# Patient Record
Sex: FEMALE | Race: WHITE | NOT HISPANIC OR LATINO | Employment: UNEMPLOYED | URBAN - METROPOLITAN AREA
[De-identification: names, ages, dates, MRNs, and addresses within clinical notes are randomized per-mention and may not be internally consistent; named-entity substitution may affect disease eponyms.]

---

## 2017-01-12 DIAGNOSIS — A60.00 HERPES SIMPLEX INFECTION OF GENITOURINARY SYSTEM: ICD-10-CM

## 2017-01-12 DIAGNOSIS — M19.90 OSTEOARTHRITIS, UNSPECIFIED OSTEOARTHRITIS TYPE, UNSPECIFIED SITE: ICD-10-CM

## 2017-01-12 DIAGNOSIS — G89.4 CHRONIC PAIN DISORDER: Primary | ICD-10-CM

## 2017-01-12 DIAGNOSIS — F33.1 MAJOR DEPRESSIVE DISORDER, RECURRENT EPISODE, MODERATE (H): ICD-10-CM

## 2017-01-12 DIAGNOSIS — F41.1 GENERALIZED ANXIETY DISORDER: ICD-10-CM

## 2017-01-12 DIAGNOSIS — J30.89 OTHER ALLERGIC RHINITIS: ICD-10-CM

## 2017-01-12 DIAGNOSIS — Z98.1 STATUS POST LAMINECTOMY WITH SPINAL FUSION: ICD-10-CM

## 2017-01-12 RX ORDER — VALACYCLOVIR HYDROCHLORIDE 500 MG/1
500 TABLET, FILM COATED ORAL DAILY
Qty: 30 TABLET | Refills: 0 | Status: SHIPPED | OUTPATIENT
Start: 2017-01-12 | End: 2017-05-03

## 2017-01-12 RX ORDER — MELOXICAM 7.5 MG/1
7.5-15 TABLET ORAL DAILY
Qty: 60 TABLET | Refills: 1 | Status: SHIPPED
Start: 2017-01-12 | End: 2017-06-09 | Stop reason: ALTCHOICE

## 2017-01-12 NOTE — TELEPHONE ENCOUNTER
Valacyclovir     Last Written Prescription Date: 10/04/16  Last Fill Quantity: 90, # refills: 0  Last Office Visit with [facility] or [facility] prescribing provider: 12/09/16   Next 5 appointments (look out 90 days)     Feb 13, 2017  9:20 AM   Office Visit with Rosaura Flores PA-C   [facility]    [address] [place] [id number] [phone]                   CREATININE   Date Value Ref Range Status   07/05/2016 0.65 0.52 - 1.04 mg/dL Final

## 2017-01-12 NOTE — TELEPHONE ENCOUNTER
Fuad      Last Written Prescription Date: 12/09/16  Last Quantity: 60, # refills: 1  Last Office Visit with [facility], [facility] or [facility] prescribing provider: 12/09/16   Next 5 appointments (look out 90 days)     Feb 13, 2017  9:20 AM   Office Visit with Rosaura Flores PA-C   [facility]    [address] [place] [id number]   [phone]                   CREATININE   Date Value Ref Range Status   07/05/2016 0.65 0.52 - 1.04 mg/dL Final     AST        7   7/5/2016  ALT       21   7/5/2016  BP Readings from Last 3 Encounters:   12/09/16 144/100   10/14/16 138/75   09/15/16 146/92

## 2017-01-20 DIAGNOSIS — A60.00 HERPES SIMPLEX INFECTION OF GENITOURINARY SYSTEM: ICD-10-CM

## 2017-01-20 DIAGNOSIS — G89.4 CHRONIC PAIN DISORDER: ICD-10-CM

## 2017-01-20 DIAGNOSIS — F41.1 GENERALIZED ANXIETY DISORDER: ICD-10-CM

## 2017-01-20 DIAGNOSIS — F33.1 MAJOR DEPRESSIVE DISORDER, RECURRENT EPISODE, MODERATE (H): ICD-10-CM

## 2017-01-20 DIAGNOSIS — J30.89 OTHER ALLERGIC RHINITIS: Primary | ICD-10-CM

## 2017-01-20 DIAGNOSIS — Z98.1 STATUS POST LAMINECTOMY WITH SPINAL FUSION: ICD-10-CM

## 2017-01-20 RX ORDER — CETIRIZINE HYDROCHLORIDE 10 MG/1
10 TABLET ORAL 2 TIMES DAILY
Qty: 60 TABLET | Refills: 5 | Status: SHIPPED | OUTPATIENT
Start: 2017-01-20 | End: 2018-01-04

## 2017-01-20 NOTE — TELEPHONE ENCOUNTER
cetirizine (ZYRTEC) 10 MG tablet      Last Written Prescription Date: 1/18/2016  Last Fill Quantity: 60,  # refills: 11   Last Office Visit with [facility], [facility] or [facility] prescribing provider: 12/9/2016                                         Next 5 appointments (look out 90 days)     Feb 13, 2017  9:20 AM   Office Visit with Rosaura Flores PA-C   [facility]    [address] [place] [id number] [phone]

## 2017-01-24 ENCOUNTER — TELEPHONE (OUTPATIENT)
Dept: FAMILY MEDICINE | Facility: CLINIC | Age: 47
End: 2017-01-24

## 2017-01-24 DIAGNOSIS — Z98.1 STATUS POST LAMINECTOMY WITH SPINAL FUSION: Primary | ICD-10-CM

## 2017-01-24 DIAGNOSIS — G89.4 CHRONIC PAIN DISORDER: ICD-10-CM

## 2017-01-24 NOTE — TELEPHONE ENCOUNTER
left message for patient to return call.  I just want her to know that Rosaura Flores PA-C is out of the office today  Mariaa Sierra RN

## 2017-01-24 NOTE — TELEPHONE ENCOUNTER
Reason for Call:  Medication or medication refill:    Do you use a [facility]?  Name of the pharmacy and phone number for the current request: Wants written Rx    Name of the medication requested: Norco    Other request: Cynthia says she won't see the spine specialist until Feb 10th and will see Rosaura on Feb 17. She has a new job so can only make apts on Fridays. Her [place] will be due to be refilled on Feb 9th. She wonders if Rosaura will write her Rx to be filled on 2/9 and she will .     Can we leave a detailed message on this number? YES    Phone number patient can be reached at: Home number on file [phone] (home)    Best Time: anytime    Call taken on 1/24/2017 at 8:34 AM by Lian Hernandes [phone]      Last Written Prescription Date:  1/9/17  Last Fill Quantity: 180,   # refills: 0  Last Office Visit with [facility] or [facility] prescribing provider: 12/9/16  Future Office visit:    Next 5 appointments (look out 90 days)     Feb 17, 2017  7:40 AM   Office Visit with Rosaura Flores PA-C   [facility]    [address] [place] [id number] [phone]                   Routing refill request to provider for review/approval because:  Drug not on the [facility], [facility] or [facility] refill protocol or controlled substance

## 2017-01-25 RX ORDER — HYDROCODONE BITARTRATE AND ACETAMINOPHEN 5; 325 MG/1; MG/1
TABLET ORAL
Qty: 180 TABLET | Refills: 0 | Status: SHIPPED | OUTPATIENT
Start: 2017-02-09 | End: 2017-02-17

## 2017-01-25 NOTE — TELEPHONE ENCOUNTER
Pt informed. Script walked over to pharmacy in [facility].     Sherita Wilhelm MA  8:33 AM 1/25/2017

## 2017-02-17 ENCOUNTER — OFFICE VISIT (OUTPATIENT)
Dept: FAMILY MEDICINE | Facility: CLINIC | Age: 47
End: 2017-02-17
Payer: COMMERCIAL

## 2017-02-17 VITALS
DIASTOLIC BLOOD PRESSURE: 94 MMHG | HEART RATE: 87 BPM | SYSTOLIC BLOOD PRESSURE: 142 MMHG | WEIGHT: 211 LBS | TEMPERATURE: 97.2 F | BODY MASS INDEX: 33.91 KG/M2 | HEIGHT: 66 IN

## 2017-02-17 DIAGNOSIS — G89.4 CHRONIC PAIN DISORDER: ICD-10-CM

## 2017-02-17 DIAGNOSIS — Z98.1 STATUS POST LAMINECTOMY WITH SPINAL FUSION: Primary | ICD-10-CM

## 2017-02-17 DIAGNOSIS — E66.9 NON MORBID OBESITY, UNSPECIFIED OBESITY TYPE: ICD-10-CM

## 2017-02-17 DIAGNOSIS — I10 ESSENTIAL HYPERTENSION WITH GOAL BLOOD PRESSURE LESS THAN 140/90: ICD-10-CM

## 2017-02-17 PROCEDURE — 99214 OFFICE O/P EST MOD 30 MIN: CPT | Performed by: PHYSICIAN ASSISTANT

## 2017-02-17 RX ORDER — HYDROCODONE BITARTRATE AND ACETAMINOPHEN 5; 325 MG/1; MG/1
TABLET ORAL
Qty: 187 TABLET | Refills: 0 | Status: SHIPPED | OUTPATIENT
Start: 2017-02-17 | End: 2017-02-17

## 2017-02-17 RX ORDER — HYDROCODONE BITARTRATE AND ACETAMINOPHEN 5; 325 MG/1; MG/1
TABLET ORAL
Qty: 180 TABLET | Refills: 0 | Status: SHIPPED | OUTPATIENT
Start: 2017-02-17 | End: 2017-05-03

## 2017-02-17 ASSESSMENT — ANXIETY QUESTIONNAIRES
7. FEELING AFRAID AS IF SOMETHING AWFUL MIGHT HAPPEN: NOT AT ALL
6. BECOMING EASILY ANNOYED OR IRRITABLE: SEVERAL DAYS
GAD7 TOTAL SCORE: 4
2. NOT BEING ABLE TO STOP OR CONTROL WORRYING: SEVERAL DAYS
1. FEELING NERVOUS, ANXIOUS, OR ON EDGE: SEVERAL DAYS
3. WORRYING TOO MUCH ABOUT DIFFERENT THINGS: NOT AT ALL
5. BEING SO RESTLESS THAT IT IS HARD TO SIT STILL: NOT AT ALL

## 2017-02-17 ASSESSMENT — PATIENT HEALTH QUESTIONNAIRE - PHQ9: 5. POOR APPETITE OR OVEREATING: SEVERAL DAYS

## 2017-02-17 NOTE — PROGRESS NOTES
SUBJECTIVE:                                                    Cynthia Lyons is a 47 year old female who presents to clinic today for the following health issues:    Chronic Pain Follow-Up      Type / Location of Pain: Back Pain   Analgesia/pain control:      Recent changes: Worse-     Overall control: Tolerable with discomfort  Activity level/function:     Daily activities: Can do most things most days, with some rest    Work: not applicable  Adverse effects: No  Adherance    Taking medication as directed? Yes    Participating in other treatments: Recently saw spine specialist  Risk Factors:    Sleep: Good    Mood/anxiety: controlled    Recent family or social stressors: none noted    Other aggravating factors: prolonged sitting and prolonged standing  PHQ-9 SCORE 3/2/2016 6/7/2016 9/7/2016   Total Score - - -   Total Score 0 0 0     NANCI-7 SCORE 3/2/2016 6/7/2016 9/7/2016   Total Score - - -   Total Score 0 0 0     Encounter-Level CSA - 08/11/2016:                 Controlled Substance Agreement - Scan on 9/8/2016  8:37 AM : CONTROLLED SUBSTANCE AGREEMENT 08/11/2016 (below)          Encounter-Level CSA - 08/11/2016:                 Controlled Substance Agreement - Scan on 9/16/2014  3:59 PM : [facility] Controlled Medication Agreement 9/10/14 (below)               Saw Dr Otto - liked him.  He suggested pool therapy and wt loss, no other recommendations.  Patient had been on disability in past, tried to go back to work but will be going back on full disability.  * Needs to talk about medications, is going to be going to [place] 2/28 to 3/13.  Wonders about temporary increase in #tabs as always hurts worse when not in her own bed.    Wt - states she gained a lot over holidays and has lost some already, plans to lose more.  Has lap band, thinking about getting adjusted.    BP - high today and in Dec.  History of HTN, off meds > 1 yr.  States in past BP very sensitive to her wt.  Thinks BPs maybe higher in mornings  "since that is when her pain is worst.      Amount of exercise or physical activity: None    Problems taking medications regularly: No    Medication side effects: none  Diet: regular (no restrictions)    Problem list and histories reviewed & adjusted, as indicated.  Additional history: as documented    Problem list, Medication list, Allergies, and Medical/Social/Surgical histories reviewed in [facility] and updated as appropriate.    ROS:  Constitutional, cardiovascular, pulmonary, musculoskeletal, and psych systems are negative, except as otherwise noted.    OBJECTIVE:                                                    BP (!) 142/94  Pulse 87  Temp 97.2  F (36.2  C) (Tympanic)  Ht 5' 6\" (1.676 m)  Wt 211 lb (95.7 kg)  BMI 34.06 kg/m2  Body mass index is 34.06 kg/(m^2).  GENERAL: healthy, alert and no distress  Heart: Regular rate and rhythm, no murmurs or rubs        ASSESSMENT/PLAN:                                                        ICD-10-CM    1. Status post laminectomy with spinal fusion Z98.1 HYDROcodone-acetaminophen (NORCO) 5-325 MG per tablet     DISCONTINUED: HYDROcodone-acetaminophen (NORCO) 5-325 MG per tablet   2. Chronic pain disorder G89.4 HYDROcodone-acetaminophen (NORCO) 5-325 MG per tablet     DISCONTINUED: HYDROcodone-acetaminophen (NORCO) 5-325 MG per tablet   3. Essential hypertension with goal blood pressure less than 140/90 I10    4. Non morbid obesity, unspecified obesity type E66.9      Patient Instructions     Start the pool therapy per Dr Otto    Refills today should last about until 5/9    See me then, or sooner if needed    Start checking BPs - need to be under 140/90.  Or we need to get you on a medicine - call if we need to resume lisinopril.    Keep working on weight loss - for your back and your BP      Rosaura Flores PA-C  [facility]    "

## 2017-02-17 NOTE — NURSING NOTE
"Chief Complaint   Patient presents with     Musculoskeletal Problem     BP (!) 153/95 (BP Location: Right arm, Patient Position: Chair, Cuff Size: Adult Large)  Pulse 87  Temp 97.2  F (36.2  C) (Tympanic)  Ht 5' 6\" (1.676 m)  Wt 211 lb (95.7 kg)  BMI 34.06 kg/m2 Estimated body mass index is 34.06 kg/(m^2) as calculated from the following:    Height as of this encounter: 5' 6\" (1.676 m).    Weight as of this encounter: 211 lb (95.7 kg).  bp completed using cuff size: large      Health Maintenance that is potentially due pending provider review:  PHQ9    Possibly completing today per provider review.  Lorena CHI MA      "

## 2017-02-17 NOTE — MR AVS SNAPSHOT
After Visit Summary   2/17/2017    Cynthia Lyons    MRN: [id number]           Patient Information     Date Of Birth          1970        Visit Information        Provider Department      2/17/2017 7:40 AM Rosarua Flores PA-C [facility]        Today's Diagnoses     Status post laminectomy with spinal fusion        Chronic pain disorder          Care Instructions      Start the pool therapy per Dr Otto    Refills today should last about until 5/9    See me then, or sooner if needed    Start checking BPs - need to be under 140/90.  Or we need to get you on a medicine - call if we need to resume lisinopril.    Keep working on weight loss - for your back and your BP        Follow-ups after your visit        Who to contact     If you have questions or need follow up information about today's clinic visit or your schedule please contact [facility] directly at [phone].  Normal or non-critical lab and imaging results will be communicated to you by [employer], letter or phone within 4 business days after the clinic has received the results. If you do not hear from us within 7 days, please contact the clinic through [employer] or phone. If you have a critical or abnormal lab result, we will notify you by phone as soon as possible.  Submit refill requests through [employer]) or call your pharmacy and they will forward the refill request to us. Please allow 3 business days for your refill to be completed.          Additional Information About Your Visit        MyChart Information     [employer] gives you secure access to your electronic health record. If you see a primary care provider, you can also send messages to your care team and make appointments. If you have questions, please call your primary care clinic.  If you do not have a primary care provider, please call [phone] and they will assist you.        Care EveryWhere ID     This is your Care  "EveryWhere ID. This could be used by other organizations to access your [place] medical records  [id number]        Your Vitals Were     Pulse Temperature Height BMI (Body Mass Index)          87 97.2  F (36.2  C) (Tympanic) 5' 6\" (1.676 m) 34.06 kg/m2         Blood Pressure from Last 3 Encounters:   02/17/17 (!) 153/95   12/09/16 (!) 144/100   10/14/16 138/75    Weight from Last 3 Encounters:   02/17/17 211 lb (95.7 kg)   08/11/16 211 lb 6.4 oz (95.9 kg)   05/03/16 204 lb (92.5 kg)              Today, you had the following     No orders found for display         Today's Medication Changes          These changes are accurate as of: 2/17/17  8:21 AM.  If you have any questions, ask your nurse or doctor.               Start taking these medicines.        Dose/Directions    HYDROcodone-acetaminophen 5-325 MG per tablet   Commonly known as:  NORCO   Used for:  Status post laminectomy with spinal fusion, Chronic pain disorder   Started by:  Rosaura Flores PA-C        2 in am and 2 at dinnertime/pm, and additional 1-2 at night.   Quantity:  180 tablet   Refills:  0         Stop taking these medicines if you haven't already. Please contact your care team if you have questions.     cefdinir 300 MG capsule   Commonly known as:  OMNICEF   Stopped by:  Rosaura Flores PA-C                Where to get your medicines      Some of these will need a paper prescription and others can be bought over the counter.  Ask your nurse if you have questions.     Bring a paper prescription for each of these medications     HYDROcodone-acetaminophen 5-325 MG per tablet                Primary Care Provider Office Phone # Fax #    Rosaura Flores PA-C [phone]       [address] [address]        Thank you!     Thank you for choosing [facility]  for your care. Our goal is always to provide you with excellent care. Hearing back from our patients is one " way we can continue to improve our services. Please take a few minutes to complete the written survey that you may receive in the mail after your visit with us. Thank you!             Your Updated Medication List - Protect others around you: Learn how to safely use, store and throw away your medicines at www.disposemymeds.org.          This list is accurate as of: 2/17/17  8:21 AM.  Always use your most recent med list.                   Brand Name Dispense Instructions for use    acetaminophen 500 MG tablet    TYLENOL    60    1 TABLET EVERY 6 HOURS. max tylenol/acetaminophen daily 4,000mg/24 hours       albuterol 108 (90 BASE) MCG/ACT Inhaler    albuterol    1 Inhaler    Inhale 2 puffs into the lungs every 4 hours as needed for shortness of breath / dyspnea       buPROPion 100 MG 12 hr tablet    WELLBUTRIN SR    30 tablet    Take 2 tablets (200 mg) by mouth daily       cetirizine 10 MG tablet    zyrTEC    60 tablet    Take 1 tablet (10 mg) by mouth 2 times daily       FLUoxetine 20 MG capsule    PROzac    360 capsule    Take 4 capsules (80 mg) by mouth daily       fluticasone 50 MCG/ACT spray    FLONASE    1 Package    Spray 1 spray in nostril 2 times daily.       HYDROcodone-acetaminophen 5-325 MG per tablet    NORCO    180 tablet    2 in am and 2 at dinnertime/pm, and additional 1-2 at night.       ipratropium - albuterol 0.5 mg/2.5 mg/3 mL 0.5-2.5 (3) MG/3ML neb solution    DUONEB    360 mL    Take 1 vial (3 mLs) by nebulization 4 times daily       meloxicam 7.5 MG tablet    MOBIC    60 tablet    Take 1-2 tablets (7.5-15 mg) by mouth daily If needed for pain (optional).  Take with food and do not overlap with any ibuprofen or naproxen.  Max 2 pills per 24 hrs.       methocarbamol 750 MG tablet    ROBAXIN    120 tablet    1-2 tabs as needed up to 3 times per day (4th time per day, on occasion, is ok).       pseudoePHEDrine 120 MG 12 hr tablet    SUDAFED    28 tablet    Take 1 tablet (120 mg) by mouth every 12  hours       valACYclovir 500 MG tablet    VALTREX    30 tablet    Take 1 tablet (500 mg) by mouth daily

## 2017-02-17 NOTE — PATIENT INSTRUCTIONS
Start the pool therapy per Dr Otto    Refills today should last about until 5/9    See me then, or sooner if needed    Start checking BPs - need to be under 140/90.  Or we need to get you on a medicine - call if we need to resume lisinopril.    Keep working on weight loss - for your back and your BP

## 2017-02-18 ASSESSMENT — ANXIETY QUESTIONNAIRES: GAD7 TOTAL SCORE: 4

## 2017-02-18 ASSESSMENT — PATIENT HEALTH QUESTIONNAIRE - PHQ9: SUM OF ALL RESPONSES TO PHQ QUESTIONS 1-9: 0

## 2017-04-05 DIAGNOSIS — Z98.1 STATUS POST LAMINECTOMY WITH SPINAL FUSION: ICD-10-CM

## 2017-04-05 RX ORDER — METHOCARBAMOL 750 MG/1
TABLET, FILM COATED ORAL
Qty: 120 TABLET | Refills: 0 | Status: SHIPPED | OUTPATIENT
Start: 2017-04-05 | End: 2017-06-09

## 2017-04-05 NOTE — TELEPHONE ENCOUNTER
methocarbamol (ROBAXIN) 750 MG tablet      Last Written Prescription Date:  12/9/16  Last Fill Quantity: 120,   # refills: 1  Last Office Visit with [facility] or [facility] provider: 2/17/17  Future Office visit:    Next 5 appointments (look out 90 days)     May 08, 2017  8:00 AM CDT   Office Visit with Rosaura Flores PA-C   [facility]    [address] [place] [id number]   [phone]                   Routing refill request to provider for review/approval because:  Drug not on the [facility] or [facility] refill protocol or controlled substance

## 2017-05-03 ENCOUNTER — OFFICE VISIT (OUTPATIENT)
Dept: FAMILY MEDICINE | Facility: CLINIC | Age: 47
End: 2017-05-03
Payer: COMMERCIAL

## 2017-05-03 VITALS
WEIGHT: 216.2 LBS | SYSTOLIC BLOOD PRESSURE: 144 MMHG | HEART RATE: 76 BPM | TEMPERATURE: 97.6 F | DIASTOLIC BLOOD PRESSURE: 100 MMHG | BODY MASS INDEX: 34.9 KG/M2

## 2017-05-03 DIAGNOSIS — Z98.1 STATUS POST LAMINECTOMY WITH SPINAL FUSION: ICD-10-CM

## 2017-05-03 DIAGNOSIS — G89.4 CHRONIC PAIN DISORDER: Primary | ICD-10-CM

## 2017-05-03 DIAGNOSIS — F41.0 PANIC ANXIETY SYNDROME: ICD-10-CM

## 2017-05-03 DIAGNOSIS — F33.1 MAJOR DEPRESSIVE DISORDER, RECURRENT EPISODE, MODERATE (H): ICD-10-CM

## 2017-05-03 DIAGNOSIS — J30.89 OTHER ALLERGIC RHINITIS: ICD-10-CM

## 2017-05-03 DIAGNOSIS — G89.4 CHRONIC PAIN DISORDER: ICD-10-CM

## 2017-05-03 DIAGNOSIS — F41.1 GENERALIZED ANXIETY DISORDER: ICD-10-CM

## 2017-05-03 DIAGNOSIS — A60.00 HERPES SIMPLEX INFECTION OF GENITOURINARY SYSTEM: ICD-10-CM

## 2017-05-03 DIAGNOSIS — I10 ESSENTIAL HYPERTENSION WITH GOAL BLOOD PRESSURE LESS THAN 140/90: ICD-10-CM

## 2017-05-03 PROCEDURE — 99214 OFFICE O/P EST MOD 30 MIN: CPT | Performed by: PHYSICIAN ASSISTANT

## 2017-05-03 RX ORDER — LISINOPRIL 20 MG/1
20 TABLET ORAL DAILY
Qty: 30 TABLET | Refills: 0 | Status: SHIPPED | OUTPATIENT
Start: 2017-05-03 | End: 2017-06-09

## 2017-05-03 RX ORDER — BUPROPION HYDROCHLORIDE 100 MG/1
TABLET, EXTENDED RELEASE ORAL
Qty: 60 TABLET | Refills: 0 | Status: SHIPPED | OUTPATIENT
Start: 2017-05-03 | End: 2017-06-09

## 2017-05-03 RX ORDER — VALACYCLOVIR HYDROCHLORIDE 500 MG/1
TABLET, FILM COATED ORAL
Qty: 30 TABLET | Refills: 0 | Status: SHIPPED | OUTPATIENT
Start: 2017-05-03 | End: 2017-06-09

## 2017-05-03 RX ORDER — VENLAFAXINE HYDROCHLORIDE 37.5 MG/1
37.5 CAPSULE, EXTENDED RELEASE ORAL DAILY
Qty: 30 CAPSULE | Refills: 0 | Status: SHIPPED | OUTPATIENT
Start: 2017-05-03 | End: 2017-06-09

## 2017-05-03 RX ORDER — HYDROCODONE BITARTRATE AND ACETAMINOPHEN 5; 325 MG/1; MG/1
TABLET ORAL
Qty: 180 TABLET | Refills: 0 | Status: SHIPPED | OUTPATIENT
Start: 2017-05-03 | End: 2017-06-09

## 2017-05-03 RX ORDER — LISINOPRIL 5 MG/1
5 TABLET ORAL
COMMUNITY
Start: 2013-04-08 | End: 2017-05-03

## 2017-05-03 ASSESSMENT — ANXIETY QUESTIONNAIRES
2. NOT BEING ABLE TO STOP OR CONTROL WORRYING: NEARLY EVERY DAY
3. WORRYING TOO MUCH ABOUT DIFFERENT THINGS: NEARLY EVERY DAY
5. BEING SO RESTLESS THAT IT IS HARD TO SIT STILL: MORE THAN HALF THE DAYS
1. FEELING NERVOUS, ANXIOUS, OR ON EDGE: NEARLY EVERY DAY
6. BECOMING EASILY ANNOYED OR IRRITABLE: NEARLY EVERY DAY
7. FEELING AFRAID AS IF SOMETHING AWFUL MIGHT HAPPEN: NEARLY EVERY DAY
GAD7 TOTAL SCORE: 19

## 2017-05-03 ASSESSMENT — PAIN SCALES - GENERAL: PAINLEVEL: SEVERE PAIN (6)

## 2017-05-03 ASSESSMENT — PATIENT HEALTH QUESTIONNAIRE - PHQ9: 5. POOR APPETITE OR OVEREATING: MORE THAN HALF THE DAYS

## 2017-05-03 NOTE — NURSING NOTE
"Chief Complaint   Patient presents with     Recheck Medication       Initial BP (!) 144/100 (BP Location: Right arm, Patient Position: Chair, Cuff Size: Adult Regular)  Pulse 76  Temp 97.6  F (36.4  C) (Tympanic)  Wt 216 lb 3.2 oz (98.1 kg)  BMI 34.9 kg/m2 Estimated body mass index is 34.9 kg/(m^2) as calculated from the following:    Height as of 2/17/17: 5' 6\" (1.676 m).    Weight as of this encounter: 216 lb 3.2 oz (98.1 kg).  Medication Reconciliation: complete    Health Maintenance that is potentially due pending provider review:  Pap Smear    Sherita Wilhelm MA  7:54 AM 5/3/2017  .    "

## 2017-05-03 NOTE — MR AVS SNAPSHOT
After Visit Summary   5/3/2017    Cynthia Lyons    MRN: [id number]           Patient Information     Date Of Birth          1970        Visit Information        Provider Department      5/3/2017 7:40 AM Rosaura Flores PA-C [facility]        Today's Diagnoses     Essential hypertension with goal blood pressure less than 140/90    -  1    Status post laminectomy with spinal fusion        Chronic pain disorder        Panic anxiety syndrome          Care Instructions    Depression and anxiety -  Anxiety chronically bad, depression maybe situationally bad  Consider counseling  Decided on med adjust today rather than seeing psych specialist   Decreasing on prozac, planning to try effexor  Continue wellbutrin  Recheck 4-6 weeks    Pain -  Unchanged   See if meloxicam helping or not by trying off it some days  Plan pool therapy and weight loss   Next narcotic refill 6/9 due to early fill today     BP -  Increase lisinopril to 20 mg   Recheck at next appt.  If BP running at/above 140/90 before then, call me for dose adjustment.  Will try getting back off med when lost weight - your goal for self is 200 pounds    Due for pap sometime          Follow-ups after your visit        Who to contact     If you have questions or need follow up information about today's clinic visit or your schedule please contact [facility] directly at [phone].  Normal or non-critical lab and imaging results will be communicated to you by MyChart, letter or phone within 4 business days after the clinic has received the results. If you do not hear from us within 7 days, please contact the clinic through MyChart or phone. If you have a critical or abnormal lab result, we will notify you by phone as soon as possible.  Submit refill requests through [employer] or call your pharmacy and they will forward the refill request to us. Please allow 3 business days for your refill to be  completed.          Additional Information About Your Visit        [employer] Information     [employer] gives you secure access to your electronic health record. If you see a primary care provider, you can also send messages to your care team and make appointments. If you have questions, please call your primary care clinic.  If you do not have a primary care provider, please call [phone] and they will assist you.        Care EveryWhere ID     This is your Care EveryWhere ID. This could be used by other organizations to access your [place] medical records  [id number]        Your Vitals Were     Pulse Temperature BMI (Body Mass Index)             76 97.6  F (36.4  C) (Tympanic) 34.9 kg/m2          Blood Pressure from Last 3 Encounters:   05/03/17 (!) 144/100   02/17/17 (!) 142/94   12/09/16 (!) 144/100    Weight from Last 3 Encounters:   05/03/17 216 lb 3.2 oz (98.1 kg)   02/17/17 211 lb (95.7 kg)   08/11/16 211 lb 6.4 oz (95.9 kg)              Today, you had the following     No orders found for display         Today's Medication Changes          These changes are accurate as of: 5/3/17  8:25 AM.  If you have any questions, ask your nurse or doctor.               Start taking these medicines.        Dose/Directions    venlafaxine 37.5 MG 24 hr capsule   Commonly known as:  EFFEXOR XR   Used for:  Panic anxiety syndrome   Started by:  Rosaura Flores PA-C        Dose:  37.5 mg   Take 1 capsule (37.5 mg) by mouth daily Start when on prozac (fluoxetine) 40 mg.   Quantity:  30 capsule   Refills:  0         These medicines have changed or have updated prescriptions.        Dose/Directions    FLUoxetine 20 MG capsule   Commonly known as:  PROzac   This may have changed:    - how much to take  - additional instructions   Used for:  Panic anxiety syndrome   Changed by:  Rosaura Flores PA-C        Dose:  60 mg   Take 3 capsules (60 mg) by mouth daily Then in 2 wks go to 2 caps (40 mg) daily and start  cymbalta 37.5.   Quantity:  75 capsule   Refills:  1       lisinopril 20 MG tablet   Commonly known as:  PRINIVIL/ZESTRIL   This may have changed:    - medication strength  - how much to take  - when to take this   Used for:  Essential hypertension with goal blood pressure less than 140/90   Changed by:  Rosaura Flores PA-C        Dose:  20 mg   Take 1 tablet (20 mg) by mouth daily   Quantity:  30 tablet   Refills:  0            Where to get your medicines      These medications were sent to [id number]     Phone:  [phone]     FLUoxetine 20 MG capsule    lisinopril 20 MG tablet    venlafaxine 37.5 MG 24 hr capsule         Some of these will need a paper prescription and others can be bought over the counter.  Ask your nurse if you have questions.     Bring a paper prescription for each of these medications     HYDROcodone-acetaminophen 5-325 MG per tablet                Primary Care Provider Office Phone # Fax #    Rosaura Flores PA-C [phone]       [address] [address]        Thank you!     Thank you for choosing [facility]  for your care. Our goal is always to provide you with excellent care. Hearing back from our patients is one way we can continue to improve our services. Please take a few minutes to complete the written survey that you may receive in the mail after your visit with us. Thank you!             Your Updated Medication List - Protect others around you: Learn how to safely use, store and throw away your medicines at www.disposemymeds.org.          This list is accurate as of: 5/3/17  8:25 AM.  Always use your most recent med list.                   Brand Name Dispense Instructions for use    acetaminophen 500 MG tablet    TYLENOL    60    1 TABLET EVERY 6 HOURS. max tylenol/acetaminophen daily 4,000mg/24  hours       albuterol 108 (90 BASE) MCG/ACT Inhaler    albuterol    1 Inhaler    Inhale 2 puffs into the lungs every 4 hours as needed for shortness of breath / dyspnea       buPROPion 100 MG 12 hr tablet    WELLBUTRIN SR    30 tablet    Take 2 tablets (200 mg) by mouth daily       cetirizine 10 MG tablet    zyrTEC    60 tablet    Take 1 tablet (10 mg) by mouth 2 times daily       FLUoxetine 20 MG capsule    PROzac    75 capsule    Take 3 capsules (60 mg) by mouth daily Then in 2 wks go to 2 caps (40 mg) daily and start cymbalta 37.5.       fluticasone 50 MCG/ACT spray    FLONASE    1 Package    Spray 1 spray in nostril 2 times daily.       HYDROcodone-acetaminophen 5-325 MG per tablet    NORCO    180 tablet    2 in am and 2 at dinnertime/pm, and additional 1-2 at night.       ipratropium - albuterol 0.5 mg/2.5 mg/3 mL 0.5-2.5 (3) MG/3ML neb solution    DUONEB    360 mL    Take 1 vial (3 mLs) by nebulization 4 times daily       lisinopril 20 MG tablet    PRINIVIL/ZESTRIL    30 tablet    Take 1 tablet (20 mg) by mouth daily       meloxicam 7.5 MG tablet    MOBIC    60 tablet    Take 1-2 tablets (7.5-15 mg) by mouth daily If needed for pain (optional).  Take with food and do not overlap with any ibuprofen or naproxen.  Max 2 pills per 24 hrs.       methocarbamol 750 MG tablet    ROBAXIN    120 tablet    TAKE 1 TO 2 TABLETS BY MOUTH UP TO THREE TIMES DAILY(4TH TIME PER DAY ON OCCASION IS OK)       pseudoePHEDrine 120 MG 12 hr tablet    SUDAFED    28 tablet    Take 1 tablet (120 mg) by mouth every 12 hours       valACYclovir 500 MG tablet    VALTREX    30 tablet    Take 1 tablet (500 mg) by mouth daily       venlafaxine 37.5 MG 24 hr capsule    EFFEXOR XR    30 capsule    Take 1 capsule (37.5 mg) by mouth daily Start when on prozac (fluoxetine) 40 mg.

## 2017-05-03 NOTE — PROGRESS NOTES
SUBJECTIVE:                                                    Cynthia Lyons is a 47 year old female who presents to clinic today for the following health issues:        Depression and Anxiety Follow-Up    Status since last visit: Worsened Anxiety    Other associated symptoms:None    Complicating factors:     Significant life event: Yes-  To discuss with provider     Current substance abuse: None    PHQ-9 SCORE 6/7/2016 9/7/2016 2/17/2017   Total Score - - -   Total Score 0 0 0     NANCI-7 SCORE 6/7/2016 9/7/2016 2/17/2017   Total Score - - -   Total Score 0 0 4        PHQ-9  English      PHQ-9   Any Language     GAD7       Chronic Pain Follow-Up       Type / Location of Pain: Lower Back  Analgesia/pain control:       Recent changes:  same      Overall control: Comfortably manageable  Activity level/function:      Daily activities:  Can do most things most days, with some rest    Work:  Unable to work  Adverse effects:  No  Adherance    Taking medication as directed?  Yes    Participating in other treatments: no - not at this time  Risk Factors:    Sleep:  Poor, feels due more to anxiety    Mood/anxiety:  Worsened, due to anxiety    Recent family or social stressors:  To discuss with provider    Other aggravating factors: prolonged sitting, prolonged standing and walking  PHQ-9 SCORE 6/7/2016 9/7/2016 2/17/2017   Total Score - - -   Total Score 0 0 0     NANCI-7 SCORE 6/7/2016 9/7/2016 2/17/2017   Total Score - - -   Total Score 0 0 4     Encounter-Level CSA - 08/11/2016:                 Controlled Substance Agreement - Scan on 9/8/2016  8:37 AM : CONTROLLED SUBSTANCE AGREEMENT 08/11/2016 (below)          Encounter-Level CSA - 08/11/2016:                 Controlled Substance Agreement - Scan on 9/16/2014  3:59 PM : [facility] Controlled Medication Agreement 9/10/14 (below)               Chronic pain.  Narcotic contract transferred from Dr Guzman.    Since last visit -   Mood worse.  Had to put down daughter's dog this  week.  Patient crying recently about dog.  Worrying about daughter's response to loss of dog, daughter's other dog, her other pregnant daughter, and family member with thyroid cancer.  Feels her depression is from dog, but anxiety chronic.    Past meds - cymbalta, and remotely, zoloft, celexa, lexapro.    Has been stress eating.  Gaining wt, despite her plan last visit to lose wt.  Hasn't started pool therapy nor followed thru on her idea last visit to get lap band adjusted.    HTN - restarted her lisinopril 5 from in the past, but home measures and clinic reading today still high.  She notes BP is a problem whenever she is over 190 pounds.  Current wt 216.       Problem list and histories reviewed & adjusted, as indicated.  Additional history: as documented    BP Readings from Last 3 Encounters:   05/03/17 (!) 144/100   02/17/17 (!) 142/94   12/09/16 (!) 144/100    Wt Readings from Last 3 Encounters:   05/03/17 216 lb 3.2 oz (98.1 kg)   02/17/17 211 lb (95.7 kg)   08/11/16 211 lb 6.4 oz (95.9 kg)         Reviewed and updated as needed this visit by clinical staff       Reviewed and updated as needed this visit by Provider         ROS:  Constitutional, musculoskeletal, and psych systems are negative, except as otherwise noted.    OBJECTIVE:                                                    BP (!) 144/100 (BP Location: Right arm, Patient Position: Chair, Cuff Size: Adult Regular)  Pulse 76  Temp 97.6  F (36.4  C) (Tympanic)  Wt 216 lb 3.2 oz (98.1 kg)  BMI 34.9 kg/m2  Body mass index is 34.9 kg/(m^2).  GENERAL: healthy, alert and no distress  PSYCH: mentation appears normal, affect somewhat stressed    PHQ-9 SCORE 9/7/2016 2/17/2017 5/3/2017   Total Score - - -   Total Score 0 0 19     NANCI-7 SCORE 9/7/2016 2/17/2017 5/3/2017   Total Score - - -   Total Score 0 4 19        reviewed today, appropriate     ASSESSMENT/PLAN:                                                        ICD-10-CM    1. Chronic pain  disorder G89.4 HYDROcodone-acetaminophen (NORCO) 5-325 MG per tablet   2. Status post laminectomy with spinal fusion Z98.1 HYDROcodone-acetaminophen (NORCO) 5-325 MG per tablet   3. Panic anxiety syndrome F41.0 FLUoxetine (PROZAC) 20 MG capsule     venlafaxine (EFFEXOR XR) 37.5 MG 24 hr capsule   4. Essential hypertension with goal blood pressure less than 140/90 I10 lisinopril (PRINIVIL/ZESTRIL) 20 MG tablet     Patient Instructions   Depression and anxiety -  Anxiety chronically bad, depression maybe situationally bad  Consider counseling  Decided on med adjust today rather than seeing psych specialist   Decreasing on prozac, planning to try effexor  Continue wellbutrin  Recheck 4-6 weeks    Pain -  Unchanged   See if meloxicam helping or not by trying off it some days  Plan pool therapy and weight loss   Next narcotic refill 6/9 due to early fill today     BP -  Increase lisinopril to 20 mg   Recheck at next appt.  If BP running at/above 140/90 before then, call me for dose adjustment.  Will try getting back off med when lost weight - your goal for self is 200 pounds    Due for pap sometime        Rosaura Flores PA-C  [facility]

## 2017-05-03 NOTE — PATIENT INSTRUCTIONS
Depression and anxiety -  Anxiety chronically bad, depression maybe situationally bad  Consider counseling  Decided on med adjust today rather than seeing psych specialist   Decreasing on prozac, planning to try effexor  Continue wellbutrin  Recheck 4-6 weeks    Pain -  Unchanged   See if meloxicam helping or not by trying off it some days  Plan pool therapy and weight loss   Next narcotic refill 6/9 due to early fill today     BP -  Increase lisinopril to 20 mg   Recheck at next appt.  If BP running at/above 140/90 before then, call me for dose adjustment.  Will try getting back off med when lost weight - your goal for self is 200 pounds    Due for pap sometime

## 2017-05-03 NOTE — TELEPHONE ENCOUNTER
Valacyclovir     Last Written Prescription Date: 1/12/17  Last Fill Quantity: 30, # refills: 0  Last Office Visit with G, P or [facility] prescribing provider: 05/03/17   Next 5 appointments (look out 90 days)     Jun 09, 2017  9:40 AM CDT   Office Visit with Rosaura Flores PA-C   [facility]    [address] [place] [id number] [phone]                   Creatinine   Date Value Ref Range Status   07/05/2016 0.65 0.52 - 1.04 mg/dL Final

## 2017-05-03 NOTE — TELEPHONE ENCOUNTER
buPROPion (WELLBUTRIN SR) 100 MG 12 hr tablet       Last Written Prescription Date: 12/12/16  Last Fill Quantity: 30; # refills: 1  Last Office Visit with [facility], [facility] or [facility] prescribing provider:  5/3/17   Next 5 appointments (look out 90 days)     Jun 09, 2017  9:40 AM CDT   Office Visit with Rosaura Flores PA-C   [facility]    [address] [place] [id number] [phone]                   Last PHQ-9 score on record=   PHQ-9 SCORE 5/3/2017   Total Score 19       Lab Results   Component Value Date    AST 7 07/05/2016     Lab Results   Component Value Date    ALT 21 07/05/2016

## 2017-05-04 ASSESSMENT — ANXIETY QUESTIONNAIRES: GAD7 TOTAL SCORE: 19

## 2017-05-04 ASSESSMENT — PATIENT HEALTH QUESTIONNAIRE - PHQ9: SUM OF ALL RESPONSES TO PHQ QUESTIONS 1-9: 19

## 2017-05-24 ENCOUNTER — CARE COORDINATION (OUTPATIENT)
Dept: SURGERY | Facility: CLINIC | Age: 47
End: 2017-05-24

## 2017-05-24 NOTE — PROGRESS NOTES
left a message in regards to needing to go over NRB questions and then will need to be reviewed with Lisa MCDERMOTT before being scheduled.

## 2017-06-03 ENCOUNTER — HEALTH MAINTENANCE LETTER (OUTPATIENT)
Age: 47
End: 2017-06-03

## 2017-06-09 ENCOUNTER — OFFICE VISIT (OUTPATIENT)
Dept: FAMILY MEDICINE | Facility: CLINIC | Age: 47
End: 2017-06-09
Payer: COMMERCIAL

## 2017-06-09 VITALS
SYSTOLIC BLOOD PRESSURE: 138 MMHG | WEIGHT: 216 LBS | DIASTOLIC BLOOD PRESSURE: 85 MMHG | HEART RATE: 77 BPM | BODY MASS INDEX: 34.72 KG/M2 | HEIGHT: 66 IN | TEMPERATURE: 97.8 F

## 2017-06-09 DIAGNOSIS — J30.89 OTHER ALLERGIC RHINITIS: ICD-10-CM

## 2017-06-09 DIAGNOSIS — I10 ESSENTIAL HYPERTENSION WITH GOAL BLOOD PRESSURE LESS THAN 140/90: ICD-10-CM

## 2017-06-09 DIAGNOSIS — G89.4 CHRONIC PAIN DISORDER: ICD-10-CM

## 2017-06-09 DIAGNOSIS — F41.0 PANIC ANXIETY SYNDROME: ICD-10-CM

## 2017-06-09 DIAGNOSIS — E78.5 DYSLIPIDEMIA: ICD-10-CM

## 2017-06-09 DIAGNOSIS — A60.00 HERPES SIMPLEX INFECTION OF GENITOURINARY SYSTEM: ICD-10-CM

## 2017-06-09 DIAGNOSIS — F33.1 MAJOR DEPRESSIVE DISORDER, RECURRENT EPISODE, MODERATE (H): ICD-10-CM

## 2017-06-09 DIAGNOSIS — Z98.1 STATUS POST LAMINECTOMY WITH SPINAL FUSION: Primary | ICD-10-CM

## 2017-06-09 DIAGNOSIS — F41.1 GENERALIZED ANXIETY DISORDER: ICD-10-CM

## 2017-06-09 DIAGNOSIS — Z98.1 STATUS POST LAMINECTOMY WITH SPINAL FUSION: ICD-10-CM

## 2017-06-09 DIAGNOSIS — E66.9 NON MORBID OBESITY, UNSPECIFIED OBESITY TYPE: ICD-10-CM

## 2017-06-09 LAB
ANION GAP SERPL CALCULATED.3IONS-SCNC: 8 MMOL/L (ref 3–14)
BUN SERPL-MCNC: 17 MG/DL (ref 7–30)
CALCIUM SERPL-MCNC: 9.4 MG/DL (ref 8.5–10.1)
CHLORIDE SERPL-SCNC: 105 MMOL/L (ref 94–109)
CHOLEST SERPL-MCNC: 273 MG/DL
CO2 SERPL-SCNC: 25 MMOL/L (ref 20–32)
CREAT SERPL-MCNC: 0.78 MG/DL (ref 0.52–1.04)
GFR SERPL CREATININE-BSD FRML MDRD: 79 ML/MIN/1.7M2
GLUCOSE SERPL-MCNC: 104 MG/DL (ref 70–99)
HDLC SERPL-MCNC: 51 MG/DL
LDLC SERPL CALC-MCNC: 187 MG/DL
NONHDLC SERPL-MCNC: 222 MG/DL
POTASSIUM SERPL-SCNC: 4 MMOL/L (ref 3.4–5.3)
SODIUM SERPL-SCNC: 138 MMOL/L (ref 133–144)
TRIGL SERPL-MCNC: 173 MG/DL
TSH SERPL DL<=0.005 MIU/L-ACNC: 1.72 MU/L (ref 0.4–4)

## 2017-06-09 PROCEDURE — 99214 OFFICE O/P EST MOD 30 MIN: CPT | Performed by: PHYSICIAN ASSISTANT

## 2017-06-09 PROCEDURE — 80048 BASIC METABOLIC PNL TOTAL CA: CPT | Performed by: PHYSICIAN ASSISTANT

## 2017-06-09 PROCEDURE — 80061 LIPID PANEL: CPT | Performed by: PHYSICIAN ASSISTANT

## 2017-06-09 PROCEDURE — 84443 ASSAY THYROID STIM HORMONE: CPT | Performed by: PHYSICIAN ASSISTANT

## 2017-06-09 PROCEDURE — 36415 COLL VENOUS BLD VENIPUNCTURE: CPT | Performed by: PHYSICIAN ASSISTANT

## 2017-06-09 RX ORDER — LISINOPRIL 20 MG/1
20 TABLET ORAL DAILY
Qty: 30 TABLET | Refills: 11 | Status: SHIPPED | OUTPATIENT
Start: 2017-06-09 | End: 2018-01-15

## 2017-06-09 RX ORDER — VENLAFAXINE HYDROCHLORIDE 75 MG/1
75 CAPSULE, EXTENDED RELEASE ORAL DAILY
Qty: 30 CAPSULE | Refills: 1 | Status: SHIPPED | OUTPATIENT
Start: 2017-06-09 | End: 2017-08-13

## 2017-06-09 RX ORDER — HYDROCODONE BITARTRATE AND ACETAMINOPHEN 5; 325 MG/1; MG/1
TABLET ORAL
Qty: 180 TABLET | Refills: 0 | Status: SHIPPED | OUTPATIENT
Start: 2017-06-09 | End: 2017-07-06

## 2017-06-09 RX ORDER — BUPROPION HYDROCHLORIDE 100 MG/1
TABLET, EXTENDED RELEASE ORAL
Qty: 60 TABLET | Refills: 1 | Status: SHIPPED | OUTPATIENT
Start: 2017-06-09 | End: 2017-07-06

## 2017-06-09 ASSESSMENT — ANXIETY QUESTIONNAIRES
1. FEELING NERVOUS, ANXIOUS, OR ON EDGE: SEVERAL DAYS
6. BECOMING EASILY ANNOYED OR IRRITABLE: SEVERAL DAYS
2. NOT BEING ABLE TO STOP OR CONTROL WORRYING: NOT AT ALL
GAD7 TOTAL SCORE: 2
7. FEELING AFRAID AS IF SOMETHING AWFUL MIGHT HAPPEN: NOT AT ALL
3. WORRYING TOO MUCH ABOUT DIFFERENT THINGS: NOT AT ALL
5. BEING SO RESTLESS THAT IT IS HARD TO SIT STILL: NOT AT ALL

## 2017-06-09 ASSESSMENT — PATIENT HEALTH QUESTIONNAIRE - PHQ9: 5. POOR APPETITE OR OVEREATING: NOT AT ALL

## 2017-06-09 NOTE — PATIENT INSTRUCTIONS
Pain -   Stop meloxicam (mobic), try daypro (oxaprozin) instead  Consider calling again for formal pool therapy if your swimming not helping  Working on wt loss -    Labs today   self goals:     -to be gym weekdays for 10 minutes per day, and   increasing this every 2 weeks    -Planning to use [employer] vega    -smaller portions    -more chicken, more salad    -less junk food    -less red meat   Motivations for wt loss: back pain, getting rid of BP med, daughter's upcoming wedding, grandchild, and considering plastics    Mood-  Increase dose of effexor - new prescription sent for 75 mg  No prozac  Continue wellbutrin  lab    Due for pap

## 2017-06-09 NOTE — NURSING NOTE
"Chief Complaint   Patient presents with     Musculoskeletal Problem       Initial /85 (BP Location: Right arm, Patient Position: Chair, Cuff Size: Adult Large)  Pulse 77  Temp 97.8  F (36.6  C) (Tympanic)  Ht 5' 6\" (1.676 m) Estimated body mass index is 34.9 kg/(m^2) as calculated from the following:    Height as of 2/17/17: 5' 6\" (1.676 m).    Weight as of 5/3/17: 216 lb 3.2 oz (98.1 kg).  Medication Reconciliation: complete    Health Maintenance that is potentially due pending provider review:  NONE    Lorena CHI MA        "

## 2017-06-09 NOTE — LETTER
"[facility]  [address] [place] [id number]  Phone: [phone]  Fax: [phone]    June 13, 2017    Cynthia Lyons  [address]   [place]  [place] [address]          Dear Ms. Lyons,    The results of your recent lab tests: Your thyroid is normal.  Blood salts were fine.  Kidney function decreased slightly, but this could be due to dehydration.  Blood sugar was within normal for having had breakfast, but high if had not had breakfast.  I am not sure how to interpret this since you had stated having \"just a few bites of hashbrown\".  Cholesterol increased a lot and is actually very close to needing a cholesterol med.  Please strongly work on weight loss and we'll recheck lab in 3 months. Enclosed is a copy of these results.  If you have any further questions or problems, please contact our office.  Results for orders placed or performed in visit on 06/09/17   Basic metabolic panel  (Ca, Cl, CO2, Creat, Gluc, K, Na, BUN)   Result Value Ref Range    Sodium 138 133 - 144 mmol/L    Potassium 4.0 3.4 - 5.3 mmol/L    Chloride 105 94 - 109 mmol/L    Carbon Dioxide 25 20 - 32 mmol/L    Anion Gap 8 3 - 14 mmol/L    Glucose 104 (H) 70 - 99 mg/dL    Urea Nitrogen 17 7 - 30 mg/dL    Creatinine 0.78 0.52 - 1.04 mg/dL    GFR Estimate 79 >60 mL/min/1.7m2    GFR Estimate If Black >90   GFR Calc   >60 mL/min/1.7m2    Calcium 9.4 8.5 - 10.1 mg/dL   Lipid Profile with reflex to direct LDL   Result Value Ref Range    Cholesterol 273 (H) <200 mg/dL    Triglycerides 173 (H) <150 mg/dL    HDL Cholesterol 51 >49 mg/dL    LDL Cholesterol Calculated 187 (H) <100 mg/dL    Non HDL Cholesterol 222 (H) <130 mg/dL   TSH with free T4 reflex   Result Value Ref Range    TSH 1.72 0.40 - 4.00 mU/L       Sincerely,      Rosaura Flores PA-C/ [facility]          "

## 2017-06-09 NOTE — PROGRESS NOTES
SUBJECTIVE:                                                    Cynthia Lyons is a 47 year old female who presents to clinic today for the following health issues:    Chronic Pain Follow-Up       Type / Location of Pain: Lower Back  Analgesia/pain control:       Recent changes:  same      Overall control: Comfortably manageable  Activity level/function:      Daily activities:  Can do most things most days, with some rest    Work:  Unable to work  Adverse effects:  No  Adherance    Taking medication as directed?  Yes    Participating in other treatments: no - not at this time  Risk Factors::    Sleep:  Good    Mood/anxiety:  It's OK, is improved, but now that she is not on Prozac she feels irritated.  Is now taking 2 prozac with 1 effexor and 2 wellbutrin    Recent family or social stressors:  none noted    Other aggravating factors: none  PHQ-9 SCORE 9/7/2016 2/17/2017 5/3/2017   Total Score - - -   Total Score 0 0 19     NANCI-7 SCORE 9/7/2016 2/17/2017 5/3/2017   Total Score - - -   Total Score 0 4 19     Encounter-Level CSA - 08/11/2016:                 Controlled Substance Agreement - Scan on 9/8/2016  8:37 AM : CONTROLLED SUBSTANCE AGREEMENT 08/11/2016 (below)          Encounter-Level CSA - 08/11/2016:                 Controlled Substance Agreement - Scan on 9/16/2014  3:59 PM : [facility] Controlled Medication Agreement 9/10/14 (below)                 Amount of exercise or physical activity: None, but will be starting today    Problems taking medications regularly: No    Medication side effects: none    Diet: regular (no restrictions)      Chronic back pain.  Narcotic contract transferred from Dr Guzman.  At last visit was unsure if meloxicam was helping.  Was to work on pool therapy and wt loss.  Lisinopril was increased, with patient self goal of getting wt down to 200 pounds.  Anxiety, had been cross tapering to effexor and continuing wellbutrin, and considering counseling.    Today - patient notes felt  "awesome during the cross taper, but then got angry without any prozac.  Just started taking more prozac again today.  Anxiety is now good, but vega.  Has talked with  about counseling but they haven't started yet.  Pain is unchanged overall.  Decided meloxicam doesn't make a difference.  Started swimming in her pool today - decided to hold off on formal pool therapy.  Hasn't heard back from Dr Otto about back brace, but somewhat wants to lose the wt first.  Thinks will motivate self with considering plastic surgeon for tummy tuck and breast reduction after wt loss.  Past meds - ibuprofen, naprosyn.  Notes BPs have been doing very well, that today's is actually high.      Problem list and histories reviewed & adjusted, as indicated.  Additional history: as documented    BP Readings from Last 3 Encounters:   06/09/17 138/85   05/03/17 (!) 144/100   02/17/17 (!) 142/94    Wt Readings from Last 3 Encounters:   06/09/17 216 lb (98 kg)   05/03/17 216 lb 3.2 oz (98.1 kg)   02/17/17 211 lb (95.7 kg)         Labs reviewed in EPIC    Reviewed and updated as needed this visit by clinical staff       Reviewed and updated as needed this visit by Provider         ROS:  Constitutional, cardiovascular, pulmonary, musculoskeletal, neuro, endocrine and psych systems are negative, except as otherwise noted.    OBJECTIVE:                                                    /85 (BP Location: Right arm, Patient Position: Chair, Cuff Size: Adult Large)  Pulse 77  Temp 97.8  F (36.6  C) (Tympanic)  Ht 5' 6\" (1.676 m)  Body mass index is 34.86 kg/(m^2).  GENERAL: healthy, alert and no distress  RESP: lungs clear to auscultation - no rales, rhonchi or wheezes  CV: regular rate and rhythm, normal S1 S2, no S3 or S4, no murmur, click or rub, no peripheral edema   PSYCH: mentation appears normal, affect normal/bright    PHQ-9 SCORE 2/17/2017 5/3/2017 6/9/2017   Total Score - - -   Total Score 0 19 1     NANCI-7 SCORE " 2/17/2017 5/3/2017 6/9/2017   Total Score - - -   Total Score 4 19 2          ASSESSMENT/PLAN:                                                        ICD-10-CM    1. Status post laminectomy with spinal fusion Z98.1 oxaprozin (DAYPRO) 600 MG tablet     HYDROcodone-acetaminophen (NORCO) 5-325 MG per tablet   2. Chronic pain disorder G89.4 venlafaxine (EFFEXOR-XR) 75 MG 24 hr capsule     oxaprozin (DAYPRO) 600 MG tablet     HYDROcodone-acetaminophen (NORCO) 5-325 MG per tablet   3. Major depressive disorder, recurrent episode, moderate (H) F33.1 venlafaxine (EFFEXOR-XR) 75 MG 24 hr capsule     buPROPion (WELLBUTRIN SR) 100 MG 12 hr tablet   4. Generalized anxiety disorder F41.1 venlafaxine (EFFEXOR-XR) 75 MG 24 hr capsule     buPROPion (WELLBUTRIN SR) 100 MG 12 hr tablet   5. Essential hypertension with goal blood pressure less than 140/90 I10 Basic metabolic panel  (Ca, Cl, CO2, Creat, Gluc, K, Na, BUN)     TSH with free T4 reflex     lisinopril (PRINIVIL/ZESTRIL) 20 MG tablet   6. Dyslipidemia E78.5 Lipid Profile with reflex to direct LDL     TSH with free T4 reflex   7. Non morbid obesity, unspecified obesity type E66.9        Patient Instructions   Pain -   Stop meloxicam (mobic), try daypro (oxaprozin) instead  Consider calling again for formal pool therapy if your swimming not helping  Working on wt loss -    Labs today   self goals:     -to be gym weekdays for 10 minutes per day, and   increasing this every 2 weeks    -Planning to use [employer] vega    -smaller portions    -more chicken, more salad    -less junk food    -less red meat   Motivations for wt loss: back pain, getting rid of BP med, daughter's upcoming wedding, grandchild, and considering plastics    Mood-  Increase dose of effexor - new prescription sent for 75 mg  No prozac  Continue wellbutrin  lab    Due for kristen Flores PA-C  [facility]

## 2017-06-09 NOTE — MR AVS SNAPSHOT
After Visit Summary   6/9/2017    Cynthia Lyons    MRN: [id number]           Patient Information     Date Of Birth          1970        Visit Information        Provider Department      6/9/2017 9:40 AM Rosaura Flores PA-C [facility]        Today's Diagnoses     Status post laminectomy with spinal fusion    -  1    Chronic pain disorder        Major depressive disorder, recurrent episode, moderate (H)        Generalized anxiety disorder        Essential hypertension with goal blood pressure less than 140/90        Dyslipidemia          Care Instructions    Pain -   Stop meloxicam (mobic), try daypro (oxaprozin) instead  Consider calling again for formal pool therapy if your swimming not helping  Working on wt loss -    Labs today   self goals:     -to be gym weekdays for 10 minutes per day, and   increasing this every 2 weeks    -Planning to use [employer] vega    -smaller portions    -more chicken, more salad    -less junk food    -less red meat   Motivations for wt loss: back pain, getting rid of BP med, daughter's upcoming wedding, grandchild, and considering plastics    Mood-  Increase dose of effexor - new prescription sent for 75 mg  No prozac  Continue wellbutrin  lab    Due for pap          Follow-ups after your visit        Who to contact     If you have questions or need follow up information about today's clinic visit or your schedule please contact [facility] directly at [phone].  Normal or non-critical lab and imaging results will be communicated to you by MyChart, letter or phone within 4 business days after the clinic has received the results. If you do not hear from us within 7 days, please contact the clinic through MyChart or phone. If you have a critical or abnormal lab result, we will notify you by phone as soon as possible.  Submit refill requests through [employer] or call your pharmacy and they will forward the refill  "request to us. Please allow 3 business days for your refill to be completed.          Additional Information About Your Visit        S[employer] Information     View[employer] gives you secure access to your electronic health record. If you see a primary care provider, you can also send messages to your care team and make appointments. If you have questions, please call your primary care clinic.  If you do not have a primary care provider, please call [phone] and they will assist you.        Care EveryWhere ID     This is your Care EveryWhere ID. This could be used by other organizations to access your [facility] medical records  [id number]        Your Vitals Were     Pulse Temperature Height             77 97.8  F (36.6  C) (Tympanic) 5' 6\" (1.676 m)          Blood Pressure from Last 3 Encounters:   06/09/17 138/85   05/03/17 (!) 144/100   02/17/17 (!) 142/94    Weight from Last 3 Encounters:   05/03/17 216 lb 3.2 oz (98.1 kg)   02/17/17 211 lb (95.7 kg)   08/11/16 211 lb 6.4 oz (95.9 kg)              We Performed the Following     Basic metabolic panel  (Ca, Cl, CO2, Creat, Gluc, K, Na, BUN)     Lipid Profile with reflex to direct LDL     TSH with free T4 reflex          Today's Medication Changes          These changes are accurate as of: 6/9/17 10:21 AM.  If you have any questions, ask your nurse or doctor.               Start taking these medicines.        Dose/Directions    oxaprozin 600 MG tablet   Commonly known as:  DAYPRO   Used for:  Status post laminectomy with spinal fusion, Chronic pain disorder   Started by:  Rosaura Flores PA-C        Dose:  1200 mg   Take 2 tablets (1,200 mg) by mouth daily Or 1 tab twice daily.   Quantity:  60 tablet   Refills:  11         These medicines have changed or have updated prescriptions.        Dose/Directions    buPROPion 100 MG 12 hr tablet   Commonly known as:  WELLBUTRIN SR   This may have changed:  See the new instructions.   Used for:  Major depressive disorder, " recurrent episode, moderate (H), Generalized anxiety disorder   Changed by:  Rosaura Flores PA-C        TAKE 2 TABLETS BY MOUTH DAILY   Quantity:  60 tablet   Refills:  1       venlafaxine 75 MG 24 hr capsule   Commonly known as:  EFFEXOR XR   This may have changed:    - medication strength  - how much to take  - additional instructions   Used for:  Chronic pain disorder, Major depressive disorder, recurrent episode, moderate (H), Generalized anxiety disorder   Changed by:  Rosaura Flores PA-C        Dose:  75 mg   Take 1 capsule (75 mg) by mouth daily   Quantity:  30 capsule   Refills:  1         Stop taking these medicines if you haven't already. Please contact your care team if you have questions.     meloxicam 7.5 MG tablet   Commonly known as:  MOBIC   Stopped by:  Rosaura Flores PA-C                Where to get your medicines      These medications were sent to [facility] [id number]     Phone:  [phone]     buPROPion 100 MG 12 hr tablet    lisinopril 20 MG tablet    oxaprozin 600 MG tablet    venlafaxine 75 MG 24 hr capsule         Some of these will need a paper prescription and others can be bought over the counter.  Ask your nurse if you have questions.     Bring a paper prescription for each of these medications     HYDROcodone-acetaminophen 5-325 MG per tablet                Primary Care Provider Office Phone # Fax #    Rosaura Flores PA-C [phone]       [address] [address]        Thank you!     Thank you for choosing [facility]  for your care. Our goal is always to provide you with excellent care. Hearing back from our patients is one way we can continue to improve our services. Please take a few minutes to complete the written survey that you may receive in the mail after your visit with us. Thank you!              Your Updated Medication List - Protect others around you: Learn how to safely use, store and throw away your medicines at www.disposemymeds.org.          This list is accurate as of: 6/9/17 10:21 AM.  Always use your most recent med list.                   Brand Name Dispense Instructions for use    acetaminophen 500 MG tablet    TYLENOL    60    1 TABLET EVERY 6 HOURS. max tylenol/acetaminophen daily 4,000mg/24 hours       albuterol 108 (90 BASE) MCG/ACT Inhaler    albuterol    1 Inhaler    Inhale 2 puffs into the lungs every 4 hours as needed for shortness of breath / dyspnea       buPROPion 100 MG 12 hr tablet    WELLBUTRIN SR    60 tablet    TAKE 2 TABLETS BY MOUTH DAILY       cetirizine 10 MG tablet    zyrTEC    60 tablet    Take 1 tablet (10 mg) by mouth 2 times daily       fluticasone 50 MCG/ACT spray    FLONASE    1 Package    Spray 1 spray in nostril 2 times daily.       HYDROcodone-acetaminophen 5-325 MG per tablet    NORCO    180 tablet    2 in am and 2 at dinnertime/pm, and additional 1-2 at night.       ipratropium - albuterol 0.5 mg/2.5 mg/3 mL 0.5-2.5 (3) MG/3ML neb solution    DUONEB    360 mL    Take 1 vial (3 mLs) by nebulization 4 times daily       lisinopril 20 MG tablet    PRINIVIL/ZESTRIL    30 tablet    Take 1 tablet (20 mg) by mouth daily       methocarbamol 750 MG tablet    ROBAXIN    120 tablet    TAKE 1 TO 2 TABLETS BY MOUTH UP TO THREE TIMES DAILY(4TH TIME PER DAY ON OCCASION IS OK)       oxaprozin 600 MG tablet    DAYPRO    60 tablet    Take 2 tablets (1,200 mg) by mouth daily Or 1 tab twice daily.       pseudoePHEDrine 120 MG 12 hr tablet    SUDAFED    28 tablet    Take 1 tablet (120 mg) by mouth every 12 hours       valACYclovir 500 MG tablet    VALTREX    30 tablet    TAKE 1 TABLET(500 MG) BY MOUTH DAILY       venlafaxine 75 MG 24 hr capsule    EFFEXOR XR    30 capsule    Take 1 capsule (75 mg) by mouth daily

## 2017-06-10 RX ORDER — BUPROPION HYDROCHLORIDE 100 MG/1
TABLET, EXTENDED RELEASE ORAL
Qty: 60 TABLET | Refills: 0 | OUTPATIENT
Start: 2017-06-10

## 2017-06-10 RX ORDER — LISINOPRIL 20 MG/1
TABLET ORAL
Qty: 30 TABLET | Refills: 0 | OUTPATIENT
Start: 2017-06-10

## 2017-06-10 ASSESSMENT — ANXIETY QUESTIONNAIRES: GAD7 TOTAL SCORE: 2

## 2017-06-10 ASSESSMENT — PATIENT HEALTH QUESTIONNAIRE - PHQ9: SUM OF ALL RESPONSES TO PHQ QUESTIONS 1-9: 1

## 2017-06-10 NOTE — TELEPHONE ENCOUNTER
methocarbamol (ROBAXIN) 750 MG tablet      Last Written Prescription Date: 4/5/17  Last Quantity: 120, # refills: 0  Last Office Visit with [facility], [facility] or  Health prescribing provider: 6/9/17  Next 5 appointments (look out 90 days)     Jul 06, 2017 10:00 AM CDT   Office Visit with Rosaura Flores PA-C   [facility] ([facility]    [address] [place] [address]   [phone]                   Creatinine   Date Value Ref Range Status   06/09/2017 0.78 0.52 - 1.04 mg/dL Final     Lab Results   Component Value Date    AST 7 07/05/2016     Lab Results   Component Value Date    ALT 21 07/05/2016     BP Readings from Last 3 Encounters:   06/09/17 138/85   05/03/17 (!) 144/100   02/17/17 (!) 142/94         valACYclovir (VALTREX) 500 MG tablet     Last Written Prescription Date: 5/3/17  Last Fill Quantity: 30, # refills: 0  Last Office Visit with [facility], [facility] or [facility] prescribing provider: 6/9/17   Next 5 appointments (look out 90 days)     Jul 06, 2017 10:00 AM CDT   Office Visit with Rosaura Flores PA-C   [facility] ([facility]    [address] [place] [address]   [phone]                   Creatinine   Date Value Ref Range Status   06/09/2017 0.78 0.52 - 1.04 mg/dL Final

## 2017-06-12 RX ORDER — VALACYCLOVIR HYDROCHLORIDE 500 MG/1
TABLET, FILM COATED ORAL
Qty: 30 TABLET | Refills: 3 | Status: SHIPPED | OUTPATIENT
Start: 2017-06-12 | End: 2017-10-26

## 2017-06-12 RX ORDER — METHOCARBAMOL 750 MG/1
TABLET, FILM COATED ORAL
Qty: 120 TABLET | Refills: 3 | Status: SHIPPED | OUTPATIENT
Start: 2017-06-12 | End: 2018-02-28

## 2017-06-12 NOTE — PROGRESS NOTES
"CMA - future orders lipid panel, diagnosis dyslipidemia, a1c diagnosis elev blood sugar, and Cr, diagnosis dec GFR.    Stephanie Marie,    Your thyroid is normal.  Blood salts were fine.  Kidney function decreased slightly, but this could be due to dehydration.  Blood sugar was within normal for having had breakfast, but high if had not had breakfast.  I am not sure how to interpret this since you had stated having \"just a few bites of hashbrown\".  Cholesterol increased a lot and is actually very close to needing a cholesterol med.  Please strongly work on weight loss and we'll recheck lab in 3 months.    Please call clinic at [phone] if you have questions.    Rosaura Flores PA-C  "

## 2017-06-13 DIAGNOSIS — E78.5 DYSLIPIDEMIA: Primary | ICD-10-CM

## 2017-06-13 DIAGNOSIS — R94.4 DECREASED GFR: ICD-10-CM

## 2017-06-13 DIAGNOSIS — R73.9 ELEVATED BLOOD SUGAR: ICD-10-CM

## 2017-06-19 DIAGNOSIS — F33.1 MAJOR DEPRESSIVE DISORDER, RECURRENT EPISODE, MODERATE (H): ICD-10-CM

## 2017-06-19 DIAGNOSIS — F41.0 PANIC ANXIETY SYNDROME: ICD-10-CM

## 2017-06-19 DIAGNOSIS — F41.1 GENERALIZED ANXIETY DISORDER: ICD-10-CM

## 2017-06-20 RX ORDER — BUPROPION HYDROCHLORIDE 100 MG/1
TABLET, EXTENDED RELEASE ORAL
Qty: 60 TABLET | Refills: 1 | Status: SHIPPED | OUTPATIENT
Start: 2017-06-20 | End: 2017-10-05

## 2017-07-06 ENCOUNTER — OFFICE VISIT (OUTPATIENT)
Dept: FAMILY MEDICINE | Facility: CLINIC | Age: 47
End: 2017-07-06
Payer: COMMERCIAL

## 2017-07-06 VITALS
BODY MASS INDEX: 34.87 KG/M2 | SYSTOLIC BLOOD PRESSURE: 122 MMHG | WEIGHT: 217 LBS | HEIGHT: 66 IN | DIASTOLIC BLOOD PRESSURE: 84 MMHG | HEART RATE: 89 BPM | TEMPERATURE: 97.7 F

## 2017-07-06 DIAGNOSIS — Z98.1 STATUS POST LAMINECTOMY WITH SPINAL FUSION: Primary | ICD-10-CM

## 2017-07-06 DIAGNOSIS — G89.4 CHRONIC PAIN DISORDER: ICD-10-CM

## 2017-07-06 DIAGNOSIS — F32.1 MODERATE MAJOR DEPRESSION (H): ICD-10-CM

## 2017-07-06 DIAGNOSIS — Z02.89 ENCOUNTER FOR COMPLETION OF FORM WITH PATIENT: ICD-10-CM

## 2017-07-06 DIAGNOSIS — I10 ESSENTIAL HYPERTENSION WITH GOAL BLOOD PRESSURE LESS THAN 140/90: ICD-10-CM

## 2017-07-06 PROCEDURE — 99215 OFFICE O/P EST HI 40 MIN: CPT | Performed by: PHYSICIAN ASSISTANT

## 2017-07-06 RX ORDER — SULINDAC 200 MG/1
200 TABLET ORAL 2 TIMES DAILY WITH MEALS
Qty: 60 TABLET | Refills: 1 | Status: SHIPPED | OUTPATIENT
Start: 2017-07-06 | End: 2017-07-31 | Stop reason: ALTCHOICE

## 2017-07-06 RX ORDER — HYDROCODONE BITARTRATE AND ACETAMINOPHEN 5; 325 MG/1; MG/1
TABLET ORAL
Qty: 180 TABLET | Refills: 0 | Status: SHIPPED | OUTPATIENT
Start: 2017-07-06 | End: 2017-08-09

## 2017-07-06 NOTE — PATIENT INSTRUCTIONS
Switch from daypro to sulindac to see if cheaper   Keep working on physical activity and weight loss    Form will be completed later today     Recheck in 1 month to see how pain is doing, lifestyle changes and your weight loss

## 2017-07-06 NOTE — MR AVS SNAPSHOT
After Visit Summary   7/6/2017    Cynthia Lyons    MRN: [id number]           Patient Information     Date Of Birth          1970        Visit Information        Provider Department      7/6/2017 9:40 AM Rosaura Flores PA-C [facility]        Today's Diagnoses     Essential hypertension with goal blood pressure less than 140/90    -  1    Status post laminectomy with spinal fusion        Chronic pain disorder        Moderate major depression (H)          Care Instructions    Switch from daypro to sulindac to see if cheaper   Keep working on physical activity and weight loss    Form will be completed later today     Recheck in 1 month to see how pain is doing, lifestyle changes and your weight loss          Follow-ups after your visit        Who to contact     If you have questions or need follow up information about today's clinic visit or your schedule please contact [facility] directly at [phone].  Normal or non-critical lab and imaging results will be communicated to you by [employer], letter or phone within 4 business days after the clinic has received the results. If you do not hear from us within 7 days, please contact the clinic through [employer] or phone. If you have a critical or abnormal lab result, we will notify you by phone as soon as possible.  Submit refill requests through [employer] or call your pharmacy and they will forward the refill request to us. Please allow 3 business days for your refill to be completed.          Additional Information About Your Visit        MyChart Information     [employer] gives you secure access to your electronic health record. If you see a primary care provider, you can also send messages to your care team and make appointments. If you have questions, please call your primary care clinic.  If you do not have a primary care provider, please call [phone] and they will assist you.        Care EveryWhere  "ID     This is your Care EveryWhere ID. This could be used by other organizations to access your [place] medical records  [id number]        Your Vitals Were     Pulse Temperature Height BMI (Body Mass Index)          89 97.7  F (36.5  C) (Tympanic) 5' 6\" (1.676 m) 35.02 kg/m2         Blood Pressure from Last 3 Encounters:   07/06/17 (!) 136/94   06/09/17 138/85   05/03/17 (!) 144/100    Weight from Last 3 Encounters:   07/06/17 217 lb (98.4 kg)   06/09/17 216 lb (98 kg)   05/03/17 216 lb 3.2 oz (98.1 kg)              Today, you had the following     No orders found for display         Today's Medication Changes          These changes are accurate as of: 7/6/17 10:35 AM.  If you have any questions, ask your nurse or doctor.               Start taking these medicines.        Dose/Directions    sulindac 200 MG tablet   Commonly known as:  CLINORIL   Used for:  Status post laminectomy with spinal fusion, Chronic pain disorder   Started by:  Rosaura Flores PA-C        Dose:  200 mg   Take 1 tablet (200 mg) by mouth 2 times daily (with meals)   Quantity:  60 tablet   Refills:  1         These medicines have changed or have updated prescriptions.        Dose/Directions    buPROPion 100 MG 12 hr tablet   Commonly known as:  WELLBUTRIN SR   This may have changed:  Another medication with the same name was removed. Continue taking this medication, and follow the directions you see here.   Used for:  Panic anxiety syndrome, Major depressive disorder, recurrent episode, moderate (H), Generalized anxiety disorder   Changed by:  Rosaura Flores PA-C        TAKE 2 TABLETS BY MOUTH DAILY   Quantity:  60 tablet   Refills:  1            Where to get your medicines      These medications were sent to [facility] [address] [address]     Phone:  [phone]     sulindac 200 MG tablet         Some of these will need a paper prescription and " others can be bought over the counter.  Ask your nurse if you have questions.     Bring a paper prescription for each of these medications     HYDROcodone-acetaminophen 5-325 MG per tablet                Primary Care Provider Office Phone # Fax #    Rosaura Flores PA-C [phone]       [facility] [address] [facility] [address]        Equal Access to Services     AXEL ALEXANDER : Hadii aad ku hadasho Soomaali, waaxda luqadaha, qaybta kaalmada adeegyada, waxay idiin hayaan adeeg kharash la'aan ah. So [facility] [phone].    ATENCIÓN: Si habla español, tiene a sumner disposición servicios gratuitos de asistencia lingüística. Rafitaame al [phone].    We comply with applicable federal civil rights laws and [place] laws. We do not discriminate on the basis of race, color, national origin, age, disability sex, sexual orientation or gender identity.            Thank you!     Thank you for choosing [facility]  for your care. Our goal is always to provide you with excellent care. Hearing back from our patients is one way we can continue to improve our services. Please take a few minutes to complete the written survey that you may receive in the mail after your visit with us. Thank you!             Your Updated Medication List - Protect others around you: Learn how to safely use, store and throw away your medicines at www.disposemymeds.org.          This list is accurate as of: 7/6/17 10:35 AM.  Always use your most recent med list.                   Brand Name Dispense Instructions for use Diagnosis    acetaminophen 500 MG tablet    TYLENOL    60    1 TABLET EVERY 6 HOURS. max tylenol/acetaminophen daily 4,000mg/24 hours    Obesity, unspecified       albuterol 108 (90 BASE) MCG/ACT Inhaler    albuterol    1 Inhaler    Inhale 2 puffs into the lungs every 4 hours as needed for shortness of breath / dyspnea    Other allergic rhinitis       buPROPion 100 MG 12 hr tablet     WELLBUTRIN SR    60 tablet    TAKE 2 TABLETS BY MOUTH DAILY    Panic anxiety syndrome, Major depressive disorder, recurrent episode, moderate (H), Generalized anxiety disorder       cetirizine 10 MG tablet    zyrTEC    60 tablet    Take 1 tablet (10 mg) by mouth 2 times daily    Other allergic rhinitis, Status post laminectomy with spinal fusion, Chronic pain disorder, Major depressive disorder, recurrent episode, moderate (H), Generalized anxiety disorder, Herpes simplex infection of genitourinary system       fluticasone 50 MCG/ACT spray    FLONASE    1 Package    Spray 1 spray in nostril 2 times daily.    Allergic rhinitis       HYDROcodone-acetaminophen 5-325 MG per tablet    NORCO    180 tablet    2 in am and 2 at dinnertime/pm, and additional 1-2 at night.    Status post laminectomy with spinal fusion, Chronic pain disorder       ipratropium - albuterol 0.5 mg/2.5 mg/3 mL 0.5-2.5 (3) MG/3ML neb solution    DUONEB    360 mL    Take 1 vial (3 mLs) by nebulization 4 times daily    Bronchitis, acute, with bronchospasm       lisinopril 20 MG tablet    PRINIVIL/ZESTRIL    30 tablet    Take 1 tablet (20 mg) by mouth daily    Essential hypertension with goal blood pressure less than 140/90       methocarbamol 750 MG tablet    ROBAXIN    120 tablet    TAKE 1 TO 2 TABLETS BY MOUTH UP TO THREE TIMES DAILY(4TH TIME PER DAY ON OCCASION IS OK)    Status post laminectomy with spinal fusion       oxaprozin 600 MG tablet    DAYPRO    60 tablet    Take 2 tablets (1,200 mg) by mouth daily Or 1 tab twice daily.    Status post laminectomy with spinal fusion, Chronic pain disorder       pseudoePHEDrine 120 MG 12 hr tablet    SUDAFED    28 tablet    Take 1 tablet (120 mg) by mouth every 12 hours    Chronic pansinusitis       sulindac 200 MG tablet    CLINORIL    60 tablet    Take 1 tablet (200 mg) by mouth 2 times daily (with meals)    Status post laminectomy with spinal fusion, Chronic pain disorder       valACYclovir 500 MG tablet     VALTREX    30 tablet    TAKE 1 TABLET(500 MG) BY MOUTH DAILY    Herpes simplex infection of genitourinary system, Status post laminectomy with spinal fusion, Chronic pain disorder, Major depressive disorder, recurrent episode, moderate (H), Generalized anxiety disorder, Other allergic rhinitis       venlafaxine 75 MG 24 hr capsule    EFFEXOR XR    30 capsule    Take 1 capsule (75 mg) by mouth daily    Chronic pain disorder, Major depressive disorder, recurrent episode, moderate (H), Generalized anxiety disorder

## 2017-07-06 NOTE — PROGRESS NOTES
SUBJECTIVE:                                                    Cynthia Lyons is a 47 year old female who presents to clinic today for the following health issues:      Chronic Pain Follow-Up       Type / Location of Pain: Lower Back  Analgesia/pain control:       Recent changes:  same      Overall control: Comfortably manageable  Activity level/function:      Daily activities:  Can do most things most days, with some rest    Work:  Unable to work  Adverse effects:  No  Adherance    Taking medication as directed?  Yes    Participating in other treatments: no - not at this time  Risk Factors:    Sleep:  Poor currently due to not being at home    Mood/anxiety:  controlled    Recent family or social stressors:  none noted    Other aggravating factors: none  PHQ-9 SCORE 2/17/2017 5/3/2017 6/9/2017   Total Score - - -   Total Score 0 19 1     NANCI-7 SCORE 2/17/2017 5/3/2017 6/9/2017   Total Score - - -   Total Score 4 19 2     Encounter-Level CSA - 08/11/2016:                 Controlled Substance Agreement - Scan on 9/8/2016  8:37 AM : CONTROLLED SUBSTANCE AGREEMENT 08/11/2016 (below)          Encounter-Level CSA - 08/11/2016:                 Controlled Substance Agreement - Scan on 9/16/2014  3:59 PM : [facility] Controlled Medication Agreement 9/10/14 (below)              * Has forms for Disability     Chronic back pain.  Narcotic contract transferred from Dr Guzman.    Pain -   Didn't get to switch NSAID as daypro ended up being too expensive.  Started doing her pool exercise, but then went on vacation  Eating was doing well until vacation as well.  No wt loss this month.    Generally success with her self goals, except the gym is a struggle.  Her self goals:     -to be gym weekdays for 10 minutes per day, and                                    increasing this every 2 weeks                                              -Planning to use [employer]ga                                              -smaller  "portions                                              -more chicken, more salad                                              -less junk food                                              -less red meat  Continues to recall her motivations for wt loss: back pain, getting rid of BP med, daughter's upcoming wedding, grandchild, and considering plastics     Mood-  Now doing well with the increase in effexor    No lost/stolen/shared meds.  Not locking up currently due to being home, daughter won't take her own meds, not out of house or having people over often.  Locks up if daughter having sleep over.    Problem list and histories reviewed & adjusted, as indicated.  Additional history: as documented    BP Readings from Last 3 Encounters:   07/06/17 (!) 136/94   06/09/17 138/85   05/03/17 (!) 144/100    Wt Readings from Last 3 Encounters:   07/06/17 217 lb (98.4 kg)   06/09/17 216 lb (98 kg)   05/03/17 216 lb 3.2 oz (98.1 kg)         Labs reviewed in EPIC    Reviewed and updated as needed this visit by clinical staff       Reviewed and updated as needed this visit by Provider         ROS:  Constitutional, musculoskeletal, neuro, and psych systems are negative, except as otherwise noted.    OBJECTIVE:     /84 (BP Location: Right arm, Patient Position: Chair, Cuff Size: Adult Large)  Pulse 89  Temp 97.7  F (36.5  C) (Tympanic)  Ht 5' 6\" (1.676 m)  Wt 217 lb (98.4 kg)  BMI 35.02 kg/m2  Body mass index is 35.02 kg/(m^2).  GENERAL: healthy, alert and no distress  PSYCH: mentation appears normal, affect normal/bright  MS: no tenderness over spine, flexion to 15-20 degrees, side bending 10 bilaterally, extension 10.  Normal strength to opposed hip flexion, knee flexion and extension, ankle flexion and extension, able to heel and toe raise.  Normal patellar and achilles reflexes.    PHQ-9 SCORE 2/17/2017 5/3/2017 6/9/2017   Total Score - - -   Total Score 0 19 1     NANCI-7 SCORE 2/17/2017 5/3/2017 6/9/2017   Total Score " - - -   Total Score 4 19 2     ASSESSMENT/PLAN:       ICD-10-CM    1. Status post laminectomy with spinal fusion Z98.1 sulindac (CLINORIL) 200 MG tablet     HYDROcodone-acetaminophen (NORCO) 5-325 MG per tablet   2. Chronic pain disorder G89.4 sulindac (CLINORIL) 200 MG tablet     HYDROcodone-acetaminophen (NORCO) 5-325 MG per tablet   3. Moderate major depression (H) F32.1    4. Essential hypertension with goal blood pressure less than 140/90 I10    5. Encounter for completion of form with patient Z02.89    40 min spent with patient, over half in form completion.  Patient Instructions   Switch from daypro to sulindac to see if cheaper   Keep working on physical activity and weight loss    Form will be completed later today     Recheck in 1 month to see how pain is doing, lifestyle changes and your weight loss    Rosaura Flores PA-C  [facility]

## 2017-07-06 NOTE — NURSING NOTE
"Chief Complaint   Patient presents with     Pain       Initial BP (!) 136/94 (BP Location: Right arm, Patient Position: Chair, Cuff Size: Adult Large)  Pulse 89  Temp 97.7  F (36.5  C) (Tympanic)  Ht 5' 6\" (1.676 m)  Wt 217 lb (98.4 kg)  BMI 35.02 kg/m2 Estimated body mass index is 35.02 kg/(m^2) as calculated from the following:    Height as of this encounter: 5' 6\" (1.676 m).    Weight as of this encounter: 217 lb (98.4 kg).  Medication Reconciliation: complete    Health Maintenance that is potentially due pending provider review:  BP was high, used pink card, recheck manually    Possibly completing today per provider review.  Lorena CHI MA        "

## 2017-07-15 ENCOUNTER — APPOINTMENT (OUTPATIENT)
Dept: CT IMAGING | Facility: CLINIC | Age: 47
End: 2017-07-15
Attending: EMERGENCY MEDICINE
Payer: COMMERCIAL

## 2017-07-15 ENCOUNTER — HOSPITAL ENCOUNTER (EMERGENCY)
Facility: CLINIC | Age: 47
Discharge: HOME | End: 2017-07-15
Attending: EMERGENCY MEDICINE | Admitting: EMERGENCY MEDICINE
Payer: COMMERCIAL

## 2017-07-15 VITALS
BODY MASS INDEX: 32.49 KG/M2 | RESPIRATION RATE: 16 BRPM | DIASTOLIC BLOOD PRESSURE: 81 MMHG | SYSTOLIC BLOOD PRESSURE: 136 MMHG | WEIGHT: 195 LBS | TEMPERATURE: 97.8 F | HEIGHT: 65 IN | OXYGEN SATURATION: 100 %

## 2017-07-15 DIAGNOSIS — F41.9 ANXIETY: ICD-10-CM

## 2017-07-15 DIAGNOSIS — R42 DIZZINESS: ICD-10-CM

## 2017-07-15 LAB
ALBUMIN SERPL-MCNC: 3.4 G/DL (ref 3.4–5)
ALBUMIN UR-MCNC: NEGATIVE MG/DL
ALP SERPL-CCNC: 63 U/L (ref 40–150)
ALT SERPL W P-5'-P-CCNC: 32 U/L (ref 0–50)
AMPHETAMINES UR QL SCN: ABNORMAL
ANION GAP SERPL CALCULATED.3IONS-SCNC: 5 MMOL/L (ref 3–14)
APPEARANCE UR: CLEAR
AST SERPL W P-5'-P-CCNC: 24 U/L (ref 0–45)
BARBITURATES UR QL: ABNORMAL
BASOPHILS # BLD AUTO: 0 10E9/L (ref 0–0.2)
BASOPHILS NFR BLD AUTO: 0.4 %
BENZODIAZ UR QL: ABNORMAL
BILIRUB SERPL-MCNC: 0.1 MG/DL (ref 0.2–1.3)
BILIRUB UR QL STRIP: NEGATIVE
BUN SERPL-MCNC: 17 MG/DL (ref 7–30)
CALCIUM SERPL-MCNC: 9.1 MG/DL (ref 8.5–10.1)
CANNABINOIDS UR QL SCN: ABNORMAL
CHLORIDE SERPL-SCNC: 111 MMOL/L (ref 94–109)
CO2 SERPL-SCNC: 26 MMOL/L (ref 20–32)
COCAINE UR QL: ABNORMAL
COLOR UR AUTO: YELLOW
CREAT SERPL-MCNC: 0.68 MG/DL (ref 0.52–1.04)
CRP SERPL-MCNC: 16.8 MG/L (ref 0–8)
D DIMER PPP FEU-MCNC: NORMAL UG/ML FEU (ref 0–0.5)
DIFFERENTIAL METHOD BLD: ABNORMAL
EOSINOPHIL # BLD AUTO: 0.3 10E9/L (ref 0–0.7)
EOSINOPHIL NFR BLD AUTO: 3.2 %
ERYTHROCYTE [DISTWIDTH] IN BLOOD BY AUTOMATED COUNT: 15.4 % (ref 10–15)
GFR SERPL CREATININE-BSD FRML MDRD: ABNORMAL ML/MIN/1.7M2
GLUCOSE SERPL-MCNC: 130 MG/DL (ref 70–99)
GLUCOSE UR STRIP-MCNC: NEGATIVE MG/DL
HCT VFR BLD AUTO: 39.1 % (ref 35–47)
HGB BLD-MCNC: 12.4 G/DL (ref 11.7–15.7)
HGB UR QL STRIP: ABNORMAL
IMM GRANULOCYTES # BLD: 0 10E9/L (ref 0–0.4)
IMM GRANULOCYTES NFR BLD: 0.2 %
KETONES UR STRIP-MCNC: NEGATIVE MG/DL
LACTATE BLD-SCNC: 2.7 MMOL/L (ref 0.7–2.1)
LEUKOCYTE ESTERASE UR QL STRIP: NEGATIVE
LYMPHOCYTES # BLD AUTO: 1.7 10E9/L (ref 0.8–5.3)
LYMPHOCYTES NFR BLD AUTO: 20.7 %
MCH RBC QN AUTO: 30.8 PG (ref 26.5–33)
MCHC RBC AUTO-ENTMCNC: 31.7 G/DL (ref 31.5–36.5)
MCV RBC AUTO: 97 FL (ref 78–100)
MONOCYTES # BLD AUTO: 0.6 10E9/L (ref 0–1.3)
MONOCYTES NFR BLD AUTO: 7.8 %
MUCOUS THREADS #/AREA URNS LPF: PRESENT /LPF
NEUTROPHILS # BLD AUTO: 5.5 10E9/L (ref 1.6–8.3)
NEUTROPHILS NFR BLD AUTO: 67.7 %
NITRATE UR QL: NEGATIVE
OPIATES UR QL SCN: ABNORMAL
PCP UR QL SCN: ABNORMAL
PH UR STRIP: 5.5 PH (ref 5–7)
PLATELET # BLD AUTO: 401 10E9/L (ref 150–450)
POTASSIUM SERPL-SCNC: 4.3 MMOL/L (ref 3.4–5.3)
PROT SERPL-MCNC: 7.1 G/DL (ref 6.8–8.8)
RBC # BLD AUTO: 4.02 10E12/L (ref 3.8–5.2)
RBC #/AREA URNS AUTO: 7 /HPF (ref 0–2)
SODIUM SERPL-SCNC: 142 MMOL/L (ref 133–144)
SP GR UR STRIP: 1.02 (ref 1–1.03)
SQUAMOUS #/AREA URNS AUTO: <1 /HPF (ref 0–1)
TROPONIN I SERPL-MCNC: NORMAL UG/L (ref 0–0.04)
TSH SERPL DL<=0.005 MIU/L-ACNC: 1.24 MU/L (ref 0.4–4)
URN SPEC COLLECT METH UR: ABNORMAL
UROBILINOGEN UR STRIP-MCNC: NORMAL MG/DL (ref 0–2)
WBC # BLD AUTO: 8.1 10E9/L (ref 4–11)
WBC #/AREA URNS AUTO: 1 /HPF (ref 0–2)

## 2017-07-15 PROCEDURE — 25000131 ZZH RX MED GY IP 250 OP 636 PS 637: Performed by: EMERGENCY MEDICINE

## 2017-07-15 PROCEDURE — 93005 ELECTROCARDIOGRAM TRACING: CPT

## 2017-07-15 PROCEDURE — 84443 ASSAY THYROID STIM HORMONE: CPT | Performed by: EMERGENCY MEDICINE

## 2017-07-15 PROCEDURE — 25000128 H RX IP 250 OP 636: Performed by: EMERGENCY MEDICINE

## 2017-07-15 PROCEDURE — 80307 DRUG TEST PRSMV CHEM ANLYZR: CPT | Performed by: EMERGENCY MEDICINE

## 2017-07-15 PROCEDURE — 83605 ASSAY OF LACTIC ACID: CPT | Performed by: EMERGENCY MEDICINE

## 2017-07-15 PROCEDURE — 99285 EMERGENCY DEPT VISIT HI MDM: CPT | Performed by: EMERGENCY MEDICINE

## 2017-07-15 PROCEDURE — 84484 ASSAY OF TROPONIN QUANT: CPT | Performed by: EMERGENCY MEDICINE

## 2017-07-15 PROCEDURE — 96361 HYDRATE IV INFUSION ADD-ON: CPT

## 2017-07-15 PROCEDURE — 80053 COMPREHEN METABOLIC PANEL: CPT | Performed by: EMERGENCY MEDICINE

## 2017-07-15 PROCEDURE — 25000125 ZZHC RX 250: Performed by: EMERGENCY MEDICINE

## 2017-07-15 PROCEDURE — 70498 CT ANGIOGRAPHY NECK: CPT

## 2017-07-15 PROCEDURE — 99285 EMERGENCY DEPT VISIT HI MDM: CPT | Mod: 25

## 2017-07-15 PROCEDURE — 85379 FIBRIN DEGRADATION QUANT: CPT | Performed by: EMERGENCY MEDICINE

## 2017-07-15 PROCEDURE — 96374 THER/PROPH/DIAG INJ IV PUSH: CPT

## 2017-07-15 PROCEDURE — 81001 URINALYSIS AUTO W/SCOPE: CPT | Performed by: EMERGENCY MEDICINE

## 2017-07-15 PROCEDURE — 86140 C-REACTIVE PROTEIN: CPT | Performed by: EMERGENCY MEDICINE

## 2017-07-15 PROCEDURE — 70450 CT HEAD/BRAIN W/O DYE: CPT | Mod: XS

## 2017-07-15 PROCEDURE — 85025 COMPLETE CBC W/AUTO DIFF WBC: CPT | Performed by: EMERGENCY MEDICINE

## 2017-07-15 RX ORDER — MECLIZINE HYDROCHLORIDE 25 MG/1
25 TABLET ORAL ONCE
Status: COMPLETED | OUTPATIENT
Start: 2017-07-15 | End: 2017-07-15

## 2017-07-15 RX ORDER — LORAZEPAM 2 MG/ML
1 INJECTION INTRAMUSCULAR ONCE
Status: COMPLETED | OUTPATIENT
Start: 2017-07-15 | End: 2017-07-15

## 2017-07-15 RX ORDER — SODIUM CHLORIDE 9 MG/ML
1000 INJECTION, SOLUTION INTRAVENOUS CONTINUOUS
Status: DISCONTINUED | OUTPATIENT
Start: 2017-07-15 | End: 2017-07-15 | Stop reason: HOSPADM

## 2017-07-15 RX ORDER — IOPAMIDOL 755 MG/ML
70 INJECTION, SOLUTION INTRAVASCULAR ONCE
Status: COMPLETED | OUTPATIENT
Start: 2017-07-15 | End: 2017-07-15

## 2017-07-15 RX ADMIN — MECLIZINE HYDROCHLORIDE 25 MG: 25 TABLET ORAL at 07:54

## 2017-07-15 RX ADMIN — LORAZEPAM 1 MG: 2 INJECTION INTRAMUSCULAR; INTRAVENOUS at 10:31

## 2017-07-15 RX ADMIN — SODIUM CHLORIDE 1000 ML: 9 INJECTION, SOLUTION INTRAVENOUS at 10:34

## 2017-07-15 RX ADMIN — IOPAMIDOL 70 ML: 755 INJECTION, SOLUTION INTRAVENOUS at 10:43

## 2017-07-15 RX ADMIN — SODIUM CHLORIDE 100 ML: 9 INJECTION, SOLUTION INTRAVENOUS at 10:43

## 2017-07-15 RX ADMIN — SODIUM CHLORIDE 1000 ML: 9 INJECTION, SOLUTION INTRAVENOUS at 08:46

## 2017-07-15 NOTE — ED AVS SNAPSHOT
[facility]    [address] [facility] [address]    Phone:  [phone]    Fax:  [phone]                                       Cynthia Lyons   MRN: [id number]    Department:  [facility]   Date of Visit:  7/15/2017           After Visit Summary Signature Page     I have received my discharge instructions, and my questions have been answered. I have discussed any challenges I see with this plan with the nurse or doctor.    ..........................................................................................................................................  Patient/Patient Representative Signature      ..........................................................................................................................................  Patient Representative Print Name and Relationship to Patient    ..................................................               ................................................  Date                                            Time    ..........................................................................................................................................  Reviewed by Signature/Title    ...................................................              ..............................................  Date                                                            Time

## 2017-07-15 NOTE — ED NOTES
States that she has been dizzy with numb and tingly fingertips off and on x 3 days - her only pain is chronic back pain - alert and oriented

## 2017-07-15 NOTE — ED PROVIDER NOTES
History     Chief Complaint   Patient presents with     Dizziness     dizziness , sharp pains in head, fingers numb,      HPI  Cynthia Lyons is a 47 year old female who presents with 3 days of dizziness.  Patient describes episode of dizziness but not specifically vertiginous symptoms.  She states she'll have brief episodes when she looks to the sides where she has a dizzy sensation and a fullness in her ears.  No tinnitus or change in her hearing.  Patient has chronic sinus issues and seasonal allergies.  She currently denies any colored or bloody discharge.  No fever or chills.  No difficulty with speech or swallowing.  No neck pain.  Symptoms began when they were moving and she did do a lot of lifting.  She has chronic neck and back issues but denies any significant neck pain currently.  She has at times develop paresthesias in her fingertips.  Denies any focal motor weakness.  No difficulty with gait.  She's had no change in her speech or facial droop.  No history of TIA or CVA.  Denies history of vertigo.  She denies headache or visual changes currently.  She has no chest pain or shortness of breath.  She's had no upper respiratory illness.  Does not have associated nausea or vomiting.  No abdominal pain.  No diarrhea or dysuria.  She tried some Dramamine without improvement.  Patient does have history of anxiety and this is contributing to a lot of her symptoms.  Her provider switched her from Prozac to Effexor a few months ago.  One month ago the doubled the dose.  In 2013 patient had evaluation for headache and by her description she had fullness in her ears at that time.  A CT of the head was negative.  Review the records also so she's had evaluations with negative rheumatologic blood work and Lyme screen.        I have reviewed the Medications, Allergies, Past Medical and Surgical History, and Social History in the Epic system.         Review of Systems all other systems reviewed and are  negative.  Past Medical History:   Diagnosis Date     Allergic rhinitis, cause unspecified      Lumbago     disc      Lumbago      Other kyphosis (acquired)      Other unspecified back disorder     T10 and down yoko     Unspecified sinusitis (chronic)      Patient Active Problem List   Diagnosis     Essential hypertension with goal blood pressure less than 140/90     Obesity     Backache     binge eating disorder     Allergic rhinitis     Routine general medical examination at a health care facility     Moderate major depression (H)     Sleep apnea     Sinusitis, chronic     Joint pain     Personal History of Tobacco Use, Presenting Hazards to Health-quit1/08     Lyme arthritis (H)     Status post laminectomy with spinal fusion     Panic anxiety syndrome     Chronic pain disorder     Hyperlipidemia LDL goal <130     Tobacco abuse     Generalized anxiety disorder     Acne     Seborrheic keratosis     Dermatofibroma     Genital HSV     S/P hysterectomy     Non morbid obesity, unspecified obesity type     Current Facility-Administered Medications   Medication     0.9% sodium chloride infusion     Current Outpatient Prescriptions   Medication     Meclizine HCl (DRAMAMINE LESS DROWSY PO)     sulindac (CLINORIL) 200 MG tablet     HYDROcodone-acetaminophen (NORCO) 5-325 MG per tablet     buPROPion (WELLBUTRIN SR) 100 MG 12 hr tablet     methocarbamol (ROBAXIN) 750 MG tablet     valACYclovir (VALTREX) 500 MG tablet     venlafaxine (EFFEXOR-XR) 75 MG 24 hr capsule     lisinopril (PRINIVIL/ZESTRIL) 20 MG tablet     cetirizine (ZYRTEC) 10 MG tablet     fluticasone (FLONASE) 50 MCG/ACT nasal spray        Allergies   Allergen Reactions     Oxycontin [Oxycodone Hcl] Itching and Rash     Social History     Social History     Marital status:      Spouse name: N/A     Number of children: N/A     Years of education: N/A     Occupational History     Not on file.     Social History Main Topics     Smoking status: Light Tobacco  "Smoker     Packs/day: 0.50     Years: 23.00     Types: Cigarettes     Smokeless tobacco: Never Used      Comment: started smoking age 22     Alcohol use No     Drug use: No     Sexual activity: Yes     Partners: Male     Birth control/ protection: Surgical      Comment: Vas/ hyst      Other Topics Concern     Parent/Sibling W/ Cabg, Mi Or Angioplasty Before 65f 55m? No     Social History Narrative     Family History   Problem Relation Age of Onset     Hypertension Mother      Alcohol/Drug Mother      Arthritis Mother      rheumatoid arthr     GASTROINTESTINAL DISEASE Mother      divertitulitis     Unknown/Adopted Father      DIABETES Father      GASTROINTESTINAL DISEASE Daughter      kidney and UTI problems     HEART DISEASE Maternal Grandmother      chf     Breast Cancer Maternal Aunt      CANCER Maternal Aunt      kidney cancer     Unknown/Adopted Paternal Grandmother      Unknown/Adopted Paternal Grandfather            Physical Exam   BP: 136/81  Heart Rate: 83  Temp: 97.8  F (36.6  C)  Resp: 16  Height: 165.1 cm (5' 5\")  Weight: 88.5 kg (195 lb)  SpO2: 100 %  Physical Exam general alert anxious female in mild to moderate distress.  Vital signs are stable.  She is afebrile.  HEENT shows pupils to be restricted but equal and reactive.  Extraocular motions are intact.  With left lateral gaze persistent nystagmus and reproduction of her symptoms.  No temporal tenderness.  No facial asymmetry.  Speech is clear and concise.  Ears are clear bilaterally.  Nasal passages are boggy but patent.  Orally some postnasal drip otherwise no lesions.  Neck is supple without bruits or stridor.  No reproduction of symptoms with manipulation of the neck.  Lungs are clear without adventitious sounds.  Cardiac regular rate without murmur.  Abdomen exam reveals obesity with active bowel sounds.  On palpation localizing tenderness, masses, or organomegaly.  Neurologically cranial 2 through 12 are intact except smell which was not " checked.  No focal motor or sensory findings.  Negative Romberg.    ED Course     ED Course     Procedures             EKG Interpretation:      Interpreted by Gonzalo Kenney  Time reviewed: 8:45  Symptoms at time of EKG: Dizziness   Rhythm: normal sinus   Rate: Normal  Axis: Normal  Ectopy: none  Conduction: normal  ST Segments/ T Waves: No ST-T wave changes  Q Waves: none  Comparison to prior: Unchanged from 2/09    Clinical Impression: normal EKG    Results for orders placed or performed during the hospital encounter of 07/15/17   CT Head w/o Contrast    Narrative    CT SCAN OF THE HEAD WITHOUT CONTRAST   7/15/2017 10:56 AM     HISTORY: Dizziness with left-sided headache.    TECHNIQUE: Axial images of the head and coronal reformations without  IV contrast material. Radiation dose for this scan was reduced using  automated exposure control, adjustment of the mA and/or kV according  to patient size, or iterative reconstruction technique.    COMPARISON: 7/11/2013.    FINDINGS: The ventricles are normal in size, shape and configuration.  The brain parenchyma and subarachnoid spaces are normal. There is no  evidence of intracranial hemorrhage, mass, acute infarct or anomaly.     The visualized portions of the sinuses and mastoids appear normal.  There is no evidence of trauma.      Impression    IMPRESSION: Normal CT scan of the head.     CT Head Neck Angio w/o & w Contrast    Narrative    CT ANGIOGRAM OF THE HEAD AND NECK WITHOUT AND WITH CONTRAST  7/15/2017  11:04 AM     HISTORY: Left-sided headache and dizziness.    TECHNIQUE: Precontrast localizing scans were followed by CT  angiography with an injection of 70 mL Isovue 370 IV with scans  through the head and neck. Images were transferred to a separate 3-D  workstation where multiplanar reformations and 3-D images were  created. Estimates of carotid stenoses are made relative to the distal  internal carotid artery diameters except as noted. Radiation dose for  this  scan was reduced using automated exposure control, adjustment of  the mA and/or kV according to patient size, or iterative  reconstruction technique.    COMPARISON: None.    CT HEAD FINDINGS: No contrast enhancing lesions. Cerebral blood flow  is grossly normal.    CT ANGIOGRAM HEAD FINDINGS: Arteries are widely patent with no  aneurysm, significant stenosis, occlusion or intraarterial thrombus.  Venous circulation is unremarkable.     CT ANGIOGRAM NECK FINDINGS:   Right carotid artery: Widely patent without stenosis.      Left carotid artery: Widely patent without stenosis.      Vertebral arteries: Widely patent without stenosis. No evidence of  dissection.      Other findings: Mild mosaic attenuation in the visualized lung apices  which may be due to air-trapping or expiratory phase of imaging.  Thoracic spinal fusion hardware partially visualized.      Impression    IMPRESSION: Patent arteries in the head and neck without evidence of  vascular cutoff, stenosis, or aneurysm.                   Critical Care time:  none               Labs Ordered and Resulted from Time of ED Arrival Up to the Time of Departure from the ED   CBC WITH PLATELETS DIFFERENTIAL - Abnormal; Notable for the following:        Result Value    RDW 15.4 (*)     All other components within normal limits   COMPREHENSIVE METABOLIC PANEL - Abnormal; Notable for the following:     Chloride 111 (*)     Glucose 130 (*)     Bilirubin Total 0.1 (*)     All other components within normal limits   LACTIC ACID WHOLE BLOOD - Abnormal; Notable for the following:     Lactic Acid 2.7 (*)     All other components within normal limits   CRP INFLAMMATION - Abnormal; Notable for the following:     CRP Inflammation 16.8 (*)     All other components within normal limits   URINE MACROSCOPIC WITH REFLEX TO MICRO - Abnormal; Notable for the following:     Blood Urine Small (*)     RBC Urine 7 (*)     Mucous Urine Present (*)     All other components within normal  limits   DRUG ABUSE SCREEN 77 URINE (FL, RH, SH) - Abnormal; Notable for the following:     Opiates Qualitative Urine   (*)     Value: Positive   Cutoff for a positive opiate is greater than 300 ng/mL. This is an unconfirmed   screening result to be used for medical purposes only.      All other components within normal limits   TROPONIN I   TSH WITH FREE T4 REFLEX   D DIMER QUANTITATIVE   ORTHOSTATIC BLOOD PRESSURE AND PULSE     IV was established and blood work was obtained.  EKG was obtained and reviewed as above.  Orthostatic pulse and blood pressures were obtained and showed no significant change.  IV was established blood work was obtained.  Patient is given meclizine orally.  Discussed results for blood test being unremarkable except her CRP be mildly elevated at 16.8.  Records review that she had also similar CRP elevation 6 years ago.  Patient did not think the meclizine benefited a great deal.  She then states that she feels like her hands are swollen.  They do not look markedly swollen and there is no joint effusions.  She states that she has arthritis but as mentioned above she's had negative rheumatologic blood work in the past.  Ativan is ordered and a head and neck CT/CTA is ordered.  Discussed results the patient's CT/CTA of the head and neck showing no acute abnormality.  Assessments & Plan (with Medical Decision Making)   Patient is a 47-year-old female presents with three-day history of dizziness and has a sensation where she is not feel quite right.  Denies specific headache.  No visual change but when she gazes to the left or right has a sensation of fullness in her ears a flushing sensation throughout her body.  Denies tinnitus or change in her hearing.  No difficulty with speech or swallowing.  Patient has chronic neck and back pain but denies significant neck discomfort over the last 3 days.  She denies any focal motor weakness or sensory changes.  No gait disturbance.  No saddle paresthesias  or loss of bowel or bladder.  She has not been ill.  No fever or chills.  Does not have associated chest pain, palpitations, shortness of breath or cough.  Denies abdominal pain, nausea or vomiting.  She does have significant anxiety issues and was recently switched to Effexor which has been benefiting.  She does state at times she has paresthesias in her fingertips.  Patient states she has a history of rheumatologic issues but her most recent testing was negative.  6 years ago she did have a mildly elevated CRP and this is consistent with today's value.  Patient's blood work otherwise was noncontributory.  She had a negative d-dimer so doubt significant clotting disorder, DVT or PE.  She does admit that when the symptoms began they were in the process of moving and wonders if this is contributing to her anxiety.  She has not missed dosing of her medicines so doubt this is the cause.  No recent change in her med dosing.  She denies fall or head injury.  On exam she was afebrile and vital signs are stable.  She is not hypoxic.  Her neurologic exam was unremarkable.  No evidence of CNS infection.  No skin rash which would suggest shingles.  The patient was given meclizine without significant change.  She was then given Ativan that seemed to benefited somewhat but did not completely resolve her symptoms.  We did do a CT CT of her head and neck which showed no acute cause for her problems.  At this point the exact cause of her symptoms are unclear.  We do recommend she take an aspirin for cardio and stroke prevention.  She is provided a number for neurology to make an appointment for follow-up.  She is given a handout on dizziness of unclear etiology.  Reasons to return to the emergency room for reassessment were discussed.  I have reviewed the nursing notes.    I have reviewed the findings, diagnosis, plan and need for follow up with the patient.       New Prescriptions    No medications on file       Final diagnoses:    Dizziness   Anxiety       7/15/2017   [facility], [place] EMERGENCY DEPARTMENT     Gonzalo Kenney MD  07/15/17 [id number]

## 2017-07-15 NOTE — ED AVS SNAPSHOT
[facility] Emergency Department    [address] [facility] [address]    Phone:  [phone]    Fax:  [phone]                                       Cynthia Lyons   MRN: [id number]    Department:  [facility] Emergency Department   Date of Visit:  7/15/2017           Patient Information     Date Of Birth          1970        Your diagnoses for this visit were:     Dizziness     Anxiety        You were seen by Gonzalo Kenney MD.      Follow-up Information     Follow up with Rosaura Flores PA-C.    Specialty:  Physician Assistant    Why:  As needed    Contact information:    [facility]  [address] [facility] [address]  [phone]          Discharge Instructions       To set up a neurology appointment with Dr. Boone called [phone]    Discharge References/Attachments     DIZZINESS, UNCERTAIN CAUSE (ENGLISH)      Future Appointments        Provider Department Dept Phone Center    8/9/2017 8:00 AM Rosaura Flores PA-C [facility] [phone] [facility]      24 Hour Appointment Hotline       To make an appointment at any [facility] call [phone]). If you don't have a family doctor or clinic, we will help you find one. [place] clinics are conveniently located to serve the needs of you and your family.             Review of your medicines      Our records show that you are taking the medicines listed below. If these are incorrect, please call your family doctor or clinic.        Dose / Directions Last dose taken    buPROPion 100 MG 12 hr tablet   Commonly known as:  WELLBUTRIN SR   Quantity:  60 tablet        TAKE 2 TABLETS BY MOUTH DAILY   Refills:  1        cetirizine 10 MG tablet   Commonly known as:  zyrTEC   Dose:  10 mg   Quantity:  60 tablet        Take 1 tablet (10 mg) by mouth 2 times daily   Refills:  5        DRAMAMINE LESS DROWSY PO   Dose:  50 mg        Take 50 mg by mouth 3 times daily as needed for  dizziness   Refills:  0        fluticasone 50 MCG/ACT spray   Commonly known as:  FLONASE   Dose:  1 spray   Quantity:  1 Package        Spray 1 spray in nostril 2 times daily.   Refills:  11        HYDROcodone-acetaminophen 5-325 MG per tablet   Commonly known as:  NORCO   Quantity:  180 tablet        2 in am and 2 at dinnertime/pm, and additional 1-2 at night.   Refills:  0        lisinopril 20 MG tablet   Commonly known as:  PRINIVIL/ZESTRIL   Dose:  20 mg   Quantity:  30 tablet        Take 1 tablet (20 mg) by mouth daily   Refills:  11        methocarbamol 750 MG tablet   Commonly known as:  ROBAXIN   Quantity:  120 tablet        TAKE 1 TO 2 TABLETS BY MOUTH UP TO THREE TIMES DAILY(4TH TIME PER DAY ON OCCASION IS OK)   Refills:  3        sulindac 200 MG tablet   Commonly known as:  CLINORIL   Dose:  200 mg   Quantity:  60 tablet        Take 1 tablet (200 mg) by mouth 2 times daily (with meals)   Refills:  1        valACYclovir 500 MG tablet   Commonly known as:  VALTREX   Quantity:  30 tablet        TAKE 1 TABLET(500 MG) BY MOUTH DAILY   Refills:  3        venlafaxine 75 MG 24 hr capsule   Commonly known as:  EFFEXOR XR   Dose:  75 mg   Quantity:  30 capsule        Take 1 capsule (75 mg) by mouth daily   Refills:  1                Procedures and tests performed during your visit     Procedure/Test Number of Times Performed    CBC with platelets differential 1    CRP inflammation 1    CT Head Neck Angio w/o & w Contrast 1    CT Head w/o Contrast 1    Comprehensive metabolic panel 1    D dimer quantitative 1    Drug abuse screen 77 urine ([place],RH,SH) 1    EKG 12-lead, tracing only 2    Lactic acid whole blood 1    Orthostatic blood pressure and pulse 1    TSH with free T4 reflex 1    Troponin I 1    UA reflex to Microscopic 1      Orders Needing Specimen Collection     None      Pending Results     Date and Time Order Name Status Description    7/15/2017 1016 CT Head Neck Angio w/o & w Contrast Preliminary              Pending Culture Results     No orders found from 7/13/2017 to 7/16/2017.            Pending Results Instructions     If you had any lab results that were not finalized at the time of your Discharge, you can call the ED Lab Result RN at [phone]. You will be contacted by this team for any positive Lab results or changes in treatment. The nurses are available 7 days a week from 10A to 6:30P.  You can leave a message 24 hours per day and they will return your call.        Test Results From Your Hospital Stay        7/15/2017  8:52 AM      Component Results     Component Value Ref Range & Units Status    WBC 8.1 4.0 - 11.0 10e9/L Final    RBC Count 4.02 3.8 - 5.2 10e12/L Final    Hemoglobin 12.4 11.7 - 15.7 g/dL Final    Hematocrit 39.1 35.0 - 47.0 % Final    MCV 97 78 - 100 fl Final    MCH 30.8 26.5 - 33.0 pg Final    MCHC 31.7 31.5 - 36.5 g/dL Final    RDW 15.4 (H) 10.0 - 15.0 % Final    Platelet Count 401 150 - 450 10e9/L Final    Diff Method Automated Method  Final    % Neutrophils 67.7 % Final    % Lymphocytes 20.7 % Final    % Monocytes 7.8 % Final    % Eosinophils 3.2 % Final    % Basophils 0.4 % Final    % Immature Granulocytes 0.2 % Final    Absolute Neutrophil 5.5 1.6 - 8.3 10e9/L Final    Absolute Lymphocytes 1.7 0.8 - 5.3 10e9/L Final    Absolute Monocytes 0.6 0.0 - 1.3 10e9/L Final    Absolute Eosinophils 0.3 0.0 - 0.7 10e9/L Final    Absolute Basophils 0.0 0.0 - 0.2 10e9/L Final    Abs Immature Granulocytes 0.0 0 - 0.4 10e9/L Final         7/15/2017  8:59 AM      Component Results     Component Value Ref Range & Units Status    Sodium 142 133 - 144 mmol/L Final    Potassium 4.3 3.4 - 5.3 mmol/L Final    Chloride 111 (H) 94 - 109 mmol/L Final    Carbon Dioxide 26 20 - 32 mmol/L Final    Anion Gap 5 3 - 14 mmol/L Final    Glucose 130 (H) 70 - 99 mg/dL Final    Urea Nitrogen 17 7 - 30 mg/dL Final    Creatinine 0.68 0.52 - 1.04 mg/dL Final    GFR Estimate >90  Non  GFR Calc   >60  mL/min/1.7m2 Final    GFR Estimate If Black >90   GFR Calc   >60 mL/min/1.7m2 Final    Calcium 9.1 8.5 - 10.1 mg/dL Final    Bilirubin Total 0.1 (L) 0.2 - 1.3 mg/dL Final    Albumin 3.4 3.4 - 5.0 g/dL Final    Protein Total 7.1 6.8 - 8.8 g/dL Final    Alkaline Phosphatase 63 40 - 150 U/L Final    ALT 32 0 - 50 U/L Final    AST 24 0 - 45 U/L Final         7/15/2017  8:43 AM      Component Results     Component Value Ref Range & Units Status    Lactic Acid 2.7 (H) 0.7 - 2.1 mmol/L Final    Significant value called to and read back by  TARA RAO RN ED 7/15/17 0843 MA           7/15/2017  8:59 AM      Component Results     Component Value Ref Range & Units Status    Troponin I ES  0.000 - 0.045 ug/L Final    <0.015  The 99th percentile for upper reference range is 0.045 ug/L.  Troponin values in   the range of 0.045 - 0.120 ug/L may be associated with risks of adverse   clinical events.           7/15/2017  9:05 AM      Component Results     Component Value Ref Range & Units Status    TSH 1.24 0.40 - 4.00 mU/L Final         7/15/2017  8:59 AM      Component Results     Component Value Ref Range & Units Status    CRP Inflammation 16.8 (H) 0.0 - 8.0 mg/L Final         7/15/2017  8:48 AM      Component Results     Component Value Ref Range & Units Status    D Dimer  0.0 - 0.50 ug/ml FEU Final    <0.3  This D-dimer assay is intended for use in conjunction with a clinical pretest   probability assessment model to exclude pulmonary embolism (PE) and deep venous   thrombosis (DVT) in outpatients suspected of PE or DVT. The cut-off value is   0.5 ug/mL FEU.           7/15/2017 10:18 AM      Component Results     Component Value Ref Range & Units Status    Color Urine Yellow  Final    Appearance Urine Clear  Final    Glucose Urine Negative NEG mg/dL Final    Bilirubin Urine Negative NEG Final    Ketones Urine Negative NEG mg/dL Final    Specific Gravity Urine 1.024 1.003 - 1.035 Final    Blood Urine Small (A) NEG  Final    pH Urine 5.5 5.0 - 7.0 pH Final    Protein Albumin Urine Negative NEG mg/dL Final    Urobilinogen mg/dL Normal 0.0 - 2.0 mg/dL Final    Nitrite Urine Negative NEG Final    Leukocyte Esterase Urine Negative NEG Final    Source Midstream Urine  Final    RBC Urine 7 (H) 0 - 2 /HPF Final    WBC Urine 1 0 - 2 /HPF Final    Squamous Epithelial /HPF Urine <1 0 - 1 /HPF Final    Mucous Urine Present (A) NEG /LPF Final         7/15/2017 10:18 AM      Component Results     Component Value Ref Range & Units Status    Amphetamine Qual Urine  NEG Final    Negative   Cutoff for a negative amphetamine is 500 ng/mL or less.      Barbiturates Qual Urine  NEG Final    Negative   Cutoff for a negative barbiturate is 200 ng/mL or less.      Benzodiazepine Qual Urine  NEG Final    Negative   Cutoff for a negative benzodiazepine is 200 ng/mL or less.      Cannabinoids Qual Urine  NEG Final    Negative   Cutoff for a negative cannabinoid is 50 ng/mL or less.      Cocaine Qual Urine  NEG Final    Negative   Cutoff for a negative cocaine is 300 ng/mL or less.      Opiates Qualitative Urine  NEG Final    Positive   Cutoff for a positive opiate is greater than 300 ng/mL. This is an unconfirmed   screening result to be used for medical purposes only.   (A)    PCP Qual Urine  NEG Final    Negative   Cutoff for a negative PCP is 25 ng/mL or less.           7/15/2017 11:41 AM      Narrative     CT SCAN OF THE HEAD WITHOUT CONTRAST   7/15/2017 10:56 AM     HISTORY: Dizziness with left-sided headache.    TECHNIQUE: Axial images of the head and coronal reformations without  IV contrast material. Radiation dose for this scan was reduced using  automated exposure control, adjustment of the mA and/or kV according  to patient size, or iterative reconstruction technique.    COMPARISON: 7/11/2013.    FINDINGS: The ventricles are normal in size, shape and configuration.  The brain parenchyma and subarachnoid spaces are normal. There is  no  evidence of intracranial hemorrhage, mass, acute infarct or anomaly.     The visualized portions of the sinuses and mastoids appear normal.  There is no evidence of trauma.        Impression     IMPRESSION: Normal CT scan of the head.      LAUREL HENDERSON MD         7/15/2017 11:14 AM      Narrative     CT ANGIOGRAM OF THE HEAD AND NECK WITHOUT AND WITH CONTRAST  7/15/2017  11:04 AM     HISTORY: Left-sided headache and dizziness.    TECHNIQUE: Precontrast localizing scans were followed by CT  angiography with an injection of 70 mL Isovue 370 IV with scans  through the head and neck. Images were transferred to a separate 3-D  workstation where multiplanar reformations and 3-D images were  created. Estimates of carotid stenoses are made relative to the distal  internal carotid artery diameters except as noted. Radiation dose for  this scan was reduced using automated exposure control, adjustment of  the mA and/or kV according to patient size, or iterative  reconstruction technique.    COMPARISON: None.    CT HEAD FINDINGS: No contrast enhancing lesions. Cerebral blood flow  is grossly normal.    CT ANGIOGRAM HEAD FINDINGS: Arteries are widely patent with no  aneurysm, significant stenosis, occlusion or intraarterial thrombus.  Venous circulation is unremarkable.     CT ANGIOGRAM NECK FINDINGS:   Right carotid artery: Widely patent without stenosis.      Left carotid artery: Widely patent without stenosis.      Vertebral arteries: Widely patent without stenosis. No evidence of  dissection.      Other findings: Mild mosaic attenuation in the visualized lung apices  which may be due to air-trapping or expiratory phase of imaging.  Thoracic spinal fusion hardware partially visualized.        Impression     IMPRESSION: Patent arteries in the head and neck without evidence of  vascular cutoff, stenosis, or aneurysm.                  Thank you for choosing [facility]       Thank you for choosing [facility] for your care. Our  goal is always to provide you with excellent care. Hearing back from our patients is one way we can continue to improve our services. Please take a few minutes to complete the written survey that you may receive in the mail after you visit with us. Thank you!        [employer] Information     [employer] gives you secure access to your electronic health record. If you see a primary care provider, you can also send messages to your care team and make appointments. If you have questions, please call your primary care clinic.  If you do not have a primary care provider, please call [phone] and they will assist you.        Care EveryWhere ID     This is your Care EveryWhere ID. This could be used by other organizations to access your [facility] medical records  [id number]        Equal Access to Services     AXEL ALEXANDER : Libia Kapoor, cassandra duffy, ralph inman, jean maloney. So [facility] [phone].    ATENCIÓN: Si habla español, tiene a sumner disposición servicios gratuitos de asistencia lingüística. Llame al [phone].    We comply with applicable federal civil rights laws and [place] laws. We do not discriminate on the basis of race, color, national origin, age, disability sex, sexual orientation or gender identity.            After Visit Summary       This is your record. Keep this with you and show to your community pharmacist(s) and doctor(s) at your next visit.

## 2017-07-17 ENCOUNTER — TELEPHONE (OUTPATIENT)
Dept: FAMILY MEDICINE | Facility: CLINIC | Age: 47
End: 2017-07-17

## 2017-07-17 NOTE — TELEPHONE ENCOUNTER
Cynthia was in ED on the weekend and had some labs done. She has questions about one of the labs. She says the CRP was elevated. She wants to know about that test.   Please call.

## 2017-07-31 ENCOUNTER — OFFICE VISIT (OUTPATIENT)
Dept: RHEUMATOLOGY | Facility: CLINIC | Age: 47
End: 2017-07-31
Payer: COMMERCIAL

## 2017-07-31 VITALS
SYSTOLIC BLOOD PRESSURE: 125 MMHG | HEART RATE: 89 BPM | TEMPERATURE: 98.8 F | DIASTOLIC BLOOD PRESSURE: 80 MMHG | BODY MASS INDEX: 37.77 KG/M2 | WEIGHT: 227 LBS | RESPIRATION RATE: 16 BRPM

## 2017-07-31 DIAGNOSIS — M25.50 MULTIPLE JOINT PAIN: Primary | ICD-10-CM

## 2017-07-31 LAB
CRP SERPL-MCNC: 8.4 MG/L (ref 0–8)
ERYTHROCYTE [SEDIMENTATION RATE] IN BLOOD BY WESTERGREN METHOD: 17 MM/H (ref 0–20)

## 2017-07-31 PROCEDURE — 86618 LYME DISEASE ANTIBODY: CPT | Performed by: INTERNAL MEDICINE

## 2017-07-31 PROCEDURE — 86140 C-REACTIVE PROTEIN: CPT | Performed by: INTERNAL MEDICINE

## 2017-07-31 PROCEDURE — 86200 CCP ANTIBODY: CPT | Performed by: INTERNAL MEDICINE

## 2017-07-31 PROCEDURE — 85652 RBC SED RATE AUTOMATED: CPT | Performed by: INTERNAL MEDICINE

## 2017-07-31 PROCEDURE — 86038 ANTINUCLEAR ANTIBODIES: CPT | Performed by: INTERNAL MEDICINE

## 2017-07-31 PROCEDURE — 86431 RHEUMATOID FACTOR QUANT: CPT | Performed by: INTERNAL MEDICINE

## 2017-07-31 PROCEDURE — 36415 COLL VENOUS BLD VENIPUNCTURE: CPT | Performed by: INTERNAL MEDICINE

## 2017-07-31 PROCEDURE — 99204 OFFICE O/P NEW MOD 45 MIN: CPT | Performed by: INTERNAL MEDICINE

## 2017-07-31 NOTE — NURSING NOTE
"Chief Complaint   Patient presents with     Consult     joint pain and elevate CRP       Initial /80 (BP Location: Left arm, Patient Position: Chair)  Pulse 89  Temp 98.8  F (37.1  C) (Oral)  Resp 16  Wt 227 lb (103 kg)  BMI 37.77 kg/m2 Estimated body mass index is 37.77 kg/(m^2) as calculated from the following:    Height as of 7/15/17: 5' 5\" (1.651 m).    Weight as of this encounter: 227 lb (103 kg).  Medication Reconciliation: complete   Sunitha Jiang LPN    "

## 2017-07-31 NOTE — MR AVS SNAPSHOT
After Visit Summary   7/31/2017    Cynthia Lyons    MRN: [id number]           Patient Information     Date Of Birth          1970        Visit Information        Provider Department      7/31/2017 7:45 AM Stevenson Ngo MD [facility]        Today's Diagnoses     Multiple joint pain    -  1       Follow-ups after your visit        Your next 10 appointments already scheduled     Aug 09, 2017  8:00 AM CDT   PHYSICAL with Rosaura Flores PA-C   [facility] ([facility])    [address] [place] [address] [phone]            Sep 05, 2017  8:45 AM CDT   New Visit with Abeba Boone MD   [facility] ([facility]    [address] [facility] [address] [phone]              Who to contact     If you have questions or need follow up information about today's clinic visit or your schedule please contact [facility] directly at [phone].  Normal or non-critical lab and imaging results will be communicated to you by [employer], letter or phone within 4 business days after the clinic has received the results. If you do not hear from us within 7 days, please contact the clinic through [employer] or phone. If you have a critical or abnormal lab result, we will notify you by phone as soon as possible.  Submit refill requests through [employer] or call your pharmacy and they will forward the refill request to us. Please allow 3 business days for your refill to be completed.          Additional Information About Your Visit        [employer] Information     [employer] gives you secure access to your electronic health record. If you see a primary care provider, you can also send messages to your care team and make appointments. If you have questions, please call your primary care clinic.  If you do not have a primary care provider, please call [phone] and they will assist  you.        Care EveryWhere ID     This is your Care EveryWhere ID. This could be used by other organizations to access your [place] medical records  [id number]        Your Vitals Were     Pulse Temperature Respirations BMI (Body Mass Index)          89 98.8  F (37.1  C) (Oral) 16 37.77 kg/m2         Blood Pressure from Last 3 Encounters:   07/31/17 125/80   07/15/17 136/81   07/06/17 122/84    Weight from Last 3 Encounters:   07/31/17 227 lb (103 kg)   07/15/17 195 lb (88.5 kg)   07/06/17 217 lb (98.4 kg)              We Performed the Following     Antinuclear antibody screen by EIA     CRP inflammation     Cyclic Citrullinated Peptide Antibody IgG     Erythrocyte sedimentation rate auto     Lyme Disease Diamond with reflex to WB Serum     Rheumatoid factor          Today's Medication Changes          These changes are accurate as of: 7/31/17 11:59 PM.  If you have any questions, ask your nurse or doctor.               Stop taking these medicines if you haven't already. Please contact your care team if you have questions.     sulindac 200 MG tablet   Commonly known as:  CLINORIL   Stopped by:  Stevenson Ngo MD                    Primary Care Provider Office Phone # Fax #    Rosaura lFores PA-C [phone]       [address] [address]        Equal Access to Services     AXEL ALEXANDER AH: Hadii aad ku hadasho Sopretty, waaxda luqadaha, qaybta kaalmada adeegyada, jean maloney. So [facility] [phone].    ATENCIÓN: Si habla español, tiene a sumner disposición servicios gratuitos de asistencia lingüística. Llame al [phone].    We comply with applicable federal civil rights laws and [place] laws. We do not discriminate on the basis of race, color, national origin, age, disability sex, sexual orientation or gender identity.            Thank you!     Thank you for choosing [facility]  for your care. Our goal is  always to provide you with excellent care. Hearing back from our patients is one way we can continue to improve our services. Please take a few minutes to complete the written survey that you may receive in the mail after your visit with us. Thank you!             Your Updated Medication List - Protect others around you: Learn how to safely use, store and throw away your medicines at www.disposemymeds.org.          This list is accurate as of: 7/31/17 11:59 PM.  Always use your most recent med list.                   Brand Name Dispense Instructions for use Diagnosis    buPROPion 100 MG 12 hr tablet    WELLBUTRIN SR    60 tablet    TAKE 2 TABLETS BY MOUTH DAILY    Panic anxiety syndrome, Major depressive disorder, recurrent episode, moderate (H), Generalized anxiety disorder       cetirizine 10 MG tablet    zyrTEC    60 tablet    Take 1 tablet (10 mg) by mouth 2 times daily    Other allergic rhinitis, Status post laminectomy with spinal fusion, Chronic pain disorder, Major depressive disorder, recurrent episode, moderate (H), Generalized anxiety disorder, Herpes simplex infection of genitourinary system       DRAMAMINE LESS DROWSY PO      Take 50 mg by mouth 3 times daily as needed for dizziness        fluticasone 50 MCG/ACT spray    FLONASE    1 Package    Spray 1 spray in nostril 2 times daily.    Allergic rhinitis       HYDROcodone-acetaminophen 5-325 MG per tablet    NORCO    180 tablet    2 in am and 2 at dinnertime/pm, and additional 1-2 at night.    Status post laminectomy with spinal fusion, Chronic pain disorder       lisinopril 20 MG tablet    PRINIVIL/ZESTRIL    30 tablet    Take 1 tablet (20 mg) by mouth daily    Essential hypertension with goal blood pressure less than 140/90       methocarbamol 750 MG tablet    ROBAXIN    120 tablet    TAKE 1 TO 2 TABLETS BY MOUTH UP TO THREE TIMES DAILY(4TH TIME PER DAY ON OCCASION IS OK)    Status post laminectomy with spinal fusion       valACYclovir 500 MG tablet     VALTREX    30 tablet    TAKE 1 TABLET(500 MG) BY MOUTH DAILY    Herpes simplex infection of genitourinary system, Status post laminectomy with spinal fusion, Chronic pain disorder, Major depressive disorder, recurrent episode, moderate (H), Generalized anxiety disorder, Other allergic rhinitis       venlafaxine 75 MG 24 hr capsule    EFFEXOR XR    30 capsule    Take 1 capsule (75 mg) by mouth daily    Chronic pain disorder, Major depressive disorder, recurrent episode, moderate (H), Generalized anxiety disorder
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
## 2017-08-01 LAB
ANA SER QL IA: NORMAL
CCP AB SER IA-ACNC: 1 U/ML
RHEUMATOID FACT SER NEPH-ACNC: <20 IU/ML (ref 0–20)

## 2017-08-02 LAB — B BURGDOR IGG+IGM SER QL: 0.4 (ref 0–0.89)

## 2017-08-09 ENCOUNTER — OFFICE VISIT (OUTPATIENT)
Dept: FAMILY MEDICINE | Facility: CLINIC | Age: 47
End: 2017-08-09
Payer: COMMERCIAL

## 2017-08-09 VITALS
DIASTOLIC BLOOD PRESSURE: 64 MMHG | WEIGHT: 213.4 LBS | BODY MASS INDEX: 35.51 KG/M2 | TEMPERATURE: 97.5 F | HEART RATE: 80 BPM | SYSTOLIC BLOOD PRESSURE: 118 MMHG

## 2017-08-09 DIAGNOSIS — G89.4 CHRONIC PAIN DISORDER: ICD-10-CM

## 2017-08-09 DIAGNOSIS — R73.9 ELEVATED BLOOD SUGAR: ICD-10-CM

## 2017-08-09 DIAGNOSIS — E66.01 SEVERE OBESITY (BMI 35.0-39.9) WITH COMORBIDITY (H): ICD-10-CM

## 2017-08-09 DIAGNOSIS — T39.391A NSAID OVERDOSE, ACCIDENTAL OR UNINTENTIONAL, INITIAL ENCOUNTER: ICD-10-CM

## 2017-08-09 DIAGNOSIS — Z01.411 ENCOUNTER FOR GYNECOLOGICAL EXAMINATION WITH ABNORMAL FINDING: Primary | ICD-10-CM

## 2017-08-09 DIAGNOSIS — E78.5 DYSLIPIDEMIA: ICD-10-CM

## 2017-08-09 DIAGNOSIS — Z98.1 STATUS POST LAMINECTOMY WITH SPINAL FUSION: ICD-10-CM

## 2017-08-09 DIAGNOSIS — N64.59 BREAST THICKENING: ICD-10-CM

## 2017-08-09 LAB
BASOPHILS # BLD AUTO: 0 10E9/L (ref 0–0.2)
BASOPHILS NFR BLD AUTO: 0.4 %
CHOLEST SERPL-MCNC: 277 MG/DL
DIFFERENTIAL METHOD BLD: ABNORMAL
EOSINOPHIL # BLD AUTO: 0.2 10E9/L (ref 0–0.7)
EOSINOPHIL NFR BLD AUTO: 1.5 %
ERYTHROCYTE [DISTWIDTH] IN BLOOD BY AUTOMATED COUNT: 14.9 % (ref 10–15)
HBA1C MFR BLD: 5.5 % (ref 4.3–6)
HCT VFR BLD AUTO: 43.1 % (ref 35–47)
HDLC SERPL-MCNC: 41 MG/DL
HGB BLD-MCNC: 13.9 G/DL (ref 11.7–15.7)
LDLC SERPL CALC-MCNC: 199 MG/DL
LYMPHOCYTES # BLD AUTO: 2.2 10E9/L (ref 0.8–5.3)
LYMPHOCYTES NFR BLD AUTO: 20.2 %
MCH RBC QN AUTO: 30.9 PG (ref 26.5–33)
MCHC RBC AUTO-ENTMCNC: 32.3 G/DL (ref 31.5–36.5)
MCV RBC AUTO: 96 FL (ref 78–100)
MONOCYTES # BLD AUTO: 0.8 10E9/L (ref 0–1.3)
MONOCYTES NFR BLD AUTO: 7.6 %
NEUTROPHILS # BLD AUTO: 7.7 10E9/L (ref 1.6–8.3)
NEUTROPHILS NFR BLD AUTO: 70.3 %
NONHDLC SERPL-MCNC: 236 MG/DL
PLATELET # BLD AUTO: 504 10E9/L (ref 150–450)
RBC # BLD AUTO: 4.5 10E12/L (ref 3.8–5.2)
TRIGL SERPL-MCNC: 185 MG/DL
WBC # BLD AUTO: 11 10E9/L (ref 4–11)

## 2017-08-09 PROCEDURE — 87624 HPV HI-RISK TYP POOLED RSLT: CPT | Performed by: PHYSICIAN ASSISTANT

## 2017-08-09 PROCEDURE — 87491 CHLMYD TRACH DNA AMP PROBE: CPT | Performed by: PHYSICIAN ASSISTANT

## 2017-08-09 PROCEDURE — G0145 SCR C/V CYTO,THINLAYER,RESCR: HCPCS | Performed by: PHYSICIAN ASSISTANT

## 2017-08-09 PROCEDURE — 36415 COLL VENOUS BLD VENIPUNCTURE: CPT | Performed by: FAMILY MEDICINE

## 2017-08-09 PROCEDURE — 80061 LIPID PANEL: CPT | Performed by: FAMILY MEDICINE

## 2017-08-09 PROCEDURE — 99213 OFFICE O/P EST LOW 20 MIN: CPT | Mod: 25 | Performed by: PHYSICIAN ASSISTANT

## 2017-08-09 PROCEDURE — 85025 COMPLETE CBC W/AUTO DIFF WBC: CPT | Performed by: FAMILY MEDICINE

## 2017-08-09 PROCEDURE — 83036 HEMOGLOBIN GLYCOSYLATED A1C: CPT | Performed by: FAMILY MEDICINE

## 2017-08-09 PROCEDURE — 99396 PREV VISIT EST AGE 40-64: CPT | Performed by: PHYSICIAN ASSISTANT

## 2017-08-09 RX ORDER — HYDROCODONE BITARTRATE AND ACETAMINOPHEN 5; 325 MG/1; MG/1
TABLET ORAL
Qty: 180 TABLET | Refills: 0 | Status: SHIPPED | OUTPATIENT
Start: 2017-09-08 | End: 2017-10-05

## 2017-08-09 RX ORDER — HYDROCODONE BITARTRATE AND ACETAMINOPHEN 5; 325 MG/1; MG/1
TABLET ORAL
Qty: 180 TABLET | Refills: 0 | Status: SHIPPED | OUTPATIENT
Start: 2017-08-09 | End: 2017-08-09

## 2017-08-09 NOTE — NURSING NOTE
"Chief Complaint   Patient presents with     Physical     Recheck Medication       Initial /64 (BP Location: Right arm, Cuff Size: Adult Large)  Pulse 80  Temp 97.5  F (36.4  C) (Tympanic)  Wt 213 lb 6.4 oz (96.8 kg)  BMI 35.51 kg/m2 Estimated body mass index is 35.51 kg/(m^2) as calculated from the following:    Height as of 7/15/17: 5' 5\" (1.651 m).    Weight as of this encounter: 213 lb 6.4 oz (96.8 kg).  Medication Reconciliation: complete    Health Maintenance that is potentially due pending provider review:  NONE    Possibly completing today per provider review.    Is there anyone who you would like to be able to receive your results? Not Applicable  If yes have patient fill out CICI  Tanika López M.A.        "

## 2017-08-09 NOTE — PATIENT INSTRUCTIONS
Naproxen - not more than 1-2 tabs naproxen every 12 hours.  This would be high dose.  Do not go higher.  Take with food.      Keep working on wt loss.      Recheck in 2 months.    Set up breast imaging      Preventive Health Recommendations  Female Ages 40 to 49    Yearly exam:     See your health care provider every year in order to  1. Review health changes.   2. Discuss preventive care.    3. Review your medicines if your doctor prescribed any.      Get a Pap test every three years (unless you have an abnormal result and your provider advises testing more often).      If you get Pap tests with HPV test, you only need to test every 5 years, unless you have an abnormal result. You do not need a Pap test if your uterus was removed (hysterectomy) and you have not had cancer.      You should be tested each year for STDs (sexually transmitted diseases), if you're at risk.       Ask your doctor if you should have a mammogram.      Have a colonoscopy (test for colon cancer) if someone in your family has had colon cancer or polyps before age 50.       Have a cholesterol test every 5 years.       Have a diabetes test (fasting glucose) after age 45. If you are at risk for diabetes, you should have this test every 3 years.    Shots: Get a flu shot each year. Get a tetanus shot every 10 years.     Nutrition:     Eat at least 5 servings of fruits and vegetables each day.    Eat whole-grain bread, whole-wheat pasta and brown rice instead of white grains and rice.    Talk to your provider about Calcium and Vitamin D.     Lifestyle    Exercise at least 150 minutes a week (an average of 30 minutes a day, 5 days a week). This will help you control your weight and prevent disease.    Limit alcohol to one drink per day.    No smoking.     Wear sunscreen to prevent skin cancer.    See your dentist every six months for an exam and cleaning.

## 2017-08-09 NOTE — MR AVS SNAPSHOT
After Visit Summary   8/9/2017    Cynthia Lyons    MRN: [id number]           Patient Information     Date Of Birth          1970        Visit Information        Provider Department      8/9/2017 8:00 AM Rosaura Flores PA-C [facility]        Today's Diagnoses     Encounter for gynecological examination with abnormal finding    -  1    NSAID overdose, accidental or unintentional, initial encounter        Dyslipidemia        Elevated blood sugar        Status post laminectomy with spinal fusion        Chronic pain disorder        Breast thickening          Care Instructions    Naproxen - not more than 1-2 tabs naproxen every 12 hours.  This would be high dose.  Do not go higher.  Take with food.      Keep working on wt loss.      Recheck in 2 months.    Set up breast imaging      Preventive Health Recommendations  Female Ages 40 to 49    Yearly exam:     See your health care provider every year in order to  1. Review health changes.   2. Discuss preventive care.    3. Review your medicines if your doctor prescribed any.      Get a Pap test every three years (unless you have an abnormal result and your provider advises testing more often).      If you get Pap tests with HPV test, you only need to test every 5 years, unless you have an abnormal result. You do not need a Pap test if your uterus was removed (hysterectomy) and you have not had cancer.      You should be tested each year for STDs (sexually transmitted diseases), if you're at risk.       Ask your doctor if you should have a mammogram.      Have a colonoscopy (test for colon cancer) if someone in your family has had colon cancer or polyps before age 50.       Have a cholesterol test every 5 years.       Have a diabetes test (fasting glucose) after age 45. If you are at risk for diabetes, you should have this test every 3 years.    Shots: Get a flu shot each year. Get a tetanus shot every 10 years.      Nutrition:     Eat at least 5 servings of fruits and vegetables each day.    Eat whole-grain bread, whole-wheat pasta and brown rice instead of white grains and rice.    Talk to your provider about Calcium and Vitamin D.     Lifestyle    Exercise at least 150 minutes a week (an average of 30 minutes a day, 5 days a week). This will help you control your weight and prevent disease.    Limit alcohol to one drink per day.    No smoking.     Wear sunscreen to prevent skin cancer.    See your dentist every six months for an exam and cleaning.          Follow-ups after your visit        Your next 10 appointments already scheduled     Sep 05, 2017  8:45 AM CDT   New Visit with Abeba Boone MD   [facility] ([facility]    [address] [id number] [phone]              Future tests that were ordered for you today     Open Future Orders        Priority Expected Expires Ordered    MA Diagnostic Digital Bilateral Routine  8/9/2018 8/9/2017    US Breast Left Limited 1-3 Quadrants Routine  8/9/2018 8/9/2017            Who to contact     If you have questions or need follow up information about today's clinic visit or your schedule please contact [facility] directly at [phone].  Normal or non-critical lab and imaging results will be communicated to you by Anacomphart, letter or phone within 4 business days after the clinic has received the results. If you do not hear from us within 7 days, please contact the clinic through Anacomphart or phone. If you have a critical or abnormal lab result, we will notify you by phone as soon as possible.  Submit refill requests through Netechy or call your pharmacy and they will forward the refill request to us. Please allow 3 business days for your refill to be completed.          Additional Information About Your Visit        Netechy Information     Netechy gives you secure access to your electronic health record.  If you see a primary care provider, you can also send messages to your care team and make appointments. If you have questions, please call your primary care clinic.  If you do not have a primary care provider, please call [phone] and they will assist you.        Care EveryWhere ID     This is your Care EveryWhere ID. This could be used by other organizations to access your [place] medical records  [id number]        Your Vitals Were     Pulse Temperature BMI (Body Mass Index)             80 97.5  F (36.4  C) (Tympanic) 35.51 kg/m2          Blood Pressure from Last 3 Encounters:   08/09/17 118/64   07/31/17 125/80   07/15/17 136/81    Weight from Last 3 Encounters:   08/09/17 213 lb 6.4 oz (96.8 kg)   07/31/17 227 lb (103 kg)   07/15/17 195 lb (88.5 kg)              We Performed the Following     CBC with platelets and differential     Hemoglobin A1c     Lipid Profile with reflex to direct LDL          Today's Medication Changes          These changes are accurate as of: 8/9/17  8:48 AM.  If you have any questions, ask your nurse or doctor.               Start taking these medicines.        Dose/Directions    HYDROcodone-acetaminophen 5-325 MG per tablet   Commonly known as:  NORCO   Used for:  Status post laminectomy with spinal fusion, Chronic pain disorder   Started by:  Rosaura Flores PA-C        Start taking on:  9/8/2017   2 in am and 2 at dinnertime/pm, and additional 1-2 at night.   Quantity:  180 tablet   Refills:  0            Where to get your medicines      Some of these will need a paper prescription and others can be bought over the counter.  Ask your nurse if you have questions.     Bring a paper prescription for each of these medications     HYDROcodone-acetaminophen 5-325 MG per tablet                Primary Care Provider Office Phone # Fax #    Rosaura Flores PA-C [phone] [address] [place] [address]        Equal Access to Services     AXEL ALEXANDER  AH: Hadii azul flowerssheyshawna Sobenjaali, waaxda luqadaha, qaybta kaalwilmar inman, jean olegario wesdiego velardeodilonmalik maloney. So [facility] [phone].    ATENCIÓN: Si habla dee, tiene a sumner disposición servicios gratuitos de asistencia lingüística. Llame al [phone].    We comply with applicable federal civil rights laws and [place] laws. We do not discriminate on the basis of race, color, national origin, age, disability sex, sexual orientation or gender identity.            Thank you!     Thank you for choosing [facility]  for your care. Our goal is always to provide you with excellent care. Hearing back from our patients is one way we can continue to improve our services. Please take a few minutes to complete the written survey that you may receive in the mail after your visit with us. Thank you!             Your Updated Medication List - Protect others around you: Learn how to safely use, store and throw away your medicines at www.disposemymeds.org.          This list is accurate as of: 8/9/17  8:48 AM.  Always use your most recent med list.                   Brand Name Dispense Instructions for use Diagnosis    buPROPion 100 MG 12 hr tablet    WELLBUTRIN SR    60 tablet    TAKE 2 TABLETS BY MOUTH DAILY    Panic anxiety syndrome, Major depressive disorder, recurrent episode, moderate (H), Generalized anxiety disorder       cetirizine 10 MG tablet    zyrTEC    60 tablet    Take 1 tablet (10 mg) by mouth 2 times daily    Other allergic rhinitis, Status post laminectomy with spinal fusion, Chronic pain disorder, Major depressive disorder, recurrent episode, moderate (H), Generalized anxiety disorder, Herpes simplex infection of genitourinary system       fluticasone 50 MCG/ACT spray    FLONASE    1 Package    Spray 1 spray in nostril 2 times daily.    Allergic rhinitis       HYDROcodone-acetaminophen 5-325 MG per tablet   Start taking on:  9/8/2017    NORCO    180 tablet    2 in am and 2 at  dinnertime/pm, and additional 1-2 at night.    Status post laminectomy with spinal fusion, Chronic pain disorder       lisinopril 20 MG tablet    PRINIVIL/ZESTRIL    30 tablet    Take 1 tablet (20 mg) by mouth daily    Essential hypertension with goal blood pressure less than 140/90       methocarbamol 750 MG tablet    ROBAXIN    120 tablet    TAKE 1 TO 2 TABLETS BY MOUTH UP TO THREE TIMES DAILY(4TH TIME PER DAY ON OCCASION IS OK)    Status post laminectomy with spinal fusion       valACYclovir 500 MG tablet    VALTREX    30 tablet    TAKE 1 TABLET(500 MG) BY MOUTH DAILY    Herpes simplex infection of genitourinary system, Status post laminectomy with spinal fusion, Chronic pain disorder, Major depressive disorder, recurrent episode, moderate (H), Generalized anxiety disorder, Other allergic rhinitis       venlafaxine 75 MG 24 hr capsule    EFFEXOR XR    30 capsule    Take 1 capsule (75 mg) by mouth daily    Chronic pain disorder, Major depressive disorder, recurrent episode, moderate (H), Generalized anxiety disorder

## 2017-08-09 NOTE — LETTER
[facility]  [address] [place] [id number]  [phone]        December 14, 2017    Cynthia Lyons  [address] [address]              Dear Cynthia Lyons    This is to remind you that your provider wanted you to return to the clinic for fasting lab test(s),    please fast for 10-12 hours. Morning medications can be taken with water.    You may call our office at [facility] at [phone] to schedule an appointment.    Please disregard this notice if you have already had your labs drawn or made an appointment.          Sincerely,        Rosaura Flores PA-C

## 2017-08-09 NOTE — PROGRESS NOTES
SUBJECTIVE:   CC: Cynthia Lyons is an 47 year old woman who presents for preventive health visit.     Healthy Habits:    Do you get at least three servings of calcium containing foods daily (dairy, green leafy vegetables, etc.)? yes    Amount of exercise or daily activities, outside of work: when able     Problems taking medications regularly No    Medication side effects: No    Have you had an eye exam in the past two years? yes    Do you see a dentist twice per year? yes  Do you have sleep apnea, excessive snoring or daytime drowsiness?yes    Rapid wt changes of late.  See flowsheet.  Feels she did have a lot of water wt.  Down 4 pounds overall from last appt with me.      Had been taking naproxen 3 tabs OTC q 6 hrs.  Feels naproxen was working better than sulindac.  GI upset if takes too many days in a row.  Had a lot of swellings and joint pains.  Saw rheumatology for CRP.  [place] to be OA.      Chronic back pain is stable.     last month.  Recheck today.  Elev glucose.    History of abn mammograms of R breast.  No symptoms.  Mat aunt with breast cancer.        Today's PHQ-2 Score:   PHQ-2 ( 1999 Pfizer) 3/25/2015 3/5/2014   Q1: Little interest or pleasure in doing things 0 1   Q2: Feeling down, depressed or hopeless 0 0   PHQ-2 Score 0 1       Abuse: Current or Past(Physical, Sexual or Emotional)- No  Do you feel safe in your environment - Yes  Social History   Substance Use Topics     Smoking status: Light Tobacco Smoker     Packs/day: 0.50     Years: 23.00     Types: Cigarettes     Smokeless tobacco: Never Used      Comment: started smoking age 22     Alcohol use No     The patient does not drink >3 drinks per day nor >7 drinks per week.    Reviewed orders with patient.  Reviewed health maintenance and updated orders accordingly - Yes  Labs reviewed in EPIC  BP Readings from Last 3 Encounters:   08/09/17 118/64   07/31/17 125/80   07/15/17 136/81    Wt Readings from Last 3 Encounters:   08/09/17  213 lb 6.4 oz (96.8 kg)   07/31/17 227 lb (103 kg)   07/15/17 195 lb (88.5 kg)         Patient under age 50, mutual decision reflected in health maintenance.    Pertinent mammograms are reviewed under the imaging tab.  History of abnormal Pap smear: NO - age 30-65 PAP every 5 years with negative HPV co-testing recommended    Reviewed and updated as needed this visit by clinical staff  Tobacco  Allergies  Meds  Problems  Med Hx  Surg Hx  Fam Hx  Soc Hx          Reviewed and updated as needed this visit by Provider  Allergies  Meds  Problems  Med Hx  Surg Hx  Fam Hx          ROS:C: NEGATIVE for fever, chills, change in weight  I: NEGATIVE for worrisome rashes, moles or lesions  E: NEGATIVE for vision changes or irritation  ENT: NEGATIVE for ear, mouth and throat problems  R: NEGATIVE for significant cough or SOB  B: NEGATIVE for masses, tenderness or discharge  CV: NEGATIVE for chest pain, palpitations or peripheral edema  GI: NEGATIVE for nausea, abdominal pain, heartburn, or change in bowel habits  : NEGATIVE for unusual urinary or vaginal symptoms. No vaginal bleeding.  M: NEGATIVE for significant arthralgias or myalgia  N: NEGATIVE for weakness, dizziness or paresthesias  P: NEGATIVE for changes in mood or affect     ROS is negative except as listed above in ROS or in HPI.      OBJECTIVE:   /64 (BP Location: Right arm, Cuff Size: Adult Large)  Pulse 80  Temp 97.5  F (36.4  C) (Tympanic)  Wt 213 lb 6.4 oz (96.8 kg)  BMI 35.51 kg/m2  EXAM:  GENERAL: healthy, alert and no distress  EYES: Eyes grossly normal to inspection, PERRL and conjunctivae and sclerae normal  HENT: ear canals and TM's normal, nose and mouth without ulcers or lesions  NECK: no adenopathy, no asymmetry, masses, or scars and thyroid normal to palpation  RESP: lungs clear to auscultation - no rales, rhonchi or wheezes  BREAST: L breast with diffuse thickening without discrete lump to 4 and 5 o clock, otherwise normal without  masses, tenderness or nipple discharge and no palpable axillary masses or adenopathy  CV: regular rate and rhythm, normal S1 S2, no S3 or S4, no murmur, click or rub, no peripheral edema and peripheral pulses strong  ABDOMEN: soft, nontender, no hepatosplenomegaly, no masses and bowel sounds normal   (female): normal female external genitalia, normal urethral meatus, vaginal mucosa pink, moist, well rugated, and normal cervix/adnexa/uterus without masses or discharge  MS: no gross musculoskeletal defects noted, no edema  SKIN: no suspicious lesions or rashes  NEURO: Normal strength and tone, mentation intact and speech normal  PSYCH: mentation appears normal, affect normal/bright    ASSESSMENT/PLAN:       ICD-10-CM    1. Encounter for gynecological examination with abnormal finding Z01.411 Pap imaged thin layer screen with HPV - recommended age 30 - 65     HPV High Risk Types DNA Cervical     Chlamydia trachomatis PCR   2. NSAID overdose, accidental or unintentional, initial encounter T39.391A    3. Dyslipidemia E78.5 Lipid Profile with reflex to direct LDL   4. Elevated blood sugar R73.9 Hemoglobin A1c   5. Status post laminectomy with spinal fusion Z98.1 HYDROcodone-acetaminophen (NORCO) 5-325 MG per tablet     DISCONTINUED: HYDROcodone-acetaminophen (NORCO) 5-325 MG per tablet   6. Chronic pain disorder G89.4 HYDROcodone-acetaminophen (NORCO) 5-325 MG per tablet     DISCONTINUED: HYDROcodone-acetaminophen (NORCO) 5-325 MG per tablet   7. Breast thickening N64.59 MA Diagnostic Digital Bilateral     US Breast Left Limited 1-3 Quadrants   8. Severe obesity (BMI 35.0-39.9) with comorbidity (H) - HTN E66.01 Working on wt loss       COUNSELING:   Reviewed preventive health counseling, as reflected in patient instructions       Regular exercise       Healthy diet/nutrition     reports that she has been smoking Cigarettes.  She has a 11.50 pack-year smoking history. She has never used smokeless tobacco.  Tobacco  "Cessation Action Plan: deferring until after wt loss at this time  Estimated body mass index is 35.51 kg/(m^2) as calculated from the following:    Height as of 7/15/17: 5' 5\" (1.651 m).    Weight as of this encounter: 213 lb 6.4 oz (96.8 kg).   Weight management plan: Discussed healthy diet and exercise guidelines and patient will follow up in 2 mo f/u in clinic to re-evaluate.  Has lost 4 pounds since last visit with me.    Counseling Resources:  ATP IV Guidelines  Pooled Cohorts Equation Calculator  Breast Cancer Risk Calculator  FRAX Risk Assessment  ICSI Preventive Guidelines  Dietary Guidelines for Americans, 2010  [employer]'s MyPlate  ASA Prophylaxis  Lung CA Screening    Rosaura Flores PA-C  [facility]    Patient Instructions   Naproxen - not more than 1-2 tabs naproxen every 12 hours.  This would be high dose.  Do not go higher.  Take with food.      Keep working on wt loss.      Recheck in 2 months.    Set up breast imaging      Preventive Health Recommendations  Female Ages 40 to 49    Yearly exam:     See your health care provider every year in order to  1. Review health changes.   2. Discuss preventive care.    3. Review your medicines if your doctor prescribed any.      Get a Pap test every three years (unless you have an abnormal result and your provider advises testing more often).      If you get Pap tests with HPV test, you only need to test every 5 years, unless you have an abnormal result. You do not need a Pap test if your uterus was removed (hysterectomy) and you have not had cancer.      You should be tested each year for STDs (sexually transmitted diseases), if you're at risk.       Ask your doctor if you should have a mammogram.      Have a colonoscopy (test for colon cancer) if someone in your family has had colon cancer or polyps before age 50.       Have a cholesterol test every 5 years.       Have a diabetes test (fasting glucose) after age 45. If you are at risk " for diabetes, you should have this test every 3 years.    Shots: Get a flu shot each year. Get a tetanus shot every 10 years.     Nutrition:     Eat at least 5 servings of fruits and vegetables each day.    Eat whole-grain bread, whole-wheat pasta and brown rice instead of white grains and rice.    Talk to your provider about Calcium and Vitamin D.     Lifestyle    Exercise at least 150 minutes a week (an average of 30 minutes a day, 5 days a week). This will help you control your weight and prevent disease.    Limit alcohol to one drink per day.    No smoking.     Wear sunscreen to prevent skin cancer.    See your dentist every six months for an exam and cleaning.

## 2017-08-10 LAB
C TRACH DNA SPEC QL NAA+PROBE: NORMAL
SPECIMEN SOURCE: NORMAL

## 2017-08-11 PROBLEM — E78.5 DYSLIPIDEMIA: Status: ACTIVE | Noted: 2017-08-11

## 2017-08-11 RX ORDER — ATORVASTATIN CALCIUM 20 MG/1
20 TABLET, FILM COATED ORAL DAILY
Qty: 90 TABLET | Refills: 0 | Status: SHIPPED | OUTPATIENT
Start: 2017-08-11 | End: 2017-11-03

## 2017-08-11 NOTE — PROGRESS NOTES
Stephanie Marie,    Unfortunately your cholesterol went higher yet.  You do need to be on cholesterol medication.  I've sent this to your pharmacy.  We'll recheck cholesterol in 1-3 months before further refills.  If you lose weight or change your diet, we can try off med again in future.    Please call clinic at [phone] if you have questions, or we can chat about this more at next appt.    Rosaura Flores PA-C

## 2017-08-14 LAB
COPATH REPORT: NORMAL
PAP: NORMAL

## 2017-08-15 ENCOUNTER — HOSPITAL ENCOUNTER (OUTPATIENT)
Dept: MAMMOGRAPHY | Facility: CLINIC | Age: 47
Discharge: HOME | End: 2017-08-15
Attending: PHYSICIAN ASSISTANT | Admitting: PHYSICIAN ASSISTANT
Payer: COMMERCIAL

## 2017-08-15 ENCOUNTER — HOSPITAL ENCOUNTER (OUTPATIENT)
Dept: ULTRASOUND IMAGING | Facility: CLINIC | Age: 47
End: 2017-08-15
Attending: PHYSICIAN ASSISTANT
Payer: COMMERCIAL

## 2017-08-15 DIAGNOSIS — N64.59 BREAST THICKENING: ICD-10-CM

## 2017-08-15 PROCEDURE — 76642 ULTRASOUND BREAST LIMITED: CPT | Mod: LT

## 2017-08-15 PROCEDURE — G0204 DX MAMMO INCL CAD BI: HCPCS

## 2017-08-15 NOTE — LETTER
[facility]  [address]  [place] [address]                                                                                                            [place] Lyons  [address]  [place]  [place] [address]      August 15, 2017  Date of Exam: 8/15/17    Dear Cynthia:    Thank you for your recent visit.  Breast Imaging Result: We are pleased to inform you that the results of your recent breast imaging show no evidence of malignancy (cancer).    If you are experiencing any breast problems such as a lump or localized pain we request that you discuss this with your health care provider if you haven t already done so, as additional testing may be necessary.    As you know, early detection of cancer is very important. Although mammography is the most accurate method for early detection, not all cancers are found through mammography. A thorough examination includes a combination of mammography, physical examination and breast self-examination. Currently the American College of Radiology and the Society of Breast Imaging recommend an annual mammogram for all women beginning at the age of 40.    A report of your breast imaging results was sent to: Rosaura Flores    Your breast imaging will become part of your medical file here at [facility] for at least 10 years. You are responsible for informing any new health care provider or breast imaging facility of the date and location of this examination.    We appreciate the opportunity to participate in your health care.    Sincerely,    Parvez Mazariegos MD  Interpreting Radiologist  [facility]

## 2017-08-16 LAB
FINAL DIAGNOSIS: NORMAL
HPV HR 12 DNA CVX QL NAA+PROBE: NEGATIVE
HPV16 DNA SPEC QL NAA+PROBE: NEGATIVE
HPV18 DNA SPEC QL NAA+PROBE: NEGATIVE
SPECIMEN DESCRIPTION: NORMAL

## 2017-08-16 NOTE — PROGRESS NOTES
Stephanie Marie,    Your breast imaging results were normal.  No further work up is needed.    Please call clinic at [phone] if you have questions.    Rosaura Flores PA-C

## 2017-08-21 DIAGNOSIS — F41.0 PANIC ANXIETY SYNDROME: ICD-10-CM

## 2017-08-21 DIAGNOSIS — F41.1 GENERALIZED ANXIETY DISORDER: ICD-10-CM

## 2017-08-21 DIAGNOSIS — F33.1 MAJOR DEPRESSIVE DISORDER, RECURRENT EPISODE, MODERATE (H): ICD-10-CM

## 2017-08-22 RX ORDER — BUPROPION HYDROCHLORIDE 100 MG/1
TABLET, EXTENDED RELEASE ORAL
Qty: 60 TABLET | Refills: 0 | Status: SHIPPED | OUTPATIENT
Start: 2017-08-22 | End: 2017-10-05

## 2017-08-22 NOTE — TELEPHONE ENCOUNTER
buPROPion (WELLBUTRIN SR) 100 MG 12 hr tablet       Last Written Prescription Date: 6/20/2017  Last Fill Quantity: 60; # refills: 1  Last Office Visit with [facility], [facility] or [facility] prescribing provider:  8/9/2017        Last PHQ-9 score on record=   PHQ-9 SCORE 6/9/2017   Total Score -   Total Score 1       Lab Results   Component Value Date    AST 24 07/15/2017     Lab Results   Component Value Date    ALT 32 07/15/2017

## 2017-09-18 ENCOUNTER — MYC MEDICAL ADVICE (OUTPATIENT)
Dept: FAMILY MEDICINE | Facility: CLINIC | Age: 47
End: 2017-09-18

## 2017-09-18 DIAGNOSIS — F41.1 GENERALIZED ANXIETY DISORDER: ICD-10-CM

## 2017-09-18 DIAGNOSIS — F41.0 PANIC ANXIETY SYNDROME: ICD-10-CM

## 2017-09-18 DIAGNOSIS — F33.1 MAJOR DEPRESSIVE DISORDER, RECURRENT EPISODE, MODERATE (H): ICD-10-CM

## 2017-09-18 NOTE — TELEPHONE ENCOUNTER
buPROPion (WELLBUTRIN SR) 100 MG 12 hr tablet       Last Written Prescription Date: 08/22/2017  Last Fill Quantity: 60 tablet; # refills: 0  Last Office Visit with [facility], [facility] or [facility] prescribing provider:  08/09/2017   Next 5 appointments (look out 90 days)     Oct 05, 2017  8:00 AM CDT   Office Visit with Rosaura Flores PA-C   [facility]    [address] [place] [id number] [phone]                   Last PHQ-9 score on record=   PHQ-9 SCORE 6/9/2017   Total Score -   Total Score 1       Lab Results   Component Value Date    AST 24 07/15/2017     Lab Results   Component Value Date    ALT 32 07/15/2017

## 2017-09-19 RX ORDER — BUPROPION HYDROCHLORIDE 100 MG/1
TABLET, EXTENDED RELEASE ORAL
Qty: 60 TABLET | Refills: 0 | Status: SHIPPED | OUTPATIENT
Start: 2017-09-19 | End: 2017-11-03

## 2017-10-05 ENCOUNTER — OFFICE VISIT (OUTPATIENT)
Dept: FAMILY MEDICINE | Facility: CLINIC | Age: 47
End: 2017-10-05
Payer: COMMERCIAL

## 2017-10-05 VITALS
HEIGHT: 65 IN | BODY MASS INDEX: 36.99 KG/M2 | TEMPERATURE: 98 F | WEIGHT: 222 LBS | SYSTOLIC BLOOD PRESSURE: 132 MMHG | DIASTOLIC BLOOD PRESSURE: 86 MMHG | HEART RATE: 72 BPM

## 2017-10-05 DIAGNOSIS — Z98.1 STATUS POST LAMINECTOMY WITH SPINAL FUSION: ICD-10-CM

## 2017-10-05 DIAGNOSIS — I10 ESSENTIAL HYPERTENSION WITH GOAL BLOOD PRESSURE LESS THAN 140/90: ICD-10-CM

## 2017-10-05 DIAGNOSIS — Z23 NEED FOR PROPHYLACTIC VACCINATION AND INOCULATION AGAINST INFLUENZA: ICD-10-CM

## 2017-10-05 DIAGNOSIS — G89.4 CHRONIC PAIN DISORDER: Primary | ICD-10-CM

## 2017-10-05 DIAGNOSIS — E78.5 DYSLIPIDEMIA: ICD-10-CM

## 2017-10-05 PROBLEM — E66.9 NON MORBID OBESITY, UNSPECIFIED OBESITY TYPE: Status: RESOLVED | Noted: 2017-02-17 | Resolved: 2017-10-05

## 2017-10-05 PROCEDURE — 99214 OFFICE O/P EST MOD 30 MIN: CPT | Performed by: PHYSICIAN ASSISTANT

## 2017-10-05 PROCEDURE — 90471 IMMUNIZATION ADMIN: CPT | Performed by: PHYSICIAN ASSISTANT

## 2017-10-05 PROCEDURE — 90686 IIV4 VACC NO PRSV 0.5 ML IM: CPT | Performed by: PHYSICIAN ASSISTANT

## 2017-10-05 RX ORDER — HYDROCODONE BITARTRATE AND ACETAMINOPHEN 5; 325 MG/1; MG/1
TABLET ORAL
Qty: 180 TABLET | Refills: 0 | Status: SHIPPED | OUTPATIENT
Start: 2017-10-05 | End: 2017-11-03

## 2017-10-05 RX ORDER — PHENTERMINE HYDROCHLORIDE 37.5 MG/1
TABLET ORAL
Qty: 15 TABLET | Refills: 0 | Status: SHIPPED | OUTPATIENT
Start: 2017-10-05 | End: 2017-11-03

## 2017-10-05 NOTE — PATIENT INSTRUCTIONS
Refilled pain med    Keep working on weight loss  Discussed weight loss may help back pain, BP, and cholesterol  Decided to add phentermine to help with wt loss  Recheck in 1 mo    Decided cholesterol lab again next month    Call if questions

## 2017-10-05 NOTE — NURSING NOTE
"Chief Complaint   Patient presents with     Pain     Recheck Medication       Initial /86 (BP Location: Right arm, Patient Position: Chair, Cuff Size: Adult Large)  Pulse 72  Temp 98  F (36.7  C) (Tympanic)  Ht 5' 5\" (1.651 m)  Wt 222 lb (100.7 kg)  BMI 36.94 kg/m2 Estimated body mass index is 36.94 kg/(m^2) as calculated from the following:    Height as of this encounter: 5' 5\" (1.651 m).    Weight as of this encounter: 222 lb (100.7 kg).  Medication Reconciliation: complete    Health Maintenance that is potentially due pending provider review:  NONE        Is there anyone who you would like to be able to receive your results? No  If yes have patient fill out CICI      "

## 2017-10-05 NOTE — MR AVS SNAPSHOT
After Visit Summary   10/5/2017    yCnthia Lyons    MRN: [id number]           Patient Information     Date Of Birth          1970        Visit Information        Provider Department      10/5/2017 8:00 AM Rosaura Flores PA-C [facility]        Today's Diagnoses     Status post laminectomy with spinal fusion    -  1    Chronic pain disorder        Class 2 obesity with serious comorbidity in adult, unspecified BMI, unspecified obesity type        Essential hypertension with goal blood pressure less than 140/90        Dyslipidemia          Care Instructions    Refilled pain med    Keep working on weight loss  Discussed weight loss may help back pain, BP, and cholesterol  Decided to add phentermine to help with wt loss  Recheck in 1 mo    Decided cholesterol lab again next month    Call if questions              Follow-ups after your visit        Who to contact     If you have questions or need follow up information about today's clinic visit or your schedule please contact [facility] directly at [phone].  Normal or non-critical lab and imaging results will be communicated to you by [employer], letter or phone within 4 business days after the clinic has received the results. If you do not hear from us within 7 days, please contact the clinic through [employer] or phone. If you have a critical or abnormal lab result, we will notify you by phone as soon as possible.  Submit refill requests through [employer] or call your pharmacy and they will forward the refill request to us. Please allow 3 business days for your refill to be completed.          Additional Information About Your Visit        [employer] Information     [employer] gives you secure access to your electronic health record. If you see a primary care provider, you can also send messages to your care team and make appointments. If you have questions, please call your primary care clinic.  If you do not  "have a primary care provider, please call [phone] and they will assist you.        Care EveryWhere ID     This is your Care EveryWhere ID. This could be used by other organizations to access your [facility] medical records  [id number]        Your Vitals Were     Pulse Temperature Height BMI (Body Mass Index)          72 98  F (36.7  C) (Tympanic) 5' 5\" (1.651 m) 36.94 kg/m2         Blood Pressure from Last 3 Encounters:   10/05/17 132/86   08/09/17 118/64   07/31/17 125/80    Weight from Last 3 Encounters:   10/05/17 222 lb (100.7 kg)   08/09/17 213 lb 6.4 oz (96.8 kg)   07/31/17 227 lb (103 kg)              Today, you had the following     No orders found for display         Today's Medication Changes          These changes are accurate as of: 10/5/17  8:28 AM.  If you have any questions, ask your nurse or doctor.               Start taking these medicines.        Dose/Directions    phentermine 37.5 MG tablet   Commonly known as:  ADIPEX-P   Used for:  Class 2 obesity with serious comorbidity in adult, unspecified BMI, unspecified obesity type, Essential hypertension with goal blood pressure less than 140/90, Dyslipidemia   Started by:  Rosaura Flores PA-C        0.5 tab daily in morning.   Quantity:  15 tablet   Refills:  0         These medicines have changed or have updated prescriptions.        Dose/Directions    buPROPion 100 MG 12 hr tablet   Commonly known as:  WELLBUTRIN SR   This may have changed:  Another medication with the same name was removed. Continue taking this medication, and follow the directions you see here.   Used for:  Panic anxiety syndrome, Major depressive disorder, recurrent episode, moderate (H), Generalized anxiety disorder   Changed by:  Rosaura Flores PA-C        TAKE 2 TABLETS BY MOUTH DAILY   Quantity:  60 tablet   Refills:  0            Where to get your medicines      Some of these will need a paper prescription and others can be bought over the counter.  Ask " your nurse if you have questions.     Bring a paper prescription for each of these medications     HYDROcodone-acetaminophen 5-325 MG per tablet    phentermine 37.5 MG tablet                Primary Care Provider Office Phone # Fax #    Rosaura Flores PA-C [phone] [address] [place] [address]        Equal Access to Services     [facility] CATHERINE : Hadii aad ku hadasho Soomaali, waaxda luqadaha, qaybta kaalmada adeegyada, waxay idiin hayaan adeeg khodilonsh lamilla . So [facility] [phone].    ATENCIÓN: Si habla español, tiene a sumner disposición servicios gratuitos de asistencia lingüística. Bety al [phone].    We comply with applicable federal civil rights laws and [place] laws. We do not discriminate on the basis of race, color, national origin, age, disability, sex, sexual orientation, or gender identity.            Thank you!     Thank you for choosing [facility]  for your care. Our goal is always to provide you with excellent care. Hearing back from our patients is one way we can continue to improve our services. Please take a few minutes to complete the written survey that you may receive in the mail after your visit with us. Thank you!             Your Updated Medication List - Protect others around you: Learn how to safely use, store and throw away your medicines at www.disposemymeds.org.          This list is accurate as of: 10/5/17  8:28 AM.  Always use your most recent med list.                   Brand Name Dispense Instructions for use Diagnosis    atorvastatin 20 MG tablet    LIPITOR    90 tablet    Take 1 tablet (20 mg) by mouth daily    Dyslipidemia       buPROPion 100 MG 12 hr tablet    WELLBUTRIN SR    60 tablet    TAKE 2 TABLETS BY MOUTH DAILY    Panic anxiety syndrome, Major depressive disorder, recurrent episode, moderate (H), Generalized anxiety disorder       cetirizine 10 MG tablet    zyrTEC    60 tablet    Take 1 tablet (10 mg) by mouth 2  times daily    Other allergic rhinitis, Status post laminectomy with spinal fusion, Chronic pain disorder, Major depressive disorder, recurrent episode, moderate (H), Generalized anxiety disorder, Herpes simplex infection of genitourinary system       fluticasone 50 MCG/ACT spray    FLONASE    1 Package    Spray 1 spray in nostril 2 times daily.    Allergic rhinitis       HYDROcodone-acetaminophen 5-325 MG per tablet    NORCO    180 tablet    2 in am and 2 at dinnertime/pm, and additional 1-2 at night.    Status post laminectomy with spinal fusion, Chronic pain disorder       lisinopril 20 MG tablet    PRINIVIL/ZESTRIL    30 tablet    Take 1 tablet (20 mg) by mouth daily    Essential hypertension with goal blood pressure less than 140/90       methocarbamol 750 MG tablet    ROBAXIN    120 tablet    TAKE 1 TO 2 TABLETS BY MOUTH UP TO THREE TIMES DAILY(4TH TIME PER DAY ON OCCASION IS OK)    Status post laminectomy with spinal fusion       phentermine 37.5 MG tablet    ADIPEX-P    15 tablet    0.5 tab daily in morning.    Class 2 obesity with serious comorbidity in adult, unspecified BMI, unspecified obesity type, Essential hypertension with goal blood pressure less than 140/90, Dyslipidemia       valACYclovir 500 MG tablet    VALTREX    30 tablet    TAKE 1 TABLET(500 MG) BY MOUTH DAILY    Herpes simplex infection of genitourinary system, Status post laminectomy with spinal fusion, Chronic pain disorder, Major depressive disorder, recurrent episode, moderate (H), Generalized anxiety disorder, Other allergic rhinitis       venlafaxine 75 MG 24 hr capsule    EFFEXOR-XR    90 capsule    TAKE 1 CAPSULE BY MOUTH DAILY.    Chronic pain disorder, Major depressive disorder, recurrent episode, moderate (H), Generalized anxiety disorder

## 2017-10-26 DIAGNOSIS — Z98.1 STATUS POST LAMINECTOMY WITH SPINAL FUSION: ICD-10-CM

## 2017-10-26 DIAGNOSIS — A60.00 HERPES SIMPLEX INFECTION OF GENITOURINARY SYSTEM: ICD-10-CM

## 2017-10-26 DIAGNOSIS — F41.1 GENERALIZED ANXIETY DISORDER: ICD-10-CM

## 2017-10-26 DIAGNOSIS — F33.1 MAJOR DEPRESSIVE DISORDER, RECURRENT EPISODE, MODERATE (H): ICD-10-CM

## 2017-10-26 DIAGNOSIS — J30.89 OTHER ALLERGIC RHINITIS: ICD-10-CM

## 2017-10-26 DIAGNOSIS — G89.4 CHRONIC PAIN DISORDER: ICD-10-CM

## 2017-10-26 RX ORDER — VALACYCLOVIR HYDROCHLORIDE 500 MG/1
TABLET, FILM COATED ORAL
Qty: 90 TABLET | Refills: 1 | Status: SHIPPED | OUTPATIENT
Start: 2017-10-26 | End: 2018-04-12

## 2017-10-30 DIAGNOSIS — F41.1 GENERALIZED ANXIETY DISORDER: ICD-10-CM

## 2017-10-30 DIAGNOSIS — F41.0 PANIC ANXIETY SYNDROME: ICD-10-CM

## 2017-10-30 DIAGNOSIS — F33.1 MAJOR DEPRESSIVE DISORDER, RECURRENT EPISODE, MODERATE (H): ICD-10-CM

## 2017-10-30 RX ORDER — BUPROPION HYDROCHLORIDE 100 MG/1
TABLET, EXTENDED RELEASE ORAL
Qty: 180 TABLET | Refills: 0 | Status: SHIPPED | OUTPATIENT
Start: 2017-10-30 | End: 2018-01-15

## 2017-11-03 ENCOUNTER — OFFICE VISIT (OUTPATIENT)
Dept: FAMILY MEDICINE | Facility: CLINIC | Age: 47
End: 2017-11-03
Payer: COMMERCIAL

## 2017-11-03 VITALS
WEIGHT: 219 LBS | HEIGHT: 65 IN | SYSTOLIC BLOOD PRESSURE: 130 MMHG | DIASTOLIC BLOOD PRESSURE: 90 MMHG | BODY MASS INDEX: 36.49 KG/M2 | TEMPERATURE: 97.7 F | HEART RATE: 64 BPM

## 2017-11-03 DIAGNOSIS — E78.5 DYSLIPIDEMIA: ICD-10-CM

## 2017-11-03 DIAGNOSIS — G89.4 CHRONIC PAIN DISORDER: ICD-10-CM

## 2017-11-03 DIAGNOSIS — Z98.1 STATUS POST LAMINECTOMY WITH SPINAL FUSION: ICD-10-CM

## 2017-11-03 DIAGNOSIS — I10 ESSENTIAL HYPERTENSION WITH GOAL BLOOD PRESSURE LESS THAN 140/90: ICD-10-CM

## 2017-11-03 PROCEDURE — 99214 OFFICE O/P EST MOD 30 MIN: CPT | Performed by: PHYSICIAN ASSISTANT

## 2017-11-03 RX ORDER — HYDROCODONE BITARTRATE AND ACETAMINOPHEN 5; 325 MG/1; MG/1
TABLET ORAL
Qty: 180 TABLET | Refills: 0 | Status: SHIPPED | OUTPATIENT
Start: 2017-11-03 | End: 2017-11-30

## 2017-11-03 RX ORDER — ATORVASTATIN CALCIUM 20 MG/1
20 TABLET, FILM COATED ORAL DAILY
Qty: 90 TABLET | Refills: 0 | Status: SHIPPED | OUTPATIENT
Start: 2017-11-03 | End: 2017-12-21

## 2017-11-03 RX ORDER — PHENTERMINE HYDROCHLORIDE 37.5 MG/1
TABLET ORAL
Qty: 30 TABLET | Refills: 0 | Status: SHIPPED | OUTPATIENT
Start: 2017-11-03 | End: 2017-11-30

## 2017-11-03 NOTE — PROGRESS NOTES
SUBJECTIVE:   Cynthia Lyons is a 47 year old female who presents to clinic today for the following health issues:      Chronic Pain Follow-Up       Type / Location of Pain: Lower Back  Analgesia/pain control:       Recent changes:  same      Overall control: Comfortably manageable  Activity level/function:      Daily activities:  Can do most things most days, with some rest    Work:  Unable to work  Adverse effects:  No  Adherance    Taking medication as directed?  Yes    Participating in other treatments: no - not at this time  Risk Factors:    Sleep:  Good    Mood/anxiety:  controlled    Recent family or social stressors:  Daughter had a baby 2 weeks ago    Other aggravating factors: none  PHQ-9 SCORE 2/17/2017 5/3/2017 6/9/2017   Total Score - - -   Total Score 0 19 1     NACNI-7 SCORE 2/17/2017 5/3/2017 6/9/2017   Total Score - - -   Total Score 4 19 2     Encounter-Level CSA - 08/11/2016:          Controlled Substance Agreement - Scan on 9/8/2016  8:37 AM : CONTROLLED SUBSTANCE AGREEMENT 08/11/2016 (below)            Encounter-Level CSA - 08/11/2016:          Controlled Substance Agreement - Scan on 9/16/2014  3:59 PM : [facility] Controlled Medication Agreement 9/10/14 (below)                Medication Followup of Phentermine    Taking Medication as prescribed: yes    Side Effects:  None    Medication Helping Symptoms:         Amount of exercise or physical activity: 2 days for about 13 minutes    Problems taking medications regularly: No    Medication side effects: none    Diet: regular (no restrictions)       Chronic back pain.  Narcotic contract transferred from Dr Guzman.    Wt - started phentermine last month.  Thought was down 7 pounds on home scale, but 3 pounds here.  Feels is more active, and more conscious of her eating and decreased appetite.  Smaller portions.  Not food or calorie counting.  Twice a week going on treadmill.  Not napping anymore, and sleep is better.    BP is up today.  Hasn't been  "monitoring.  Took med late today when just running out door.      Pain unchanged.    Lipids - tolerating med.  Due for lab but in rush today.    Problem list and histories reviewed & adjusted, as indicated.  Additional history: as documented    BP Readings from Last 3 Encounters:   11/03/17 130/90   10/05/17 132/86   08/09/17 118/64    Wt Readings from Last 3 Encounters:   11/03/17 219 lb (99.3 kg)   10/05/17 222 lb (100.7 kg)   08/09/17 213 lb 6.4 oz (96.8 kg)           Labs reviewed in EPIC      Reviewed and updated as needed this visit by clinical staffTobacco  Allergies  Meds  Problems  Med Hx  Surg Hx  Fam Hx  Soc Hx        Reviewed and updated as needed this visit by Provider  Tobacco  Allergies  Meds  Problems  Med Hx  Surg Hx  Fam Hx  Soc Hx          ROS:  Constitutional, cardiovascular, pulmonary, GI, musculoskeletal, neuro, and psych systems are negative, except as otherwise noted.      OBJECTIVE:   /90  Pulse 64  Temp 97.7  F (36.5  C) (Tympanic)  Ht 5' 5\" (1.651 m)  Wt 219 lb (99.3 kg)  BMI 36.44 kg/m2  Body mass index is 36.44 kg/(m^2).  GENERAL: healthy, alert and no distress  PSYCH: mentation appears normal, affect normal/bright    ASSESSMENT/PLAN:       ICD-10-CM    1. Class 2 obesity with serious comorbidity in adult, unspecified BMI, unspecified obesity type E66.9 phentermine (ADIPEX-P) 37.5 MG tablet   2. Essential hypertension with goal blood pressure less than 140/90 I10 phentermine (ADIPEX-P) 37.5 MG tablet   3. Dyslipidemia E78.5 phentermine (ADIPEX-P) 37.5 MG tablet     atorvastatin (LIPITOR) 20 MG tablet   4. Status post laminectomy with spinal fusion Z98.1 HYDROcodone-acetaminophen (NORCO) 5-325 MG per tablet   5. Chronic pain disorder G89.4 HYDROcodone-acetaminophen (NORCO) 5-325 MG per tablet   stable/no change to chronic pain  Due for lipid lab  HTN not at goal today, needs monitoring or med adjust  Increased phentermine with plan of patient report on " BP    Patient Instructions   Can increase phentermine  Try to track calories - 1200 tessie goal  Exercise 5 days per week, 30 minutes    My Chart me a few BPs in next week so that I can be sure BP today was a fluke, and that BP is doing ok with phentermine increase    Lab next visit for sure - for cholesterol, and needed anyway if BP med changes      Rosaura Flores, PAMoiraC  [facility]

## 2017-11-03 NOTE — PATIENT INSTRUCTIONS
Can increase phentermine  Try to track calories - 1200 tessie goal  Exercise 5 days per week, 30 minutes    My Chart me a few BPs in next week so that I can be sure BP today was a fluke, and that BP is doing ok with phentermine increase    Lab next visit for sure - for cholesterol, and needed anyway if BP med changes

## 2017-11-03 NOTE — NURSING NOTE
"Chief Complaint   Patient presents with     Pain       Initial BP (!) 134/92 (BP Location: Right arm, Patient Position: Chair, Cuff Size: Adult Large)  Pulse 64  Temp 97.7  F (36.5  C) (Tympanic)  Ht 5' 5\" (1.651 m)  Wt 219 lb (99.3 kg)  BMI 36.44 kg/m2 Estimated body mass index is 36.44 kg/(m^2) as calculated from the following:    Height as of this encounter: 5' 5\" (1.651 m).    Weight as of this encounter: 219 lb (99.3 kg).  Medication Reconciliation: complete    Health Maintenance that is potentially due pending provider review:  NONE        Is there anyone who you would like to be able to receive your results? No  If yes have patient fill out CICI      "

## 2017-11-03 NOTE — MR AVS SNAPSHOT
After Visit Summary   11/3/2017    Cynthia Lyons    MRN: [id number]           Patient Information     Date Of Birth          1970        Visit Information        Provider Department      11/3/2017 8:00 AM Rosaura Flores PA-C [facility]        Today's Diagnoses     Class 2 obesity with serious comorbidity in adult, unspecified BMI, unspecified obesity type        Essential hypertension with goal blood pressure less than 140/90        Dyslipidemia        Status post laminectomy with spinal fusion        Chronic pain disorder          Care Instructions    Can increase phentermine  Try to track calories - 1200 tessie goal  Exercise 5 days per week, 30 minutes    My Chart me a few BPs in next week so that I can be sure BP today was a fluke, and that BP is doing ok with phentermine increase    Lab next visit for sure - for cholesterol, and needed anyway if BP med changes          Follow-ups after your visit        Who to contact     If you have questions or need follow up information about today's clinic visit or your schedule please contact [facility] directly at [phone].  Normal or non-critical lab and imaging results will be communicated to you by [employer], letter or phone within 4 business days after the clinic has received the results. If you do not hear from us within 7 days, please contact the clinic through [employer] or phone. If you have a critical or abnormal lab result, we will notify you by phone as soon as possible.  Submit refill requests through [employer] or call your pharmacy and they will forward the refill request to us. Please allow 3 business days for your refill to be completed.          Additional Information About Your Visit        Evgenhart Information     [employer] gives you secure access to your electronic health record. If you see a primary care provider, you can also send messages to your care team and make appointments. If you have  "questions, please call your primary care clinic.  If you do not have a primary care provider, please call [phone] and they will assist you.        Care EveryWhere ID     This is your Care EveryWhere ID. This could be used by other organizations to access your [facility] medical records  [id number]        Your Vitals Were     Pulse Temperature Height BMI (Body Mass Index)          64 97.7  F (36.5  C) (Tympanic) 5' 5\" (1.651 m) 36.44 kg/m2         Blood Pressure from Last 3 Encounters:   11/03/17 130/90   10/05/17 132/86   08/09/17 118/64    Weight from Last 3 Encounters:   11/03/17 219 lb (99.3 kg)   10/05/17 222 lb (100.7 kg)   08/09/17 213 lb 6.4 oz (96.8 kg)              Today, you had the following     No orders found for display         Today's Medication Changes          These changes are accurate as of: 11/3/17  8:52 AM.  If you have any questions, ask your nurse or doctor.               These medicines have changed or have updated prescriptions.        Dose/Directions    atorvastatin 20 MG tablet   Commonly known as:  LIPITOR   This may have changed:  additional instructions   Used for:  Dyslipidemia   Changed by:  Rosaura Flores PA-C        Dose:  20 mg   Take 1 tablet (20 mg) by mouth daily Lab before refill.   Quantity:  90 tablet   Refills:  0       buPROPion 100 MG 12 hr tablet   Commonly known as:  WELLBUTRIN SR   This may have changed:  Another medication with the same name was removed. Continue taking this medication, and follow the directions you see here.   Used for:  Panic anxiety syndrome, Major depressive disorder, recurrent episode, moderate (H), Generalized anxiety disorder   Changed by:  Rosaura Flores PA-C        TAKE 2 TABLETS BY MOUTH DAILY   Quantity:  180 tablet   Refills:  0       phentermine 37.5 MG tablet   Commonly known as:  ADIPEX-P   This may have changed:  additional instructions   Used for:  Class 2 obesity with serious comorbidity in adult, unspecified BMI, " unspecified obesity type, Essential hypertension with goal blood pressure less than 140/90, Dyslipidemia   Changed by:  Rosaura Flores PA-C        1 tab daily in morning.   Quantity:  30 tablet   Refills:  0            Where to get your medicines      These medications were sent to [facility] [address]  [address] [place], [place] [address]     Phone:  [phone]     atorvastatin 20 MG tablet         Some of these will need a paper prescription and others can be bought over the counter.  Ask your nurse if you have questions.     Bring a paper prescription for each of these medications     HYDROcodone-acetaminophen 5-325 MG per tablet    phentermine 37.5 MG tablet                Primary Care Provider Office Phone # Fax #    Rosaura Flores PA-C [phone] [address] [place], [place] [address]        Equal Access to Services     AXEL ALEXANDER : Hadii azul crouch hadasho Sopretty, waaxda luqadaha, qaybta kaalmada adeegyada, jean graves . So [facility] [phone].    ATENCIÓN: Si habla español, tiene a sumner disposición servicios gratuitos de asistencia lingüística. Llame al [phone].    We comply with applicable federal civil rights laws and [place] laws. We do not discriminate on the basis of race, color, national origin, age, disability, sex, sexual orientation, or gender identity.            Thank you!     Thank you for choosing [facility]  for your care. Our goal is always to provide you with excellent care. Hearing back from our patients is one way we can continue to improve our services. Please take a few minutes to complete the written survey that you may receive in the mail after your visit with us. Thank you!             Your Updated Medication List - Protect others around you: Learn how to safely use, store and throw away your medicines at www.disposemymeds.org.          This list  is accurate as of: 11/3/17  8:52 AM.  Always use your most recent med list.                   Brand Name Dispense Instructions for use Diagnosis    atorvastatin 20 MG tablet    LIPITOR    90 tablet    Take 1 tablet (20 mg) by mouth daily Lab before refill.    Dyslipidemia       buPROPion 100 MG 12 hr tablet    WELLBUTRIN SR    180 tablet    TAKE 2 TABLETS BY MOUTH DAILY    Panic anxiety syndrome, Major depressive disorder, recurrent episode, moderate (H), Generalized anxiety disorder       cetirizine 10 MG tablet    zyrTEC    60 tablet    Take 1 tablet (10 mg) by mouth 2 times daily    Other allergic rhinitis, Status post laminectomy with spinal fusion, Chronic pain disorder, Major depressive disorder, recurrent episode, moderate (H), Generalized anxiety disorder, Herpes simplex infection of genitourinary system       fluticasone 50 MCG/ACT spray    FLONASE    1 Package    Spray 1 spray in nostril 2 times daily.    Allergic rhinitis       HYDROcodone-acetaminophen 5-325 MG per tablet    NORCO    180 tablet    2 in am and 2 at dinnertime/pm, and additional 1-2 at night.    Status post laminectomy with spinal fusion, Chronic pain disorder       lisinopril 20 MG tablet    PRINIVIL/ZESTRIL    30 tablet    Take 1 tablet (20 mg) by mouth daily    Essential hypertension with goal blood pressure less than 140/90       methocarbamol 750 MG tablet    ROBAXIN    120 tablet    TAKE 1 TO 2 TABLETS BY MOUTH UP TO THREE TIMES DAILY(4TH TIME PER DAY ON OCCASION IS OK)    Status post laminectomy with spinal fusion       phentermine 37.5 MG tablet    ADIPEX-P    30 tablet    1 tab daily in morning.    Class 2 obesity with serious comorbidity in adult, unspecified BMI, unspecified obesity type, Essential hypertension with goal blood pressure less than 140/90, Dyslipidemia       valACYclovir 500 MG tablet    VALTREX    90 tablet    TAKE 1 TABLET(500 MG) BY MOUTH DAILY    Herpes simplex infection of genitourinary system, Status post  laminectomy with spinal fusion, Chronic pain disorder, Major depressive disorder, recurrent episode, moderate (H), Generalized anxiety disorder, Other allergic rhinitis       venlafaxine 75 MG 24 hr capsule    EFFEXOR-XR    90 capsule    TAKE 1 CAPSULE BY MOUTH DAILY.    Chronic pain disorder, Major depressive disorder, recurrent episode, moderate (H), Generalized anxiety disorder

## 2017-11-29 ENCOUNTER — MYC MEDICAL ADVICE (OUTPATIENT)
Dept: FAMILY MEDICINE | Facility: CLINIC | Age: 47
End: 2017-11-29

## 2017-11-29 DIAGNOSIS — G89.4 CHRONIC PAIN DISORDER: ICD-10-CM

## 2017-11-29 DIAGNOSIS — E78.5 DYSLIPIDEMIA: ICD-10-CM

## 2017-11-29 DIAGNOSIS — I10 BENIGN ESSENTIAL HYPERTENSION: ICD-10-CM

## 2017-11-29 DIAGNOSIS — Z98.1 STATUS POST LAMINECTOMY WITH SPINAL FUSION: ICD-10-CM

## 2017-11-30 RX ORDER — PHENTERMINE HYDROCHLORIDE 37.5 MG/1
TABLET ORAL
Qty: 30 TABLET | Refills: 0 | Status: SHIPPED | OUTPATIENT
Start: 2017-11-30 | End: 2017-12-20

## 2017-11-30 RX ORDER — LISINOPRIL AND HYDROCHLOROTHIAZIDE 12.5; 2 MG/1; MG/1
1 TABLET ORAL DAILY
Qty: 30 TABLET | Refills: 0 | Status: SHIPPED | OUTPATIENT
Start: 2017-11-30 | End: 2017-12-21

## 2017-11-30 RX ORDER — HYDROCODONE BITARTRATE AND ACETAMINOPHEN 5; 325 MG/1; MG/1
TABLET ORAL
Qty: 180 TABLET | Refills: 0 | Status: SHIPPED | OUTPATIENT
Start: 2017-11-30 | End: 2017-12-20

## 2017-12-12 ENCOUNTER — MYC MEDICAL ADVICE (OUTPATIENT)
Dept: FAMILY MEDICINE | Facility: CLINIC | Age: 47
End: 2017-12-12

## 2017-12-12 ENCOUNTER — RADIANT APPOINTMENT (OUTPATIENT)
Dept: GENERAL RADIOLOGY | Facility: CLINIC | Age: 47
End: 2017-12-12
Attending: FAMILY MEDICINE
Payer: COMMERCIAL

## 2017-12-12 ENCOUNTER — OFFICE VISIT (OUTPATIENT)
Dept: FAMILY MEDICINE | Facility: CLINIC | Age: 47
End: 2017-12-12
Payer: COMMERCIAL

## 2017-12-12 VITALS
HEIGHT: 65 IN | WEIGHT: 222.2 LBS | SYSTOLIC BLOOD PRESSURE: 118 MMHG | BODY MASS INDEX: 37.02 KG/M2 | HEART RATE: 90 BPM | TEMPERATURE: 97 F | DIASTOLIC BLOOD PRESSURE: 78 MMHG

## 2017-12-12 DIAGNOSIS — M54.2 NECK PAIN: Primary | ICD-10-CM

## 2017-12-12 DIAGNOSIS — J01.00 ACUTE NON-RECURRENT MAXILLARY SINUSITIS: ICD-10-CM

## 2017-12-12 DIAGNOSIS — S16.1XXA STRAIN OF NECK MUSCLE, INITIAL ENCOUNTER: ICD-10-CM

## 2017-12-12 PROCEDURE — 72040 X-RAY EXAM NECK SPINE 2-3 VW: CPT

## 2017-12-12 PROCEDURE — 99214 OFFICE O/P EST MOD 30 MIN: CPT | Performed by: FAMILY MEDICINE

## 2017-12-12 PROCEDURE — 72070 X-RAY EXAM THORAC SPINE 2VWS: CPT

## 2017-12-12 NOTE — LETTER
My Depression Action Plan  Name: Cynthia Lyons   Date of Birth 1970  Date: 12/12/2017    My doctor: Rosaura Flores   My clinic: [address] [id number] [phone]          GREEN    ZONE   Good Control    What it looks like:     Things are going generally well. You have normal up s and down s. You may even feel depressed from time to time, but bad moods usually last less than a day.   What you need to do:  1. Continue to care for yourself (see self care plan)  2. Check your depression survival kit and update it as needed  3. Follow your physician s recommendations including any medication.  4. Do not stop taking medication unless you consult with your physician first.           YELLOW         ZONE Getting Worse    What it looks like:     Depression is starting to interfere with your life.     It may be hard to get out of bed; you may be starting to isolate yourself from others.    Symptoms of depression are starting to last most all day and this has happened for several days.     You may have suicidal thoughts but they are not constant.   What you need to do:     1. Call your care team, your response to treatment will improve if you keep your care team informed of your progress. Yellow periods are signs an adjustment may need to be made.     2. Continue your self-care, even if you have to fake it!    3. Talk to someone in your support network    4. Open up your depression survival kit           RED    ZONE Medical Alert - Get Help    What it looks like:     Depression is seriously interfering with your life.     You may experience these or other symptoms: You can t get out of bed most days, can t work or engage in other necessary activities, you have trouble taking care of basic hygiene, or basic responsibilities, thoughts of suicide or death that will not go away, self-injurious behavior.     What you need to do:  1. Call your care  team and request a same-day appointment. If they are not available (weekends or after hours) call your local crisis line, emergency room or 911.      Electronically signed by: Julia Quintanilla, December 12, 2017    Depression Self Care Plan / Survival Kit    Self-Care for Depression  Here s the deal. Your body and mind are really not as separate as most people think.  What you do and think affects how you feel and how you feel influences what you do and think. This means if you do things that people who feel good do, it will help you feel better.  Sometimes this is all it takes.  There is also a place for medication and therapy depending on how severe your depression is, so be sure to consult with your medical provider and/ or Behavioral Health Consultant if your symptoms are worsening or not improving.     In order to better manage my stress, I will:    Exercise  Get some form of exercise, every day. This will help reduce pain and release endorphins, the  feel good  chemicals in your brain. This is almost as good as taking antidepressants!  This is not the same as joining a gym and then never going! (they count on that by the way ) It can be as simple as just going for a walk or doing some gardening, anything that will get you moving.      Hygiene   Maintain good hygiene (Get out of bed in the morning, Make your bed, Brush your teeth, Take a shower, and Get dressed like you were going to work, even if you are unemployed).  If your clothes don't fit try to get ones that do.    Diet  I will strive to eat foods that are good for me, drink plenty of water, and avoid excessive sugar, caffeine, alcohol, and other mood-altering substances.  Some foods that are helpful in depression are: complex carbohydrates, B vitamins, flaxseed, fish or fish oil, fresh fruits and vegetables.    Psychotherapy  I agree to participate in Individual Therapy (if recommended).    Medication  If prescribed medications, I agree to take them.  Missing  doses can result in serious side effects.  I understand that drinking alcohol, or other illicit drug use, may cause potential side effects.  I will not stop my medication abruptly without first discussing it with my provider.    Staying Connected With Others  I will stay in touch with my friends, family members, and my primary care provider/team.    Use your imagination  Be creative.  We all have a creative side; it doesn t matter if it s oil painting, sand castles, or mud pies! This will also kick up the endorphins.    Witness Beauty  (AKA stop and smell the roses) Take a look outside, even in mid-winter. Notice colors, textures. Watch the squirrels and birds.     Service to others  Be of service to others.  There is always someone else in need.  By helping others we can  get out of ourselves  and remember the really important things.  This also provides opportunities for practicing all the other parts of the program.    Humor  Laugh and be silly!  Adjust your TV habits for less news and crime-drama and more comedy.    Control your stress  Try breathing deep, massage therapy, biofeedback, and meditation. Find time to relax each day.     My support system    Clinic Contact:  Phone number:    Contact 1:  Phone number:    Contact 2:  Phone number:    Anglican/:  Phone number:    Therapist:  Phone number:    Local crisis center:    Phone number:    Other community support:  Phone number:
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
## 2017-12-12 NOTE — PATIENT INSTRUCTIONS
Try over the counter Afrin for 3-5 days for your sinuses   Make sure you see your spine surgeon for this neck pain in the next 2-3 weeks.        Understanding Cervical Strain    There are 7 bones (vertebrae) in the neck that are part of the spine. These are called the cervical spine. Cervical strain is a medical term for neck pain. The neck has several layers of muscles. These are connected with tendons to the cervical spine and other bones. Neck pain is often the result of injury to these muscles and tendons.  Causes of cervical strain  Different types of stress on the neck can damage muscles and tendons (soft tissues) and cause cervical strain. Cervical tissues can be damaged by:    The neck being forced past its normal range of motion, such as in a car accident or sports injury    Constant, low-level stress, such as from poor posture or a poorly set-up workspace  Symptoms of cervical strain  These may include:    Neck pain or stiffness    Pain in the shoulders or upper back    Muscle spasms    Headache, often starting at the base of the neck    Irritability, difficulty concentrating, or sleeplessness  Treatment for cervical strain  This problem often gets better on its own. Treatments aim to reduce pain and inflammation and increase the range of motion of the neck. Possible treatments include:    Over-the-counter or prescription pain medicine. These help relieve pain and inflammation.    Stretching exercises to decrease neck stiffness.    Massage to decrease neck stiffness.    Cold or heat pack. These help reduce pain and swelling.  Call 911  Call emergency services right away if you have any of these:    Face drooping or numbness    Numbness or weakness, especially in the arms or on one side    Slurred speech or difficulty speaking    Blurred vision   When to call your healthcare provider  Call your healthcare provider right away if you have any of these:    Fever of 100.4 F (38 C) or higher, or as  directed    Pain or stiffness that gets worse    Symptoms that don t get better, or get worse    Numbness, tingling, weakness or shooting pains into the arms or legs    New symptoms  Date Last Reviewed: 3/10/2016    4268-7191 [employer] [address] [place] [id number]. All rights reserved. This information is not intended as a substitute for professional medical care. Always follow your healthcare professional's instructions.
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
## 2017-12-12 NOTE — MR AVS SNAPSHOT
After Visit Summary   12/12/2017    Cynthia Lyons    MRN: [id number]           Patient Information     Date Of Birth          1970        Visit Information        Provider Department      12/12/2017 8:20 AM Loida Burgess MD [facility]        Today's Diagnoses     Neck pain    -  1    Acute non-recurrent maxillary sinusitis        Strain of neck muscle, initial encounter          Care Instructions      Try over the counter Afrin for 3-5 days for your sinuses   Make sure you see your spine surgeon for this neck pain in the next 2-3 weeks.        Understanding Cervical Strain    There are 7 bones (vertebrae) in the neck that are part of the spine. These are called the cervical spine. Cervical strain is a medical term for neck pain. The neck has several layers of muscles. These are connected with tendons to the cervical spine and other bones. Neck pain is often the result of injury to these muscles and tendons.  Causes of cervical strain  Different types of stress on the neck can damage muscles and tendons (soft tissues) and cause cervical strain. Cervical tissues can be damaged by:    The neck being forced past its normal range of motion, such as in a car accident or sports injury    Constant, low-level stress, such as from poor posture or a poorly set-up workspace  Symptoms of cervical strain  These may include:    Neck pain or stiffness    Pain in the shoulders or upper back    Muscle spasms    Headache, often starting at the base of the neck    Irritability, difficulty concentrating, or sleeplessness  Treatment for cervical strain  This problem often gets better on its own. Treatments aim to reduce pain and inflammation and increase the range of motion of the neck. Possible treatments include:    Over-the-counter or prescription pain medicine. These help relieve pain and inflammation.    Stretching exercises to decrease neck stiffness.    Massage to decrease neck  stiffness.    Cold or heat pack. These help reduce pain and swelling.  Call 911  Call emergency services right away if you have any of these:    Face drooping or numbness    Numbness or weakness, especially in the arms or on one side    Slurred speech or difficulty speaking    Blurred vision   When to call your healthcare provider  Call your healthcare provider right away if you have any of these:    Fever of 100.4 F (38 C) or higher, or as directed    Pain or stiffness that gets worse    Symptoms that don t get better, or get worse    Numbness, tingling, weakness or shooting pains into the arms or legs    New symptoms  Date Last Reviewed: 3/10/2016    [id number] [employer]. [address] [place] [address], [place] [address]. All rights reserved. This information is not intended as a substitute for professional medical care. Always follow your healthcare professional's instructions.                Follow-ups after your visit        Your next 10 appointments already scheduled     Jan 03, 2018  7:40 AM CST   Office Visit with Rosaura Flores PA-C   [facility]    [address] [place] [id number] [phone]           Bring a current list of meds and any records pertaining to this visit. For Physicals, please bring immunization records and any forms needing to be filled out. Please arrive 10 minutes early to complete paperwork.              Who to contact     Normal or non-critical lab and imaging results will be communicated to you by c-LEctahart, letter or phone within 4 business days after the clinic has received the results. If you do not hear from us within 7 days, please contact the clinic through MyChart or phone. If you have a critical or abnormal lab result, we will notify you by phone as soon as possible.  Submit refill requests through [employer] or call your pharmacy and they will forward the refill request to us. Please allow 3 business  "days for your refill to be completed.          If you need to speak with a  for additional information , please call: [phone]           Additional Information About Your Visit        [employer] Information     [employer] gives you secure access to your electronic health record. If you see a primary care provider, you can also send messages to your care team and make appointments. If you have questions, please call your primary care clinic.  If you do not have a primary care provider, please call [phone] and they will assist you.        Care EveryWhere ID     This is your Care EveryWhere ID. This could be used by other organizations to access your [facility] medical records  [id number]        Your Vitals Were     Pulse Temperature Height BMI (Body Mass Index)          90 97  F (36.1  C) (Tympanic) 5' 5\" (1.651 m) 36.98 kg/m2         Blood Pressure from Last 3 Encounters:   12/12/17 118/78   11/03/17 130/90   10/05/17 132/86    Weight from Last 3 Encounters:   12/12/17 222 lb 3.2 oz (100.8 kg)   11/03/17 219 lb (99.3 kg)   10/05/17 222 lb (100.7 kg)              We Performed the Following     DEPRESSION ACTION PLAN (DAP)     XR Cervical Spine 2/3 Views     XR Thoracic Spine 2 Views          Today's Medication Changes          These changes are accurate as of: 12/12/17  9:46 AM.  If you have any questions, ask your nurse or doctor.               Start taking these medicines.        Dose/Directions    amoxicillin-clavulanate 875-125 MG per tablet   Commonly known as:  AUGMENTIN   Used for:  Acute non-recurrent maxillary sinusitis   Started by:  Loida Burgess MD        Dose:  1 tablet   Take 1 tablet by mouth 2 times daily   Quantity:  20 tablet   Refills:  0            Where to get your medicines      These medications were sent to [id number] - [place], [place] - [address] [facility] [address] [facility] [address] [address] [address] [facility] [address], [place] [address]    Hours:  24-hours Phone: "  [phone]     amoxicillin-clavulanate 875-125 MG per tablet                Primary Care Provider Office Phone # Fax #    Rosaura Flores PA-C [phone] [address] [place] [id number]        Equal Access to Services     AXEL ALEXANDER : Hadii aad ku hadasho Soomaali, waaxda luqadaha, qaybta kaalmada adeegyada, waxay adanin hayagnesn adelouie efrain ely maloney. So [facility] [phone].    ATENCIÓN: Si habla español, tiene a sumner disposición servicios gratuitos de asistencia lingüística. [facility] [phone].    We comply with applicable federal civil rights laws and [place] laws. We do not discriminate on the basis of race, color, national origin, age, disability, sex, sexual orientation, or gender identity.            Thank you!     Thank you for choosing [facility]  for your care. Our goal is always to provide you with excellent care. Hearing back from our patients is one way we can continue to improve our services. Please take a few minutes to complete the written survey that you may receive in the mail after your visit with us. Thank you!             Your Updated Medication List - Protect others around you: Learn how to safely use, store and throw away your medicines at www.disposemymeds.org.          This list is accurate as of: 12/12/17  9:46 AM.  Always use your most recent med list.                   Brand Name Dispense Instructions for use Diagnosis    amoxicillin-clavulanate 875-125 MG per tablet    AUGMENTIN    20 tablet    Take 1 tablet by mouth 2 times daily    Acute non-recurrent maxillary sinusitis       atorvastatin 20 MG tablet    LIPITOR    90 tablet    Take 1 tablet (20 mg) by mouth daily Lab before refill.    Dyslipidemia       buPROPion 100 MG 12 hr tablet    WELLBUTRIN SR    180 tablet    TAKE 2 TABLETS BY MOUTH DAILY    Panic anxiety syndrome, Major depressive disorder, recurrent episode, moderate (H), Generalized anxiety disorder       cetirizine 10 MG  tablet    zyrTEC    60 tablet    Take 1 tablet (10 mg) by mouth 2 times daily    Other allergic rhinitis, Status post laminectomy with spinal fusion, Chronic pain disorder, Major depressive disorder, recurrent episode, moderate (H), Generalized anxiety disorder, Herpes simplex infection of genitourinary system       fluticasone 50 MCG/ACT spray    FLONASE    1 Package    Spray 1 spray in nostril 2 times daily.    Allergic rhinitis       HYDROcodone-acetaminophen 5-325 MG per tablet    NORCO    180 tablet    2 in am and 2 at dinnertime/pm, and additional 1-2 at night.    Status post laminectomy with spinal fusion, Chronic pain disorder       lisinopril 20 MG tablet    PRINIVIL/ZESTRIL    30 tablet    Take 1 tablet (20 mg) by mouth daily    Essential hypertension with goal blood pressure less than 140/90       lisinopril-hydrochlorothiazide 20-12.5 MG per tablet    PRINZIDE/ZESTORETIC    30 tablet    Take 1 tablet by mouth daily Lab before further refill.    Benign essential hypertension       methocarbamol 750 MG tablet    ROBAXIN    120 tablet    TAKE 1 TO 2 TABLETS BY MOUTH UP TO THREE TIMES DAILY(4TH TIME PER DAY ON OCCASION IS OK)    Status post laminectomy with spinal fusion       phentermine 37.5 MG tablet    ADIPEX-P    30 tablet    1 tab daily in morning.    Class 2 obesity with serious comorbidity in adult, unspecified BMI, unspecified obesity type, Benign essential hypertension, Dyslipidemia       valACYclovir 500 MG tablet    VALTREX    90 tablet    TAKE 1 TABLET(500 MG) BY MOUTH DAILY    Herpes simplex infection of genitourinary system, Status post laminectomy with spinal fusion, Chronic pain disorder, Major depressive disorder, recurrent episode, moderate (H), Generalized anxiety disorder, Other allergic rhinitis       venlafaxine 75 MG 24 hr capsule    EFFEXOR-XR    90 capsule    TAKE 1 CAPSULE BY MOUTH DAILY.    Chronic pain disorder, Major depressive disorder, recurrent episode, moderate (H),  Generalized anxiety disorder

## 2017-12-20 ENCOUNTER — OFFICE VISIT (OUTPATIENT)
Dept: FAMILY MEDICINE | Facility: CLINIC | Age: 47
End: 2017-12-20
Payer: COMMERCIAL

## 2017-12-20 VITALS
DIASTOLIC BLOOD PRESSURE: 80 MMHG | SYSTOLIC BLOOD PRESSURE: 112 MMHG | TEMPERATURE: 97.7 F | WEIGHT: 222 LBS | BODY MASS INDEX: 36.94 KG/M2 | HEART RATE: 80 BPM | RESPIRATION RATE: 16 BRPM

## 2017-12-20 DIAGNOSIS — G89.4 CHRONIC PAIN DISORDER: ICD-10-CM
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
## 2017-12-20 DIAGNOSIS — Z98.1 STATUS POST LAMINECTOMY WITH SPINAL FUSION: Primary | ICD-10-CM

## 2017-12-20 DIAGNOSIS — E78.5 DYSLIPIDEMIA: ICD-10-CM

## 2017-12-20 DIAGNOSIS — K59.00 CONSTIPATION, UNSPECIFIED CONSTIPATION TYPE: ICD-10-CM

## 2017-12-20 DIAGNOSIS — I10 BENIGN ESSENTIAL HYPERTENSION: ICD-10-CM

## 2017-12-20 DIAGNOSIS — M62.838 SPASM OF MUSCLE: ICD-10-CM

## 2017-12-20 LAB
ANION GAP SERPL CALCULATED.3IONS-SCNC: 7 MMOL/L (ref 3–14)
BUN SERPL-MCNC: 13 MG/DL (ref 7–30)
CALCIUM SERPL-MCNC: 9.1 MG/DL (ref 8.5–10.1)
CHLORIDE SERPL-SCNC: 101 MMOL/L (ref 94–109)
CHOLEST SERPL-MCNC: 164 MG/DL
CO2 SERPL-SCNC: 27 MMOL/L (ref 20–32)
CREAT SERPL-MCNC: 0.82 MG/DL (ref 0.52–1.04)
GFR SERPL CREATININE-BSD FRML MDRD: 75 ML/MIN/1.7M2
GLUCOSE SERPL-MCNC: 105 MG/DL (ref 70–99)
HDLC SERPL-MCNC: 43 MG/DL
LDLC SERPL CALC-MCNC: 96 MG/DL
NONHDLC SERPL-MCNC: 121 MG/DL
POTASSIUM SERPL-SCNC: 4.2 MMOL/L (ref 3.4–5.3)
SODIUM SERPL-SCNC: 135 MMOL/L (ref 133–144)
TRIGL SERPL-MCNC: 125 MG/DL

## 2017-12-20 PROCEDURE — 80048 BASIC METABOLIC PNL TOTAL CA: CPT | Performed by: PHYSICIAN ASSISTANT

## 2017-12-20 PROCEDURE — 36415 COLL VENOUS BLD VENIPUNCTURE: CPT | Performed by: PHYSICIAN ASSISTANT

## 2017-12-20 PROCEDURE — 80061 LIPID PANEL: CPT | Performed by: PHYSICIAN ASSISTANT

## 2017-12-20 PROCEDURE — 99214 OFFICE O/P EST MOD 30 MIN: CPT | Performed by: PHYSICIAN ASSISTANT

## 2017-12-20 RX ORDER — HYDROCODONE BITARTRATE AND ACETAMINOPHEN 5; 325 MG/1; MG/1
TABLET ORAL
Qty: 180 TABLET | Refills: 0 | Status: SHIPPED | OUTPATIENT
Start: 2017-12-20 | End: 2017-12-20

## 2017-12-20 RX ORDER — PHENTERMINE HYDROCHLORIDE 37.5 MG/1
TABLET ORAL
Qty: 30 TABLET | Refills: 0 | Status: SHIPPED | OUTPATIENT
Start: 2017-12-20 | End: 2018-01-15

## 2017-12-20 RX ORDER — HYDROCODONE BITARTRATE AND ACETAMINOPHEN 5; 325 MG/1; MG/1
TABLET ORAL
Qty: 240 TABLET | Refills: 0 | Status: SHIPPED | OUTPATIENT
Start: 2017-12-20 | End: 2018-01-15

## 2017-12-20 RX ORDER — LACTULOSE 10 G/15ML
20 SOLUTION ORAL 3 TIMES DAILY
Qty: 473 ML | Refills: 0 | Status: SHIPPED | OUTPATIENT
Start: 2017-12-20 | End: 2018-08-29

## 2017-12-20 ASSESSMENT — ANXIETY QUESTIONNAIRES
7. FEELING AFRAID AS IF SOMETHING AWFUL MIGHT HAPPEN: NOT AT ALL
GAD7 TOTAL SCORE: 6
6. BECOMING EASILY ANNOYED OR IRRITABLE: MORE THAN HALF THE DAYS
GAD7 TOTAL SCORE: 6
4. TROUBLE RELAXING: SEVERAL DAYS
GAD7 TOTAL SCORE: 6
2. NOT BEING ABLE TO STOP OR CONTROL WORRYING: SEVERAL DAYS
1. FEELING NERVOUS, ANXIOUS, OR ON EDGE: SEVERAL DAYS
7. FEELING AFRAID AS IF SOMETHING AWFUL MIGHT HAPPEN: NOT AT ALL
3. WORRYING TOO MUCH ABOUT DIFFERENT THINGS: SEVERAL DAYS
5. BEING SO RESTLESS THAT IT IS HARD TO SIT STILL: NOT AT ALL

## 2017-12-20 ASSESSMENT — PATIENT HEALTH QUESTIONNAIRE - PHQ9
10. IF YOU CHECKED OFF ANY PROBLEMS, HOW DIFFICULT HAVE THESE PROBLEMS MADE IT FOR YOU TO DO YOUR WORK, TAKE CARE OF THINGS AT HOME, OR GET ALONG WITH OTHER PEOPLE: VERY DIFFICULT
SUM OF ALL RESPONSES TO PHQ QUESTIONS 1-9: 10
SUM OF ALL RESPONSES TO PHQ QUESTIONS 1-9: 10
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
## 2017-12-20 NOTE — PATIENT INSTRUCTIONS
Maintain current level of pain pills.  If we need to increase, contact me.  Discussed pain pills won't necessarily help this as much as we'd like.  Physical Therapy and massage, and moving around as much as you can (rather than laying in bed) will help the most.  Set up Physical Therapy and massage for ASAP.  Heat can help relax muscles.  Do not use heating pad more than 20 min at a time.    Lactulose as you've used in past for constipation.    Continuing phentermine for now.  You're doing your best despite current situation.    Recheck in 1 mo, sooner if needed.
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
## 2017-12-20 NOTE — NURSING NOTE
"Chief Complaint   Patient presents with     Back Pain     med recheck       Initial /80 (BP Location: Right arm)  Pulse 80  Temp 97.7  F (36.5  C) (Tympanic)  Resp 16  Wt 222 lb (100.7 kg)  BMI 36.94 kg/m2 Estimated body mass index is 36.94 kg/(m^2) as calculated from the following:    Height as of 12/12/17: 5' 5\" (1.651 m).    Weight as of this encounter: 222 lb (100.7 kg).  Medication Reconciliation: complete    Health Maintenance that is potentially due pending provider review:  NONE    n/a    Is there anyone who you would like to be able to receive your results? No  If yes have patient fill out CICI    "

## 2017-12-21 DIAGNOSIS — R94.4 DECREASED GFR: Primary | ICD-10-CM

## 2017-12-21 RX ORDER — ATORVASTATIN CALCIUM 20 MG/1
20 TABLET, FILM COATED ORAL DAILY
Qty: 90 TABLET | Refills: 3 | Status: SHIPPED | OUTPATIENT
Start: 2017-12-21 | End: 2018-08-25

## 2017-12-21 RX ORDER — LISINOPRIL AND HYDROCHLOROTHIAZIDE 12.5; 2 MG/1; MG/1
1 TABLET ORAL DAILY
Qty: 90 TABLET | Refills: 1 | Status: SHIPPED | OUTPATIENT
Start: 2017-12-21 | End: 2018-07-06

## 2017-12-21 ASSESSMENT — ANXIETY QUESTIONNAIRES: GAD7 TOTAL SCORE: 6

## 2017-12-21 ASSESSMENT — PATIENT HEALTH QUESTIONNAIRE - PHQ9: SUM OF ALL RESPONSES TO PHQ QUESTIONS 1-9: 10

## 2017-12-21 NOTE — PROGRESS NOTES
CMA - future BMP, diagnosis dec GFR.    Stephanie Nixchante,    Your cholesterol looks MUCH better!  Continue the atorvastatin.  Lab for BP med looks ok.  It looks like kidney function has been a bit variable over past year, so let's plan on rechecking this lab within next 6 months.  Sometimes function varies based on level of hydration but I just want to recheck.    Refills sent.    Please call clinic at [phone] if you have questions.    Rosaura Flores PA-C

## 2018-01-04 DIAGNOSIS — F33.1 MAJOR DEPRESSIVE DISORDER, RECURRENT EPISODE, MODERATE (H): ICD-10-CM

## 2018-01-04 DIAGNOSIS — F41.1 GENERALIZED ANXIETY DISORDER: ICD-10-CM

## 2018-01-04 DIAGNOSIS — G89.4 CHRONIC PAIN DISORDER: ICD-10-CM

## 2018-01-04 DIAGNOSIS — J30.89 OTHER ALLERGIC RHINITIS: ICD-10-CM

## 2018-01-04 DIAGNOSIS — A60.00 HERPES SIMPLEX INFECTION OF GENITOURINARY SYSTEM: ICD-10-CM

## 2018-01-04 DIAGNOSIS — Z98.1 STATUS POST LAMINECTOMY WITH SPINAL FUSION: ICD-10-CM

## 2018-01-05 RX ORDER — CETIRIZINE HYDROCHLORIDE 10 MG/1
TABLET ORAL
Qty: 60 TABLET | Refills: 0 | Status: SHIPPED | OUTPATIENT
Start: 2018-01-05 | End: 2018-01-15

## 2018-01-15 ENCOUNTER — OFFICE VISIT (OUTPATIENT)
Dept: FAMILY MEDICINE | Facility: CLINIC | Age: 48
End: 2018-01-15
Payer: COMMERCIAL

## 2018-01-15 VITALS
TEMPERATURE: 97.7 F | DIASTOLIC BLOOD PRESSURE: 81 MMHG | HEIGHT: 65 IN | SYSTOLIC BLOOD PRESSURE: 122 MMHG | WEIGHT: 216 LBS | HEART RATE: 93 BPM | BODY MASS INDEX: 35.99 KG/M2

## 2018-01-15 DIAGNOSIS — F41.1 GENERALIZED ANXIETY DISORDER: ICD-10-CM
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
## 2018-01-15 DIAGNOSIS — J30.89 OTHER ALLERGIC RHINITIS: ICD-10-CM

## 2018-01-15 DIAGNOSIS — F33.1 MAJOR DEPRESSIVE DISORDER, RECURRENT EPISODE, MODERATE (H): ICD-10-CM

## 2018-01-15 DIAGNOSIS — Z98.1 STATUS POST LAMINECTOMY WITH SPINAL FUSION: Primary | ICD-10-CM

## 2018-01-15 DIAGNOSIS — F41.0 PANIC ANXIETY SYNDROME: ICD-10-CM

## 2018-01-15 DIAGNOSIS — E78.5 DYSLIPIDEMIA: ICD-10-CM

## 2018-01-15 DIAGNOSIS — K59.00 CONSTIPATION, UNSPECIFIED CONSTIPATION TYPE: ICD-10-CM

## 2018-01-15 DIAGNOSIS — I10 BENIGN ESSENTIAL HYPERTENSION: ICD-10-CM

## 2018-01-15 DIAGNOSIS — G89.4 CHRONIC PAIN DISORDER: ICD-10-CM

## 2018-01-15 PROCEDURE — 99214 OFFICE O/P EST MOD 30 MIN: CPT | Performed by: PHYSICIAN ASSISTANT

## 2018-01-15 RX ORDER — PHENTERMINE HYDROCHLORIDE 37.5 MG/1
TABLET ORAL
Qty: 30 TABLET | Refills: 0 | Status: SHIPPED | OUTPATIENT
Start: 2018-01-15 | End: 2018-02-09

## 2018-01-15 RX ORDER — BUPROPION HYDROCHLORIDE 200 MG/1
TABLET, EXTENDED RELEASE ORAL
Qty: 90 TABLET | Refills: 3 | Status: SHIPPED | OUTPATIENT
Start: 2018-01-15 | End: 2019-02-01

## 2018-01-15 RX ORDER — CETIRIZINE HYDROCHLORIDE 10 MG/1
TABLET ORAL
Qty: 180 TABLET | Refills: 3 | Status: SHIPPED | OUTPATIENT
Start: 2018-01-15 | End: 2019-05-10

## 2018-01-15 RX ORDER — HYDROCODONE BITARTRATE AND ACETAMINOPHEN 5; 325 MG/1; MG/1
TABLET ORAL
Qty: 200 TABLET | Refills: 0 | Status: SHIPPED | OUTPATIENT
Start: 2018-01-15 | End: 2018-02-09

## 2018-01-15 ASSESSMENT — ANXIETY QUESTIONNAIRES
7. FEELING AFRAID AS IF SOMETHING AWFUL MIGHT HAPPEN: NOT AT ALL
7. FEELING AFRAID AS IF SOMETHING AWFUL MIGHT HAPPEN: NOT AT ALL
5. BEING SO RESTLESS THAT IT IS HARD TO SIT STILL: NOT AT ALL
2. NOT BEING ABLE TO STOP OR CONTROL WORRYING: NOT AT ALL
GAD7 TOTAL SCORE: 0
1. FEELING NERVOUS, ANXIOUS, OR ON EDGE: NOT AT ALL
3. WORRYING TOO MUCH ABOUT DIFFERENT THINGS: NOT AT ALL
4. TROUBLE RELAXING: NOT AT ALL
6. BECOMING EASILY ANNOYED OR IRRITABLE: NOT AT ALL

## 2018-01-15 ASSESSMENT — PATIENT HEALTH QUESTIONNAIRE - PHQ9
SUM OF ALL RESPONSES TO PHQ QUESTIONS 1-9: 4
10. IF YOU CHECKED OFF ANY PROBLEMS, HOW DIFFICULT HAVE THESE PROBLEMS MADE IT FOR YOU TO DO YOUR WORK, TAKE CARE OF THINGS AT HOME, OR GET ALONG WITH OTHER PEOPLE: SOMEWHAT DIFFICULT
SUM OF ALL RESPONSES TO PHQ QUESTIONS 1-9: 4

## 2018-01-15 NOTE — NURSING NOTE
"Chief Complaint   Patient presents with     Pain     Weight Check       Initial /81 (BP Location: Right arm, Patient Position: Chair, Cuff Size: Adult Large)  Pulse 93  Temp 97.7  F (36.5  C) (Tympanic)  Ht 5' 5\" (1.651 m)  Wt 216 lb (98 kg)  BMI 35.94 kg/m2 Estimated body mass index is 35.94 kg/(m^2) as calculated from the following:    Height as of this encounter: 5' 5\" (1.651 m).    Weight as of this encounter: 216 lb (98 kg).  Medication Reconciliation: complete    Health Maintenance that is potentially due pending provider review:  NONE        Is there anyone who you would like to be able to receive your results? No  If yes have patient fill out CICI      "

## 2018-01-15 NOTE — PATIENT INSTRUCTIONS
Start Physical Therapy   Decreased number of pain pills per month - still a bit of security blanket with starting Physical Therapy, but next month hopefully back to usual amount  Continue phentermine     Changed bupropion strength of pill, but same dose overall    Recheck 1 mo
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
## 2018-01-16 ASSESSMENT — ANXIETY QUESTIONNAIRES: GAD7 TOTAL SCORE: 0

## 2018-01-18 NOTE — LETTER
[facility]  [address] [place] [id number]  [phone]          December 12, 2017    RE:  Cynthia Lyons                                                                                                                                                       [address] [place] [id number]            To whom it may concern:    Cynthia Lyons is under my professional care for    Neck pain  Acute non-recurrent maxillary sinusitis  Strain of neck muscle, initial encounter     The employee is UNABLE to return to work until Friday of this week due to her neck injury.  She also missed work last Friday and yesterday and today due to this injury.     She may return on Friday without restrictions.       Sincerely,      Loida Burgess MD     no

## 2018-02-09 ENCOUNTER — OFFICE VISIT (OUTPATIENT)
Dept: FAMILY MEDICINE | Facility: CLINIC | Age: 48
End: 2018-02-09
Payer: COMMERCIAL

## 2018-02-09 VITALS
BODY MASS INDEX: 35.99 KG/M2 | RESPIRATION RATE: 16 BRPM | DIASTOLIC BLOOD PRESSURE: 80 MMHG | HEIGHT: 65 IN | TEMPERATURE: 98.1 F | HEART RATE: 80 BPM | SYSTOLIC BLOOD PRESSURE: 120 MMHG | WEIGHT: 216 LBS

## 2018-02-09 DIAGNOSIS — G89.4 CHRONIC PAIN DISORDER: ICD-10-CM

## 2018-02-09 DIAGNOSIS — F33.1 MAJOR DEPRESSIVE DISORDER, RECURRENT EPISODE, MODERATE (H): ICD-10-CM

## 2018-02-09 DIAGNOSIS — Z98.1 STATUS POST LAMINECTOMY WITH SPINAL FUSION: Primary | ICD-10-CM

## 2018-02-09 DIAGNOSIS — E66.01 SEVERE OBESITY (BMI 35.0-39.9) WITH COMORBIDITY (H): ICD-10-CM

## 2018-02-09 DIAGNOSIS — F41.1 GENERALIZED ANXIETY DISORDER: ICD-10-CM

## 2018-02-09 PROCEDURE — 99214 OFFICE O/P EST MOD 30 MIN: CPT | Performed by: PHYSICIAN ASSISTANT

## 2018-02-09 RX ORDER — VENLAFAXINE HYDROCHLORIDE 75 MG/1
75 CAPSULE, EXTENDED RELEASE ORAL DAILY
Qty: 90 CAPSULE | Refills: 1 | Status: SHIPPED | OUTPATIENT
Start: 2018-02-09 | End: 2018-07-13

## 2018-02-09 RX ORDER — HYDROCODONE BITARTRATE AND ACETAMINOPHEN 5; 325 MG/1; MG/1
TABLET ORAL
Qty: 180 TABLET | Refills: 0 | Status: SHIPPED | OUTPATIENT
Start: 2018-03-08 | End: 2018-04-05

## 2018-02-09 RX ORDER — HYDROCODONE BITARTRATE AND ACETAMINOPHEN 5; 325 MG/1; MG/1
TABLET ORAL
Qty: 180 TABLET | Refills: 0 | Status: SHIPPED | OUTPATIENT
Start: 2018-02-09 | End: 2018-02-09

## 2018-02-09 ASSESSMENT — ANXIETY QUESTIONNAIRES
4. TROUBLE RELAXING: NOT AT ALL
1. FEELING NERVOUS, ANXIOUS, OR ON EDGE: NOT AT ALL
GAD7 TOTAL SCORE: 0
2. NOT BEING ABLE TO STOP OR CONTROL WORRYING: NOT AT ALL
7. FEELING AFRAID AS IF SOMETHING AWFUL MIGHT HAPPEN: NOT AT ALL
GAD7 TOTAL SCORE: 0
6. BECOMING EASILY ANNOYED OR IRRITABLE: NOT AT ALL
5. BEING SO RESTLESS THAT IT IS HARD TO SIT STILL: NOT AT ALL
7. FEELING AFRAID AS IF SOMETHING AWFUL MIGHT HAPPEN: NOT AT ALL
3. WORRYING TOO MUCH ABOUT DIFFERENT THINGS: NOT AT ALL
GAD7 TOTAL SCORE: 0

## 2018-02-09 ASSESSMENT — PATIENT HEALTH QUESTIONNAIRE - PHQ9
SUM OF ALL RESPONSES TO PHQ QUESTIONS 1-9: 0
SUM OF ALL RESPONSES TO PHQ QUESTIONS 1-9: 0
10. IF YOU CHECKED OFF ANY PROBLEMS, HOW DIFFICULT HAVE THESE PROBLEMS MADE IT FOR YOU TO DO YOUR WORK, TAKE CARE OF THINGS AT HOME, OR GET ALONG WITH OTHER PEOPLE: NOT DIFFICULT AT ALL
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
## 2018-02-09 NOTE — PATIENT INSTRUCTIONS
Back to usual amounts of pain med  Refilling early today due to trip  Next month on same schedule of refill, but then should last until April 11    Stop phentermine - not seeming to work  Trying weight loss on your own  Consideration for lap band adjustment after your trip if feel you're not making progress  Or add topiramate, v see medical (pill and lifestyle) wt specialist

## 2018-02-09 NOTE — MR AVS SNAPSHOT
After Visit Summary   2/9/2018    Cynthia Lyons    MRN: [id number]           Patient Information     Date Of Birth          1970        Visit Information        Provider Department      2/9/2018 8:00 AM Rosaura Flores PA-C [facility]        Today's Diagnoses     Status post laminectomy with spinal fusion        Chronic pain disorder        Major depressive disorder, recurrent episode, moderate (H)        Generalized anxiety disorder          Care Instructions    Back to usual amounts of pain med  Refilling early today due to trip  Next month on same schedule of refill, but then should last until April 11    Stop phentermine - not seeming to work  Trying weight loss on your own  Consideration for lap band adjustment after your trip if feel you're not making progress  Or add topiramate, v see medical (pill and lifestyle) wt specialist          Follow-ups after your visit        Who to contact     If you have questions or need follow up information about today's clinic visit or your schedule please contact [facility] directly at [phone].  Normal or non-critical lab and imaging results will be communicated to you by Yi Dehart, letter or phone within 4 business days after the clinic has received the results. If you do not hear from us within 7 days, please contact the clinic through [employer] or phone. If you have a critical or abnormal lab result, we will notify you by phone as soon as possible.  Submit refill requests through Reasult or call your pharmacy and they will forward the refill request to us. Please allow 3 business days for your refill to be completed.          Additional Information About Your Visit        Yi Dehart Information     Reasult gives you secure access to your electronic health record. If you see a primary care provider, you can also send messages to your care team and make appointments. If you have questions, please call your  "primary care clinic.  If you do not have a primary care provider, please call [phone] and they will assist you.        Care EveryWhere ID     This is your Care EveryWhere ID. This could be used by other organizations to access your [facility] medical records  [id number]        Your Vitals Were     Pulse Temperature Respirations Height BMI (Body Mass Index)       80 98.1  F (36.7  C) (Tympanic) 16 5' 5\" (1.651 m) 35.94 kg/m2        Blood Pressure from Last 3 Encounters:   02/09/18 120/80   01/15/18 122/81   12/20/17 112/80    Weight from Last 3 Encounters:   02/09/18 216 lb (98 kg)   01/15/18 216 lb (98 kg)   12/20/17 222 lb (100.7 kg)              Today, you had the following     No orders found for display         Today's Medication Changes          These changes are accurate as of 2/9/18  8:30 AM.  If you have any questions, ask your nurse or doctor.               Start taking these medicines.        Dose/Directions    HYDROcodone-acetaminophen 5-325 MG per tablet   Commonly known as:  NORCO   Used for:  Status post laminectomy with spinal fusion, Chronic pain disorder   Started by:  Rosaura Flores PA-C        Start taking on:  3/8/2018   2 tabs every 6 hours as needed.   Quantity:  180 tablet   Refills:  0         These medicines have changed or have updated prescriptions.        Dose/Directions    venlafaxine 75 MG 24 hr capsule   Commonly known as:  EFFEXOR-XR   This may have changed:  See the new instructions.   Used for:  Chronic pain disorder, Major depressive disorder, recurrent episode, moderate (H), Generalized anxiety disorder   Changed by:  Rosaura Flores PA-C        Dose:  75 mg   Take 1 capsule (75 mg) by mouth daily   Quantity:  90 capsule   Refills:  1            Where to get your medicines      These medications were sent to [facility] [address] [address]     Phone:  [phone]     venlafaxine 75 " MG 24 hr capsule         Some of these will need a paper prescription and others can be bought over the counter.  Ask your nurse if you have questions.     Bring a paper prescription for each of these medications     HYDROcodone-acetaminophen 5-325 MG per tablet                Primary Care Provider Office Phone # Fax #    Rosaura Flores PA-C [phone] [address] [place] [address]        Equal Access to Services     AXEL ALEXANDER : Hadii aad ku hadasho Soomaali, waaxda luqadaha, qaybta kaalmada adeegyada, waxay idiin hayaan adeeg efrain lamilla . So [facility] [phone].    ATENCIÓN: Si habla español, tiene a sumner disposición servicios gratuitos de asistencia lingüística. Llame al [phone].    We comply with applicable federal civil rights laws and [place] laws. We do not discriminate on the basis of race, color, national origin, age, disability, sex, sexual orientation, or gender identity.            Thank you!     Thank you for choosing [facility]  for your care. Our goal is always to provide you with excellent care. Hearing back from our patients is one way we can continue to improve our services. Please take a few minutes to complete the written survey that you may receive in the mail after your visit with us. Thank you!             Your Updated Medication List - Protect others around you: Learn how to safely use, store and throw away your medicines at www.disposemymeds.org.          This list is accurate as of 2/9/18  8:30 AM.  Always use your most recent med list.                   Brand Name Dispense Instructions for use Diagnosis    atorvastatin 20 MG tablet    LIPITOR    90 tablet    Take 1 tablet (20 mg) by mouth daily    Dyslipidemia       buPROPion 200 MG 12 hr tablet    WELLBUTRIN SR    90 tablet    TAKE 1 TABLET BY MOUTH DAILY    Panic anxiety syndrome, Major depressive disorder, recurrent episode, moderate (H), Generalized anxiety disorder        cetirizine 10 MG tablet    zyrTEC    180 tablet    TAKE 1 TABLET(10 MG) BY MOUTH TWICE DAILY    Other allergic rhinitis, Status post laminectomy with spinal fusion, Chronic pain disorder, Major depressive disorder, recurrent episode, moderate (H), Generalized anxiety disorder       fluticasone 50 MCG/ACT spray    FLONASE    1 Package    Spray 1 spray in nostril 2 times daily.    Allergic rhinitis       HYDROcodone-acetaminophen 5-325 MG per tablet   Start taking on:  3/8/2018    NORCO    180 tablet    2 tabs every 6 hours as needed.    Status post laminectomy with spinal fusion, Chronic pain disorder       lactulose 10 GM/15ML solution    CHRONULAC    473 mL    Take 30 mLs (20 g) by mouth 3 times daily 15 ml (10 g) by mouth once daily, but can increase back to previous dose of 30 ml (20 g) up to 3 x per day if needed.    Constipation, unspecified constipation type       lisinopril-hydrochlorothiazide 20-12.5 MG per tablet    PRINZIDE/ZESTORETIC    90 tablet    Take 1 tablet by mouth daily Lab again by May.    Benign essential hypertension       methocarbamol 750 MG tablet    ROBAXIN    120 tablet    TAKE 1 TO 2 TABLETS BY MOUTH UP TO THREE TIMES DAILY(4TH TIME PER DAY ON OCCASION IS OK)    Status post laminectomy with spinal fusion       valACYclovir 500 MG tablet    VALTREX    90 tablet    TAKE 1 TABLET(500 MG) BY MOUTH DAILY    Herpes simplex infection of genitourinary system, Status post laminectomy with spinal fusion, Chronic pain disorder, Major depressive disorder, recurrent episode, moderate (H), Generalized anxiety disorder, Other allergic rhinitis       venlafaxine 75 MG 24 hr capsule    EFFEXOR-XR    90 capsule    Take 1 capsule (75 mg) by mouth daily    Chronic pain disorder, Major depressive disorder, recurrent episode, moderate (H), Generalized anxiety disorder

## 2018-02-09 NOTE — NURSING NOTE
"Chief Complaint   Patient presents with     Pain     Recheck Medication       Initial /80 (BP Location: Right arm, Patient Position: Chair, Cuff Size: Adult Large)  Pulse 80  Temp 98.1  F (36.7  C) (Tympanic)  Resp 16  Ht 5' 5\" (1.651 m)  Wt 216 lb (98 kg)  BMI 35.94 kg/m2 Estimated body mass index is 35.94 kg/(m^2) as calculated from the following:    Height as of this encounter: 5' 5\" (1.651 m).    Weight as of this encounter: 216 lb (98 kg).      Health Maintenance that is potentially due pending provider review:  NONE        Is there anyone who you would like to be able to receive your results? No  If yes have patient fill out CICI      "

## 2018-02-10 ASSESSMENT — ANXIETY QUESTIONNAIRES: GAD7 TOTAL SCORE: 0

## 2018-02-10 ASSESSMENT — PATIENT HEALTH QUESTIONNAIRE - PHQ9: SUM OF ALL RESPONSES TO PHQ QUESTIONS 1-9: 0

## 2018-02-22 DIAGNOSIS — Z98.1 STATUS POST LAMINECTOMY WITH SPINAL FUSION: ICD-10-CM

## 2018-02-28 DIAGNOSIS — Z98.1 STATUS POST LAMINECTOMY WITH SPINAL FUSION: ICD-10-CM

## 2018-03-01 RX ORDER — METHOCARBAMOL 750 MG/1
TABLET, FILM COATED ORAL
Qty: 120 TABLET | Refills: 0 | Status: SHIPPED | OUTPATIENT
Start: 2018-03-01 | End: 2018-04-17

## 2018-03-01 NOTE — TELEPHONE ENCOUNTER
Requested Prescriptions   Pending Prescriptions Disp Refills     methocarbamol (ROBAXIN) 750 MG tablet [Pharmacy Med Name: METHOCARBAMOL 750MG TABLETS] 120 tablet 0     Sig: TAKE 1 TO 2 TABLETS BY MOUTH UP TO THREE TIMES DAILY(4TH TIME PER DAY ON OCCASION IS OK)    There is no refill protocol information for this order        Last Written Prescription Date:  6/12/2017  Last Fill Quantity: 120,  # refills: 3   Last office visit: 2/9/2018 with prescribing provider:  2/9/2018   Future Office Visit:   Next 5 appointments (look out 90 days)     Apr 11, 2018  7:40 AM CDT   SHORT with Rosaura Flores PA-C   [facility]    [address] [place] [id number] [phone]

## 2018-03-06 ENCOUNTER — MYC MEDICAL ADVICE (OUTPATIENT)
Dept: FAMILY MEDICINE | Facility: CLINIC | Age: 48
End: 2018-03-06

## 2018-03-20 ENCOUNTER — MYC MEDICAL ADVICE (OUTPATIENT)
Dept: FAMILY MEDICINE | Facility: CLINIC | Age: 48
End: 2018-03-20

## 2018-03-21 RX ORDER — TOPIRAMATE 25 MG/1
TABLET, FILM COATED ORAL
Qty: 120 TABLET | Refills: 0 | Status: SHIPPED | OUTPATIENT
Start: 2018-03-21 | End: 2018-05-02

## 2018-04-05 ENCOUNTER — OFFICE VISIT (OUTPATIENT)
Dept: FAMILY MEDICINE | Facility: CLINIC | Age: 48
End: 2018-04-05
Payer: COMMERCIAL

## 2018-04-05 VITALS
BODY MASS INDEX: 36.15 KG/M2 | HEART RATE: 80 BPM | HEIGHT: 65 IN | DIASTOLIC BLOOD PRESSURE: 72 MMHG | WEIGHT: 217 LBS | SYSTOLIC BLOOD PRESSURE: 110 MMHG | RESPIRATION RATE: 16 BRPM

## 2018-04-05 DIAGNOSIS — K04.7 TOOTH INFECTION: ICD-10-CM

## 2018-04-05 DIAGNOSIS — G89.4 CHRONIC PAIN DISORDER: ICD-10-CM

## 2018-04-05 DIAGNOSIS — Z98.1 STATUS POST LAMINECTOMY WITH SPINAL FUSION: Primary | ICD-10-CM

## 2018-04-05 PROCEDURE — 99213 OFFICE O/P EST LOW 20 MIN: CPT | Performed by: PHYSICIAN ASSISTANT

## 2018-04-05 RX ORDER — HYDROCODONE BITARTRATE AND ACETAMINOPHEN 5; 325 MG/1; MG/1
TABLET ORAL
Qty: 180 TABLET | Refills: 0 | Status: SHIPPED | OUTPATIENT
Start: 2018-04-05 | End: 2018-04-25

## 2018-04-05 ASSESSMENT — ANXIETY QUESTIONNAIRES
GAD7 TOTAL SCORE: 0
1. FEELING NERVOUS, ANXIOUS, OR ON EDGE: NOT AT ALL
3. WORRYING TOO MUCH ABOUT DIFFERENT THINGS: NOT AT ALL
GAD7 TOTAL SCORE: 0
7. FEELING AFRAID AS IF SOMETHING AWFUL MIGHT HAPPEN: NOT AT ALL
2. NOT BEING ABLE TO STOP OR CONTROL WORRYING: NOT AT ALL
5. BEING SO RESTLESS THAT IT IS HARD TO SIT STILL: NOT AT ALL
GAD7 TOTAL SCORE: 0
7. FEELING AFRAID AS IF SOMETHING AWFUL MIGHT HAPPEN: NOT AT ALL
4. TROUBLE RELAXING: NOT AT ALL
6. BECOMING EASILY ANNOYED OR IRRITABLE: NOT AT ALL

## 2018-04-05 ASSESSMENT — PATIENT HEALTH QUESTIONNAIRE - PHQ9
10. IF YOU CHECKED OFF ANY PROBLEMS, HOW DIFFICULT HAVE THESE PROBLEMS MADE IT FOR YOU TO DO YOUR WORK, TAKE CARE OF THINGS AT HOME, OR GET ALONG WITH OTHER PEOPLE: NOT DIFFICULT AT ALL
SUM OF ALL RESPONSES TO PHQ QUESTIONS 1-9: 0
SUM OF ALL RESPONSES TO PHQ QUESTIONS 1-9: 0

## 2018-04-05 NOTE — MR AVS SNAPSHOT
After Visit Summary   4/5/2018    Cynthia Lyons    MRN: [id number]           Patient Information     Date Of Birth          1970        Visit Information        Provider Department      4/5/2018 8:40 AM Rosaura Flores PA-C [facility]        Today's Diagnoses     Status post laminectomy with spinal fusion    -  1    Chronic pain disorder        Tooth infection        Class 2 obesity with serious comorbidity in adult, unspecified BMI, unspecified obesity type          Care Instructions    Refilled pain meds - back to usual doses     See how weight loss goes with topamax - if getting side effects, go back to previously tolerated dose     Recheck 1 month          Follow-ups after your visit        Who to contact     If you have questions or need follow up information about today's clinic visit or your schedule please contact [facility] directly at [phone].  Normal or non-critical lab and imaging results will be communicated to you by Casengohart, letter or phone within 4 business days after the clinic has received the results. If you do not hear from us within 7 days, please contact the clinic through Casengohart or phone. If you have a critical or abnormal lab result, we will notify you by phone as soon as possible.  Submit refill requests through Chango or call your pharmacy and they will forward the refill request to us. Please allow 3 business days for your refill to be completed.          Additional Information About Your Visit        MyChart Information     Chango gives you secure access to your electronic health record. If you see a primary care provider, you can also send messages to your care team and make appointments. If you have questions, please call your primary care clinic.  If you do not have a primary care provider, please call [phone] and they will assist you.        Care EveryWhere ID     This is your Care EveryWhere ID. This  "could be used by other organizations to access your [facility] medical records  [id number]        Your Vitals Were     Pulse Respirations Height BMI (Body Mass Index)          80 16 5' 5\" (1.651 m) 36.11 kg/m2         Blood Pressure from Last 3 Encounters:   04/05/18 110/72   02/09/18 120/80   01/15/18 122/81    Weight from Last 3 Encounters:   04/05/18 217 lb (98.4 kg)   02/09/18 216 lb (98 kg)   01/15/18 216 lb (98 kg)              Today, you had the following     No orders found for display         Where to get your medicines      Some of these will need a paper prescription and others can be bought over the counter.  Ask your nurse if you have questions.     Bring a paper prescription for each of these medications     HYDROcodone-acetaminophen 5-325 MG per tablet          Primary Care Provider Office Phone # Fax #    Rosaura Flores PA-C [phone] [address] [place], [place] [address]        Equal Access to Services     AXEL ALEXANDER : Hadii azul crouch hadasho Soomaali, waaxda luqadaha, qaybta kaalmada adeegyada, jean graves . So [facility] [phone].    ATENCIÓN: Si habla español, tiene a sumner disposición servicios gratuitos de asistencia lingüística. Llame al [phone].    We comply with applicable federal civil rights laws and [place] laws. We do not discriminate on the basis of race, color, national origin, age, disability, sex, sexual orientation, or gender identity.            Thank you!     Thank you for choosing [facility]  for your care. Our goal is always to provide you with excellent care. Hearing back from our patients is one way we can continue to improve our services. Please take a few minutes to complete the written survey that you may receive in the mail after your visit with us. Thank you!             Your Updated Medication List - Protect others around you: Learn how to safely use, store and throw away your medicines at " www.disposemymeds.org.          This list is accurate as of 4/5/18  9:35 AM.  Always use your most recent med list.                   Brand Name Dispense Instructions for use Diagnosis    atorvastatin 20 MG tablet    LIPITOR    90 tablet    Take 1 tablet (20 mg) by mouth daily    Dyslipidemia       buPROPion 200 MG 12 hr tablet    WELLBUTRIN SR    90 tablet    TAKE 1 TABLET BY MOUTH DAILY    Panic anxiety syndrome, Major depressive disorder, recurrent episode, moderate (H), Generalized anxiety disorder       cetirizine 10 MG tablet    zyrTEC    180 tablet    TAKE 1 TABLET(10 MG) BY MOUTH TWICE DAILY    Other allergic rhinitis, Status post laminectomy with spinal fusion, Chronic pain disorder, Major depressive disorder, recurrent episode, moderate (H), Generalized anxiety disorder       fluticasone 50 MCG/ACT spray    FLONASE    1 Package    Spray 1 spray in nostril 2 times daily.    Allergic rhinitis       HYDROcodone-acetaminophen 5-325 MG per tablet    NORCO    180 tablet    2 tabs every 6 hours as needed.    Status post laminectomy with spinal fusion, Chronic pain disorder       lactulose 10 GM/15ML solution    CHRONULAC    473 mL    Take 30 mLs (20 g) by mouth 3 times daily 15 ml (10 g) by mouth once daily, but can increase back to previous dose of 30 ml (20 g) up to 3 x per day if needed.    Constipation, unspecified constipation type       lisinopril-hydrochlorothiazide 20-12.5 MG per tablet    PRINZIDE/ZESTORETIC    90 tablet    Take 1 tablet by mouth daily Lab again by May.    Benign essential hypertension       methocarbamol 750 MG tablet    ROBAXIN    120 tablet    TAKE 1 TO 2 TABLETS BY MOUTH UP TO THREE TIMES DAILY(4TH TIME PER DAY ON OCCASION IS OK)    Status post laminectomy with spinal fusion       topiramate 25 MG tablet    TOPAMAX    120 tablet    Take 1 tab daily x 1 wk, then 2 tabs daily x 1 wk, then 3 tabs daily.    Class 2 obesity with serious comorbidity in adult, unspecified BMI, unspecified  obesity type       valACYclovir 500 MG tablet    VALTREX    90 tablet    TAKE 1 TABLET(500 MG) BY MOUTH DAILY    Herpes simplex infection of genitourinary system, Status post laminectomy with spinal fusion, Chronic pain disorder, Major depressive disorder, recurrent episode, moderate (H), Generalized anxiety disorder, Other allergic rhinitis       venlafaxine 75 MG 24 hr capsule    EFFEXOR-XR    90 capsule    Take 1 capsule (75 mg) by mouth daily    Chronic pain disorder, Major depressive disorder, recurrent episode, moderate (H), Generalized anxiety disorder
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
## 2018-04-05 NOTE — NURSING NOTE
"Chief Complaint   Patient presents with     Pain       Initial /72 (BP Location: Right arm, Patient Position: Chair, Cuff Size: Adult Large)  Pulse 80  Resp 16  Ht 5' 5\" (1.651 m)  Wt 217 lb (98.4 kg)  BMI 36.11 kg/m2 Estimated body mass index is 36.11 kg/(m^2) as calculated from the following:    Height as of this encounter: 5' 5\" (1.651 m).    Weight as of this encounter: 217 lb (98.4 kg).      Health Maintenance that is potentially due pending provider review:  NONE        Is there anyone who you would like to be able to receive your results? No  If yes have patient fill out CICI      "

## 2018-04-05 NOTE — PATIENT INSTRUCTIONS
Refilled pain meds - back to usual doses     See how weight loss goes with topamax - if getting side effects, go back to previously tolerated dose     Recheck 1 month

## 2018-04-06 ASSESSMENT — PATIENT HEALTH QUESTIONNAIRE - PHQ9: SUM OF ALL RESPONSES TO PHQ QUESTIONS 1-9: 0

## 2018-04-06 ASSESSMENT — ANXIETY QUESTIONNAIRES: GAD7 TOTAL SCORE: 0

## 2018-04-12 DIAGNOSIS — Z98.1 STATUS POST LAMINECTOMY WITH SPINAL FUSION: ICD-10-CM

## 2018-04-12 DIAGNOSIS — F41.1 GENERALIZED ANXIETY DISORDER: ICD-10-CM

## 2018-04-12 DIAGNOSIS — A60.00 HERPES SIMPLEX INFECTION OF GENITOURINARY SYSTEM: ICD-10-CM

## 2018-04-12 DIAGNOSIS — G89.4 CHRONIC PAIN DISORDER: ICD-10-CM

## 2018-04-12 DIAGNOSIS — F33.1 MAJOR DEPRESSIVE DISORDER, RECURRENT EPISODE, MODERATE (H): ICD-10-CM

## 2018-04-12 DIAGNOSIS — J30.89 OTHER ALLERGIC RHINITIS: ICD-10-CM

## 2018-04-13 RX ORDER — VALACYCLOVIR HYDROCHLORIDE 500 MG/1
TABLET, FILM COATED ORAL
Qty: 90 TABLET | Refills: 0 | Status: SHIPPED | OUTPATIENT
Start: 2018-04-13 | End: 2018-07-08

## 2018-04-13 NOTE — TELEPHONE ENCOUNTER
"Requested Prescriptions   Pending Prescriptions Disp Refills     valACYclovir (VALTREX) 500 MG tablet [Pharmacy Med Name: VALACYCLOVIR 500MG TABLETS] 90 tablet 0     Sig: TAKE 1 TABLET(500 MG) BY MOUTH DAILY    Antivirals for Herpes Protocol Passed    4/12/2018  6:20 PM       Passed - Patient is age 12 or older       Passed - Recent (12 mo) or future (30 days) visit within the authorizing provider's specialty    Patient had office visit in the last 12 months or has a visit in the next 30 days with authorizing provider or within the authorizing provider's specialty.  See \"Patient Info\" tab in inbasket, or \"Choose Columns\" in Meds & Orders section of the refill encounter.           Passed - Normal serum creatinine on file in past 12 months    Recent Labs   Lab Test  12/20/17   0831   CR  0.82             Last Written Prescription Date:  10/26/2017  Last Fill Quantity: 90,  # refills: 1   Last office visit: 4/5/2018 with prescribing provider:  Mark   Future Office Visit:      "

## 2018-04-17 DIAGNOSIS — Z98.1 STATUS POST LAMINECTOMY WITH SPINAL FUSION: ICD-10-CM

## 2018-04-17 NOTE — TELEPHONE ENCOUNTER
Requested Prescriptions   Pending Prescriptions Disp Refills     methocarbamol (ROBAXIN) 750 MG tablet [Pharmacy Med Name: METHOCARBAMOL 750MG TABLETS] 120 tablet 0     Sig: TAKE 1 TO 2 TABLETS BY MOUTH UP TO THREE TIMES DAILY(4TH TIME PER DAY ON OCCASION IS OK)    There is no refill protocol information for this order        methocarbamol (ROBAXIN) 750 MG tablet      Last Written Prescription Date:  3/1/18  Last Fill Quantity: 120,   # refills: 0  Last Office Visit: 4/5/18  Future Office visit:       Routing refill request to provider for review/approval because:  Drug not on the [facility], [facility] or [facility] refill protocol or controlled substance

## 2018-04-18 ENCOUNTER — MYC MEDICAL ADVICE (OUTPATIENT)
Dept: FAMILY MEDICINE | Facility: CLINIC | Age: 48
End: 2018-04-18

## 2018-04-18 RX ORDER — METHOCARBAMOL 750 MG/1
TABLET, FILM COATED ORAL
Qty: 120 TABLET | Refills: 11 | Status: SHIPPED | OUTPATIENT
Start: 2018-04-18 | End: 2019-05-02

## 2018-04-24 ENCOUNTER — MYC MEDICAL ADVICE (OUTPATIENT)
Dept: FAMILY MEDICINE | Facility: CLINIC | Age: 48
End: 2018-04-24

## 2018-04-24 DIAGNOSIS — Z98.1 STATUS POST LAMINECTOMY WITH SPINAL FUSION: ICD-10-CM

## 2018-04-24 DIAGNOSIS — G89.4 CHRONIC PAIN DISORDER: ICD-10-CM

## 2018-04-25 RX ORDER — HYDROCODONE BITARTRATE AND ACETAMINOPHEN 5; 325 MG/1; MG/1
TABLET ORAL
Qty: 180 TABLET | Refills: 0 | Status: SHIPPED | OUTPATIENT
Start: 2018-06-04 | End: 2018-04-25

## 2018-04-25 RX ORDER — HYDROCODONE BITARTRATE AND ACETAMINOPHEN 5; 325 MG/1; MG/1
TABLET ORAL
Qty: 180 TABLET | Refills: 0 | Status: SHIPPED | OUTPATIENT
Start: 2018-05-04 | End: 2018-04-25

## 2018-04-25 RX ORDER — METHOCARBAMOL 750 MG/1
TABLET, FILM COATED ORAL
Qty: 120 TABLET | Refills: 0 | OUTPATIENT
Start: 2018-04-25

## 2018-04-25 RX ORDER — HYDROCODONE BITARTRATE AND ACETAMINOPHEN 5; 325 MG/1; MG/1
TABLET ORAL
Qty: 180 TABLET | Refills: 0 | Status: SHIPPED | OUTPATIENT
Start: 2018-07-04 | End: 2018-06-28

## 2018-05-23 DIAGNOSIS — G89.4 CHRONIC PAIN DISORDER: ICD-10-CM

## 2018-05-23 DIAGNOSIS — F33.1 MAJOR DEPRESSIVE DISORDER, RECURRENT EPISODE, MODERATE (H): ICD-10-CM

## 2018-05-23 DIAGNOSIS — F41.1 GENERALIZED ANXIETY DISORDER: ICD-10-CM

## 2018-05-23 RX ORDER — VENLAFAXINE HYDROCHLORIDE 75 MG/1
CAPSULE, EXTENDED RELEASE ORAL
Qty: 90 CAPSULE | Refills: 0 | Status: SHIPPED | OUTPATIENT
Start: 2018-05-23 | End: 2018-07-13

## 2018-05-23 NOTE — TELEPHONE ENCOUNTER
"Requested Prescriptions   Pending Prescriptions Disp Refills     venlafaxine (EFFEXOR-XR) 75 MG 24 hr capsule [Pharmacy Med Name: VENLAFAXINE ER 75MG CAPSULES] 90 capsule 0     Sig: TAKE 1 CAPSULE BY MOUTH DAILY    Serotonin-Norepinephrine Reuptake Inhibitors  Passed    5/23/2018  9:36 AM       Passed - Blood pressure under 140/90 in past 12 months    BP Readings from Last 3 Encounters:   04/05/18 110/72   02/09/18 120/80   01/15/18 122/81                Passed - PHQ-9 score of less than 5 in past 6 months    Please review last PHQ-9 score.          Passed - Patient is age 18 or older       Passed - No active pregnancy on record       Passed - Normal serum creatinine on file in past 12 months    Recent Labs   Lab Test  12/20/17   0831   CR  0.82            Passed - No positive pregnancy test in past 12 months       Passed - Recent (6 mo) or future (30 days) visit within the authorizing provider's specialty    Patient had office visit in the last 6 months or has a visit in the next 30 days with authorizing provider or within the authorizing provider's specialty.  See \"Patient Info\" tab in inbasket, or \"Choose Columns\" in Meds & Orders section of the refill encounter.            PHQ-9 SCORE 1/15/2018 2/9/2018 4/5/2018   Total Score - - -   Total Score MyChart 4 (Minimal depression) 0 0   Total Score 4 0 0     NANCI-7 SCORE 1/15/2018 2/9/2018 4/5/2018   Total Score - - -   Total Score 0 (minimal anxiety) 0 (minimal anxiety) 0 (minimal anxiety)   Total Score 0 0 0       Last Written Prescription Date:  2/9/18  Last Fill Quantity: 90,  # refills: 1   Last office visit: 4/5/2018 with prescribing provider:     Future Office Visit:   Next 5 appointments (look out 90 days)     Aug 02, 2018  8:00 AM CDT   [facility]    [address] [place] [address] [phone]                   "

## 2018-05-31 ENCOUNTER — MYC MEDICAL ADVICE (OUTPATIENT)
Dept: FAMILY MEDICINE | Facility: CLINIC | Age: 48
End: 2018-05-31

## 2018-06-27 ENCOUNTER — MYC MEDICAL ADVICE (OUTPATIENT)
Dept: FAMILY MEDICINE | Facility: CLINIC | Age: 48
End: 2018-06-27

## 2018-06-27 DIAGNOSIS — G89.4 CHRONIC PAIN DISORDER: ICD-10-CM

## 2018-06-27 DIAGNOSIS — Z98.1 STATUS POST LAMINECTOMY WITH SPINAL FUSION: ICD-10-CM

## 2018-06-28 ENCOUNTER — MYC MEDICAL ADVICE (OUTPATIENT)
Dept: FAMILY MEDICINE | Facility: CLINIC | Age: 48
End: 2018-06-28

## 2018-06-28 RX ORDER — HYDROCODONE BITARTRATE AND ACETAMINOPHEN 5; 325 MG/1; MG/1
TABLET ORAL
Qty: 60 TABLET | Refills: 0 | Status: SHIPPED | OUTPATIENT
Start: 2018-06-29 | End: 2018-07-13

## 2018-06-28 NOTE — TELEPHONE ENCOUNTER
Ok to fill 6/29, 4 days early, due to upcoming trip.  Also trying to get med refill dates off the first 10 days of month since that is when patient tends to travel.  This is 10 day prescription, and since picking up early, should last 14 days since should have 4 days from prev prescription.  Next monthly prescription will therefore be due 7/14?  Destroy old prescription that had 7/4 start date.

## 2018-07-06 ENCOUNTER — OFFICE VISIT (OUTPATIENT)
Dept: FAMILY MEDICINE | Facility: CLINIC | Age: 48
End: 2018-07-06

## 2018-07-06 VITALS
RESPIRATION RATE: 16 BRPM | OXYGEN SATURATION: 98 % | SYSTOLIC BLOOD PRESSURE: 110 MMHG | DIASTOLIC BLOOD PRESSURE: 80 MMHG | HEIGHT: 65 IN | TEMPERATURE: 100.3 F | HEART RATE: 91 BPM

## 2018-07-06 DIAGNOSIS — M25.511 ACUTE PAIN OF RIGHT SHOULDER: Primary | ICD-10-CM

## 2018-07-06 PROCEDURE — 99213 OFFICE O/P EST LOW 20 MIN: CPT | Performed by: PHYSICIAN ASSISTANT

## 2018-07-06 RX ORDER — ALBUTEROL SULFATE 90 UG/1
2 AEROSOL, METERED RESPIRATORY (INHALATION)
COMMUNITY
Start: 2018-05-18 | End: 2022-09-29

## 2018-07-06 NOTE — MR AVS SNAPSHOT
"              After Visit Summary   7/6/2018    Cynthia Lyons    MRN: [id number]           Patient Information     Date Of Birth          1970        Visit Information        Provider Department      7/6/2018 3:00 PM Rosaura Flores PA-C [facility]        Today's Diagnoses     Acute pain of right shoulder    -  1      Care Instructions    Improving on exam, based on what you tell me from previous exam  Pain is not at GH (shoulder) joint - good news  This is more muscle -  Out of brace is fine and even good   Try to use a little bit  Gentle range of motion if nothing else  If not really improving in a week, start Physical Therapy.   Work note     Recheck 3-4 weeks if not resolving          Follow-ups after your visit        Additional Services     PHYSICAL THERAPY REFERRAL       *This therapy referral will be filtered to a centralized scheduling office at [facility] and the patient will receive a call to schedule an appointment at a [facility] location most convenient for them. *     [facility] provides Physical Therapy evaluation and treatment and many specialty services across the [facility] system.  If requesting a specialty program, please choose from the list below.    If you have not heard from the scheduling office within 2 business days, please call [phone] for all locations, with the exception of [place], please call [phone] and [facility], please call [phone]  Treatment: Evaluation & Treatment  Special Instructions/Modalities: eval and treat  Special Programs: None    Please be aware that coverage of these services is subject to the terms and limitations of your health insurance plan.  Call member services at your health plan with any benefit or coverage questions.      **Note to Provider:  If you are referring outside of [facility] for the therapy appointment, please list the name of the location in the \"special " "instructions\" above, print the referral and give to the patient to schedule the appointment.                  Your next 10 appointments already scheduled     Jul 13, 2018  7:40 AM CDT   Office Visit with Rosaura Flores PA-C   [facility]    [address] [place] [id number]   [phone]           Bring a current list of meds and any records pertaining to this visit. For Physicals, please bring immunization records and any forms needing to be filled out. Please arrive 10 minutes early to complete paperwork.              Who to contact     If you have questions or need follow up information about today's clinic visit or your schedule please contact [facility] directly at [phone].  Normal or non-critical lab and imaging results will be communicated to you by [employer], letter or phone within 4 business days after the clinic has received the results. If you do not hear from us within 7 days, please contact the clinic through [employer] or phone. If you have a critical or abnormal lab result, we will notify you by phone as soon as possible.  Submit refill requests through [employer] or call your pharmacy and they will forward the refill request to us. Please allow 3 business days for your refill to be completed.          Additional Information About Your Visit        [employer] Information     [employer] gives you secure access to your electronic health record. If you see a primary care provider, you can also send messages to your care team and make appointments. If you have questions, please call your primary care clinic.  If you do not have a primary care provider, please call [phone] and they will assist you.        Care EveryWhere ID     This is your Care EveryWhere ID. This could be used by other organizations to access your [facility] medical records  [id number]        Your Vitals Were     Pulse Temperature Respirations Height " "Pulse Oximetry       91 100.3  F (37.9  C) (Oral) 16 5' 5\" (1.651 m) 98%        Blood Pressure from Last 3 Encounters:   07/06/18 110/80   04/05/18 110/72   02/09/18 120/80    Weight from Last 3 Encounters:   04/05/18 217 lb (98.4 kg)   02/09/18 216 lb (98 kg)   01/15/18 216 lb (98 kg)              We Performed the Following     PHYSICAL THERAPY REFERRAL          Today's Medication Changes          These changes are accurate as of 7/6/18  3:43 PM.  If you have any questions, ask your nurse or doctor.               These medicines have changed or have updated prescriptions.        Dose/Directions    lisinopril-hydrochlorothiazide 20-12.5 MG per tablet   Commonly known as:  PRINZIDE/ZESTORETIC   This may have changed:  See the new instructions.   Used for:  Benign essential hypertension   Changed by:  Rosaura Flores PA-C        TAKE 1 TABLET BY MOUTH DAILY   Quantity:  90 tablet   Refills:  0            Where to get your medicines      These medications were sent to [id number] - [place], [place] - [address] [facility]  [address], [place] [address]     Phone:  [phone]     lisinopril-hydrochlorothiazide 20-12.5 MG per tablet                Primary Care Provider Office Phone # Fax #    Rosaura Flores PA-C [phone] [address] [place] [address]        Equal Access to Services     [facility] CATHERINE AH: Hadii azul ku hadasho Soomaali, waaxda luqadaha, qaybta kaalmada adeegyada, jean maloney. So [facility] [phone].    ATENCIÓN: Si habla dee, tiene a sumner disposición servicios gratuitos de asistencia lingüística. Llame al [phone].    We comply with applicable federal civil rights laws and [place] laws. We do not discriminate on the basis of race, color, national origin, age, disability, sex, sexual orientation, or gender identity.            Thank you!     Thank you for choosing [facility] " [facility]  for your care. Our goal is always to provide you with excellent care. Hearing back from our patients is one way we can continue to improve our services. Please take a few minutes to complete the written survey that you may receive in the mail after your visit with us. Thank you!             Your Updated Medication List - Protect others around you: Learn how to safely use, store and throw away your medicines at www.disposemymeds.org.          This list is accurate as of 7/6/18  3:43 PM.  Always use your most recent med list.                   Brand Name Dispense Instructions for use Diagnosis    albuterol 108 (90 Base) MCG/ACT Inhaler    PROAIR HFA/PROVENTIL HFA/VENTOLIN HFA     Inhale 2 puffs into the lungs        atorvastatin 20 MG tablet    LIPITOR    90 tablet    Take 1 tablet (20 mg) by mouth daily    Dyslipidemia       buPROPion 200 MG 12 hr tablet    WELLBUTRIN SR    90 tablet    TAKE 1 TABLET BY MOUTH DAILY    Panic anxiety syndrome, Major depressive disorder, recurrent episode, moderate (H), Generalized anxiety disorder       cetirizine 10 MG tablet    zyrTEC    180 tablet    TAKE 1 TABLET(10 MG) BY MOUTH TWICE DAILY    Other allergic rhinitis, Status post laminectomy with spinal fusion, Chronic pain disorder, Major depressive disorder, recurrent episode, moderate (H), Generalized anxiety disorder       fluticasone 50 MCG/ACT spray    FLONASE    1 Package    Spray 1 spray in nostril 2 times daily.    Allergic rhinitis       HYDROcodone-acetaminophen 5-325 MG per tablet    NORCO    60 tablet    2 in am and 2 at dinnertime/pm, and additional 1-2 at night.  Be seen in July before further refill due in August.    Status post laminectomy with spinal fusion, Chronic pain disorder       lactulose 10 GM/15ML solution    CHRONULAC    473 mL    Take 30 mLs (20 g) by mouth 3 times daily 15 ml (10 g) by mouth once daily, but can increase back to previous dose of 30 ml (20 g) up to 3 x per day if  needed.    Constipation, unspecified constipation type       lisinopril-hydrochlorothiazide 20-12.5 MG per tablet    PRINZIDE/ZESTORETIC    90 tablet    TAKE 1 TABLET BY MOUTH DAILY    Benign essential hypertension       methocarbamol 750 MG tablet    ROBAXIN    120 tablet    TAKE 1 TO 2 TABLETS BY MOUTH UP TO THREE TIMES DAILY(4TH TIME PER DAY ON OCCASION IS OK)    Status post laminectomy with spinal fusion       topiramate 25 MG tablet    TOPAMAX    120 tablet    Take 1 tablet (25 mg) by mouth 3 times daily    Class 2 obesity with serious comorbidity in adult, unspecified BMI, unspecified obesity type       TYLENOL PO      Take 1,000 mg by mouth every 8 hours as needed for mild pain or fever        valACYclovir 500 MG tablet    VALTREX    90 tablet    TAKE 1 TABLET(500 MG) BY MOUTH DAILY    Herpes simplex infection of genitourinary system, Status post laminectomy with spinal fusion, Chronic pain disorder, Major depressive disorder, recurrent episode, moderate (H), Generalized anxiety disorder, Other allergic rhinitis       * venlafaxine 75 MG 24 hr capsule    EFFEXOR-XR    90 capsule    Take 1 capsule (75 mg) by mouth daily    Chronic pain disorder, Major depressive disorder, recurrent episode, moderate (H), Generalized anxiety disorder       * venlafaxine 75 MG 24 hr capsule    EFFEXOR-XR    90 capsule    TAKE 1 CAPSULE BY MOUTH DAILY    Chronic pain disorder, Major depressive disorder, recurrent episode, moderate (H), Generalized anxiety disorder       * Notice:  This list has 2 medication(s) that are the same as other medications prescribed for you. Read the directions carefully, and ask your doctor or other care provider to review them with you.

## 2018-07-06 NOTE — PATIENT INSTRUCTIONS
Improving on exam, based on what you tell me from previous exam  Pain is not at GH (shoulder) joint - good news  This is more muscle -  Out of brace is fine and even good   Try to use a little bit  Gentle range of motion if nothing else  If not really improving in a week, start Physical Therapy.   Work note     Recheck 3-4 weeks if not resolving

## 2018-07-06 NOTE — PROGRESS NOTES
"  SUBJECTIVE:   Cynthia Lyons is a 48 year old female who presents to clinic today for the following health issues:    ED/UC Followup:    Facility:  [facility]  Date of visit: 7/2/2018  Reason for visit: Rotator cuff sprain/tear  Current Status: still having pain.   Injury 6/31.  Has been on long term disability but now was to work 8 hrs/wk.  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  Arm got stretched in scapular and shoulder abduction while having load of  clothes up by shoulder (elbow bent).  Since then - pain in shoulder, some pain in wrist, arm feels asleep.  Hurts to sleep on it.  Unsure that current acromioclavicular brace helpful to pain.  Pain manageable with her typical chronic narcotics.  Needs work note revised to no lifting rather than off work entirely.    Problem list and histories reviewed & adjusted, as indicated.  Additional history: as documented    BP Readings from Last 3 Encounters:   07/06/18 110/80   04/05/18 110/72   02/09/18 120/80    Wt Readings from Last 3 Encounters:   04/05/18 217 lb (98.4 kg)   02/09/18 216 lb (98 kg)   01/15/18 216 lb (98 kg)         Labs reviewed in EPIC    Reviewed and updated as needed this visit by clinical staff  Tobacco  Allergies  Meds  Problems  Med Hx  Surg Hx  Fam Hx  Soc Hx        Reviewed and updated as needed this visit by Provider  Tobacco  Allergies  Meds  Problems  Med Hx  Surg Hx  Fam Hx  Soc Hx          ROS:  Constitutional, musculoskeletal, neuro, skin, endocrine and psych systems are negative, except as otherwise noted.    OBJECTIVE:     /80 (BP Location: Left arm, Patient Position: Chair, Cuff Size: Adult Regular)  Pulse 91  Temp 100.3  F (37.9  C) (Oral)  Resp 16  Ht 5' 5\" (1.651 m)  SpO2 98%  There is no height or weight on file to calculate BMI.  GENERAL: healthy, alert " and no distress  R Shoulder: There is no tenderness or crepitus at GH or AC.  Is very tender at deltoid insertion.  Active ROM is impaired, 90 flexion, normal extension (only if elbow is straight, more pain with elbow bent), abduction to 70.  No arm drop.  Busch impingement test pos, Neers neg, Crossover mildly pos.  No bicepital groove tenderness.  Strength testing deferred.  ASSESSMENT/PLAN:       ICD-10-CM    1. Acute pain of right shoulder M25.511 PHYSICAL THERAPY REFERRAL       Patient Instructions   Improving on exam, based on what you tell me from previous exam  Pain is not at GH (shoulder) joint - good news  This is more muscle -  Out of brace is fine and even good   Try to use a little bit  Gentle range of motion if nothing else  If not really improving in a week, start Physical Therapy.   Work note     Recheck 3-4 weeks if not resolving      Rosaura Flores PA-C  [facility]

## 2018-07-06 NOTE — LETTER
[facility]  [address] [place] [address]  Phone: [phone]  Fax: [phone]      REPORT OF WORK ABILITY    NOTE TO EMPLOYEE: You must promptly provide a copy of this report to your  employer or worker's compensation insurer, and Qualified Rehabilitation Consultant.    Date: 7/6/2018                     Employee Name: Cynthia Lyons         YOB: 1970  Medical Record Number: [id number]   Soc.Sec.No: [id number]  Employer: None                Date of Injury: 6/31/18  Managed Care Organization / Insurance Company Name: UNKNOWN    Diagnosis: acute shoulder pain  Work Related: yes     MMI: UNKNOWN/unlikely   Permanent Partial Disability(PPD) likely: unlikely    EMPLOYEE IS ABLE TO WORK: Return to work with restrictions effective immediately      RESTRICTIONS IF ANY: lift as tolerated with R arm, max 4 hours/day    OTHER RESTRICTIONS: None    TREATMENT PLAN/NOTES: change work position for comfort, plan Physical Therapy if not further improving well in 1 week, recheck 3-4 weeks if not resolving      Rosaura Flores PA-C

## 2018-07-07 NOTE — TELEPHONE ENCOUNTER
Bupriopion   100 mg  Last Written Prescription Date: 6/9/17  Last Fill Quantity: 60; # refills: 1  Last Office Visit with [facility], [facility] or  Health prescribing provider:  6/9/17   Next 5 appointments (look out 90 days)     Jul 06, 2017 10:00 AM CDT   Office Visit with Rosaura Flores PA-C   [facility]    [address] [place] [id number] [phone]                   Last PHQ-9 score on record=   PHQ-9 SCORE 6/9/2017   Total Score 1       Lab Results   Component Value Date    AST 7 07/05/2016     Lab Results   Component Value Date    ALT 21 07/05/2016       
Look like this is a dupilcation from 6/9/17 for buPROPion (WELLBUTRIN SR) 100 MG 12 hr tablet    
EMT/paramedic

## 2018-07-08 DIAGNOSIS — Z98.1 STATUS POST LAMINECTOMY WITH SPINAL FUSION: ICD-10-CM

## 2018-07-08 DIAGNOSIS — A60.00 HERPES SIMPLEX INFECTION OF GENITOURINARY SYSTEM: ICD-10-CM

## 2018-07-08 DIAGNOSIS — J30.89 OTHER ALLERGIC RHINITIS: ICD-10-CM

## 2018-07-08 DIAGNOSIS — F33.1 MAJOR DEPRESSIVE DISORDER, RECURRENT EPISODE, MODERATE (H): ICD-10-CM

## 2018-07-08 DIAGNOSIS — G89.4 CHRONIC PAIN DISORDER: ICD-10-CM

## 2018-07-08 DIAGNOSIS — F41.1 GENERALIZED ANXIETY DISORDER: ICD-10-CM

## 2018-07-08 NOTE — TELEPHONE ENCOUNTER
"Requested Prescriptions   Pending Prescriptions Disp Refills     valACYclovir (VALTREX) 500 MG tablet   Last Written Prescription Date:  4/13/18  Last Fill Quantity: 90,  # refills: 0   Last office visit: 7/6/2018 with prescribing provider:  MARRY Flores   Future Office Visit:   Next 5 appointments (look out 90 days)     Jul 13, 2018  7:40 AM CDT   Office Visit with Rosaura Flores PA-C   [facility] ([facility]    [address] [place] [id number]   [phone]                  90 tablet 0     Sig: TAKE 1 TABLET(500 MG) BY MOUTH DAILY    Antivirals for Herpes Protocol Passed    7/8/2018  1:17 PM       Passed - Patient is age 12 or older       Passed - Recent (12 mo) or future (30 days) visit within the authorizing provider's specialty    Patient had office visit in the last 12 months or has a visit in the next 30 days with authorizing provider or within the authorizing provider's specialty.  See \"Patient Info\" tab in inbasket, or \"Choose Columns\" in Meds & Orders section of the refill encounter.           Passed - Normal serum creatinine on file in past 12 months    Recent Labs   Lab Test  12/20/17   0831   CR  0.82               "

## 2018-07-09 RX ORDER — VALACYCLOVIR HYDROCHLORIDE 500 MG/1
TABLET, FILM COATED ORAL
Qty: 90 TABLET | Refills: 0 | Status: SHIPPED | OUTPATIENT
Start: 2018-07-09 | End: 2018-07-13

## 2018-07-13 ENCOUNTER — OFFICE VISIT (OUTPATIENT)
Dept: FAMILY MEDICINE | Facility: CLINIC | Age: 48
End: 2018-07-13
Payer: COMMERCIAL

## 2018-07-13 VITALS
DIASTOLIC BLOOD PRESSURE: 80 MMHG | HEIGHT: 65 IN | SYSTOLIC BLOOD PRESSURE: 118 MMHG | BODY MASS INDEX: 35.45 KG/M2 | OXYGEN SATURATION: 100 % | TEMPERATURE: 98.6 F | HEART RATE: 85 BPM | WEIGHT: 212.8 LBS

## 2018-07-13 DIAGNOSIS — A60.00 HERPES SIMPLEX INFECTION OF GENITOURINARY SYSTEM: ICD-10-CM

## 2018-07-13 DIAGNOSIS — I10 BENIGN ESSENTIAL HYPERTENSION: ICD-10-CM

## 2018-07-13 DIAGNOSIS — Z98.1 STATUS POST LAMINECTOMY WITH SPINAL FUSION: ICD-10-CM

## 2018-07-13 DIAGNOSIS — J30.89 OTHER ALLERGIC RHINITIS: ICD-10-CM

## 2018-07-13 DIAGNOSIS — F41.1 GENERALIZED ANXIETY DISORDER: ICD-10-CM

## 2018-07-13 DIAGNOSIS — G89.4 CHRONIC PAIN DISORDER: Primary | ICD-10-CM

## 2018-07-13 DIAGNOSIS — L98.9 SKIN LESION: ICD-10-CM

## 2018-07-13 DIAGNOSIS — F33.1 MAJOR DEPRESSIVE DISORDER, RECURRENT EPISODE, MODERATE (H): ICD-10-CM

## 2018-07-13 PROCEDURE — 99214 OFFICE O/P EST MOD 30 MIN: CPT | Performed by: PHYSICIAN ASSISTANT

## 2018-07-13 RX ORDER — VENLAFAXINE HYDROCHLORIDE 75 MG/1
75 CAPSULE, EXTENDED RELEASE ORAL DAILY
Qty: 90 CAPSULE | Refills: 1 | Status: SHIPPED | OUTPATIENT
Start: 2018-07-13 | End: 2018-11-26

## 2018-07-13 RX ORDER — HYDROCODONE BITARTRATE AND ACETAMINOPHEN 5; 325 MG/1; MG/1
TABLET ORAL
Qty: 180 TABLET | Refills: 0 | Status: SHIPPED | OUTPATIENT
Start: 2018-07-14 | End: 2018-07-13

## 2018-07-13 RX ORDER — LISINOPRIL AND HYDROCHLOROTHIAZIDE 12.5; 2 MG/1; MG/1
1 TABLET ORAL DAILY
Qty: 90 TABLET | Refills: 3 | Status: SHIPPED | OUTPATIENT
Start: 2018-07-13 | End: 2019-01-18

## 2018-07-13 RX ORDER — HYDROCODONE BITARTRATE AND ACETAMINOPHEN 5; 325 MG/1; MG/1
TABLET ORAL
Qty: 180 TABLET | Refills: 0 | Status: SHIPPED | OUTPATIENT
Start: 2018-08-14 | End: 2018-07-13

## 2018-07-13 RX ORDER — VENLAFAXINE HYDROCHLORIDE 37.5 MG/1
CAPSULE, EXTENDED RELEASE ORAL
Qty: 90 CAPSULE | Refills: 1 | Status: SHIPPED | OUTPATIENT
Start: 2018-07-13 | End: 2018-11-03

## 2018-07-13 RX ORDER — HYDROCODONE BITARTRATE AND ACETAMINOPHEN 5; 325 MG/1; MG/1
TABLET ORAL
Qty: 180 TABLET | Refills: 0 | Status: SHIPPED | OUTPATIENT
Start: 2018-09-14 | End: 2018-11-26

## 2018-07-13 RX ORDER — VALACYCLOVIR HYDROCHLORIDE 500 MG/1
TABLET, FILM COATED ORAL
Qty: 90 TABLET | Refills: 3 | Status: SHIPPED | OUTPATIENT
Start: 2018-07-13 | End: 2019-01-18

## 2018-07-13 NOTE — PATIENT INSTRUCTIONS
Pain meds now on monthly refills - shouldn't need adjustment to dates for refills    Try increase in effexor (venlafaxine) for anxiety/irritablity, and may help pain     Monitor BP     Keep up weight loss    Discussed azelastine nasal spray can be added if needed    Recheck in Dec.  Call for refills when coming due for next prescriptions in Oct.

## 2018-07-13 NOTE — PROGRESS NOTES
"  SUBJECTIVE:   Cynthia Lyons is a 48 year old female who presents to clinic today for the following health issues:    Medication Followup of Hydrocodone    Taking Medication as prescribed: yes    Side Effects:  None    Medication Helping Symptoms:  yes   Chronic back pain.  Narcotic contract transferred from Dr Guzman.      Somewhat feels like meds not working, HTN and anxiety meds, but thinks is just stress from moving.  Feels isn't her obvious anxiety but easily irritable and angry at little things.  On wellbutrin 200, effexor 75.    HTN - thinks running high but hasn't been checking it.    Sinuses - back on allergy pills and nasal sprays, HA better now that not in her house.  Knows previous owners had cat, and patient has cat allergy.  Plans to try cleaning carpet.     Wants derm referral for general skin check.      Problem list and histories reviewed & adjusted, as indicated.  Additional history: as documented    BP Readings from Last 3 Encounters:   07/13/18 118/80   07/06/18 110/80   04/05/18 110/72    Wt Readings from Last 3 Encounters:   07/13/18 212 lb 12.8 oz (96.5 kg)   04/05/18 217 lb (98.4 kg)   02/09/18 216 lb (98 kg)        Labs reviewed in EPIC    Reviewed and updated as needed this visit by clinical staff  Tobacco  Allergies  Meds  Problems  Med Hx  Surg Hx  Fam Hx  Soc Hx        Reviewed and updated as needed this visit by Provider  Tobacco  Allergies  Meds  Problems  Med Hx  Surg Hx  Fam Hx  Soc Hx          ROS:  Constitutional, HEENT, cardiovascular, pulmonary, musculoskeletal, neuro, skin, psych systems are negative, except as otherwise noted.    OBJECTIVE:     /80  Pulse 85  Temp 98.6  F (37  C) (Tympanic)  Ht 5' 5\" (1.651 m)  Wt 212 lb 12.8 oz (96.5 kg)  SpO2 100%  BMI 35.41 kg/m2  Body mass index is 35.41 kg/(m^2).  GENERAL: healthy, alert and no distress  PSYCH: mentation appears normal, affect normal/bright    ASSESSMENT/PLAN:       ICD-10-CM    1. Chronic " pain disorder G89.4 valACYclovir (VALTREX) 500 MG tablet     venlafaxine (EFFEXOR-XR) 75 MG 24 hr capsule     HYDROcodone-acetaminophen (NORCO) 5-325 MG per tablet     DISCONTINUED: HYDROcodone-acetaminophen (NORCO) 5-325 MG per tablet     DISCONTINUED: HYDROcodone-acetaminophen (NORCO) 5-325 MG per tablet   2. Major depressive disorder, recurrent episode, moderate (H) F33.1 valACYclovir (VALTREX) 500 MG tablet     venlafaxine (EFFEXOR-XR) 75 MG 24 hr capsule     venlafaxine (EFFEXOR-XR) 37.5 MG 24 hr capsule   3. Generalized anxiety disorder F41.1 valACYclovir (VALTREX) 500 MG tablet     venlafaxine (EFFEXOR-XR) 75 MG 24 hr capsule     venlafaxine (EFFEXOR-XR) 37.5 MG 24 hr capsule   4. Benign essential hypertension I10 lisinopril-hydrochlorothiazide (PRINZIDE/ZESTORETIC) 20-12.5 MG per tablet   5. Herpes simplex infection of genitourinary system A60.00 valACYclovir (VALTREX) 500 MG tablet   6. Status post laminectomy with spinal fusion Z98.1 valACYclovir (VALTREX) 500 MG tablet     HYDROcodone-acetaminophen (NORCO) 5-325 MG per tablet     DISCONTINUED: HYDROcodone-acetaminophen (NORCO) 5-325 MG per tablet     DISCONTINUED: HYDROcodone-acetaminophen (NORCO) 5-325 MG per tablet   7. Other allergic rhinitis J30.89 valACYclovir (VALTREX) 500 MG tablet   8. Skin lesion L98.9 DERMATOLOGY REFERRAL       Patient Instructions   Pain meds now on monthly refills - shouldn't need adjustment to dates for refills    Try increase in effexor (venlafaxine) for anxiety/irritablity, and may help pain     Monitor BP     Keep up weight loss    Discussed azelastine nasal spray can be added if needed    Recheck in Dec.  Call for refills when coming due for next prescriptions in Oct.      Rosaura Flores PA-C  [facility]

## 2018-07-13 NOTE — MR AVS SNAPSHOT
After Visit Summary   7/13/2018    Cynthia Lyons    MRN: [id number]           Patient Information     Date Of Birth          1970        Visit Information        Provider Department      7/13/2018 7:40 AM Rosaura Flores PA-C [facility]        Today's Diagnoses     Chronic pain disorder    -  1    Major depressive disorder, recurrent episode, moderate (H)        Generalized anxiety disorder        Benign essential hypertension        Herpes simplex infection of genitourinary system        Status post laminectomy with spinal fusion        Other allergic rhinitis        Skin lesion          Care Instructions    Pain meds now on monthly refills - shouldn't need adjustment to dates for refills    Try increase in effexor (venlafaxine) for anxiety/irritablity, and may help pain     Monitor BP     Keep up weight loss    Discussed azelastine nasal spray can be added if needed    Recheck in Dec.  Call for refills when coming due for next prescriptions in Oct.          Follow-ups after your visit        Additional Services     DERMATOLOGY REFERRAL       Your provider has referred you to: TANMAY: [facility] [phone]   [email or web address]    Please be aware that coverage of these services is subject to the terms and limitations of your health insurance plan.  Call member services at your health plan with any benefit or coverage questions.      Please bring the following with you to your appointment:    (1) Any X-Rays, CTs or MRIs which have been performed.  Contact the facility where they were done to arrange for  prior to your scheduled appointment.    (2) List of current medications  (3) This referral request   (4) Any documents/labs given to you for this referral                  Who to contact     If you have questions or need follow up information about today's clinic visit or your schedule please contact [place]  [facility] directly at [phone].  Normal or non-critical lab and imaging results will be communicated to you by MyChart, letter or phone within 4 business days after the clinic has received the results. If you do not hear from us within 7 days, please contact the clinic through Newdeahart or phone. If you have a critical or abnormal lab result, we will notify you by phone as soon as possible.  Submit refill requests through Wis.dm or call your pharmacy and they will forward the refill request to us. Please allow 3 business days for your refill to be completed.          Additional Information About Your Visit        NewdeaharNetLex Information     Wis.dm gives you secure access to your electronic health record. If you see a primary care provider, you can also send messages to your care team and make appointments. If you have questions, please call your primary care clinic.  If you do not have a primary care provider, please call [phone] and they will assist you.        Care EveryWhere ID     This is your Care EveryWhere ID. This could be used by other organizations to access your [place] medical records  [id number]        Your Vitals Were     Pulse Temperature Height Pulse Oximetry BMI (Body Mass Index)       85 98.6  F (37  C) (Tympanic) 5' 5\" (1.651 m) 100% 35.41 kg/m2        Blood Pressure from Last 3 Encounters:   07/13/18 118/80   07/06/18 110/80   04/05/18 110/72    Weight from Last 3 Encounters:   07/13/18 212 lb 12.8 oz (96.5 kg)   04/05/18 217 lb (98.4 kg)   02/09/18 216 lb (98 kg)              We Performed the Following     DERMATOLOGY REFERRAL          Today's Medication Changes          These changes are accurate as of 7/13/18  8:19 AM.  If you have any questions, ask your nurse or doctor.               These medicines have changed or have updated prescriptions.        Dose/Directions    HYDROcodone-acetaminophen 5-325 MG per tablet   Commonly known as:  NORCO   This may have changed:    - " additional instructions  - These instructions start on 9/14/2018. If you are unsure what to do until then, ask your doctor or other care provider.   Used for:  Status post laminectomy with spinal fusion, Chronic pain disorder   Changed by:  Rosaura Flores PA-C        Start taking on:  9/14/2018   2 in am and 2 at dinnertime/pm, and additional 1-2 at night.   Quantity:  180 tablet   Refills:  0       * venlafaxine 75 MG 24 hr capsule   Commonly known as:  EFFEXOR-XR   This may have changed:  See the new instructions.   Used for:  Chronic pain disorder, Major depressive disorder, recurrent episode, moderate (H), Generalized anxiety disorder   Changed by:  Rosaura Flores PA-C        Dose:  75 mg   Take 1 capsule (75 mg) by mouth daily   Quantity:  90 capsule   Refills:  1       * venlafaxine 37.5 MG 24 hr capsule   Commonly known as:  EFFEXOR-XR   This may have changed:  You were already taking a medication with the same name, and this prescription was added. Make sure you understand how and when to take each.   Used for:  Major depressive disorder, recurrent episode, moderate (H), Generalized anxiety disorder   Changed by:  Rosaura Flores PA-C        Take 1 capsule daily.  37.5 + 75 = total dose 112.5.   Quantity:  90 capsule   Refills:  1       * Notice:  This list has 2 medication(s) that are the same as other medications prescribed for you. Read the directions carefully, and ask your doctor or other care provider to review them with you.         Where to get your medicines      These medications were sent to [facility] [place] [id number] [place] [address]     Phone:  [phone]     lisinopril-hydrochlorothiazide 20-12.5 MG per tablet    valACYclovir 500 MG tablet    venlafaxine 37.5 MG 24 hr capsule    venlafaxine 75 MG 24 hr capsule         Some of these will need a paper prescription and others can be bought over the  counter.  Ask your nurse if you have questions.     Bring a paper prescription for each of these medications     HYDROcodone-acetaminophen 5-325 MG per tablet               Information about OPIOIDS     PRESCRIPTION OPIOIDS: WHAT YOU NEED TO KNOW   We gave you an opioid (narcotic) pain medicine. It is important to manage your pain, but opioids are not always the best choice. You should first try all the other options your care team gave you. Take this medicine for as short a time (and as few doses) as possible.     These medicines have risks:    DO NOT drive when on new or higher doses of pain medicine. These medicines can affect your alertness and reaction times, and you could be arrested for driving under the influence (DUI). If you need to use opioids long-term, talk to your care team about driving.    DO NOT operate heave machinery    DO NOT do any other dangerous activities while taking these medicines.     DO NOT drink any alcohol while taking these medicines.      If the opioid prescribed includes acetaminophen, DO NOT take with any other medicines that contain acetaminophen. Read all labels carefully. Look for the word  acetaminophen  or  Tylenol.  Ask your pharmacist if you have questions or are unsure.    You can get addicted to pain medicines, especially if you have a history of addiction (chemical, alcohol or substance dependence). Talk to your care team about ways to reduce this risk.    Store your pills in a secure place, locked if possible. We will not replace any lost or stolen medicine. If you don t finish your medicine, please throw away (dispose) as directed by your pharmacist. The [place] Pollution Control Agency has more information about safe disposal: https://www.pca.state.[place]/living-green/managing-unwanted-medications.     All opioids tend to cause constipation. Drink plenty of water and eat foods that have a lot of fiber, such as fruits, vegetables, prune juice, apple juice and  high-fiber cereal. Take a laxative (Miralax, milk of magnesia, Colace, Senna) if you don t move your bowels at least every other day.          Primary Care Provider Office Phone # Fax #    Rosaura Flores PA-C [phone] [address] [place] [address]        Equal Access to Services     [facility] : Hadii aad ku hadasho Soomaali, waaxda luqadaha, qaybta kaalmada adeegyada, waxay idiin hayaan adeeg khodilonsh laJessicaagnesn ah. So [facility] [phone].    ATENCIÓN: Si habla español, tiene a sumner disposición servicios gratuitos de asistencia lingüística. Rafitaame al [phone].    We comply with applicable federal civil rights laws and [place] laws. We do not discriminate on the basis of race, color, national origin, age, disability, sex, sexual orientation, or gender identity.            Thank you!     Thank you for choosing [facility]  for your care. Our goal is always to provide you with excellent care. Hearing back from our patients is one way we can continue to improve our services. Please take a few minutes to complete the written survey that you may receive in the mail after your visit with us. Thank you!             Your Updated Medication List - Protect others around you: Learn how to safely use, store and throw away your medicines at www.disposemymeds.org.          This list is accurate as of 7/13/18  8:19 AM.  Always use your most recent med list.                   Brand Name Dispense Instructions for use Diagnosis    albuterol 108 (90 Base) MCG/ACT Inhaler    PROAIR HFA/PROVENTIL HFA/VENTOLIN HFA     Inhale 2 puffs into the lungs        atorvastatin 20 MG tablet    LIPITOR    90 tablet    Take 1 tablet (20 mg) by mouth daily    Dyslipidemia       buPROPion 200 MG 12 hr tablet    WELLBUTRIN SR    90 tablet    TAKE 1 TABLET BY MOUTH DAILY    Panic anxiety syndrome, Major depressive disorder, recurrent episode, moderate (H), Generalized anxiety disorder       cetirizine  10 MG tablet    zyrTEC    180 tablet    TAKE 1 TABLET(10 MG) BY MOUTH TWICE DAILY    Other allergic rhinitis, Status post laminectomy with spinal fusion, Chronic pain disorder, Major depressive disorder, recurrent episode, moderate (H), Generalized anxiety disorder       fluticasone 50 MCG/ACT spray    FLONASE    1 Package    Spray 1 spray in nostril 2 times daily.    Allergic rhinitis       HYDROcodone-acetaminophen 5-325 MG per tablet   Start taking on:  9/14/2018    NORCO    180 tablet    2 in am and 2 at dinnertime/pm, and additional 1-2 at night.    Status post laminectomy with spinal fusion, Chronic pain disorder       lactulose 10 GM/15ML solution    CHRONULAC    473 mL    Take 30 mLs (20 g) by mouth 3 times daily 15 ml (10 g) by mouth once daily, but can increase back to previous dose of 30 ml (20 g) up to 3 x per day if needed.    Constipation, unspecified constipation type       lisinopril-hydrochlorothiazide 20-12.5 MG per tablet    PRINZIDE/ZESTORETIC    90 tablet    Take 1 tablet by mouth daily    Benign essential hypertension       methocarbamol 750 MG tablet    ROBAXIN    120 tablet    TAKE 1 TO 2 TABLETS BY MOUTH UP TO THREE TIMES DAILY(4TH TIME PER DAY ON OCCASION IS OK)    Status post laminectomy with spinal fusion       TYLENOL PO      Take 1,000 mg by mouth every 8 hours as needed for mild pain or fever        valACYclovir 500 MG tablet    VALTREX    90 tablet    TAKE 1 TABLET(500 MG) BY MOUTH DAILY    Herpes simplex infection of genitourinary system, Status post laminectomy with spinal fusion, Chronic pain disorder, Major depressive disorder, recurrent episode, moderate (H), Generalized anxiety disorder, Other allergic rhinitis       * venlafaxine 75 MG 24 hr capsule    EFFEXOR-XR    90 capsule    Take 1 capsule (75 mg) by mouth daily    Chronic pain disorder, Major depressive disorder, recurrent episode, moderate (H), Generalized anxiety disorder       * venlafaxine 37.5 MG 24 hr capsule     EFFEXOR-XR    90 capsule    Take 1 capsule daily.  37.5 + 75 = total dose 112.5.    Major depressive disorder, recurrent episode, moderate (H), Generalized anxiety disorder       * Notice:  This list has 2 medication(s) that are the same as other medications prescribed for you. Read the directions carefully, and ask your doctor or other care provider to review them with you.

## 2018-08-01 ENCOUNTER — TELEPHONE (OUTPATIENT)
Dept: FAMILY MEDICINE | Facility: CLINIC | Age: 48
End: 2018-08-01

## 2018-08-01 NOTE — TELEPHONE ENCOUNTER
Pt notified and recently started a new Job. Pt will call the [facility] and schedule a Lab appt with them. The Labs are Future.  Loren [place] Sec

## 2018-08-01 NOTE — TELEPHONE ENCOUNTER
Patient was told to return for a BMP, reminder letter was sent to patient on 06/27/2018, patient has still not scheduled an appointment.

## 2018-08-18 DIAGNOSIS — F33.1 MAJOR DEPRESSIVE DISORDER, RECURRENT EPISODE, MODERATE (H): ICD-10-CM

## 2018-08-18 DIAGNOSIS — F41.1 GENERALIZED ANXIETY DISORDER: ICD-10-CM

## 2018-08-18 DIAGNOSIS — G89.4 CHRONIC PAIN DISORDER: ICD-10-CM

## 2018-08-18 NOTE — TELEPHONE ENCOUNTER
"Requested Prescriptions   Pending Prescriptions Disp Refills     venlafaxine (EFFEXOR-XR) 75 MG 24 hr capsule   Last Written Prescription Date:  7/13/18  Last Fill Quantity: 90,  # refills: 1   Last office visit: 7/13/2018 with prescribing provider:  MARRY Flores   Future Office Visit:     90 capsule 0     Sig: TAKE 1 CAPSULE BY MOUTH DAILY    Serotonin-Norepinephrine Reuptake Inhibitors  Passed    8/18/2018 11:56 AM       Passed - Blood pressure under 140/90 in past 12 months    BP Readings from Last 3 Encounters:   07/13/18 118/80   07/06/18 110/80   04/05/18 110/72                Passed - PHQ-9 score of less than 5 in past 6 months    Please review last PHQ-9 score.   PHQ-9 SCORE 1/15/2018 2/9/2018 4/5/2018   Total Score - - -   Total Score MyChart 4 (Minimal depression) 0 0   Total Score 4 0 0     NANCI-7 SCORE 1/15/2018 2/9/2018 4/5/2018   Total Score - - -   Total Score 0 (minimal anxiety) 0 (minimal anxiety) 0 (minimal anxiety)   Total Score 0 0 0                Passed - Patient is age 18 or older       Passed - No active pregnancy on record       Passed - Normal serum creatinine on file in past 12 months    Recent Labs   Lab Test  12/20/17   0831   CR  0.82            Passed - No positive pregnancy test in past 12 months       Passed - Recent (6 mo) or future (30 days) visit within the authorizing provider's specialty    Patient had office visit in the last 6 months or has a visit in the next 30 days with authorizing provider or within the authorizing provider's specialty.  See \"Patient Info\" tab in inbasket, or \"Choose Columns\" in Meds & Orders section of the refill encounter.              "

## 2018-08-20 RX ORDER — VENLAFAXINE HYDROCHLORIDE 75 MG/1
CAPSULE, EXTENDED RELEASE ORAL
Qty: 90 CAPSULE | Refills: 0 | Status: SHIPPED | OUTPATIENT
Start: 2018-08-20 | End: 2018-11-03

## 2018-08-24 ENCOUNTER — TRANSFERRED RECORDS (OUTPATIENT)
Dept: HEALTH INFORMATION MANAGEMENT | Facility: CLINIC | Age: 48
End: 2018-08-24

## 2018-08-25 DIAGNOSIS — E78.5 DYSLIPIDEMIA: ICD-10-CM

## 2018-08-26 NOTE — TELEPHONE ENCOUNTER
"Requested Prescriptions   Pending Prescriptions Disp Refills     atorvastatin (LIPITOR) 20 MG tablet   Last Written Prescription Date:  12/21/17  Last Fill Quantity: 90,  # refills: 3   Last office visit: 7/13/2018 with prescribing provider:  MARRY Flores   Future Office Visit:     90 tablet 0     Sig: TAKE 1 TABLET BY MOUTH DAILY.    Statins Protocol Passed    8/25/2018 12:19 PM       Passed - LDL on file in past 12 months    Recent Labs   Lab Test  12/20/17   0831   LDL  96            Passed - No abnormal creatine kinase in past 12 months    No lab results found.            Passed - Recent (12 mo) or future (30 days) visit within the authorizing provider's specialty    Patient had office visit in the last 12 months or has a visit in the next 30 days with authorizing provider or within the authorizing provider's specialty.  See \"Patient Info\" tab in inbasket, or \"Choose Columns\" in Meds & Orders section of the refill encounter.           Passed - Patient is age 18 or older       Passed - No active pregnancy on record       Passed - No positive pregnancy test in past 12 months          "

## 2018-08-27 RX ORDER — ATORVASTATIN CALCIUM 20 MG/1
TABLET, FILM COATED ORAL
Qty: 90 TABLET | Refills: 1 | Status: SHIPPED | OUTPATIENT
Start: 2018-08-27 | End: 2019-03-11

## 2018-08-29 ENCOUNTER — OFFICE VISIT (OUTPATIENT)
Dept: FAMILY MEDICINE | Facility: CLINIC | Age: 48
End: 2018-08-29
Payer: COMMERCIAL

## 2018-08-29 VITALS
DIASTOLIC BLOOD PRESSURE: 78 MMHG | TEMPERATURE: 99.1 F | HEART RATE: 80 BPM | BODY MASS INDEX: 35.49 KG/M2 | HEIGHT: 65 IN | WEIGHT: 213 LBS | SYSTOLIC BLOOD PRESSURE: 112 MMHG

## 2018-08-29 DIAGNOSIS — M54.2 CERVICALGIA: Primary | ICD-10-CM

## 2018-08-29 DIAGNOSIS — M54.9 UPPER BACK PAIN: ICD-10-CM

## 2018-08-29 DIAGNOSIS — Z98.1 STATUS POST LAMINECTOMY WITH SPINAL FUSION: ICD-10-CM

## 2018-08-29 PROCEDURE — 99213 OFFICE O/P EST LOW 20 MIN: CPT | Performed by: FAMILY MEDICINE

## 2018-08-29 NOTE — MR AVS SNAPSHOT
After Visit Summary   8/29/2018    Cynthia Lyons    MRN: [id number]           Patient Information     Date Of Birth          1970        Visit Information        Provider Department      8/29/2018 3:00 PM Dana Reyes MD [facility]        Today's Diagnoses     Cervicalgia    -  1    Upper back pain        Status post laminectomy with spinal fusion           Follow-ups after your visit        Additional Services     PAIN MANAGEMENT REFERRAL       Your provider has referred you to: N: [facility] [phone]   [email or web address]       Please call clinic directly to schedule appointment.  N: [facility] [phone]   [email or web address]       Please call clinic directly to schedule appointment.  [facility]: [facility] [phone] Fax: [phone] [email or web address]    **ANY DIAGNOSTIC TESTS THAT ARE NOT IN [facility] SHOULD BE SENT TO THE PAIN CENTER**    REGARDING OPIOID MEDICATIONS:  The discussion of opioids management, appropriateness of therapy, and dosing will be discussed in patients being seen for evaluation.  The pain management clinics are not long-term prescribing clinics, with transition of prescribing of medications ultimately going back to the referring provider/PCP.  If prescribing is taken over at the pain clinic, it is in actively involved patients whom are appropriate for opioids, urine drug screening is completed, and long-term prescribing plan has been determined.  Therefore, we will not be automatically taking over prescribing at the patient's first visit.  Is this agreeable to you? agrees.     Please be aware that coverage of these services is subject to the terms and limitations of your health insurance plan.  Call member services at your health plan with any benefit or coverage questions.      Please bring the following with you to your  "appointment:    (1) Any X-Rays, CTs or MRIs which have been performed.  Contact the facility where they were done to arrange for  prior to your scheduled appointment.    (2) List of current medications   (3) This referral request   (4) Any documents/labs given to you for this referral                  Who to contact     Normal or non-critical lab and imaging results will be communicated to you by Rare Pinkhart, letter or phone within 4 business days after the clinic has received the results. If you do not hear from us within 7 days, please contact the clinic through Avacent or phone. If you have a critical or abnormal lab result, we will notify you by phone as soon as possible.  Submit refill requests through [employer] Talking or call your pharmacy and they will forward the refill request to us. Please allow 3 business days for your refill to be completed.          If you need to speak with a  for additional information , please call: [phone]             Additional Information About Your Visit        Africa's Talking Information     [employer]'s Talking gives you secure access to your electronic health record. If you see a primary care provider, you can also send messages to your care team and make appointments. If you have questions, please call your primary care clinic.  If you do not have a primary care provider, please call [phone] and they will assist you.        Care EveryWhere ID     This is your Care EveryWhere ID. This could be used by other organizations to access your [place] medical records  [id number]        Your Vitals Were     Pulse Temperature Height BMI (Body Mass Index)          80 99.1  F (37.3  C) (Tympanic) 5' 5\" (1.651 m) 35.45 kg/m2         Blood Pressure from Last 3 Encounters:   08/29/18 112/78   07/13/18 118/80   07/06/18 110/80    Weight from Last 3 Encounters:   08/29/18 213 lb (96.6 kg)   07/13/18 212 lb 12.8 oz (96.5 kg)   04/05/18 217 lb (98.4 kg)              We Performed the Following "     PAIN MANAGEMENT REFERRAL        Primary Care Provider Office Phone # Fax #    Rosaura Flores PA-C [phone] [address] [place] [address]        Equal Access to Services     AXEL ALEXANDER : Hadsergio azul crouch vargheseo Sobenjaali, waaxda luqadaha, qaybta kaalmada adelouieyada, jean zuniga laJessicajose l maloney. So [facility] [phone].    ATENCIÓN: Si habla español, tiene a sumner disposición servicios gratuitos de asistencia lingüística. Llame al [phone].    We comply with applicable federal civil rights laws and [place] laws. We do not discriminate on the basis of race, color, national origin, age, disability, sex, sexual orientation, or gender identity.            Thank you!     Thank you for choosing [facility]  for your care. Our goal is always to provide you with excellent care. Hearing back from our patients is one way we can continue to improve our services. Please take a few minutes to complete the written survey that you may receive in the mail after your visit with us. Thank you!             Your Updated Medication List - Protect others around you: Learn how to safely use, store and throw away your medicines at www.disposemymeds.org.          This list is accurate as of 8/29/18  3:56 PM.  Always use your most recent med list.                   Brand Name Dispense Instructions for use Diagnosis    albuterol 108 (90 Base) MCG/ACT inhaler    PROAIR HFA/PROVENTIL HFA/VENTOLIN HFA     Inhale 2 puffs into the lungs        atorvastatin 20 MG tablet    LIPITOR    90 tablet    TAKE 1 TABLET BY MOUTH DAILY.    Dyslipidemia       buPROPion 200 MG 12 hr tablet    WELLBUTRIN SR    90 tablet    TAKE 1 TABLET BY MOUTH DAILY    Panic anxiety syndrome, Major depressive disorder, recurrent episode, moderate (H), Generalized anxiety disorder       cetirizine 10 MG tablet    zyrTEC    180 tablet    TAKE 1 TABLET(10 MG) BY MOUTH TWICE DAILY    Other allergic rhinitis, Status post  laminectomy with spinal fusion, Chronic pain disorder, Major depressive disorder, recurrent episode, moderate (H), Generalized anxiety disorder       fluticasone 50 MCG/ACT spray    FLONASE    1 Package    Spray 1 spray in nostril 2 times daily.    Allergic rhinitis       HYDROcodone-acetaminophen 5-325 MG per tablet   Start taking on:  9/14/2018    NORCO    180 tablet    2 in am and 2 at dinnertime/pm, and additional 1-2 at night.    Status post laminectomy with spinal fusion, Chronic pain disorder       lisinopril-hydrochlorothiazide 20-12.5 MG per tablet    PRINZIDE/ZESTORETIC    90 tablet    Take 1 tablet by mouth daily    Benign essential hypertension       methocarbamol 750 MG tablet    ROBAXIN    120 tablet    TAKE 1 TO 2 TABLETS BY MOUTH UP TO THREE TIMES DAILY(4TH TIME PER DAY ON OCCASION IS OK)    Status post laminectomy with spinal fusion       TYLENOL PO      Take 1,000 mg by mouth every 8 hours as needed for mild pain or fever        valACYclovir 500 MG tablet    VALTREX    90 tablet    TAKE 1 TABLET(500 MG) BY MOUTH DAILY    Herpes simplex infection of genitourinary system, Status post laminectomy with spinal fusion, Chronic pain disorder, Major depressive disorder, recurrent episode, moderate (H), Generalized anxiety disorder, Other allergic rhinitis       * venlafaxine 75 MG 24 hr capsule    EFFEXOR-XR    90 capsule    Take 1 capsule (75 mg) by mouth daily    Chronic pain disorder, Major depressive disorder, recurrent episode, moderate (H), Generalized anxiety disorder       * venlafaxine 37.5 MG 24 hr capsule    EFFEXOR-XR    90 capsule    Take 1 capsule daily.  37.5 + 75 = total dose 112.5.    Major depressive disorder, recurrent episode, moderate (H), Generalized anxiety disorder       * venlafaxine 75 MG 24 hr capsule    EFFEXOR-XR    90 capsule    TAKE 1 CAPSULE BY MOUTH DAILY    Chronic pain disorder, Major depressive disorder, recurrent episode, moderate (H), Generalized anxiety disorder        * Notice:  This list has 3 medication(s) that are the same as other medications prescribed for you. Read the directions carefully, and ask your doctor or other care provider to review them with you.
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
## 2018-09-12 ENCOUNTER — OFFICE VISIT (OUTPATIENT)
Dept: FAMILY MEDICINE | Facility: CLINIC | Age: 48
End: 2018-09-12
Payer: COMMERCIAL

## 2018-09-12 ENCOUNTER — TELEPHONE (OUTPATIENT)
Dept: FAMILY MEDICINE | Facility: CLINIC | Age: 48
End: 2018-09-12

## 2018-09-12 VITALS — HEART RATE: 80 BPM | DIASTOLIC BLOOD PRESSURE: 80 MMHG | SYSTOLIC BLOOD PRESSURE: 139 MMHG | HEIGHT: 65 IN

## 2018-09-12 DIAGNOSIS — S13.9XXD NECK SPRAIN, SUBSEQUENT ENCOUNTER: ICD-10-CM

## 2018-09-12 DIAGNOSIS — S06.0X0D CONCUSSION WITHOUT LOSS OF CONSCIOUSNESS, SUBSEQUENT ENCOUNTER: Primary | ICD-10-CM

## 2018-09-12 DIAGNOSIS — Z98.1 STATUS POST LAMINECTOMY WITH SPINAL FUSION: ICD-10-CM

## 2018-09-12 DIAGNOSIS — G89.4 CHRONIC PAIN DISORDER: ICD-10-CM

## 2018-09-12 DIAGNOSIS — S29.019D STRAIN OF THORACIC REGION, SUBSEQUENT ENCOUNTER: ICD-10-CM
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
## 2018-09-12 PROCEDURE — 99213 OFFICE O/P EST LOW 20 MIN: CPT | Performed by: FAMILY MEDICINE

## 2018-09-12 RX ORDER — HYDROCODONE BITARTRATE AND ACETAMINOPHEN 5; 325 MG/1; MG/1
TABLET ORAL
Qty: 180 TABLET | Refills: 0 | Status: CANCELLED | OUTPATIENT
Start: 2018-09-14

## 2018-09-12 ASSESSMENT — PAIN SCALES - GENERAL: PAINLEVEL: SEVERE PAIN (7)

## 2018-09-12 NOTE — MR AVS SNAPSHOT
"              After Visit Summary   9/12/2018    Cynthia Lyons    MRN: [id number]           Patient Information     Date Of Birth          1970        Visit Information        Provider Department      9/12/2018 10:00 AM Kim Granado MD [facility]        Care Instructions    *    I think in 2 months you'll be back at your old baseline.     *    I would recommend filling the hydrocodone and see what the pain clinic has to say.     *    Consider seeing our pain clinic if things don't work out: [phone].           Follow-ups after your visit        Who to contact     Normal or non-critical lab and imaging results will be communicated to you by [employer], letter or phone within 4 business days after the clinic has received the results. If you do not hear from us within 7 days, please contact the clinic through [employer] or phone. If you have a critical or abnormal lab result, we will notify you by phone as soon as possible.  Submit refill requests through [employer] or call your pharmacy and they will forward the refill request to us. Please allow 3 business days for your refill to be completed.          If you need to speak with a  for additional information , please call: [phone]           Additional Information About Your Visit        [employer] Information     [employer] gives you secure access to your electronic health record. If you see a primary care provider, you can also send messages to your care team and make appointments. If you have questions, please call your primary care clinic.  If you do not have a primary care provider, please call [phone] and they will assist you.        Care EveryWhere ID     This is your Care EveryWhere ID. This could be used by other organizations to access your [facility] medical records  [id number]        Your Vitals Were     Pulse Height                80 5' 5\" (1.651 m)           Blood Pressure from Last 3 Encounters: "   09/12/18 139/80   08/29/18 112/78   07/13/18 118/80    Weight from Last 3 Encounters:   08/29/18 213 lb (96.6 kg)   07/13/18 212 lb 12.8 oz (96.5 kg)   04/05/18 217 lb (98.4 kg)              Today, you had the following     No orders found for display       Primary Care Provider Office Phone # Fax #    Rosaura Flores PA-C [phone] [address] [place] [id number]        Equal Access to Services     [facility]: Hadii aad ku hadasho Soomaali, waaxda luqadaha, qaybta kaalmada adelouieyarood, jean graves . So [facility] [phone].    ATENCIÓN: Si habla español, tiene a sumner disposición servicios gratuitos de asistencia lingüística. [facility] [phone].    We comply with applicable federal civil rights laws and [place] laws. We do not discriminate on the basis of race, color, national origin, age, disability, sex, sexual orientation, or gender identity.            Thank you!     Thank you for choosing [facility]  for your care. Our goal is always to provide you with excellent care. Hearing back from our patients is one way we can continue to improve our services. Please take a few minutes to complete the written survey that you may receive in the mail after your visit with us. Thank you!             Your Updated Medication List - Protect others around you: Learn how to safely use, store and throw away your medicines at www.disposemymeds.org.          This list is accurate as of 9/12/18 10:26 AM.  Always use your most recent med list.                   Brand Name Dispense Instructions for use Diagnosis    albuterol 108 (90 Base) MCG/ACT inhaler    PROAIR HFA/PROVENTIL HFA/VENTOLIN HFA     Inhale 2 puffs into the lungs        atorvastatin 20 MG tablet    LIPITOR    90 tablet    TAKE 1 TABLET BY MOUTH DAILY.    Dyslipidemia       buPROPion 200 MG 12 hr tablet    WELLBUTRIN SR    90 tablet    TAKE 1 TABLET BY MOUTH DAILY    Panic anxiety syndrome,  Major depressive disorder, recurrent episode, moderate (H), Generalized anxiety disorder       cetirizine 10 MG tablet    zyrTEC    180 tablet    TAKE 1 TABLET(10 MG) BY MOUTH TWICE DAILY    Other allergic rhinitis, Status post laminectomy with spinal fusion, Chronic pain disorder, Major depressive disorder, recurrent episode, moderate (H), Generalized anxiety disorder       fluticasone 50 MCG/ACT spray    FLONASE    1 Package    Spray 1 spray in nostril 2 times daily.    Allergic rhinitis       HYDROcodone-acetaminophen 5-325 MG per tablet   Start taking on:  9/14/2018    NORCO    180 tablet    2 in am and 2 at dinnertime/pm, and additional 1-2 at night.    Status post laminectomy with spinal fusion, Chronic pain disorder       lisinopril-hydrochlorothiazide 20-12.5 MG per tablet    PRINZIDE/ZESTORETIC    90 tablet    Take 1 tablet by mouth daily    Benign essential hypertension       methocarbamol 750 MG tablet    ROBAXIN    120 tablet    TAKE 1 TO 2 TABLETS BY MOUTH UP TO THREE TIMES DAILY(4TH TIME PER DAY ON OCCASION IS OK)    Status post laminectomy with spinal fusion       TYLENOL PO      Take 1,000 mg by mouth every 8 hours as needed for mild pain or fever        valACYclovir 500 MG tablet    VALTREX    90 tablet    TAKE 1 TABLET(500 MG) BY MOUTH DAILY    Herpes simplex infection of genitourinary system, Status post laminectomy with spinal fusion, Chronic pain disorder, Major depressive disorder, recurrent episode, moderate (H), Generalized anxiety disorder, Other allergic rhinitis       * venlafaxine 75 MG 24 hr capsule    EFFEXOR-XR    90 capsule    Take 1 capsule (75 mg) by mouth daily    Chronic pain disorder, Major depressive disorder, recurrent episode, moderate (H), Generalized anxiety disorder       * venlafaxine 37.5 MG 24 hr capsule    EFFEXOR-XR    90 capsule    Take 1 capsule daily.  37.5 + 75 = total dose 112.5.    Major depressive disorder, recurrent episode, moderate (H), Generalized anxiety  disorder       * venlafaxine 75 MG 24 hr capsule    EFFEXOR-XR    90 capsule    TAKE 1 CAPSULE BY MOUTH DAILY    Chronic pain disorder, Major depressive disorder, recurrent episode, moderate (H), Generalized anxiety disorder       * Notice:  This list has 3 medication(s) that are the same as other medications prescribed for you. Read the directions carefully, and ask your doctor or other care provider to review them with you.
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
## 2018-09-12 NOTE — PATIENT INSTRUCTIONS
*    I think in 2 months you'll be back at your old baseline.     *    I would recommend filling the hydrocodone and see what the pain clinic has to say.     *    Consider seeing our pain clinic if things don't work out: [phone].

## 2018-09-12 NOTE — TELEPHONE ENCOUNTER
Pt saw dr granado today and when she got home she realized she is short one day of medicine because there are 31 days in august ,  And she normally gets 30 pills at a time, she states that if the extra day hadn't happened she would  Be on track to get her RX filled on Friday.  She is asking if Dr Granado Will give a new RX stating she can Fill today or tomorrow? Please advise.     Megha Hillman, [place]

## 2018-09-13 ENCOUNTER — TELEPHONE (OUTPATIENT)
Dept: FAMILY MEDICINE | Facility: CLINIC | Age: 48
End: 2018-09-13

## 2018-09-13 NOTE — TELEPHONE ENCOUNTER
Pt is calling re: her NORCO med.  She has ran out today and would like it refilled today.  States it is off by one day because of there being 31 days in August.    Please call, she also called yesterday re. Same.      China Nielsen  [employer]

## 2018-09-28 DIAGNOSIS — I10 BENIGN ESSENTIAL HYPERTENSION: ICD-10-CM

## 2018-09-28 RX ORDER — LISINOPRIL AND HYDROCHLOROTHIAZIDE 12.5; 2 MG/1; MG/1
TABLET ORAL
Qty: 90 TABLET | Refills: 0 | Status: SHIPPED | OUTPATIENT
Start: 2018-09-28 | End: 2018-11-26

## 2018-09-28 NOTE — TELEPHONE ENCOUNTER
"Requested Prescriptions   Pending Prescriptions Disp Refills     lisinopril-hydrochlorothiazide (PRINZIDE/ZESTORETIC) 20-12.5 MG per tablet [Pharmacy Med Name: LISINOPRIL-HCTZ 20/12.5MG TABLETS] 90 tablet 0     Sig: TAKE 1 TABLET BY MOUTH DAILY    Diuretics (Including Combos) Protocol Passed    9/28/2018 12:23 PM       Passed - Blood pressure under 140/90 in past 12 months    BP Readings from Last 3 Encounters:   09/12/18 139/80   08/29/18 112/78   07/13/18 118/80                Passed - Recent (12 mo) or future (30 days) visit within the authorizing provider's specialty    Patient had office visit in the last 12 months or has a visit in the next 30 days with authorizing provider or within the authorizing provider's specialty.  See \"Patient Info\" tab in inbasket, or \"Choose Columns\" in Meds & Orders section of the refill encounter.           Passed - Patient is age 18 or older       Passed - No active pregancy on record       Passed - Normal serum creatinine on file in past 12 months    Recent Labs   Lab Test  12/20/17   0831   CR  0.82             Passed - Normal serum potassium on file in past 12 months    Recent Labs   Lab Test  12/20/17   0831   POTASSIUM  4.2                   Passed - Normal serum sodium on file in past 12 months    Recent Labs   Lab Test  12/20/17   0831   NA  135             Passed - No positive pregnancy test in past 12 months        Last Written Prescription Date:  07/13/18  Last Fill Quantity: 90,  # refills: 3   Last office visit: No previous visit found with prescribing provider:  07/13/18   Future Office Visit:      "

## 2018-11-03 DIAGNOSIS — F41.1 GENERALIZED ANXIETY DISORDER: ICD-10-CM

## 2018-11-03 DIAGNOSIS — G89.4 CHRONIC PAIN DISORDER: ICD-10-CM

## 2018-11-03 DIAGNOSIS — F33.1 MAJOR DEPRESSIVE DISORDER, RECURRENT EPISODE, MODERATE (H): ICD-10-CM

## 2018-11-03 NOTE — TELEPHONE ENCOUNTER
"Requested Prescriptions   Pending Prescriptions Disp Refills     venlafaxine (EFFEXOR-XR) 75 MG 24 hr capsule  Last Written Prescription Date:  8/20/18  Last Fill Quantity: 90,  # refills: 0   Last office visit: 7/13/2018 with prescribing provider:  MARRY Flores    90 capsule 0     Sig: TAKE 1 CAPSULE BY MOUTH DAILY    Serotonin-Norepinephrine Reuptake Inhibitors  Failed    11/3/2018  3:15 PM       Failed - PHQ-9 score of less than 5 in past 6 months    Please review last PHQ-9 score. SEE BELOW         Passed - Blood pressure under 140/90 in past 12 months    BP Readings from Last 3 Encounters:   09/12/18 139/80   08/29/18 112/78   07/13/18 118/80                Passed - Patient is age 18 or older       Passed - No active pregnancy on record       Passed - Normal serum creatinine on file in past 12 months    Recent Labs   Lab Test  12/20/17   0831   CR  0.82            Passed - No positive pregnancy test in past 12 months       Passed - Recent (6 mo) or future (30 days) visit within the authorizing provider's specialty    Patient had office visit in the last 6 months or has a visit in the next 30 days with authorizing provider or within the authorizing provider's specialty.  See \"Patient Info\" tab in inbasket, or \"Choose Columns\" in Meds & Orders section of the refill encounter.            venlafaxine (EFFEXOR-XR) 37.5 MG 24 hr capsule  Last Written Prescription Date:  7/13/18  Last Fill Quantity: 90,  # refills: 1   Last office visit: 7/13/2018 with prescribing provider:  MARRY Flores   Future Office Visit:   Next 5 appointments (look out 90 days)     Nov 05, 2018  8:20 AM CST   SHORT with Rosaura Flores PA-C   [facility]    [address] [place] [id number] [phone]                  30 capsule 0     Sig: TAKE 1 CAPSULE BY MOUTH EVERY DAY; START WHEN PROZAC AT 40MG    Serotonin-Norepinephrine Reuptake Inhibitors  Failed    11/3/2018  " "3:15 PM       Failed - PHQ-9 score of less than 5 in past 6 months    Please review last PHQ-9 score.   PHQ-9 SCORE 1/15/2018 2/9/2018 4/5/2018   Total Score - - -   Total Score MyChart 4 (Minimal depression) 0 0   Total Score 4 0 0     NANCI-7 SCORE 1/15/2018 2/9/2018 4/5/2018   Total Score - - -   Total Score 0 (minimal anxiety) 0 (minimal anxiety) 0 (minimal anxiety)   Total Score 0 0 0                Passed - Blood pressure under 140/90 in past 12 months    BP Readings from Last 3 Encounters:   09/12/18 139/80   08/29/18 112/78   07/13/18 118/80                Passed - Patient is age 18 or older       Passed - No active pregnancy on record       Passed - Normal serum creatinine on file in past 12 months    Recent Labs   Lab Test  12/20/17   0831   CR  0.82            Passed - No positive pregnancy test in past 12 months       Passed - Recent (6 mo) or future (30 days) visit within the authorizing provider's specialty    Patient had office visit in the last 6 months or has a visit in the next 30 days with authorizing provider or within the authorizing provider's specialty.  See \"Patient Info\" tab in inbasket, or \"Choose Columns\" in Meds & Orders section of the refill encounter.              "

## 2018-11-05 RX ORDER — VENLAFAXINE HYDROCHLORIDE 75 MG/1
CAPSULE, EXTENDED RELEASE ORAL
Qty: 90 CAPSULE | Refills: 0 | Status: SHIPPED | OUTPATIENT
Start: 2018-11-05 | End: 2018-12-31

## 2018-11-05 RX ORDER — VENLAFAXINE HYDROCHLORIDE 37.5 MG/1
CAPSULE, EXTENDED RELEASE ORAL
Qty: 30 CAPSULE | Refills: 0 | Status: SHIPPED | OUTPATIENT
Start: 2018-11-05 | End: 2018-11-29

## 2018-11-26 ENCOUNTER — OFFICE VISIT (OUTPATIENT)
Dept: FAMILY MEDICINE | Facility: CLINIC | Age: 48
End: 2018-11-26
Payer: COMMERCIAL

## 2018-11-26 VITALS
DIASTOLIC BLOOD PRESSURE: 56 MMHG | HEIGHT: 65 IN | HEART RATE: 85 BPM | RESPIRATION RATE: 16 BRPM | BODY MASS INDEX: 34.16 KG/M2 | WEIGHT: 205 LBS | SYSTOLIC BLOOD PRESSURE: 109 MMHG

## 2018-11-26 VITALS
BODY MASS INDEX: 34.16 KG/M2 | SYSTOLIC BLOOD PRESSURE: 109 MMHG | RESPIRATION RATE: 16 BRPM | HEART RATE: 85 BPM | DIASTOLIC BLOOD PRESSURE: 56 MMHG | WEIGHT: 205 LBS | TEMPERATURE: 97.7 F | HEIGHT: 65 IN

## 2018-11-26 DIAGNOSIS — G89.4 CHRONIC PAIN DISORDER: ICD-10-CM

## 2018-11-26 DIAGNOSIS — E66.9 OBESITY, UNSPECIFIED CLASSIFICATION, UNSPECIFIED OBESITY TYPE, UNSPECIFIED WHETHER SERIOUS COMORBIDITY PRESENT: ICD-10-CM

## 2018-11-26 DIAGNOSIS — J30.9 ALLERGIC RHINITIS, UNSPECIFIED SEASONALITY, UNSPECIFIED TRIGGER: ICD-10-CM

## 2018-11-26 DIAGNOSIS — R63.4 WEIGHT LOSS: ICD-10-CM

## 2018-11-26 DIAGNOSIS — Z72.0 TOBACCO ABUSE: ICD-10-CM

## 2018-11-26 DIAGNOSIS — J01.90 ACUTE SINUSITIS WITH SYMPTOMS > 10 DAYS: Primary | ICD-10-CM

## 2018-11-26 DIAGNOSIS — Z98.1 STATUS POST LAMINECTOMY WITH SPINAL FUSION: Primary | ICD-10-CM

## 2018-11-26 PROCEDURE — 99214 OFFICE O/P EST MOD 30 MIN: CPT | Performed by: PHYSICIAN ASSISTANT

## 2018-11-26 RX ORDER — GABAPENTIN 300 MG/1
300 CAPSULE ORAL AT BEDTIME
COMMUNITY
Start: 2018-11-26 | End: 2018-12-13

## 2018-11-26 RX ORDER — FLUTICASONE PROPIONATE 50 MCG
1 SPRAY, SUSPENSION (ML) NASAL 2 TIMES DAILY
Qty: 1 BOTTLE | Refills: 11 | Status: SHIPPED | OUTPATIENT
Start: 2018-11-26 | End: 2022-01-21

## 2018-11-26 RX ORDER — OXYCODONE AND ACETAMINOPHEN 10; 325 MG/1; MG/1
TABLET ORAL
Refills: 0 | COMMUNITY
Start: 2018-10-26 | End: 2018-11-26

## 2018-11-26 RX ORDER — OXYCODONE AND ACETAMINOPHEN 10; 325 MG/1; MG/1
TABLET ORAL
Qty: 60 TABLET | Refills: 0 | Status: SHIPPED | OUTPATIENT
Start: 2019-01-26 | End: 2019-01-18 | Stop reason: ALTCHOICE

## 2018-11-26 RX ORDER — OXYCODONE AND ACETAMINOPHEN 10; 325 MG/1; MG/1
TABLET ORAL
Qty: 60 TABLET | Refills: 0 | Status: SHIPPED | OUTPATIENT
Start: 2018-11-26 | End: 2018-11-26

## 2018-11-26 RX ORDER — GABAPENTIN 300 MG/1
300 CAPSULE ORAL 3 TIMES DAILY
COMMUNITY
Start: 2018-11-20 | End: 2018-11-26

## 2018-11-26 RX ORDER — OXYCODONE AND ACETAMINOPHEN 10; 325 MG/1; MG/1
TABLET ORAL
Qty: 60 TABLET | Refills: 0 | Status: SHIPPED | OUTPATIENT
Start: 2018-12-26 | End: 2018-11-26

## 2018-11-26 ASSESSMENT — ANXIETY QUESTIONNAIRES
3. WORRYING TOO MUCH ABOUT DIFFERENT THINGS: NOT AT ALL
GAD7 TOTAL SCORE: 0
7. FEELING AFRAID AS IF SOMETHING AWFUL MIGHT HAPPEN: NOT AT ALL
4. TROUBLE RELAXING: NOT AT ALL
1. FEELING NERVOUS, ANXIOUS, OR ON EDGE: NOT AT ALL
GAD7 TOTAL SCORE: 0
2. NOT BEING ABLE TO STOP OR CONTROL WORRYING: NOT AT ALL
GAD7 TOTAL SCORE: 0
5. BEING SO RESTLESS THAT IT IS HARD TO SIT STILL: NOT AT ALL
6. BECOMING EASILY ANNOYED OR IRRITABLE: NOT AT ALL
7. FEELING AFRAID AS IF SOMETHING AWFUL MIGHT HAPPEN: NOT AT ALL

## 2018-11-26 ASSESSMENT — PATIENT HEALTH QUESTIONNAIRE - PHQ9
SUM OF ALL RESPONSES TO PHQ QUESTIONS 1-9: 0
SUM OF ALL RESPONSES TO PHQ QUESTIONS 1-9: 0

## 2018-11-26 NOTE — NURSING NOTE
"No chief complaint on file.      Initial /56 (BP Location: Right arm, Patient Position: Chair, Cuff Size: Adult Large)  Pulse 85  Temp 97.7  F (36.5  C) (Tympanic)  Resp 16  Ht 5' 5\" (1.651 m)  Wt 205 lb (93 kg)  BMI 34.11 kg/m2 Estimated body mass index is 34.11 kg/(m^2) as calculated from the following:    Height as of this encounter: 5' 5\" (1.651 m).    Weight as of this encounter: 205 lb (93 kg).    Patient presents to the clinic using No DME    Health Maintenance that is potentially due pending provider review:  NONE        Is there anyone who you would like to be able to receive your results? No  If yes have patient fill out CICI        "

## 2018-11-26 NOTE — PATIENT INSTRUCTIONS
I'll take over the pain med prescribing again, per your preference.  Try to increase gabapentin to 2 caps (600 mg total) at night.  If makes too tired or daytime side effects, call and I'll do an intermediate dose first.  If tolerate, increase to 3 caps (900 mg total) at night.  Contact me with update for gabapentin prescription.    Recheck 3 months, sooner if needed.

## 2018-11-26 NOTE — MR AVS SNAPSHOT
After Visit Summary   11/26/2018    Cynthia Lyons    MRN: [id number]           Patient Information     Date Of Birth          1970        Visit Information        Provider Department      11/26/2018 1:20 PM Rosaura Flores PA-C [facility]        Today's Diagnoses     Acute sinusitis with symptoms > 10 days    -  1    Allergic rhinitis, unspecified seasonality, unspecified trigger        Tobacco abuse        Weight loss          Care Instructions    Treat for sinus infection     Finish quitting smoking...    Recheck as needed          Follow-ups after your visit        Who to contact     If you have questions or need follow up information about today's clinic visit or your schedule please contact [facility] directly at [phone].  Normal or non-critical lab and imaging results will be communicated to you by H-art (WPP)hart, letter or phone within 4 business days after the clinic has received the results. If you do not hear from us within 7 days, please contact the clinic through H-art (WPP)hart or phone. If you have a critical or abnormal lab result, we will notify you by phone as soon as possible.  Submit refill requests through Roost or call your pharmacy and they will forward the refill request to us. Please allow 3 business days for your refill to be completed.          Additional Information About Your Visit        MyChart Information     Roost gives you secure access to your electronic health record. If you see a primary care provider, you can also send messages to your care team and make appointments. If you have questions, please call your primary care clinic.  If you do not have a primary care provider, please call [phone] and they will assist you.        Care EveryWhere ID     This is your Care EveryWhere ID. This could be used by other organizations to access your [place] medical records  [id number]        Your Vitals Were     Pulse  "Respirations Height BMI (Body Mass Index)          85 16 5' 5\" (1.651 m) 34.11 kg/m2         Blood Pressure from Last 3 Encounters:   11/26/18 109/56   11/26/18 109/56   09/12/18 139/80    Weight from Last 3 Encounters:   11/26/18 205 lb (93 kg)   11/26/18 205 lb (93 kg)   08/29/18 213 lb (96.6 kg)              Today, you had the following     No orders found for display         Today's Medication Changes          These changes are accurate as of 11/26/18  1:49 PM.  If you have any questions, ask your nurse or doctor.               Start taking these medicines.        Dose/Directions    amoxicillin-clavulanate 875-125 MG per tablet   Commonly known as:  AUGMENTIN   Used for:  Acute sinusitis with symptoms > 10 days   Started by:  Rosaura Flores PA-C        Dose:  1 tablet   Take 1 tablet by mouth 2 times daily   Quantity:  20 tablet   Refills:  0         These medicines have changed or have updated prescriptions.        Dose/Directions    lisinopril-hydrochlorothiazide 20-12.5 MG per tablet   Commonly known as:  PRINZIDE/ZESTORETIC   This may have changed:  Another medication with the same name was removed. Continue taking this medication, and follow the directions you see here.   Used for:  Benign essential hypertension   Changed by:  Rosaura Flores PA-C        Dose:  1 tablet   Take 1 tablet by mouth daily   Quantity:  90 tablet   Refills:  3       * venlafaxine 75 MG 24 hr capsule   Commonly known as:  EFFEXOR-XR   This may have changed:  Another medication with the same name was removed. Continue taking this medication, and follow the directions you see here.   Used for:  Chronic pain disorder, Major depressive disorder, recurrent episode, moderate (H), Generalized anxiety disorder   Changed by:  Rosaura Flores PA-C        TAKE 1 CAPSULE BY MOUTH DAILY   Quantity:  90 capsule   Refills:  0       * venlafaxine 37.5 MG 24 hr capsule   Commonly known as:  EFFEXOR-XR   This may have changed:  " Another medication with the same name was removed. Continue taking this medication, and follow the directions you see here.   Used for:  Major depressive disorder, recurrent episode, moderate (H), Generalized anxiety disorder   Changed by:  Rosaura Flores PA-C        TAKE 1 CAPSULE BY MOUTH EVERY DAY; START WHEN PROZAC AT 40MG   Quantity:  30 capsule   Refills:  0       * Notice:  This list has 2 medication(s) that are the same as other medications prescribed for you. Read the directions carefully, and ask your doctor or other care provider to review them with you.      Stop taking these medicines if you haven't already. Please contact your care team if you have questions.     HYDROcodone-acetaminophen 5-325 MG tablet   Commonly known as:  NORCO   Stopped by:  Rosaura Flores PA-C                Where to get your medicines      These medications were sent to [id number] - [place], [place] - [address] [place] [address] [facility] [address] [place] [address]     Phone:  [phone]     amoxicillin-clavulanate 875-125 MG per tablet    fluticasone 50 MCG/ACT spray                Primary Care Provider Office Phone # Fax #    Rosaura Flores PA-C [phone] [address] [place] [address]        Equal Access to Services     AXEL ALEXANDER AH: Hadii azul ku hadasho Soomaali, waaxda luqadaha, qaybta kaalmada adeegyada, waxay idiin hayaan lilo graves . So [facility] [phone].    ATENCIÓN: Si habla español, tiene a sumner disposición servicios gratuitos de asistencia lingüística. Bety al [phone].    We comply with applicable federal civil rights laws and [place] laws. We do not discriminate on the basis of race, color, national origin, age, disability, sex, sexual orientation, or gender identity.            Thank you!     Thank you for choosing [facility]  for your care. Our goal is always to provide you with  excellent care. Hearing back from our patients is one way we can continue to improve our services. Please take a few minutes to complete the written survey that you may receive in the mail after your visit with us. Thank you!             Your Updated Medication List - Protect others around you: Learn how to safely use, store and throw away your medicines at www.disposemymeds.org.          This list is accurate as of 11/26/18  1:49 PM.  Always use your most recent med list.                   Brand Name Dispense Instructions for use Diagnosis    albuterol 108 (90 Base) MCG/ACT inhaler    PROAIR HFA/PROVENTIL HFA/VENTOLIN HFA     Inhale 2 puffs into the lungs        amoxicillin-clavulanate 875-125 MG per tablet    AUGMENTIN    20 tablet    Take 1 tablet by mouth 2 times daily    Acute sinusitis with symptoms > 10 days       atorvastatin 20 MG tablet    LIPITOR    90 tablet    TAKE 1 TABLET BY MOUTH DAILY.    Dyslipidemia       buPROPion 200 MG 12 hr tablet    WELLBUTRIN SR    90 tablet    TAKE 1 TABLET BY MOUTH DAILY    Panic anxiety syndrome, Major depressive disorder, recurrent episode, moderate (H), Generalized anxiety disorder       cetirizine 10 MG tablet    zyrTEC    180 tablet    TAKE 1 TABLET(10 MG) BY MOUTH TWICE DAILY    Other allergic rhinitis, Status post laminectomy with spinal fusion, Chronic pain disorder, Major depressive disorder, recurrent episode, moderate (H), Generalized anxiety disorder       fluticasone 50 MCG/ACT spray    FLONASE    1 Bottle    Spray 1 spray in nostril 2 times daily    Allergic rhinitis, unspecified seasonality, unspecified trigger       gabapentin 300 MG capsule    NEURONTIN     300 mg 3 times daily        lisinopril-hydrochlorothiazide 20-12.5 MG per tablet    PRINZIDE/ZESTORETIC    90 tablet    Take 1 tablet by mouth daily    Benign essential hypertension       methocarbamol 750 MG tablet    ROBAXIN    120 tablet    TAKE 1 TO 2 TABLETS BY MOUTH UP TO THREE TIMES DAILY(4TH  TIME PER DAY ON OCCASION IS OK)    Status post laminectomy with spinal fusion       oxyCODONE-acetaminophen  MG per tablet    PERCOCET     TK 1 T PO BID AS NEEDED.        TYLENOL PO      Take 1,000 mg by mouth every 8 hours as needed for mild pain or fever        valACYclovir 500 MG tablet    VALTREX    90 tablet    TAKE 1 TABLET(500 MG) BY MOUTH DAILY    Herpes simplex infection of genitourinary system, Status post laminectomy with spinal fusion, Chronic pain disorder, Major depressive disorder, recurrent episode, moderate (H), Generalized anxiety disorder, Other allergic rhinitis       * venlafaxine 75 MG 24 hr capsule    EFFEXOR-XR    90 capsule    TAKE 1 CAPSULE BY MOUTH DAILY    Chronic pain disorder, Major depressive disorder, recurrent episode, moderate (H), Generalized anxiety disorder       * venlafaxine 37.5 MG 24 hr capsule    EFFEXOR-XR    30 capsule    TAKE 1 CAPSULE BY MOUTH EVERY DAY; START WHEN PROZAC AT 40MG    Major depressive disorder, recurrent episode, moderate (H), Generalized anxiety disorder       * Notice:  This list has 2 medication(s) that are the same as other medications prescribed for you. Read the directions carefully, and ask your doctor or other care provider to review them with you.
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
## 2018-11-26 NOTE — NURSING NOTE
Prescription for Oxycodone/Acetaminophen dated 11/26/18 was destroyed as patient didn't need.  Lorena CHI MA

## 2018-11-26 NOTE — MR AVS SNAPSHOT
After Visit Summary   11/26/2018    Cynthia Lyons    MRN: [id number]           Patient Information     Date Of Birth          1970        Visit Information        Provider Department      11/26/2018 1:00 PM Rosaura Flores PA-C [facility]        Today's Diagnoses     Status post laminectomy with spinal fusion    -  1    Chronic pain disorder          Care Instructions    I'll take over the pain med prescribing again, per your preference.  Try to increase gabapentin to 2 caps (600 mg total) at night.  If makes too tired or daytime side effects, call and I'll do an intermediate dose first.  If tolerate, increase to 3 caps (900 mg total) at night.  Contact me with update for gabapentin prescription.    Recheck 3 months, sooner if needed.          Follow-ups after your visit        Follow-up notes from your care team     Return in about 3 months (around 2/26/2019) for pain.      Your next 10 appointments already scheduled     Feb 25, 2019  8:00 AM CST   SHORT with Rosaura Flores PA-C   [facility] ([facility])    [address] [place] [id number] [phone]              Who to contact     If you have questions or need follow up information about today's clinic visit or your schedule please contact [facility] directly at [phone].  Normal or non-critical lab and imaging results will be communicated to you by MyChart, letter or phone within 4 business days after the clinic has received the results. If you do not hear from us within 7 days, please contact the clinic through MyChart or phone. If you have a critical or abnormal lab result, we will notify you by phone as soon as possible.  Submit refill requests through Fingo or call your pharmacy and they will forward the refill request to us. Please allow 3 business days for your refill to be completed.          Additional Information About  "Your Visit        Booster[employer] Information     [facility] gives you secure access to your electronic health record. If you see a primary care provider, you can also send messages to your care team and make appointments. If you have questions, please call your primary care clinic.  If you do not have a primary care provider, please call [phone] and they will assist you.        Care EveryWhere ID     This is your Care EveryWhere ID. This could be used by other organizations to access your [facility] medical records  [id number]        Your Vitals Were     Pulse Temperature Respirations Height BMI (Body Mass Index)       85 97.7  F (36.5  C) (Tympanic) 16 5' 5\" (1.651 m) 34.11 kg/m2        Blood Pressure from Last 3 Encounters:   11/26/18 109/56   11/26/18 109/56   09/12/18 139/80    Weight from Last 3 Encounters:   11/26/18 205 lb (93 kg)   11/26/18 205 lb (93 kg)   08/29/18 213 lb (96.6 kg)              Today, you had the following     No orders found for display         Today's Medication Changes          These changes are accurate as of 11/26/18 11:59 PM.  If you have any questions, ask your nurse or doctor.               Start taking these medicines.        Dose/Directions    amoxicillin-clavulanate 875-125 MG tablet   Commonly known as:  AUGMENTIN   Used for:  Acute sinusitis with symptoms > 10 days   Started by:  Rosaura Flores PA-C        Dose:  1 tablet   Take 1 tablet by mouth 2 times daily   Quantity:  20 tablet   Refills:  0       oxyCODONE-acetaminophen  MG per tablet   Commonly known as:  PERCOCET   Used for:  Status post laminectomy with spinal fusion, Chronic pain disorder   Started by:  Rosaura Flores PA-C        Start taking on:  1/26/2019   1 tab twice daily as needed.   Quantity:  60 tablet   Refills:  0         These medicines have changed or have updated prescriptions.        Dose/Directions    gabapentin 300 MG capsule   Commonly known as:  NEURONTIN   This may have changed:  "   - how to take this  - when to take this   Changed by:  Rosaura Flores PA-C        Dose:  300 mg   Take 1 capsule (300 mg) by mouth At Bedtime   Refills:  0       lisinopril-hydrochlorothiazide 20-12.5 MG per tablet   Commonly known as:  PRINZIDE/ZESTORETIC   This may have changed:  Another medication with the same name was removed. Continue taking this medication, and follow the directions you see here.   Used for:  Benign essential hypertension   Changed by:  Rosaura Flores PA-C        Dose:  1 tablet   Take 1 tablet by mouth daily   Quantity:  90 tablet   Refills:  3       * venlafaxine 75 MG 24 hr capsule   Commonly known as:  EFFEXOR-XR   This may have changed:  Another medication with the same name was removed. Continue taking this medication, and follow the directions you see here.   Used for:  Chronic pain disorder, Major depressive disorder, recurrent episode, moderate (H), Generalized anxiety disorder   Changed by:  Rosaura Flores PA-C        TAKE 1 CAPSULE BY MOUTH DAILY   Quantity:  90 capsule   Refills:  0       * venlafaxine 37.5 MG 24 hr capsule   Commonly known as:  EFFEXOR-XR   This may have changed:  Another medication with the same name was removed. Continue taking this medication, and follow the directions you see here.   Used for:  Major depressive disorder, recurrent episode, moderate (H), Generalized anxiety disorder   Changed by:  Rosaura Flores PA-C        TAKE 1 CAPSULE BY MOUTH EVERY DAY; START WHEN PROZAC AT 40MG   Quantity:  30 capsule   Refills:  0       * Notice:  This list has 2 medication(s) that are the same as other medications prescribed for you. Read the directions carefully, and ask your doctor or other care provider to review them with you.      Stop taking these medicines if you haven't already. Please contact your care team if you have questions.     HYDROcodone-acetaminophen 5-325 MG tablet   Commonly known as:  NORCO   Stopped by:   Rosaura Flores PA-C                Where to get your medicines      These medications were sent to [id number] - [place], [place] - [address]  [address], [place] [address]     Phone:  [phone]     amoxicillin-clavulanate 875-125 MG tablet    fluticasone 50 MCG/ACT nasal spray         Some of these will need a paper prescription and others can be bought over the counter.  Ask your nurse if you have questions.     Bring a paper prescription for each of these medications     oxyCODONE-acetaminophen  MG per tablet               Information about OPIOIDS     PRESCRIPTION OPIOIDS: WHAT YOU NEED TO KNOW   We gave you an opioid (narcotic) pain medicine. It is important to manage your pain, but opioids are not always the best choice. You should first try all the other options your care team gave you. Take this medicine for as short a time (and as few doses) as possible.    Some activities can increase your pain, such as bandage changes or therapy sessions. It may help to take your pain medicine 30 to 60 minutes before these activities. Reduce your stress by getting enough sleep, working on hobbies you enjoy and practicing relaxation or meditation. Talk to your care team about ways to manage your pain beyond prescription opioids.    These medicines have risks:    DO NOT drive when on new or higher doses of pain medicine. These medicines can affect your alertness and reaction times, and you could be arrested for driving under the influence (DUI). If you need to use opioids long-term, talk to your care team about driving.    DO NOT operate heavy machinery    DO NOT do any other dangerous activities while taking these medicines.    DO NOT drink any alcohol while taking these medicines.     If the opioid prescribed includes acetaminophen, DO NOT take with any other medicines that contain acetaminophen. Read all labels carefully. Look for the  word  acetaminophen  or  Tylenol.  Ask your pharmacist if you have questions or are unsure.    You can get addicted to pain medicines, especially if you have a history of addiction (chemical, alcohol or substance dependence). Talk to your care team about ways to reduce this risk.    All opioids tend to cause constipation. Drink plenty of water and eat foods that have a lot of fiber, such as fruits, vegetables, prune juice, apple juice and high-fiber cereal. Take a laxative (Miralax, milk of magnesia, Colace, Senna) if you don t move your bowels at least every other day. Other side effects include upset stomach, sleepiness, dizziness, throwing up, tolerance (needing more of the medicine to have the same effect), physical dependence and slowed breathing.    Store your pills in a secure place, locked if possible. We will not replace any lost or stolen medicine. If you don t finish your medicine, please throw away (dispose) as directed by your pharmacist. The [place] [employer]ution [employer] has more information about safe disposal: [email or web address]         Primary Care Provider Office Phone # Fax #    Rosaura Flores PA-C [phone] [address] [place], [place] [address]        Equal Access to Services     AXEL ALEXANDER : Libia kwongo Sobenjaali, waaxda luqadaha, qaybta kaalmada adeegyada, jean maloney. So [facility] [phone].    ATENCIÓN: Si habla español, tiene a sumner disposición servicios gratuitos de asistencia lingüística. Bety al [phone].    We comply with applicable federal civil rights laws and [place] laws. We do not discriminate on the basis of race, color, national origin, age, disability, sex, sexual orientation, or gender identity.            Thank you!     Thank you for choosing [facility]  for your care. Our goal is always to provide you with excellent care. Hearing  back from our patients is one way we can continue to improve our services. Please take a few minutes to complete the written survey that you may receive in the mail after your visit with us. Thank you!             Your Updated Medication List - Protect others around you: Learn how to safely use, store and throw away your medicines at www.disposemymeds.org.          This list is accurate as of 11/26/18 11:59 PM.  Always use your most recent med list.                   Brand Name Dispense Instructions for use Diagnosis    albuterol 108 (90 Base) MCG/ACT inhaler    PROAIR HFA/PROVENTIL HFA/VENTOLIN HFA     Inhale 2 puffs into the lungs        amoxicillin-clavulanate 875-125 MG tablet    AUGMENTIN    20 tablet    Take 1 tablet by mouth 2 times daily    Acute sinusitis with symptoms > 10 days       atorvastatin 20 MG tablet    LIPITOR    90 tablet    TAKE 1 TABLET BY MOUTH DAILY.    Dyslipidemia       buPROPion 200 MG 12 hr tablet    WELLBUTRIN SR    90 tablet    TAKE 1 TABLET BY MOUTH DAILY    Panic anxiety syndrome, Major depressive disorder, recurrent episode, moderate (H), Generalized anxiety disorder       cetirizine 10 MG tablet    zyrTEC    180 tablet    TAKE 1 TABLET(10 MG) BY MOUTH TWICE DAILY    Other allergic rhinitis, Status post laminectomy with spinal fusion, Chronic pain disorder, Major depressive disorder, recurrent episode, moderate (H), Generalized anxiety disorder       fluticasone 50 MCG/ACT nasal spray    FLONASE    1 Bottle    Spray 1 spray in nostril 2 times daily    Allergic rhinitis, unspecified seasonality, unspecified trigger       gabapentin 300 MG capsule    NEURONTIN     Take 1 capsule (300 mg) by mouth At Bedtime        lisinopril-hydrochlorothiazide 20-12.5 MG per tablet    PRINZIDE/ZESTORETIC    90 tablet    Take 1 tablet by mouth daily    Benign essential hypertension       methocarbamol 750 MG tablet    ROBAXIN    120 tablet    TAKE 1 TO 2 TABLETS BY MOUTH UP TO THREE TIMES DAILY(4TH  TIME PER DAY ON OCCASION IS OK)    Status post laminectomy with spinal fusion       oxyCODONE-acetaminophen  MG per tablet   Start taking on:  1/26/2019    PERCOCET    60 tablet    1 tab twice daily as needed.    Status post laminectomy with spinal fusion, Chronic pain disorder       TYLENOL PO      Take 1,000 mg by mouth every 8 hours as needed for mild pain or fever        valACYclovir 500 MG tablet    VALTREX    90 tablet    TAKE 1 TABLET(500 MG) BY MOUTH DAILY    Herpes simplex infection of genitourinary system, Status post laminectomy with spinal fusion, Chronic pain disorder, Major depressive disorder, recurrent episode, moderate (H), Generalized anxiety disorder, Other allergic rhinitis       * venlafaxine 75 MG 24 hr capsule    EFFEXOR-XR    90 capsule    TAKE 1 CAPSULE BY MOUTH DAILY    Chronic pain disorder, Major depressive disorder, recurrent episode, moderate (H), Generalized anxiety disorder       * venlafaxine 37.5 MG 24 hr capsule    EFFEXOR-XR    30 capsule    TAKE 1 CAPSULE BY MOUTH EVERY DAY; START WHEN PROZAC AT 40MG    Major depressive disorder, recurrent episode, moderate (H), Generalized anxiety disorder       * Notice:  This list has 2 medication(s) that are the same as other medications prescribed for you. Read the directions carefully, and ask your doctor or other care provider to review them with you.
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
## 2018-11-27 ASSESSMENT — ANXIETY QUESTIONNAIRES: GAD7 TOTAL SCORE: 0

## 2018-11-27 ASSESSMENT — PATIENT HEALTH QUESTIONNAIRE - PHQ9: SUM OF ALL RESPONSES TO PHQ QUESTIONS 1-9: 0

## 2018-12-12 ENCOUNTER — MYC MEDICAL ADVICE (OUTPATIENT)
Dept: FAMILY MEDICINE | Facility: CLINIC | Age: 48
End: 2018-12-12

## 2018-12-12 DIAGNOSIS — Z98.1 STATUS POST LAMINECTOMY WITH SPINAL FUSION: Primary | ICD-10-CM

## 2018-12-13 RX ORDER — GABAPENTIN 300 MG/1
CAPSULE ORAL
Qty: 240 CAPSULE | Refills: 5 | Status: SHIPPED | OUTPATIENT
Start: 2018-12-13 | End: 2019-03-11

## 2018-12-28 ENCOUNTER — TELEPHONE (OUTPATIENT)
Dept: FAMILY MEDICINE | Facility: CLINIC | Age: 48
End: 2018-12-28

## 2018-12-28 NOTE — TELEPHONE ENCOUNTER
Rosaura a person called that wants to be anonymous. She does not want her friendship to be ended because Cynthia found out she called.  Patient has a job selling pull tabs at a bar and has found people to buy percocet from at the bar.  She is taking what you prescribed and a lot more that she is buying.  She thinks she is getting away with it because she is buying them and not getting another Rx from a different Doctor.  Her personality is off the charts and gets angry easily with the high dose she is taking.  The person calling is afraid what will happen to her if she keeps this up.

## 2018-12-31 DIAGNOSIS — G89.4 CHRONIC PAIN DISORDER: ICD-10-CM

## 2018-12-31 DIAGNOSIS — A60.00 HERPES SIMPLEX INFECTION OF GENITOURINARY SYSTEM: ICD-10-CM

## 2018-12-31 DIAGNOSIS — Z98.1 STATUS POST LAMINECTOMY WITH SPINAL FUSION: ICD-10-CM

## 2018-12-31 DIAGNOSIS — F41.1 GENERALIZED ANXIETY DISORDER: ICD-10-CM

## 2018-12-31 DIAGNOSIS — J30.89 OTHER ALLERGIC RHINITIS: ICD-10-CM

## 2018-12-31 DIAGNOSIS — F33.1 MAJOR DEPRESSIVE DISORDER, RECURRENT EPISODE, MODERATE (H): ICD-10-CM

## 2018-12-31 RX ORDER — VENLAFAXINE HYDROCHLORIDE 75 MG/1
CAPSULE, EXTENDED RELEASE ORAL
Qty: 90 CAPSULE | Refills: 0 | Status: SHIPPED | OUTPATIENT
Start: 2018-12-31 | End: 2019-01-18 | Stop reason: ALTCHOICE

## 2018-12-31 RX ORDER — VALACYCLOVIR HYDROCHLORIDE 500 MG/1
TABLET, FILM COATED ORAL
Qty: 30 TABLET | Refills: 0 | Status: SHIPPED | OUTPATIENT
Start: 2018-12-31 | End: 2019-03-03

## 2018-12-31 NOTE — TELEPHONE ENCOUNTER
"Requested Prescriptions   Pending Prescriptions Disp Refills     valACYclovir (VALTREX) 500 MG tablet [Pharmacy Med Name: VALACYCLOVIR 500MG TABLETS] 90 tablet 0     Sig: TAKE 1 TABLET(500 MG) BY MOUTH DAILY    Antivirals for Herpes Protocol Failed - 12/31/2018 10:31 AM       Failed - Normal serum creatinine on file in past 12 months    Recent Labs   Lab Test 12/20/17  0831   CR 0.82            Passed - Patient is age 12 or older       Passed - Recent (12 mo) or future (30 days) visit within the authorizing provider's specialty    Patient had office visit in the last 12 months or has a visit in the next 30 days with authorizing provider or within the authorizing provider's specialty.  See \"Patient Info\" tab in inbasket, or \"Choose Columns\" in Meds & Orders section of the refill encounter.      Last Written Prescription Date:  7/13/18  Last Fill Quantity: 90,  # refills: 3   Last office visit: 11/26/2018 with prescribing provider:     Future Office Visit:   Next 5 appointments (look out 90 days)    Jan 07, 2019  1:00 PM CST  [facility] [address] [place] [id number]  [phone]   Feb 25, 2019  8:00 AM CST  [facility] [address] [place] [phone]  [phone]                        "

## 2018-12-31 NOTE — TELEPHONE ENCOUNTER
"Requested Prescriptions   Pending Prescriptions Disp Refills     venlafaxine (EFFEXOR-XR) 75 MG 24 hr capsule [Pharmacy Med Name: VENLAFAXINE ER 75MG CAPSULES] 90 capsule 0     Sig: TAKE 1 CAPSULE BY MOUTH DAILY    Serotonin-Norepinephrine Reuptake Inhibitors  Failed - 12/31/2018 10:45 AM       Failed - Normal serum creatinine on file in past 12 months    Recent Labs   Lab Test 12/20/17  0831   CR 0.82            Passed - Blood pressure under 140/90 in past 12 months    BP Readings from Last 3 Encounters:   11/26/18 109/56   11/26/18 109/56   09/12/18 139/80                Passed - PHQ-9 score of less than 5 in past 6 months    Please review last PHQ-9 score.          Passed - Patient is age 18 or older       Passed - No active pregnancy on record       Passed - No positive pregnancy test in past 12 months       Passed - Recent (6 mo) or future (30 days) visit within the authorizing provider's specialty    Patient had office visit in the last 6 months or has a visit in the next 30 days with authorizing provider or within the authorizing provider's specialty.  See \"Patient Info\" tab in inbasket, or \"Choose Columns\" in Meds & Orders section of the refill encounter.      Last Written Prescription Date:  11/5/18  Last Fill Quantity: 90,  # refills: 0   Last office visit: 11/26/2018 with prescribing provider:     Future Office Visit:   Next 5 appointments (look out 90 days)    Jan 07, 2019  1:00 PM CST  [facility] [address] [place] [phone]  [phone]   Feb 25, 2019  8:00 AM CST  SHORT [facility] Flores [facility]-C  [facility] [address] [place] [phone]  [phone]                     "

## 2019-01-02 NOTE — TELEPHONE ENCOUNTER
I talked with release of information about this message.  I will call her and talk to her  Mariaa Sierra RN

## 2019-01-04 DIAGNOSIS — I10 BENIGN ESSENTIAL HYPERTENSION: ICD-10-CM

## 2019-01-04 NOTE — TELEPHONE ENCOUNTER
"Requested Prescriptions   Pending Prescriptions Disp Refills     lisinopril-hydrochlorothiazide (PRINZIDE/ZESTORETIC) 20-12.5 MG tablet [Pharmacy Med Name: LISINOPRIL-HCTZ 20/12.5MG TABLETS] 90 tablet 0     Sig: TAKE 1 TABLET BY MOUTH DAILY    Diuretics (Including Combos) Protocol Failed - 1/4/2019  3:47 PM       Failed - Normal serum creatinine on file in past 12 months    Recent Labs   Lab Test 12/20/17  0831   CR 0.82             Failed - Normal serum potassium on file in past 12 months    Recent Labs   Lab Test 12/20/17  0831   POTASSIUM 4.2                   Failed - Normal serum sodium on file in past 12 months    Recent Labs   Lab Test 12/20/17  0831                Passed - Blood pressure under 140/90 in past 12 months    BP Readings from Last 3 Encounters:   11/26/18 109/56   11/26/18 109/56   09/12/18 139/80                Passed - Recent (12 mo) or future (30 days) visit within the authorizing provider's specialty    Patient had office visit in the last 12 months or has a visit in the next 30 days with authorizing provider or within the authorizing provider's specialty.  See \"Patient Info\" tab in inbasket, or \"Choose Columns\" in Meds & Orders section of the refill encounter.             Passed - Medication is active on med list       Passed - Patient is age 18 or older       Passed - No active pregancy on record       Passed - No positive pregnancy test in past 12 months      Last Written Prescription Date:  7/13/2018  Last Fill Quantity: 90,  # refills: 3   Last office visit: 11/26/2018 with prescribing provider:  Rosaura RAO   Future Office Visit:   Next 5 appointments (look out 90 days)    Jan 07, 2019  1:00 PM CST  SHORT with Rosaura Flores PA-C  [facility] ([facility] [address] [place] [phone] [phone]   Feb 25, 2019  8:00 AM CST  SHORT with Rosaura Flores PA-C  [facility] ([facility]" [facility]) [address] [place] [phone]

## 2019-01-07 RX ORDER — LISINOPRIL AND HYDROCHLOROTHIAZIDE 12.5; 2 MG/1; MG/1
TABLET ORAL
Qty: 90 TABLET | Refills: 0 | Status: SHIPPED | OUTPATIENT
Start: 2019-01-07 | End: 2019-04-21

## 2019-01-18 ENCOUNTER — OFFICE VISIT (OUTPATIENT)
Dept: FAMILY MEDICINE | Facility: CLINIC | Age: 49
End: 2019-01-18
Payer: COMMERCIAL

## 2019-01-18 VITALS
HEART RATE: 95 BPM | TEMPERATURE: 97.3 F | RESPIRATION RATE: 16 BRPM | HEIGHT: 65 IN | DIASTOLIC BLOOD PRESSURE: 67 MMHG | BODY MASS INDEX: 34.49 KG/M2 | SYSTOLIC BLOOD PRESSURE: 113 MMHG | WEIGHT: 207 LBS

## 2019-01-18 DIAGNOSIS — G89.4 CHRONIC PAIN DISORDER: ICD-10-CM

## 2019-01-18 DIAGNOSIS — Z98.1 STATUS POST LAMINECTOMY WITH SPINAL FUSION: ICD-10-CM

## 2019-01-18 DIAGNOSIS — I10 BENIGN ESSENTIAL HYPERTENSION: ICD-10-CM

## 2019-01-18 DIAGNOSIS — L01.00 IMPETIGO: Primary | ICD-10-CM

## 2019-01-18 DIAGNOSIS — F32.1 MODERATE MAJOR DEPRESSION (H): ICD-10-CM

## 2019-01-18 DIAGNOSIS — E78.5 DYSLIPIDEMIA: ICD-10-CM

## 2019-01-18 LAB
ANION GAP SERPL CALCULATED.3IONS-SCNC: 2 MMOL/L (ref 3–14)
BUN SERPL-MCNC: 10 MG/DL (ref 7–30)
CALCIUM SERPL-MCNC: 9 MG/DL (ref 8.5–10.1)
CHLORIDE SERPL-SCNC: 104 MMOL/L (ref 94–109)
CHOLEST SERPL-MCNC: 171 MG/DL
CO2 SERPL-SCNC: 31 MMOL/L (ref 20–32)
CREAT SERPL-MCNC: 0.84 MG/DL (ref 0.52–1.04)
GFR SERPL CREATININE-BSD FRML MDRD: 83 ML/MIN/{1.73_M2}
GLUCOSE SERPL-MCNC: 110 MG/DL (ref 70–99)
HDLC SERPL-MCNC: 52 MG/DL
LDLC SERPL CALC-MCNC: 106 MG/DL
NONHDLC SERPL-MCNC: 119 MG/DL
POTASSIUM SERPL-SCNC: 4.3 MMOL/L (ref 3.4–5.3)
SODIUM SERPL-SCNC: 137 MMOL/L (ref 133–144)
TRIGL SERPL-MCNC: 66 MG/DL

## 2019-01-18 PROCEDURE — 80307 DRUG TEST PRSMV CHEM ANLYZR: CPT | Mod: 90 | Performed by: PHYSICIAN ASSISTANT

## 2019-01-18 PROCEDURE — 80048 BASIC METABOLIC PNL TOTAL CA: CPT | Performed by: PHYSICIAN ASSISTANT

## 2019-01-18 PROCEDURE — 36415 COLL VENOUS BLD VENIPUNCTURE: CPT | Performed by: PHYSICIAN ASSISTANT

## 2019-01-18 PROCEDURE — 99000 SPECIMEN HANDLING OFFICE-LAB: CPT | Performed by: PHYSICIAN ASSISTANT

## 2019-01-18 PROCEDURE — 80061 LIPID PANEL: CPT | Performed by: PHYSICIAN ASSISTANT

## 2019-01-18 PROCEDURE — 99214 OFFICE O/P EST MOD 30 MIN: CPT | Performed by: PHYSICIAN ASSISTANT

## 2019-01-18 RX ORDER — MUPIROCIN CALCIUM 20 MG/G
CREAM TOPICAL 3 TIMES DAILY
Qty: 22 G | Refills: 0 | Status: SHIPPED | OUTPATIENT
Start: 2019-01-18 | End: 2019-03-11

## 2019-01-18 RX ORDER — OXYCODONE AND ACETAMINOPHEN 7.5; 325 MG/1; MG/1
1 TABLET ORAL 3 TIMES DAILY PRN
Qty: 42 TABLET | Refills: 0 | Status: SHIPPED | OUTPATIENT
Start: 2019-01-26 | End: 2019-01-30

## 2019-01-18 RX ORDER — DULOXETIN HYDROCHLORIDE 30 MG/1
30 CAPSULE, DELAYED RELEASE ORAL DAILY
Qty: 30 CAPSULE | Refills: 1 | Status: SHIPPED | OUTPATIENT
Start: 2019-01-18 | End: 2019-02-01

## 2019-01-18 ASSESSMENT — ANXIETY QUESTIONNAIRES
7. FEELING AFRAID AS IF SOMETHING AWFUL MIGHT HAPPEN: NOT AT ALL
1. FEELING NERVOUS, ANXIOUS, OR ON EDGE: NOT AT ALL
3. WORRYING TOO MUCH ABOUT DIFFERENT THINGS: NOT AT ALL
7. FEELING AFRAID AS IF SOMETHING AWFUL MIGHT HAPPEN: NOT AT ALL
6. BECOMING EASILY ANNOYED OR IRRITABLE: NOT AT ALL
4. TROUBLE RELAXING: NOT AT ALL
GAD7 TOTAL SCORE: 0
GAD7 TOTAL SCORE: 0
5. BEING SO RESTLESS THAT IT IS HARD TO SIT STILL: NOT AT ALL
GAD7 TOTAL SCORE: 0
2. NOT BEING ABLE TO STOP OR CONTROL WORRYING: NOT AT ALL

## 2019-01-18 ASSESSMENT — PATIENT HEALTH QUESTIONNAIRE - PHQ9
SUM OF ALL RESPONSES TO PHQ QUESTIONS 1-9: 0
10. IF YOU CHECKED OFF ANY PROBLEMS, HOW DIFFICULT HAVE THESE PROBLEMS MADE IT FOR YOU TO DO YOUR WORK, TAKE CARE OF THINGS AT HOME, OR GET ALONG WITH OTHER PEOPLE: NOT DIFFICULT AT ALL
SUM OF ALL RESPONSES TO PHQ QUESTIONS 1-9: 0

## 2019-01-18 ASSESSMENT — MIFFLIN-ST. JEOR: SCORE: 1569.83

## 2019-01-18 NOTE — NURSING NOTE
"Chief Complaint   Patient presents with     Derm Problem     Recheck Medication       Initial /67 (BP Location: Right arm, Patient Position: Chair, Cuff Size: Adult Large)   Pulse 95   Temp 97.3  F (36.3  C) (Tympanic)   Resp 16   Ht 1.651 m (5' 5\")   Wt 93.9 kg (207 lb)   BMI 34.45 kg/m   Estimated body mass index is 34.45 kg/m  as calculated from the following:    Height as of this encounter: 1.651 m (5' 5\").    Weight as of this encounter: 93.9 kg (207 lb).    Patient presents to the clinic using No DME    Health Maintenance that is potentially due pending provider review:  NONE        Is there anyone who you would like to be able to receive your results? No  If yes have patient fill out CICI      "
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
## 2019-01-18 NOTE — PATIENT INSTRUCTIONS
Labs today     Trying to change some meds to help pain -  Stop effexor, try cymbalta  Switched from 10 mg percocet twice daily as needed to 7.5 mg percocet three times daily as needed  Update me via My Chart on whether these helped pains  Will get the new refill date off the vacation time  Decide which pain clinic you want after talking to your spine clinic     Recheck 2 months

## 2019-01-19 ASSESSMENT — ANXIETY QUESTIONNAIRES: GAD7 TOTAL SCORE: 0

## 2019-01-19 ASSESSMENT — PATIENT HEALTH QUESTIONNAIRE - PHQ9: SUM OF ALL RESPONSES TO PHQ QUESTIONS 1-9: 0

## 2019-01-24 ENCOUNTER — MYC MEDICAL ADVICE (OUTPATIENT)
Dept: FAMILY MEDICINE | Facility: CLINIC | Age: 49
End: 2019-01-24
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
## 2019-01-27 ENCOUNTER — MYC MEDICAL ADVICE (OUTPATIENT)
Dept: FAMILY MEDICINE | Facility: CLINIC | Age: 49
End: 2019-01-27

## 2019-01-27 LAB — PAIN DRUG SCR UR W RPTD MEDS: NORMAL

## 2019-01-28 ENCOUNTER — TELEPHONE (OUTPATIENT)
Dept: FAMILY MEDICINE | Facility: CLINIC | Age: 49
End: 2019-01-28

## 2019-01-28 NOTE — TELEPHONE ENCOUNTER
PA needed for over a 7 day supply--per [employer] ID [id number].  [phone].  Please provide statement explaining plan limits being exceeded if you would like to obtain a prior auth for greater supply.   Pt only got #21 tabs for a 7 days supply at the pharmacy.

## 2019-01-28 NOTE — TELEPHONE ENCOUNTER
Patient has been on narcotics for chronic pain for many years, dating back to at least 2007.  She also had pain clinic consult this Sept, with pain managed by pain clinic in my maternity leave absence.  Pain clinic was in agreement with long term narcotics.    Routing to Margaret Carreno and PA team.

## 2019-01-29 NOTE — TELEPHONE ENCOUNTER
Central Prior Authorization Team   Phone: [phone]      PA Initiation    Medication: percocet-Initiated  Insurance Company: J[employer] - Phone [phone] Fax [phone]  Pharmacy Filling the Rx: [id number] - [place], [place] - [address] [place] [address] E AT [facility] [address] & [facility]  Filling Pharmacy Phone: [phone]  Filling Pharmacy Fax:    Start Date: 1/29/2019

## 2019-01-29 NOTE — TELEPHONE ENCOUNTER
Prior Authorization Approval    Authorization Effective Date: 1/29/2019  Authorization Expiration Date: 7/28/2019  Medication: percocet-APPROVED  Approved Dose/Quantity:   Reference #:     Insurance Company: Jm - Phone [phone] Fax [phone]  Expected CoPay:       CoPay Card Available:      Foundation Assistance Needed:    Which Pharmacy is filling the prescription (Not needed for infusion/clinic administered): [id number] - [place], [place] - [address] AT [place] [address] & [facility]  Pharmacy Notified: Yes  Patient Notified: No    Pharmacy will need a new prescription so the patient can get a months supply since she got the 7-day supply from the previous prescription.  Pharmacy will notify patient when medication is ready.

## 2019-01-30 ENCOUNTER — MYC MEDICAL ADVICE (OUTPATIENT)
Dept: FAMILY MEDICINE | Facility: CLINIC | Age: 49
End: 2019-01-30

## 2019-01-30 DIAGNOSIS — Z98.1 STATUS POST LAMINECTOMY WITH SPINAL FUSION: ICD-10-CM

## 2019-01-30 DIAGNOSIS — F32.1 MODERATE MAJOR DEPRESSION (H): ICD-10-CM

## 2019-01-30 DIAGNOSIS — G89.4 CHRONIC PAIN DISORDER: ICD-10-CM

## 2019-01-30 RX ORDER — OXYCODONE AND ACETAMINOPHEN 7.5; 325 MG/1; MG/1
1 TABLET ORAL 3 TIMES DAILY PRN
Qty: 21 TABLET | Refills: 0 | Status: SHIPPED | OUTPATIENT
Start: 2019-02-01 | End: 2019-05-10

## 2019-01-30 RX ORDER — OXYCODONE AND ACETAMINOPHEN 7.5; 325 MG/1; MG/1
1 TABLET ORAL 3 TIMES DAILY PRN
Qty: 90 TABLET | Refills: 0 | Status: SHIPPED | OUTPATIENT
Start: 2019-02-08 | End: 2019-03-11

## 2019-02-01 ENCOUNTER — MYC MEDICAL ADVICE (OUTPATIENT)
Dept: FAMILY MEDICINE | Facility: CLINIC | Age: 49
End: 2019-02-01

## 2019-02-01 RX ORDER — DULOXETIN HYDROCHLORIDE 60 MG/1
60 CAPSULE, DELAYED RELEASE ORAL DAILY
Qty: 60 CAPSULE | Refills: 1 | Status: SHIPPED | OUTPATIENT
Start: 2019-02-01 | End: 2019-05-10

## 2019-03-05 ENCOUNTER — TRANSFERRED RECORDS (OUTPATIENT)
Dept: HEALTH INFORMATION MANAGEMENT | Facility: CLINIC | Age: 49
End: 2019-03-05

## 2019-03-11 ENCOUNTER — OFFICE VISIT (OUTPATIENT)
Dept: FAMILY MEDICINE | Facility: CLINIC | Age: 49
End: 2019-03-11
Payer: COMMERCIAL

## 2019-03-11 VITALS
TEMPERATURE: 98.9 F | BODY MASS INDEX: 33.82 KG/M2 | RESPIRATION RATE: 16 BRPM | OXYGEN SATURATION: 100 % | HEIGHT: 65 IN | HEART RATE: 90 BPM | DIASTOLIC BLOOD PRESSURE: 89 MMHG | WEIGHT: 203 LBS | SYSTOLIC BLOOD PRESSURE: 133 MMHG

## 2019-03-11 DIAGNOSIS — G89.4 CHRONIC PAIN DISORDER: Primary | ICD-10-CM

## 2019-03-11 DIAGNOSIS — J01.90 ACUTE SINUSITIS WITH SYMPTOMS > 10 DAYS: ICD-10-CM

## 2019-03-11 DIAGNOSIS — J32.9 CHRONIC SINUSITIS, UNSPECIFIED LOCATION: ICD-10-CM

## 2019-03-11 DIAGNOSIS — R20.2 PARESTHESIA OF ARM: ICD-10-CM

## 2019-03-11 DIAGNOSIS — Z98.1 STATUS POST LAMINECTOMY WITH SPINAL FUSION: ICD-10-CM

## 2019-03-11 PROCEDURE — 99214 OFFICE O/P EST MOD 30 MIN: CPT | Performed by: PHYSICIAN ASSISTANT

## 2019-03-11 RX ORDER — OXYCODONE AND ACETAMINOPHEN 7.5; 325 MG/1; MG/1
1 TABLET ORAL 3 TIMES DAILY PRN
Qty: 90 TABLET | Refills: 0 | Status: SHIPPED | OUTPATIENT
Start: 2019-03-11 | End: 2019-03-11

## 2019-03-11 RX ORDER — GABAPENTIN 600 MG/1
TABLET ORAL
Qty: 120 TABLET | Refills: 5 | Status: SHIPPED | OUTPATIENT
Start: 2019-03-11 | End: 2019-12-20

## 2019-03-11 RX ORDER — OXYCODONE AND ACETAMINOPHEN 7.5; 325 MG/1; MG/1
1 TABLET ORAL 3 TIMES DAILY PRN
Qty: 90 TABLET | Refills: 0 | Status: SHIPPED | OUTPATIENT
Start: 2019-04-11 | End: 2019-05-10

## 2019-03-11 ASSESSMENT — MIFFLIN-ST. JEOR: SCORE: 1551.68

## 2019-03-11 ASSESSMENT — PATIENT HEALTH QUESTIONNAIRE - PHQ9
SUM OF ALL RESPONSES TO PHQ QUESTIONS 1-9: 0
5. POOR APPETITE OR OVEREATING: NOT AT ALL

## 2019-03-11 ASSESSMENT — ANXIETY QUESTIONNAIRES
6. BECOMING EASILY ANNOYED OR IRRITABLE: NOT AT ALL
5. BEING SO RESTLESS THAT IT IS HARD TO SIT STILL: NOT AT ALL
GAD7 TOTAL SCORE: 0
2. NOT BEING ABLE TO STOP OR CONTROL WORRYING: NOT AT ALL
3. WORRYING TOO MUCH ABOUT DIFFERENT THINGS: NOT AT ALL
7. FEELING AFRAID AS IF SOMETHING AWFUL MIGHT HAPPEN: NOT AT ALL
1. FEELING NERVOUS, ANXIOUS, OR ON EDGE: NOT AT ALL

## 2019-03-11 NOTE — PATIENT INSTRUCTIONS
Treat for sinus infection    See neurology  Spine surgeon would be appropriate person to fill out work limitations, but wait until after arms are figured out by neurology    Keep working on weight loss    See me in 2 months

## 2019-03-11 NOTE — LETTER
March 11, 2019      Cynthia Lyons  [address] [facility] [address]  [place] [address]        To Whom It May Concern,     Cynthia Lyons attended clinic here on Mar 11, 2019.  She will be off work today due to illness.  She can return when she feels able, which I anticipate in the next day or so.    If you have questions or concerns, please call the clinic at the number listed above.    Sincerely,         Rosaura Flores PA-C

## 2019-03-11 NOTE — LETTER
March 11, 2019      Cynthia Lyons  [address] [facility] [address]  [place] [address]        To Whom It May Concern,     Cynthia Lyons attended clinic here on Mar 11, 2019.  She needs to be able to change position every half hour due to medical condition.    If you have questions or concerns, please call the clinic at the number listed above.    Sincerely,         Rosaura Flores PA-C

## 2019-03-11 NOTE — PROGRESS NOTES
SUBJECTIVE:   Cynthia Lyons is a 49 year old female who presents to clinic today for the following health issues:      Medication Followup of Pain    Taking Medication as prescribed: yes    Side Effects:  None    Medication Helping Symptoms:  yes   Pain unchanged  Doesn't note change of symptoms with switch from effexor to cymbalta.    Feels good aside from numbness symptoms below.  Feels depression is doing well.  Has lost some weight.    * Referral-  Needs a referral to neurology.  The spine surgeon wants her to see neurology due to the numbness in her arms.    Notes numbness is horrible, she would give anything, take her pain, to have the numbness gone in her hands.  Drops things.  Painful numb, burning, can be just tips of fingers, or a few fingers or whole arm, sometimes both arms.  States not just like pins and needles, sometimes numb like she would not feel someone touching her.  Positional - sleeping propped up and arms further propped helps somewhat.    Depression Followup    Status since last visit: Stable     See PHQ-9 for current symptoms.  Other associated symptoms: None    Complicating factors:   Significant life event:  No   Current substance abuse:  None  Anxiety or Panic symptoms:  No    PHQ 4/5/2018 11/26/2018 1/18/2019   PHQ-9 Total Score 0 0 0   Q9: Suicide Ideation Not at all Not at all Not at all     PHQ-9  English  PHQ-9   Any Language  Suicide Assessment Five-step Evaluation and Treatment (SAFE-T)    * Needs a couple notes for work.  Wants note to be off work today due to illness  Needs note stating she can move every 30 min, cannot sit stationary x 3 hr bingo shift  Has work restriction form she needs updated - went off disability x 5 yrs since could make more money than disability with her part time work, but that has changed and she has to redo entire disability process.      * Cough- Feels she has bronchitis starting.   Has had a sinus issue for a couple weeks, over the weekend she  "felt it coming on.  Was wheezing over the weekend and cough started yesterday.  No fever.  ST with drainage.    Had energy drink today.    Problem list and histories reviewed & adjusted, as indicated.  Additional history: as documented    BP Readings from Last 3 Encounters:   03/11/19 133/89   01/18/19 113/67   11/26/18 109/56    Wt Readings from Last 3 Encounters:   03/11/19 92.1 kg (203 lb)   01/18/19 93.9 kg (207 lb)   11/26/18 93 kg (205 lb)         Labs reviewed in EPIC    Reviewed and updated as needed this visit by clinical staff  Tobacco  Allergies  Meds  Problems  Med Hx  Surg Hx  Fam Hx  Soc Hx        Reviewed and updated as needed this visit by Provider  Tobacco  Allergies  Meds  Problems  Med Hx  Surg Hx  Fam Hx  Soc Hx          ROS:  Constitutional, HEENT, cardiovascular, pulmonary, musculoskeletal, neuro, and psych systems are negative, except as otherwise noted.    OBJECTIVE:     /89 (BP Location: Right arm, Patient Position: Chair, Cuff Size: Adult Large)   Pulse 90   Temp 98.9  F (37.2  C) (Tympanic)   Resp 16   Ht 1.651 m (5' 5\")   Wt 92.1 kg (203 lb)   SpO2 100%   BMI 33.78 kg/m    Body mass index is 33.78 kg/m .  GENERAL: healthy, alert and no distress  EYES: Eyes grossly normal to inspection, conjunctivae and sclerae normal  HENT: ear canals and TM's normal, nose and mouth without ulcers or lesions  NECK: no adenopathy, no asymmetry, masses, or scars   RESP: lungs clear to auscultation - no rales, rhonchi or wheezes  CV: regular rate and rhythm, normal S1 S2, no S3 or S4, no murmur, click or rub  PSYCH: affect initially high energy, calmed during visit, affect happy     reviewed today.  ASSESSMENT/PLAN:       ICD-10-CM    1. Chronic pain disorder G89.4 NEUROLOGY ADULT REFERRAL     gabapentin (NEURONTIN) 600 MG tablet     oxyCODONE-acetaminophen (PERCOCET) 7.5-325 MG per tablet     DISCONTINUED: oxyCODONE-acetaminophen (PERCOCET) 7.5-325 MG per tablet   2. Status " post laminectomy with spinal fusion Z98.1 NEUROLOGY ADULT REFERRAL     gabapentin (NEURONTIN) 600 MG tablet     oxyCODONE-acetaminophen (PERCOCET) 7.5-325 MG per tablet     DISCONTINUED: oxyCODONE-acetaminophen (PERCOCET) 7.5-325 MG per tablet   3. Paresthesia of arm R20.2 NEUROLOGY ADULT REFERRAL     gabapentin (NEURONTIN) 600 MG tablet   4. Acute sinusitis with symptoms > 10 days J01.90 amoxicillin-clavulanate (AUGMENTIN) 875-125 MG tablet   5. Chronic sinusitis, unspecified location J32.9        Patient Instructions   Treat for sinus infection    See neurology  Spine surgeon would be appropriate person to fill out work limitations, but wait until after arms are figured out by neurology    Keep working on weight loss    See me in 2 months      Rosaura Flores PA-C  [facility]

## 2019-03-11 NOTE — NURSING NOTE
"Chief Complaint   Patient presents with     Pain Management     Cough       Initial /89 (BP Location: Right arm, Patient Position: Chair, Cuff Size: Adult Large)   Pulse 108   Temp 98.9  F (37.2  C) (Tympanic)   Resp 16   Ht 1.651 m (5' 5\")   Wt 92.1 kg (203 lb)   SpO2 100%   BMI 33.78 kg/m   Estimated body mass index is 33.78 kg/m  as calculated from the following:    Height as of this encounter: 1.651 m (5' 5\").    Weight as of this encounter: 92.1 kg (203 lb).    Patient presents to the clinic using No DME    Health Maintenance that is potentially due pending provider review:  NONE        Is there anyone who you would like to be able to receive your results? No  If yes have patient fill out CICI      "

## 2019-03-12 ASSESSMENT — ANXIETY QUESTIONNAIRES: GAD7 TOTAL SCORE: 0

## 2019-03-14 ENCOUNTER — TELEPHONE (OUTPATIENT)
Dept: NEUROLOGY | Facility: CLINIC | Age: 49
End: 2019-03-14
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
## 2019-03-19 ENCOUNTER — TRANSFERRED RECORDS (OUTPATIENT)
Dept: HEALTH INFORMATION MANAGEMENT | Facility: CLINIC | Age: 49
End: 2019-03-19

## 2019-03-30 DIAGNOSIS — F41.0 PANIC ANXIETY SYNDROME: ICD-10-CM

## 2019-03-30 DIAGNOSIS — Z98.1 STATUS POST LAMINECTOMY WITH SPINAL FUSION: ICD-10-CM

## 2019-03-30 DIAGNOSIS — J30.89 OTHER ALLERGIC RHINITIS: ICD-10-CM

## 2019-03-30 DIAGNOSIS — G89.4 CHRONIC PAIN DISORDER: ICD-10-CM

## 2019-03-30 DIAGNOSIS — F41.1 GENERALIZED ANXIETY DISORDER: ICD-10-CM

## 2019-03-30 DIAGNOSIS — A60.00 HERPES SIMPLEX INFECTION OF GENITOURINARY SYSTEM: ICD-10-CM

## 2019-03-30 DIAGNOSIS — F33.1 MAJOR DEPRESSIVE DISORDER, RECURRENT EPISODE, MODERATE (H): ICD-10-CM

## 2019-03-30 NOTE — TELEPHONE ENCOUNTER
"Requested Prescriptions   Pending Prescriptions Disp Refills     valACYclovir (VALTREX) 500 MG tablet   Last Written Prescription Date:  3/4/19  Last Fill Quantity: 30,  # refills: 0   Last office visit: 3/11/2019 with prescribing provider:  MARRY Flores    30 tablet 0     Sig: TAKE 1 TABLET BY MOUTH DAILY    Antivirals for Herpes Protocol Passed - 3/30/2019  3:08 PM       Passed - Patient is age 12 or older       Passed - Recent (12 mo) or future (30 days) visit within the authorizing provider's specialty    Patient had office visit in the last 12 months or has a visit in the next 30 days with authorizing provider or within the authorizing provider's specialty.  See \"Patient Info\" tab in inbasket, or \"Choose Columns\" in Meds & Orders section of the refill encounter.             Passed - Medication is active on med list       Passed - Normal serum creatinine on file in past 12 months    Recent Labs   Lab Test 01/18/19  1159   CR 0.84             buPROPion (WELLBUTRIN SR) 200 MG 12 hr tablet   Last Written Prescription Date:  2/1/19  Last Fill Quantity: 30,  # refills: 1   Last office visit: 3/11/2019 with prescribing provider:  MARRY Flores    30 tablet 0     Sig: TAKE 1 TABLET BY MOUTH EVERY DAY    SSRIs Protocol Passed - 3/30/2019  3:08 PM       Passed - PHQ-9 score less than 5 in past 6 months    Please review last PHQ-9 score.          Passed - Medication is Bupropion    If the medication is Bupropion (Wellbutrin), and the patient is taking for smoking cessation; OK to refill.         Passed - Medication is active on med list       Passed - Patient is age 18 or older       Passed - No active pregnancy on record       Passed - No positive pregnancy test in last 12 months       Passed - Recent (6 mo) or future (30 days) visit within the authorizing provider's specialty    Patient had office visit in the last 6 months or has a visit in the next 30 days with authorizing provider or within the authorizing " "provider's specialty.  See \"Patient Info\" tab in inbasket, or \"Choose Columns\" in Meds & Orders section of the refill encounter.              "

## 2019-04-01 RX ORDER — BUPROPION HYDROCHLORIDE 200 MG/1
TABLET, EXTENDED RELEASE ORAL
Qty: 30 TABLET | Refills: 5 | Status: SHIPPED | OUTPATIENT
Start: 2019-04-01 | End: 2019-12-20

## 2019-04-01 RX ORDER — VALACYCLOVIR HYDROCHLORIDE 500 MG/1
TABLET, FILM COATED ORAL
Qty: 30 TABLET | Refills: 5 | Status: SHIPPED | OUTPATIENT
Start: 2019-04-01 | End: 2020-05-11

## 2019-04-19 ENCOUNTER — TRANSFERRED RECORDS (OUTPATIENT)
Dept: HEALTH INFORMATION MANAGEMENT | Facility: CLINIC | Age: 49
End: 2019-04-19

## 2019-04-21 DIAGNOSIS — I10 BENIGN ESSENTIAL HYPERTENSION: ICD-10-CM

## 2019-04-22 RX ORDER — LISINOPRIL AND HYDROCHLOROTHIAZIDE 12.5; 2 MG/1; MG/1
TABLET ORAL
Qty: 90 TABLET | Refills: 1 | Status: SHIPPED | OUTPATIENT
Start: 2019-04-22 | End: 2019-11-10

## 2019-04-22 NOTE — TELEPHONE ENCOUNTER
"Requested Prescriptions   Pending Prescriptions Disp Refills     lisinopril-hydrochlorothiazide (PRINZIDE/ZESTORETIC) 20-12.5 MG tablet [Pharmacy Med Name: LISINOPRIL-HCTZ 20/12.5MG TABLETS]  Last Written Prescription Date:  1/7/19  Last Fill Quantity: 90,  # refills: 0   Last office visit: 3/11/2019 with prescribing provider:  Mark   Future Office Visit:   Next 5 appointments (look out 90 days)    May 10, 2019 10:40 AM CDT  SHORT with Rosaura Flores PA-C  [facility] ([facility]) [address] [place] [id number]  [phone]          90 tablet 0     Sig: TAKE 1 TABLET BY MOUTH DAILY.       Diuretics (Including Combos) Protocol Passed - 4/21/2019  2:09 PM        Passed - Blood pressure under 140/90 in past 12 months     BP Readings from Last 3 Encounters:   03/11/19 133/89   01/18/19 113/67   11/26/18 109/56                 Passed - Recent (12 mo) or future (30 days) visit within the authorizing provider's specialty     Patient had office visit in the last 12 months or has a visit in the next 30 days with authorizing provider or within the authorizing provider's specialty.  See \"Patient Info\" tab in inbasket, or \"Choose Columns\" in Meds & Orders section of the refill encounter.              Passed - Medication is active on med list        Passed - Patient is age 18 or older        Passed - No active pregancy on record        Passed - Normal serum creatinine on file in past 12 months     Recent Labs   Lab Test 01/18/19  1159   CR 0.84              Passed - Normal serum potassium on file in past 12 months     Recent Labs   Lab Test 01/18/19  1159   POTASSIUM 4.3                    Passed - Normal serum sodium on file in past 12 months     Recent Labs   Lab Test 01/18/19  1159                 Passed - No positive pregnancy test in past 12 months          "

## 2019-05-02 DIAGNOSIS — Z98.1 STATUS POST LAMINECTOMY WITH SPINAL FUSION: ICD-10-CM

## 2019-05-02 DIAGNOSIS — F33.1 MAJOR DEPRESSIVE DISORDER, RECURRENT EPISODE, MODERATE (H): ICD-10-CM

## 2019-05-02 DIAGNOSIS — J30.89 OTHER ALLERGIC RHINITIS: ICD-10-CM

## 2019-05-02 DIAGNOSIS — A60.00 HERPES SIMPLEX INFECTION OF GENITOURINARY SYSTEM: ICD-10-CM

## 2019-05-02 DIAGNOSIS — G89.4 CHRONIC PAIN DISORDER: ICD-10-CM

## 2019-05-02 DIAGNOSIS — K59.00 CONSTIPATION: Primary | ICD-10-CM

## 2019-05-02 DIAGNOSIS — F41.1 GENERALIZED ANXIETY DISORDER: ICD-10-CM

## 2019-05-03 RX ORDER — CETIRIZINE HYDROCHLORIDE 10 MG/1
TABLET ORAL
Qty: 60 TABLET | Refills: 3 | Status: SHIPPED | OUTPATIENT
Start: 2019-05-03 | End: 2020-05-22

## 2019-05-03 RX ORDER — LACTULOSE 10 G/15ML
SOLUTION ORAL; RECTAL
Qty: 473 ML | Refills: 11 | Status: SHIPPED | OUTPATIENT
Start: 2019-05-03 | End: 2020-06-30

## 2019-05-03 RX ORDER — METHOCARBAMOL 750 MG/1
TABLET, FILM COATED ORAL
Qty: 120 TABLET | Refills: 4 | Status: SHIPPED | OUTPATIENT
Start: 2019-05-03 | End: 2019-10-03

## 2019-05-03 NOTE — TELEPHONE ENCOUNTER
"Requested Prescriptions   Pending Prescriptions Disp Refills     GENERLAC 10 GM/15ML solution [Pharmacy Med Name: GENERLAC 10GM/15ML ORAL/RECTAL SOL] 473 mL 0     Sig: SEE NOTES       There is no refill protocol information for this order        methocarbamol (ROBAXIN) 750 MG tablet [Pharmacy Med Name: METHOCARBAMOL 750MG TABLETS] 120 tablet 0     Sig: TAKE 1 TO 2 TABLETS BY MOUTH UP TO THREE TIMES DAILY(4TH TIME PER DAY ON OCCASION IS OK)       There is no refill protocol information for this order        cetirizine (ZYRTEC) 10 MG tablet [Pharmacy Med Name: CETIRIZINE 10MG TABLETS] 60 tablet 0     Sig: TAKE 1 TABLET(10 MG) BY MOUTH TWICE DAILY       Antihistamines Protocol Passed - 5/2/2019 11:13 PM        Passed - Patient is 3-64 years of age     Apply weight-based dosing for peds patients age 3 - 12 years of age.    Forward request to provider for patients under the age of 3 or over the age of 64.          Passed - Recent (12 mo) or future (30 days) visit within the authorizing provider's specialty     Patient had office visit in the last 12 months or has a visit in the next 30 days with authorizing provider or within the authorizing provider's specialty.  See \"Patient Info\" tab in inbasket, or \"Choose Columns\" in Meds & Orders section of the refill encounter.              Passed - Medication is active on med list      GENERLAC 10GM/15ML ORAL/RECTAL SOL  Last Written Prescription Date:  ?  Last Fill Quantity: ?,  # refills: ?   Last office visit: 3/11/2019 with prescribing provider:  HU Flores   Future Office Visit:   Next 5 appointments (look out 90 days)    May 10, 2019 10:40 AM CDT  SHORT with Rosaura Flores PA-C  [facility] ([facility]) [address] [place] [id number] [phone]         methocarbamol (ROBAXIN) 750 MG tablet  Last Written Prescription Date:  04/18/2018  Last Fill Quantity: 120 tablet,  # refills: 11   Last office visit: 3/11/2019 " with prescribing provider:  HU Flores    Future Office Visit:   Next 5 appointments (look out 90 days)    May 10, 2019 10:40 AM CDT  SHORT with Rosaura Flores PA-C  [facility] ([facility]) [address] [place] [phone]  [phone]         cetirizine (ZYRTEC) 10 MG tablet  Last Written Prescription Date:  01/15/2018  Last Fill Quantity: 180 tablet,  # refills: 3   Last office visit: 3/11/2019 with prescribing provider:  HU Flores    Future Office Visit:   Next 5 appointments (look out 90 days)    May 10, 2019 10:40 AM CDT  SHORT with Rosaura Flores PA-C  [facility] ([facility]) [address] [place] [phone]  [phone]         Shirley EPPS (R) (M)

## 2019-05-06 ENCOUNTER — TELEPHONE (OUTPATIENT)
Dept: FAMILY MEDICINE | Facility: CLINIC | Age: 49
End: 2019-05-06

## 2019-05-10 ENCOUNTER — OFFICE VISIT (OUTPATIENT)
Dept: FAMILY MEDICINE | Facility: CLINIC | Age: 49
End: 2019-05-10
Payer: COMMERCIAL

## 2019-05-10 VITALS
BODY MASS INDEX: 33.15 KG/M2 | DIASTOLIC BLOOD PRESSURE: 86 MMHG | TEMPERATURE: 98 F | RESPIRATION RATE: 18 BRPM | HEIGHT: 65 IN | HEART RATE: 76 BPM | SYSTOLIC BLOOD PRESSURE: 122 MMHG | WEIGHT: 199 LBS

## 2019-05-10 DIAGNOSIS — Z98.1 STATUS POST LAMINECTOMY WITH SPINAL FUSION: ICD-10-CM

## 2019-05-10 DIAGNOSIS — I10 BENIGN ESSENTIAL HYPERTENSION: ICD-10-CM

## 2019-05-10 DIAGNOSIS — R63.4 WEIGHT LOSS: ICD-10-CM

## 2019-05-10 DIAGNOSIS — E66.811 CLASS 1 OBESITY WITH SERIOUS COMORBIDITY IN ADULT, UNSPECIFIED BMI, UNSPECIFIED OBESITY TYPE: ICD-10-CM

## 2019-05-10 DIAGNOSIS — F32.1 MODERATE MAJOR DEPRESSION (H): ICD-10-CM

## 2019-05-10 DIAGNOSIS — G89.4 CHRONIC PAIN DISORDER: Primary | ICD-10-CM

## 2019-05-10 PROCEDURE — 99214 OFFICE O/P EST MOD 30 MIN: CPT | Performed by: PHYSICIAN ASSISTANT

## 2019-05-10 RX ORDER — OXYCODONE AND ACETAMINOPHEN 7.5; 325 MG/1; MG/1
1 TABLET ORAL 3 TIMES DAILY PRN
Qty: 90 TABLET | Refills: 0 | Status: SHIPPED | OUTPATIENT
Start: 2019-07-10 | End: 2019-08-07

## 2019-05-10 RX ORDER — DULOXETIN HYDROCHLORIDE 60 MG/1
60 CAPSULE, DELAYED RELEASE ORAL DAILY
Qty: 60 CAPSULE | Refills: 2 | Status: SHIPPED | OUTPATIENT
Start: 2019-05-10 | End: 2019-08-07

## 2019-05-10 RX ORDER — OXYCODONE AND ACETAMINOPHEN 7.5; 325 MG/1; MG/1
1 TABLET ORAL 3 TIMES DAILY PRN
Qty: 90 TABLET | Refills: 0 | Status: SHIPPED | OUTPATIENT
Start: 2019-06-10 | End: 2019-08-07

## 2019-05-10 RX ORDER — OXYCODONE AND ACETAMINOPHEN 7.5; 325 MG/1; MG/1
1 TABLET ORAL 3 TIMES DAILY PRN
Qty: 90 TABLET | Refills: 0 | Status: SHIPPED | OUTPATIENT
Start: 2019-05-10 | End: 2019-07-08

## 2019-05-10 ASSESSMENT — MIFFLIN-ST. JEOR: SCORE: 1533.54

## 2019-05-10 NOTE — PATIENT INSTRUCTIONS
Continue with [facility] and spine surgeon    Keep working on weight loss  If weight gets down closer to 190, start checking BPs.  If BPs pretty well under 130/80, then can try off BP med if checking BP.  If BP stays under the 130/80, can stay off med (monitor occasionally).

## 2019-05-10 NOTE — PROGRESS NOTES
"  SUBJECTIVE:   Cynthia Lyons is a 49 year old female who presents to clinic today for the following   health issues:      Medication Followup of Pain medication    Taking Medication as prescribed: yes    Side Effects:  None    Medication Helping Symptoms:  yes   \"old stuff\" in low back is unchanged.  Neck and hand numbness unchanged.  No lost, stolen, shared meds.  Still working with  on getting disability back.    Saw spine surgeon for neck and numbness - was to do MRI and if neg then EMG.  Thought was likely more musculoligamentous.      Did have MRI, very distorted by metal, doing EMG.    Weight loss -down 4 pounds from last visit, but notes she had been down an additional 6 pounds.  In part due to having influenza A.  Watching what she eats with healthier foods and with better portions as well.  Also going on walks nightly.    HTN - not checking.  Notes her \"sweet spot\" is when she gets to about 190, she usually can go off her BP med.    Additional history: as documented    Reviewed  and updated as needed this visit by clinical staff  Tobacco  Allergies  Meds  Problems  Med Hx  Surg Hx  Fam Hx  Soc Hx          Reviewed and updated as needed this visit by Provider  Tobacco  Allergies  Meds  Problems  Med Hx  Surg Hx  Fam Hx         BP Readings from Last 3 Encounters:   05/10/19 122/86   03/11/19 133/89   01/18/19 113/67    Wt Readings from Last 3 Encounters:   05/10/19 90.3 kg (199 lb)   03/11/19 92.1 kg (203 lb)   01/18/19 93.9 kg (207 lb)            ROS:  Constitutional, musculoskeletal, neuro, and psych systems are negative, except as otherwise noted.    OBJECTIVE:     /86 (BP Location: Right arm, Patient Position: Chair, Cuff Size: Adult Regular)   Pulse 76   Temp 98  F (36.7  C) (Tympanic)   Resp 18   Ht 1.651 m (5' 5\")   Wt 90.3 kg (199 lb)   BMI 33.12 kg/m    Body mass index is 33.12 kg/m .  GENERAL: healthy, alert and no distress  PSYCH: mentation appears normal, " affect normal/bright    ASSESSMENT/PLAN:       ICD-10-CM    1. Chronic pain disorder G89.4 DULoxetine (CYMBALTA) 60 MG capsule     oxyCODONE-acetaminophen (PERCOCET) 7.5-325 MG per tablet     oxyCODONE-acetaminophen (PERCOCET) 7.5-325 MG per tablet     oxyCODONE-acetaminophen (PERCOCET) 7.5-325 MG per tablet   2. Benign essential hypertension I10 order for DME   3. Moderate major depression (H) F32.1 DULoxetine (CYMBALTA) 60 MG capsule   4. Weight loss R63.4    5. Status post laminectomy with spinal fusion Z98.1 oxyCODONE-acetaminophen (PERCOCET) 7.5-325 MG per tablet     oxyCODONE-acetaminophen (PERCOCET) 7.5-325 MG per tablet     oxyCODONE-acetaminophen (PERCOCET) 7.5-325 MG per tablet   6. Class 1 obesity with serious comorbidity in adult, unspecified BMI, unspecified obesity type E66.9        Patient Instructions   Continue with [facility] and spine surgeon    Keep working on weight loss  If weight gets down closer to 190, start checking BPs.  If BPs pretty well under 130/80, then can try off BP med if checking BP.  If BP stays under the 130/80, can stay off med (monitor occasionally).      Rosaura Flores PA-C  [facility]

## 2019-05-10 NOTE — NURSING NOTE
"Chief Complaint   Patient presents with     Pain Management       Initial /86 (BP Location: Right arm, Patient Position: Chair, Cuff Size: Adult Regular)   Pulse 76   Temp 98  F (36.7  C) (Tympanic)   Resp 18   Ht 1.651 m (5' 5\")   Wt 90.3 kg (199 lb)   BMI 33.12 kg/m   Estimated body mass index is 33.12 kg/m  as calculated from the following:    Height as of this encounter: 1.651 m (5' 5\").    Weight as of this encounter: 90.3 kg (199 lb).    Patient presents to the clinic using No DME    Health Maintenance that is potentially due pending provider review:  NONE    Sherita Najera MA  10:19 AM 5/10/2019  .        "

## 2019-05-17 ENCOUNTER — OFFICE VISIT (OUTPATIENT)
Dept: FAMILY MEDICINE | Facility: CLINIC | Age: 49
End: 2019-05-17
Payer: COMMERCIAL

## 2019-05-17 VITALS
HEART RATE: 79 BPM | TEMPERATURE: 98 F | OXYGEN SATURATION: 98 % | HEIGHT: 65 IN | DIASTOLIC BLOOD PRESSURE: 80 MMHG | SYSTOLIC BLOOD PRESSURE: 118 MMHG | BODY MASS INDEX: 33.15 KG/M2 | RESPIRATION RATE: 16 BRPM | WEIGHT: 199 LBS

## 2019-05-17 DIAGNOSIS — L98.9 SKIN LESION: ICD-10-CM

## 2019-05-17 DIAGNOSIS — M67.432 GANGLION CYST OF WRIST, LEFT: Primary | ICD-10-CM

## 2019-05-17 PROCEDURE — 99214 OFFICE O/P EST MOD 30 MIN: CPT | Performed by: FAMILY MEDICINE

## 2019-05-17 ASSESSMENT — MIFFLIN-ST. JEOR: SCORE: 1533.54

## 2019-05-17 NOTE — PROGRESS NOTES
SUBJECTIVE:   Cynthia Lyons is a 49 year old female who presents to clinic today for the following   health issues:      DERM:  Here to discuss about some lumps on her skin.  She noticed the first lump on the right shin about a few months ago.  Now has another new lump on the right shin.  They can be painful when pushing on them.    Lump on the left wrist area.  This is newer in the past month.  She does have anxiety and then gets worried about what she has read about her symptoms.      WEIGHT LOSS:  She has noticed weight loss with her changes in reduced calories. She had a lap band 5-10 years ago. She was 275 lbs at the max.  She did have Influenza about one month ago.  Weights are copied below, today she is 199#.    Vital Signs 1/15/2018 2/9/2018 4/5/2018 7/6/2018 7/13/2018   Weight (LB) 216 lb 216 lb 217 lb (No Data) 212 lb 12.8 oz     Vital Signs 8/29/2018 9/12/2018 9/12/2018 11/26/2018 11/26/2018   Weight (LB) 213 lb  (No Data) 205 lb 205 lb     Vital Signs 1/18/2019 3/11/2019 3/11/2019 5/10/2019   Weight (LB) 207 lb 203 lb  199 lb         Current Outpatient Medications:      Acetaminophen (TYLENOL PO), Take 1,000 mg by mouth every 8 hours as needed for mild pain or fever, Disp: , Rfl:      albuterol (PROAIR HFA/PROVENTIL HFA/VENTOLIN HFA) 108 (90 Base) MCG/ACT Inhaler, Inhale 2 puffs into the lungs, Disp: , Rfl:      atorvastatin (LIPITOR) 20 MG tablet, TAKE 1 TABLET(20 MG) BY MOUTH DAILY, Disp: 90 tablet, Rfl: 1     buPROPion (WELLBUTRIN SR) 200 MG 12 hr tablet, TAKE 1 TABLET BY MOUTH EVERY DAY, Disp: 30 tablet, Rfl: 5     cetirizine (ZYRTEC) 10 MG tablet, TAKE 1 TABLET(10 MG) BY MOUTH TWICE DAILY, Disp: 60 tablet, Rfl: 3     DULoxetine (CYMBALTA) 60 MG capsule, Take 1 capsule (60 mg) by mouth daily, Disp: 60 capsule, Rfl: 2     fluticasone (FLONASE) 50 MCG/ACT spray, Spray 1 spray in nostril 2 times daily, Disp: 1 Bottle, Rfl: 11     gabapentin (NEURONTIN) 600 MG tablet, 1 tab morning, 1 tab with  lunch, and 2 tabs evening., Disp: 120 tablet, Rfl: 5     GENERLAC 10 GM/15ML solution, SEE NOTES, Disp: 473 mL, Rfl: 11     lisinopril-hydrochlorothiazide (PRINZIDE/ZESTORETIC) 20-12.5 MG tablet, TAKE 1 TABLET BY MOUTH DAILY., Disp: 90 tablet, Rfl: 1     methocarbamol (ROBAXIN) 750 MG tablet, TAKE 1 TO 2 TABLETS BY MOUTH UP TO THREE TIMES DAILY(4TH TIME PER DAY ON OCCASION IS OK), Disp: 120 tablet, Rfl: 4     order for DME, Equipment being ordered: Digital home blood pressure monitor kit, Disp: 1 kit, Rfl: 0     oxyCODONE-acetaminophen (PERCOCET) 7.5-325 MG per tablet, Take 1 tablet by mouth 3 times daily as needed for severe pain, Disp: 90 tablet, Rfl: 0     [START ON 6/10/2019] oxyCODONE-acetaminophen (PERCOCET) 7.5-325 MG per tablet, Take 1 tablet by mouth 3 times daily as needed for severe pain, Disp: 90 tablet, Rfl: 0     [START ON 7/10/2019] oxyCODONE-acetaminophen (PERCOCET) 7.5-325 MG per tablet, Take 1 tablet by mouth 3 times daily as needed for severe pain, Disp: 90 tablet, Rfl: 0     valACYclovir (VALTREX) 500 MG tablet, TAKE 1 TABLET BY MOUTH DAILY, Disp: 30 tablet, Rfl: 5    Patient Active Problem List   Diagnosis     Benign essential hypertension     Severe obesity (BMI 35.0-39.9) with comorbidity (H)     Backache     binge eating disorder     Allergic rhinitis     Moderate major depression (H)     Sleep apnea     Sinusitis, chronic     Joint pain     Personal History of Tobacco Use, Presenting Hazards to Health-quit1/08     Lyme arthritis (H)     Status post laminectomy with spinal fusion     Panic anxiety syndrome     Chronic pain disorder     Hyperlipidemia LDL goal <130     Tobacco abuse     Generalized anxiety disorder     Acne     Seborrheic keratosis     Dermatofibroma     Genital HSV     S/P hysterectomy     Dyslipidemia     Class 2 obesity with serious comorbidity in adult, unspecified BMI, unspecified obesity type       Blood pressure 118/80, pulse 79, temperature 98  F (36.7  C), temperature  "source Tympanic, resp. rate 16, height 1.651 m (5' 5\"), weight 90.3 kg (199 lb), SpO2 98 %, not currently breastfeeding.    Exam:  GENERAL APPEARANCE: healthy, alert and no distress  SKIN: on the left volar wrist area there is a 4 mm diameter cyst, compatible with a ganglion cyst. It is not red or hot.   On the left anterior tibial area there are three subcutaneous lesions from 3-6 mm in diameter that are minimally tender, firm and not red or hot.   They are mobile.   PSYCH: mentation appears normal and affect normal/bright      (M67.432) Ganglion cyst of wrist, left  (primary encounter diagnosis)  Comment:   Plan: For the left wrist this lesion is a ganglion cyst, or joint fluid in a weak spot on the joint lining. If this is symptomatic then our hand surgeon could discuss treatment.   Use your wrist normally. If interested in the referral just call our clinic RN at [phone].     (C08.9) Skin lesion  Comment:   Plan: For the subcutaneous lesions on the left shin area, these are between the skin and the bone. Avoid trauma. If these are symptomatic, our surgeons could excise them.   Again call our RN if this is desired.       Keshawn Farrell      "

## 2019-05-17 NOTE — PATIENT INSTRUCTIONS
Thank you for choosing [facility].  You may be receiving an email and/or telephone survey request from [facility] [employer] regarding your visit today.  Please take a few minutes to respond to the survey to let us know how we are doing.      If you have questions or concerns, please contact us via [employer] or you can contact your care team at [phone].    Our Clinic hours are:  Monday 6:40 am  to 7:00 pm  Tuesday -Friday 6:40 am to 5:00 pm    The [place] outpatient lab hours are:  Monday - Friday 6:10 am to 4:45 pm  Saturdays 7:00 am to 11:00 am  Appointments are required, call [phone]    If you have clinical questions after hours or would like to schedule an appointment,  call the clinic at [phone].    (M67.432) Ganglion cyst of wrist, left  (primary encounter diagnosis)  Comment:   Plan: For the left wrist this lesion is a ganglion cyst, or joint fluid in a weak spot on the joint lining. If this is symptomatic then our hand surgeon could discuss treatment.   Use your wrist normally. If interested in the referral just call our clinic RN at [phone].     (L98.9) Skin lesion  Comment:   Plan: For the subcutaneous lesions on the left shin area, these are between the skin and the bone. Avoid trauma. If these are symptomatic, our surgeons could excise them.   Again call our RN if this is desired.

## 2019-05-26 DIAGNOSIS — G89.4 CHRONIC PAIN DISORDER: ICD-10-CM

## 2019-05-26 DIAGNOSIS — F32.1 MODERATE MAJOR DEPRESSION (H): ICD-10-CM

## 2019-05-28 RX ORDER — DULOXETIN HYDROCHLORIDE 60 MG/1
CAPSULE, DELAYED RELEASE ORAL
Qty: 60 CAPSULE | Refills: 5 | Status: SHIPPED | OUTPATIENT
Start: 2019-05-28 | End: 2019-12-20

## 2019-05-28 NOTE — TELEPHONE ENCOUNTER
"Requested Prescriptions   Pending Prescriptions Disp Refills     DULoxetine (CYMBALTA) 60 MG capsule [Pharmacy Med Name: DULOXETINE DR 60MG CAPSULES] 60 capsule 0     Sig: TAKE 1 CAPSULE(60 MG) BY MOUTH DAILY       Serotonin-Norepinephrine Reuptake Inhibitors  Passed - 5/26/2019  9:47 AM        Passed - Blood pressure under 140/90 in past 12 months     BP Readings from Last 3 Encounters:   05/17/19 118/80   05/10/19 122/86   03/11/19 133/89                 Passed - PHQ-9 score of less than 5 in past 6 months     Please review last PHQ-9 score.           Passed - Medication is active on med list        Passed - Patient is age 18 or older        Passed - No active pregnancy on record        Passed - No positive pregnancy test in past 12 months        Passed - Recent (6 mo) or future (30 days) visit within the authorizing provider's specialty     Patient had office visit in the last 6 months or has a visit in the next 30 days with authorizing provider or within the authorizing provider's specialty.  See \"Patient Info\" tab in inbasket, or \"Choose Columns\" in Meds & Orders section of the refill encounter.            Last Written Prescription Date:  5/10/19  Last Fill Quantity: 60,  # refills: 2   Last office visit: 5/17/2019 with prescribing provider:     Future Office Visit:      "

## 2019-05-30 ENCOUNTER — MYC MEDICAL ADVICE (OUTPATIENT)
Dept: FAMILY MEDICINE | Facility: CLINIC | Age: 49
End: 2019-05-30

## 2019-05-30 DIAGNOSIS — Z98.1 STATUS POST LAMINECTOMY WITH SPINAL FUSION: ICD-10-CM

## 2019-05-30 DIAGNOSIS — G89.4 CHRONIC PAIN DISORDER: ICD-10-CM

## 2019-05-30 DIAGNOSIS — M54.9 BACKACHE: Primary | ICD-10-CM

## 2019-05-30 DIAGNOSIS — M25.50 JOINT PAIN: ICD-10-CM

## 2019-06-03 NOTE — TELEPHONE ENCOUNTER
Order signed.  Print off order and last records or have patient sign release for records?  Please coordinate with patient.

## 2019-06-06 ENCOUNTER — TRANSFERRED RECORDS (OUTPATIENT)
Dept: HEALTH INFORMATION MANAGEMENT | Facility: CLINIC | Age: 49
End: 2019-06-06

## 2019-06-12 ENCOUNTER — AMBULATORY - HEALTHEAST (OUTPATIENT)
Dept: PALLIATIVE MEDICINE | Facility: OTHER | Age: 49
End: 2019-06-12

## 2019-06-12 DIAGNOSIS — G89.4 CHRONIC PAIN DISORDER: ICD-10-CM

## 2019-06-28 ENCOUNTER — TELEPHONE (OUTPATIENT)
Dept: FAMILY MEDICINE | Facility: CLINIC | Age: 49
End: 2019-06-28

## 2019-06-28 ENCOUNTER — TRANSFERRED RECORDS (OUTPATIENT)
Dept: HEALTH INFORMATION MANAGEMENT | Facility: CLINIC | Age: 49
End: 2019-06-28

## 2019-07-08 DIAGNOSIS — Z98.1 STATUS POST LAMINECTOMY WITH SPINAL FUSION: ICD-10-CM

## 2019-07-08 DIAGNOSIS — G89.4 CHRONIC PAIN DISORDER: ICD-10-CM

## 2019-07-08 RX ORDER — OXYCODONE AND ACETAMINOPHEN 7.5; 325 MG/1; MG/1
1 TABLET ORAL 3 TIMES DAILY PRN
Qty: 90 TABLET | Refills: 0 | Status: SHIPPED | OUTPATIENT
Start: 2019-07-08 | End: 2019-08-07

## 2019-07-08 NOTE — TELEPHONE ENCOUNTER
Controlled Substance Refill Request for percocet  Problem List Complete:  Yes   checked in past 3 months?  Unable to check  because Rosaura Flores PA-C has not approved my viewing status.  Mariaa Sierra RN

## 2019-08-01 ENCOUNTER — TRANSFERRED RECORDS (OUTPATIENT)
Dept: HEALTH INFORMATION MANAGEMENT | Facility: CLINIC | Age: 49
End: 2019-08-01

## 2019-08-07 ENCOUNTER — OFFICE VISIT (OUTPATIENT)
Dept: FAMILY MEDICINE | Facility: CLINIC | Age: 49
End: 2019-08-07
Payer: COMMERCIAL

## 2019-08-07 VITALS
TEMPERATURE: 97.9 F | HEART RATE: 78 BPM | SYSTOLIC BLOOD PRESSURE: 122 MMHG | OXYGEN SATURATION: 97 % | DIASTOLIC BLOOD PRESSURE: 84 MMHG | BODY MASS INDEX: 33.66 KG/M2 | HEIGHT: 65 IN | RESPIRATION RATE: 18 BRPM | WEIGHT: 202 LBS

## 2019-08-07 DIAGNOSIS — M54.2 NECK PAIN: ICD-10-CM

## 2019-08-07 DIAGNOSIS — G89.4 CHRONIC PAIN DISORDER: ICD-10-CM

## 2019-08-07 DIAGNOSIS — G56.00 CARPAL TUNNEL SYNDROME, UNSPECIFIED LATERALITY: ICD-10-CM

## 2019-08-07 DIAGNOSIS — E78.5 DYSLIPIDEMIA: ICD-10-CM

## 2019-08-07 DIAGNOSIS — I10 BENIGN ESSENTIAL HYPERTENSION: ICD-10-CM

## 2019-08-07 DIAGNOSIS — E66.01 SEVERE OBESITY (BMI 35.0-39.9) WITH COMORBIDITY (H): ICD-10-CM

## 2019-08-07 DIAGNOSIS — Z98.1 STATUS POST LAMINECTOMY WITH SPINAL FUSION: Primary | ICD-10-CM

## 2019-08-07 PROCEDURE — 99213 OFFICE O/P EST LOW 20 MIN: CPT | Performed by: PHYSICIAN ASSISTANT

## 2019-08-07 RX ORDER — OXYCODONE AND ACETAMINOPHEN 7.5; 325 MG/1; MG/1
1 TABLET ORAL 3 TIMES DAILY PRN
Qty: 90 TABLET | Refills: 0 | Status: CANCELLED | OUTPATIENT
Start: 2019-08-07

## 2019-08-07 RX ORDER — ATORVASTATIN CALCIUM 20 MG/1
20 TABLET, FILM COATED ORAL DAILY
Qty: 90 TABLET | Refills: 1 | Status: SHIPPED | OUTPATIENT
Start: 2019-08-07 | End: 2019-12-20

## 2019-08-07 RX ORDER — OXYCODONE AND ACETAMINOPHEN 7.5; 325 MG/1; MG/1
1 TABLET ORAL 3 TIMES DAILY PRN
Qty: 90 TABLET | Refills: 0 | Status: SHIPPED | OUTPATIENT
Start: 2019-08-07 | End: 2019-08-29

## 2019-08-07 ASSESSMENT — ANXIETY QUESTIONNAIRES
5. BEING SO RESTLESS THAT IT IS HARD TO SIT STILL: NOT AT ALL
GAD7 TOTAL SCORE: 0
1. FEELING NERVOUS, ANXIOUS, OR ON EDGE: NOT AT ALL
GAD7 TOTAL SCORE: 0
4. TROUBLE RELAXING: NOT AT ALL
7. FEELING AFRAID AS IF SOMETHING AWFUL MIGHT HAPPEN: NOT AT ALL
3. WORRYING TOO MUCH ABOUT DIFFERENT THINGS: NOT AT ALL
GAD7 TOTAL SCORE: 0
7. FEELING AFRAID AS IF SOMETHING AWFUL MIGHT HAPPEN: NOT AT ALL
6. BECOMING EASILY ANNOYED OR IRRITABLE: NOT AT ALL
2. NOT BEING ABLE TO STOP OR CONTROL WORRYING: NOT AT ALL

## 2019-08-07 ASSESSMENT — PATIENT HEALTH QUESTIONNAIRE - PHQ9
10. IF YOU CHECKED OFF ANY PROBLEMS, HOW DIFFICULT HAVE THESE PROBLEMS MADE IT FOR YOU TO DO YOUR WORK, TAKE CARE OF THINGS AT HOME, OR GET ALONG WITH OTHER PEOPLE: NOT DIFFICULT AT ALL
SUM OF ALL RESPONSES TO PHQ QUESTIONS 1-9: 2
SUM OF ALL RESPONSES TO PHQ QUESTIONS 1-9: 2

## 2019-08-07 ASSESSMENT — MIFFLIN-ST. JEOR: SCORE: 1547.15
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
## 2019-08-07 NOTE — PATIENT INSTRUCTIONS
Refilled pain meds     Set up physical for next month and will give next pain pills at that time - tetanus shot next visit    Back to working on wt loss    Decided not to change BP meds until pain better

## 2019-08-08 ASSESSMENT — ANXIETY QUESTIONNAIRES: GAD7 TOTAL SCORE: 0

## 2019-08-08 ASSESSMENT — PATIENT HEALTH QUESTIONNAIRE - PHQ9: SUM OF ALL RESPONSES TO PHQ QUESTIONS 1-9: 2

## 2019-08-14 DIAGNOSIS — E78.5 DYSLIPIDEMIA: ICD-10-CM

## 2019-08-14 RX ORDER — ATORVASTATIN CALCIUM 20 MG/1
TABLET, FILM COATED ORAL
Qty: 90 TABLET | Refills: 1 | Status: SHIPPED | OUTPATIENT
Start: 2019-08-14 | End: 2020-05-07

## 2019-08-14 NOTE — TELEPHONE ENCOUNTER
"Requested Prescriptions   Pending Prescriptions Disp Refills     atorvastatin (LIPITOR) 20 MG tablet [Pharmacy Med Name: ATORVASTATIN 20MG TABLETS] 90 tablet 0     Sig: TAKE 1 TABLET(20 MG) BY MOUTH DAILY       Statins Protocol Passed - 8/14/2019  1:50 PM        Passed - LDL on file in past 12 months     Recent Labs   Lab Test 01/18/19  1159   *             Passed - No abnormal creatine kinase in past 12 months     No lab results found.             Passed - Recent (12 mo) or future (30 days) visit within the authorizing provider's specialty     Patient had office visit in the last 12 months or has a visit in the next 30 days with authorizing provider or within the authorizing provider's specialty.  See \"Patient Info\" tab in inbasket, or \"Choose Columns\" in Meds & Orders section of the refill encounter.              Passed - Medication is active on med list        Passed - Patient is age 18 or older        Passed - No active pregnancy on record        Passed - No positive pregnancy test in past 12 months        Last Written Prescription Date:  08/07/19  Last Fill Quantity: 90,  # refills: 1   Last office visit: 8/7/2019 with prescribing provider:  08/07/19   Future Office Visit:      "

## 2019-08-25 ENCOUNTER — HOSPITAL ENCOUNTER (EMERGENCY)
Facility: CLINIC | Age: 49
Discharge: HOME | End: 2019-08-25
Attending: STUDENT IN AN ORGANIZED HEALTH CARE EDUCATION/TRAINING PROGRAM | Admitting: STUDENT IN AN ORGANIZED HEALTH CARE EDUCATION/TRAINING PROGRAM
Payer: COMMERCIAL

## 2019-08-25 VITALS
SYSTOLIC BLOOD PRESSURE: 119 MMHG | RESPIRATION RATE: 18 BRPM | DIASTOLIC BLOOD PRESSURE: 81 MMHG | BODY MASS INDEX: 33.82 KG/M2 | TEMPERATURE: 98.1 F | WEIGHT: 203 LBS | OXYGEN SATURATION: 99 % | HEIGHT: 65 IN

## 2019-08-25 DIAGNOSIS — K08.89 PAIN, DENTAL: ICD-10-CM

## 2019-08-25 DIAGNOSIS — R51.9 RIGHT FACIAL PAIN: ICD-10-CM

## 2019-08-25 PROCEDURE — 96372 THER/PROPH/DIAG INJ SC/IM: CPT | Performed by: STUDENT IN AN ORGANIZED HEALTH CARE EDUCATION/TRAINING PROGRAM

## 2019-08-25 PROCEDURE — 64400 NJX AA&/STRD TRIGEMINAL NRV: CPT | Performed by: STUDENT IN AN ORGANIZED HEALTH CARE EDUCATION/TRAINING PROGRAM

## 2019-08-25 PROCEDURE — 99284 EMERGENCY DEPT VISIT MOD MDM: CPT | Mod: 25 | Performed by: STUDENT IN AN ORGANIZED HEALTH CARE EDUCATION/TRAINING PROGRAM

## 2019-08-25 PROCEDURE — 25000125 ZZHC RX 250: Performed by: STUDENT IN AN ORGANIZED HEALTH CARE EDUCATION/TRAINING PROGRAM

## 2019-08-25 PROCEDURE — 25000128 H RX IP 250 OP 636: Performed by: STUDENT IN AN ORGANIZED HEALTH CARE EDUCATION/TRAINING PROGRAM

## 2019-08-25 PROCEDURE — 64400 NJX AA&/STRD TRIGEMINAL NRV: CPT | Mod: Z6 | Performed by: STUDENT IN AN ORGANIZED HEALTH CARE EDUCATION/TRAINING PROGRAM

## 2019-08-25 RX ORDER — KETOROLAC TROMETHAMINE 30 MG/ML
20 INJECTION, SOLUTION INTRAMUSCULAR; INTRAVENOUS ONCE
Status: COMPLETED | OUTPATIENT
Start: 2019-08-25 | End: 2019-08-25

## 2019-08-25 RX ADMIN — KETOROLAC TROMETHAMINE 20 MG: 30 INJECTION, SOLUTION INTRAMUSCULAR at 18:03

## 2019-08-25 RX ADMIN — TOPICAL ANESTHETIC 0.5 ML: 200 SPRAY DENTAL; PERIODONTAL at 18:03

## 2019-08-25 ASSESSMENT — MIFFLIN-ST. JEOR: SCORE: 1551.68
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
## 2019-08-25 NOTE — DISCHARGE INSTRUCTIONS
Many of these clinics offer a sliding fee option for patients that qualify, and see patients on a walk-in or same day basis. Please call each clinic directly. As services, hours, fees and policies vary greatly.          [facility], [facility]  [phone]  Sees no insurance  [facility], [place]  [phone]  Preventive services only  [facility] (mult loc) [phone]  [facility]    ([facility]), [facility]  [phone]  [facility]       [phone]  Preventive services only  [facility]  [phone]  Accepts MA & sees no ins  [facility],      Accepts MA & sees no ins   [place]   [phone]; [phone]  [facility], [facility]   Accepts MA & sees no ins       [phone]  [facility], [facility]  [phone]   Accepts MA emergencies  [facility], [place] [phone]  [facility],     Accepts MA   [place]   [phone]    [facility] [phone]  Accepts MA & sees no ins   [facility]   Dental Clinic    [phone]  [facility], [facility]  [phone]   [facility] [phone]  [facility]  Preventive services only   [place]   [phone]  [facility] ([facility]) [phone]  [facility], [place]  [phone]  Same day [facility] [phone]  Same day [facility],      Same day [facility]   [phone]    [facility], [facility] [phone]  Free [facility], walk-in only  [facility] (multiple locations) [phone]      [facility] , [facility] [phone]    [facility] [phone]  Free clinic, walk-in only  [place] [facility]  [phone]  [facility] [phone] (adults)       [phone]  (children)  [place] [facility] [phone]    Also, referral service for low cost dental and healthcare: [phone]  And [phone]

## 2019-08-25 NOTE — ED NOTES
Patient being treated for right ear infection.  Today went to [facility] urgent [facility] and told to come to ED to get a CT of the head for possible dental abscess.  No swelling of face.  No dental pain.  Stated went to  today because didn't feel better with ear infection.  No drainage noted or swelling of ear.  C/o pain to right side of face into forehead.  No other complaints

## 2019-08-25 NOTE — ED AVS SNAPSHOT
[facility] Emergency Department  [address] [address]  Phone:  [phone]  Fax:  [phone]                                    Cynthia Lyons   MRN: [id number]    Department:  [facility] Emergency Department   Date of Visit:  8/25/2019           After Visit Summary Signature Page    I have received my discharge instructions, and my questions have been answered. I have discussed any challenges I see with this plan with the nurse or doctor.    ..........................................................................................................................................  Patient/Patient Representative Signature      ..........................................................................................................................................  Patient Representative Print Name and Relationship to Patient    ..................................................               ................................................  Date                                   Time    ..........................................................................................................................................  Reviewed by Signature/Title    ...................................................              ..............................................  Date                                               Time          22EPIC Rev 08/18

## 2019-08-28 ENCOUNTER — MYC MEDICAL ADVICE (OUTPATIENT)
Dept: FAMILY MEDICINE | Facility: CLINIC | Age: 49
End: 2019-08-28

## 2019-08-28 DIAGNOSIS — Z98.1 STATUS POST LAMINECTOMY WITH SPINAL FUSION: ICD-10-CM

## 2019-08-28 DIAGNOSIS — G89.4 CHRONIC PAIN DISORDER: ICD-10-CM

## 2019-08-29 DIAGNOSIS — G89.4 CHRONIC PAIN DISORDER: ICD-10-CM

## 2019-08-29 DIAGNOSIS — Z98.1 STATUS POST LAMINECTOMY WITH SPINAL FUSION: ICD-10-CM

## 2019-08-29 RX ORDER — OXYCODONE AND ACETAMINOPHEN 7.5; 325 MG/1; MG/1
1 TABLET ORAL 3 TIMES DAILY PRN
Qty: 90 TABLET | Refills: 0 | Status: SHIPPED | OUTPATIENT
Start: 2019-09-06 | End: 2019-12-20

## 2019-08-29 RX ORDER — OXYCODONE AND ACETAMINOPHEN 7.5; 325 MG/1; MG/1
1 TABLET ORAL 3 TIMES DAILY PRN
Qty: 90 TABLET | Refills: 0 | Status: CANCELLED | OUTPATIENT
Start: 2019-08-29

## 2019-09-04 ENCOUNTER — TELEPHONE (OUTPATIENT)
Dept: FAMILY MEDICINE | Facility: CLINIC | Age: 49
End: 2019-09-04

## 2019-09-04 NOTE — TELEPHONE ENCOUNTER
Reason for call:  Patient reporting a symptom    Symptom or request: Pt called and says she is on a new medication for anxiety. She had scheduled an appointment for today but cancelled and re-scheduled for next month. She says she wishes now that she wouldn't  Have cancelled  (then started to cry) and says she is just going through so much and wants to talk to Rosaura or her nurse.       Have you been treated for this before? Yes        Phone Number patient can be reached at:  Home number on file [phone] (home)    Best Time:  anytime    Can we leave a detailed message on this number:  YES    Call taken on 9/4/2019 at 7:47 AM by Lian Roman

## 2019-09-13 ENCOUNTER — TRANSFERRED RECORDS (OUTPATIENT)
Dept: HEALTH INFORMATION MANAGEMENT | Facility: CLINIC | Age: 49
End: 2019-09-13

## 2019-09-13 ENCOUNTER — HOSPITAL ENCOUNTER (OUTPATIENT)
Dept: PALLIATIVE MEDICINE | Facility: OTHER | Age: 49
Discharge: HOME | End: 2019-09-13
Attending: NURSE PRACTITIONER

## 2019-09-13 DIAGNOSIS — G89.29 CHRONIC LOW BACK PAIN WITHOUT SCIATICA, UNSPECIFIED BACK PAIN LATERALITY: ICD-10-CM

## 2019-09-13 DIAGNOSIS — F32.A DEPRESSION, UNSPECIFIED DEPRESSION TYPE: ICD-10-CM

## 2019-09-13 DIAGNOSIS — G89.4 CHRONIC PAIN SYNDROME: ICD-10-CM

## 2019-09-13 DIAGNOSIS — M54.50 CHRONIC LOW BACK PAIN WITHOUT SCIATICA, UNSPECIFIED BACK PAIN LATERALITY: ICD-10-CM

## 2019-09-13 ASSESSMENT — MIFFLIN-ST. JEOR: SCORE: 1509.93

## 2019-09-14 ENCOUNTER — HOSPITAL ENCOUNTER (OUTPATIENT)
Dept: RADIOLOGY | Facility: HOSPITAL | Age: 49
Discharge: HOME | End: 2019-09-14

## 2019-09-14 DIAGNOSIS — G89.29 CHRONIC LOW BACK PAIN WITHOUT SCIATICA, UNSPECIFIED BACK PAIN LATERALITY: ICD-10-CM

## 2019-09-14 DIAGNOSIS — M54.50 CHRONIC LOW BACK PAIN WITHOUT SCIATICA, UNSPECIFIED BACK PAIN LATERALITY: ICD-10-CM

## 2019-09-16 LAB
6MAM UR QL: NOT DETECTED
7AMINOCLONAZEPAM UR QL: NOT DETECTED
A-OH ALPRAZ UR QL: NOT DETECTED
ALPRAZ UR QL: NOT DETECTED
AMPHET UR QL SCN: NOT DETECTED
BARBITURATES UR QL: NOT DETECTED
BUPRENORPHINE UR QL: NOT DETECTED
BZE UR QL: NOT DETECTED
CARBOXYTHC UR QL: NOT DETECTED
CARISOPRODOL UR QL: NOT DETECTED
CLONAZEPAM UR QL: NOT DETECTED
CODEINE UR QL: NOT DETECTED
CREAT UR-MCNC: 106.9 MG/DL (ref 20–400)
DIAZEPAM UR QL: NOT DETECTED
ETHYL GLUCURONIDE UR QL: NOT DETECTED
FENTANYL UR QL: NOT DETECTED
HYDROCODONE UR QL: NOT DETECTED
HYDROMORPHONE UR QL: NOT DETECTED
LORAZEPAM UR QL: NOT DETECTED
MDA UR QL: NOT DETECTED
MDEA UR QL: NOT DETECTED
MDMA UR QL: NOT DETECTED
ME-PHENIDATE UR QL: NOT DETECTED
MEPERIDINE UR QL: NOT DETECTED
METHADONE UR QL: NOT DETECTED
METHAMPHET UR QL: NOT DETECTED
MIDAZOLAM UR QL SCN: NOT DETECTED
MORPHINE UR QL: NOT DETECTED
NORBUPRENORPHINE UR QL CFM: NOT DETECTED
NORDIAZEPAM UR QL: NOT DETECTED
NORFENTANYL UR QL: NOT DETECTED
NORHYDROCODONE UR QL CFM: NOT DETECTED
NOROXYCODONE UR QL CFM: PRESENT
NOROXYMORPHONE UR QL SCN: NOT DETECTED
OXAZEPAM UR QL: NOT DETECTED
OXYCODONE UR QL: PRESENT
OXYMORPHONE UR QL: NOT DETECTED
PATHOLOGY STUDY: NORMAL
PCP UR QL: NOT DETECTED
PHENTERMINE UR QL: NOT DETECTED
PROPOXYPH UR QL: NOT DETECTED
SERVICE CMNT-IMP: NORMAL
TAPENTADOL UR QL SCN: NOT DETECTED
TAPENTADOL UR QL SCN: NOT DETECTED
TEMAZEPAM UR QL: NOT DETECTED
TRAMADOL UR QL: NOT DETECTED
ZOLPIDEM UR QL: NOT DETECTED

## 2019-09-29 ENCOUNTER — HEALTH MAINTENANCE LETTER (OUTPATIENT)
Age: 49
End: 2019-09-29

## 2019-10-03 ENCOUNTER — OFFICE VISIT (OUTPATIENT)
Dept: FAMILY MEDICINE | Facility: CLINIC | Age: 49
End: 2019-10-03
Payer: COMMERCIAL

## 2019-10-03 VITALS
DIASTOLIC BLOOD PRESSURE: 68 MMHG | BODY MASS INDEX: 32.72 KG/M2 | HEIGHT: 65 IN | HEART RATE: 82 BPM | OXYGEN SATURATION: 99 % | TEMPERATURE: 98.6 F | SYSTOLIC BLOOD PRESSURE: 104 MMHG | RESPIRATION RATE: 16 BRPM | WEIGHT: 196.4 LBS

## 2019-10-03 DIAGNOSIS — G89.4 CHRONIC PAIN DISORDER: ICD-10-CM

## 2019-10-03 DIAGNOSIS — Z98.1 STATUS POST LAMINECTOMY WITH SPINAL FUSION: ICD-10-CM

## 2019-10-03 DIAGNOSIS — Z72.0 TOBACCO ABUSE: ICD-10-CM

## 2019-10-03 DIAGNOSIS — R31.29 MICROSCOPIC HEMATURIA: ICD-10-CM

## 2019-10-03 DIAGNOSIS — R35.0 URINARY FREQUENCY: ICD-10-CM

## 2019-10-03 DIAGNOSIS — Z00.00 ROUTINE GENERAL MEDICAL EXAMINATION AT A HEALTH CARE FACILITY: Primary | ICD-10-CM

## 2019-10-03 LAB

## 2019-10-03 PROCEDURE — 87086 URINE CULTURE/COLONY COUNT: CPT | Performed by: PHYSICIAN ASSISTANT

## 2019-10-03 PROCEDURE — 81001 URINALYSIS AUTO W/SCOPE: CPT | Performed by: PHYSICIAN ASSISTANT

## 2019-10-03 PROCEDURE — 99396 PREV VISIT EST AGE 40-64: CPT | Performed by: PHYSICIAN ASSISTANT

## 2019-10-03 PROCEDURE — 99213 OFFICE O/P EST LOW 20 MIN: CPT | Mod: 25 | Performed by: PHYSICIAN ASSISTANT

## 2019-10-03 RX ORDER — OXYCODONE AND ACETAMINOPHEN 7.5; 325 MG/1; MG/1
1 TABLET ORAL 3 TIMES DAILY PRN
Qty: 90 TABLET | Refills: 0 | Status: SHIPPED | OUTPATIENT
Start: 2019-10-06 | End: 2019-12-20

## 2019-10-03 RX ORDER — NICOTINE 21 MG/24HR
1 PATCH, TRANSDERMAL 24 HOURS TRANSDERMAL EVERY 24 HOURS
Qty: 14 PATCH | Refills: 1 | Status: SHIPPED | OUTPATIENT
Start: 2019-10-03 | End: 2020-08-22

## 2019-10-03 RX ORDER — METHOCARBAMOL 750 MG/1
TABLET, FILM COATED ORAL
Qty: 120 TABLET | Refills: 4 | Status: SHIPPED | OUTPATIENT
Start: 2019-10-03 | End: 2020-05-21

## 2019-10-03 RX ORDER — OXYCODONE AND ACETAMINOPHEN 7.5; 325 MG/1; MG/1
1 TABLET ORAL 3 TIMES DAILY PRN
Qty: 90 TABLET | Refills: 0 | Status: CANCELLED | OUTPATIENT
Start: 2019-10-03

## 2019-10-03 ASSESSMENT — ENCOUNTER SYMPTOMS
SHORTNESS OF BREATH: 0
FEVER: 1
ARTHRALGIAS: 1
HEMATOCHEZIA: 0
PARESTHESIAS: 0
HEADACHES: 0
HEARTBURN: 1
COUGH: 0
EYE PAIN: 0
NAUSEA: 0
NERVOUS/ANXIOUS: 0
CHILLS: 0
FREQUENCY: 1
SORE THROAT: 0
DIARRHEA: 0
MYALGIAS: 1
DIZZINESS: 0
JOINT SWELLING: 1
HEMATURIA: 0
PALPITATIONS: 0
DYSURIA: 0
WEAKNESS: 0
CONSTIPATION: 0
BREAST MASS: 0
ABDOMINAL PAIN: 0

## 2019-10-03 ASSESSMENT — ACTIVITIES OF DAILY LIVING (ADL)
CURRENT_FUNCTION: LAUNDRY REQUIRES ASSISTANCE
CURRENT_FUNCTION: HOUSEWORK REQUIRES ASSISTANCE
CURRENT_FUNCTION: SHOPPING REQUIRES ASSISTANCE

## 2019-10-03 ASSESSMENT — MIFFLIN-ST. JEOR: SCORE: 1521.74

## 2019-10-03 NOTE — PROGRESS NOTES
"   SUBJECTIVE:   CC: Cynthia Lyons is an 49 year old woman who presents for preventive health visit.     Healthy Habits:     In general, how would you rate your overall health?  Fair    Getting at least 3 servings of Calcium per day:  NO    Bi-annual eye exam:  Yes    Dental care twice a year:  Yes    Sleep apnea or symptoms of sleep apnea:  None    Diet:  Regular (no restrictions)    Frequency of exercise:  None    Do you usually eat at least 4 servings of fruit and vegetables a day, include whole grains    & fiber and avoid regularly eating high fat or \"junk\" foods?  No    Taking medications regularly:  Yes    Medication side effects:  None    Ability to successfully perform activities of daily living:  Shopping requires assistance, housework requires assistance and laundry requires assistance    Home Safety:  No safety concerns identified    Hearing Impairment:  No hearing concerns    In the past 6 months, have you been bothered by leaking of urine?  No    In general, how would you rate your overall mental or emotional health?  Good      PHQ-2 Total Score: 0    Additional concerns today:  No      Medication refill on Percocet 7.5-325 MG today. Pt knows she is a little early today. Still in process of establishing with pain clinic.    URINARY TRACT SYMPTOMS      Duration: over the weekend     Description  frequency and urgency    Intensity:  moderate    Accompanying signs and symptoms:  Fever/chills: no but has low temp off and on   Flank pain: unknown because she has back problems   Nausea and vomiting: YES  Vaginal symptoms: none  Abdominal/Pelvic Pain: YES    History  History of frequent UTI's: YES  History of kidney stones: YES  Sexually Active: no   Possibility of pregnancy: No    Precipitating or alleviating factors: None    Therapies tried and outcome: none       A lot of caffeine of late.  Trying to avoid pop and having energy drinks instead.  Lots of bubble baths lately.  History microscopic " hematuria    Today's PHQ-2 Score:   PHQ-2 ( 1999 Pfizer) 10/3/2019   Q1: Little interest or pleasure in doing things 0   Q2: Feeling down, depressed or hopeless 0   PHQ-2 Score 0   Q1: Little interest or pleasure in doing things Not at all   Q2: Feeling down, depressed or hopeless Not at all   PHQ-2 Score 0        Abuse: Current or Past(Physical, Sexual or Emotional)- No  Do you feel safe in your environment? Yes    Social History     Tobacco Use     Smoking status: Light Tobacco Smoker     Packs/day: 0.50     Years: 23.00     Pack years: 11.50     Types: Cigarettes     Smokeless tobacco: Never Used     Tobacco comment: started smoking age 22   Substance Use Topics     Alcohol use: No     Alcohol/week: 0.0 standard drinks     If you drink alcohol do you typically have >3 drinks per day or >7 drinks per week? No    Alcohol Use 10/3/2019   Prescreen: >3 drinks/day or >7 drinks/week? Not Applicable   Prescreen: >3 drinks/day or >7 drinks/week? -   No flowsheet data found.    Reviewed orders with patient.  Reviewed health maintenance and updated orders accordingly - Yes  Labs reviewed in EPIC  BP Readings from Last 3 Encounters:   10/03/19 104/68   08/25/19 119/81   08/07/19 122/84    Wt Readings from Last 3 Encounters:   10/03/19 89.1 kg (196 lb 6.4 oz)   08/25/19 92.1 kg (203 lb)   08/07/19 91.6 kg (202 lb)                    Mammogram Screening: Patient under age 50, mutual decision reflected in health maintenance.      Pertinent mammograms are reviewed under the imaging tab.  History of abnormal Pap smear: NO - age 30-65 PAP every 5 years with negative HPV co-testing recommended  PAP / HPV Latest Ref Rng & Units 8/9/2017 8/22/2013   PAP - NIL NIL   HPV 16 DNA NEG:Negative Negative -   HPV 18 DNA NEG:Negative Negative -   OTHER HR HPV NEG:Negative Negative -     Reviewed and updated as needed this visit by clinical staff         Reviewed and updated as needed this visit by Provider            Review of Systems  "  Constitutional: Positive for fever. Negative for chills.   HENT: Negative for congestion, ear pain, hearing loss and sore throat.    Eyes: Negative for pain and visual disturbance.   Respiratory: Negative for cough and shortness of breath.    Cardiovascular: Negative for chest pain, palpitations and peripheral edema.   Gastrointestinal: Positive for heartburn. Negative for abdominal pain, constipation, diarrhea, hematochezia and nausea.   Breasts:  Negative for tenderness, breast mass and discharge.   Genitourinary: Positive for frequency and urgency. Negative for dysuria, genital sores, hematuria, pelvic pain, vaginal bleeding and vaginal discharge.   Musculoskeletal: Positive for arthralgias, joint swelling and myalgias.   Skin: Negative for rash.   Neurological: Negative for dizziness, weakness, headaches and paresthesias.   Psychiatric/Behavioral: Negative for mood changes. The patient is not nervous/anxious.      OBJECTIVE:   /68   Pulse 82   Temp 98.6  F (37  C) (Oral)   Resp 16   Ht 1.651 m (5' 5\")   Wt 89.1 kg (196 lb 6.4 oz)   LMP  (LMP Unknown)   SpO2 99%   BMI 32.68 kg/m    Physical Exam  GENERAL: healthy, alert and no distress  EYES: Eyes grossly normal to inspection, PERRL and conjunctivae and sclerae normal  HENT: ear canals and TM's normal, nose and mouth without ulcers or lesions  NECK: no adenopathy, no asymmetry, masses, or scars and thyroid normal to palpation  RESP: lungs clear to auscultation - no rales, rhonchi or wheezes  BREAST: normal without masses, tenderness or nipple discharge and no palpable axillary masses or adenopathy  CV: regular rate and rhythm, normal S1 S2, no S3 or S4, no murmur, click or rub, no peripheral edema and peripheral pulses strong  ABDOMEN: soft, nontender, no hepatosplenomegaly, no masses and bowel sounds normal  MS: no gross musculoskeletal defects noted, no edema  SKIN: no suspicious lesions or rashes  NEURO: Normal strength and tone, mentation " "intact and speech normal  PSYCH: mentation appears normal, affect normal/bright    Diagnostic Test Results:  Labs reviewed in Epic    ASSESSMENT/PLAN:       ICD-10-CM    1. Routine general medical examination at a health care facility Z00.00 Urine Microscopic   2. Status post laminectomy with spinal fusion Z98.1 methocarbamol (ROBAXIN) 750 MG tablet     oxyCODONE-acetaminophen (PERCOCET) 7.5-325 MG per tablet   3. Chronic pain disorder G89.4 oxyCODONE-acetaminophen (PERCOCET) 7.5-325 MG per tablet   4. Urinary frequency R35.0 *UA reflex to Microscopic and Culture ([place] and [facility] (except [place] and [place])     Urine Culture Aerobic Bacterial   5. Microscopic hematuria R31.29 Urine Culture Aerobic Bacterial   6. Tobacco abuse Z72.0 nicotine (NICODERM CQ) 14 MG/24HR 24 hr patch     nicotine (NICODERM CQ) 7 MG/24HR 24 hr patch   long history microscopic hematuria, has seen urology  Suspect urethritis as current urinary symptoms, recent hot tubbing    COUNSELING:  Reviewed preventive health counseling, as reflected in patient instructions       Regular exercise       Healthy diet/nutrition    Estimated body mass index is 33.78 kg/m  as calculated from the following:    Height as of 8/25/19: 1.651 m (5' 5\").    Weight as of 8/25/19: 92.1 kg (203 lb).    Weight management plan: Discussed healthy diet and exercise guidelines     reports that she has been smoking cigarettes. She has a 11.50 pack-year smoking history. She has never used smokeless tobacco.  Tobacco Cessation Action Plan: Self help information given to patient  discussed possible nicotine gum, is planning on trying patches     Counseling Resources:  ATP IV Guidelines  Pooled Cohorts Equation Calculator  Breast Cancer Risk Calculator  FRAX Risk Assessment  ICSI Preventive Guidelines  Dietary Guidelines for Americans, 2010  USDA's MyPlate  ASA Prophylaxis  Lung CA Screening    Rosaura Flores PA-C  [facility]    Patient " Instructions   Flu shot  Tetanus shot    Working on calcium intake     Working on weight loss    Send me a message about a week before next pain pills are needed    Preventive Health Recommendations  Female Ages 40 to 49    Yearly exam:     See your health care provider every year in order to  1. Review health changes.   2. Discuss preventive care.    3. Review your medicines if your doctor prescribed any.      Get a Pap test every three years (unless you have an abnormal result and your provider advises testing more often).      If you get Pap tests with HPV test, you only need to test every 5 years, unless you have an abnormal result. You do not need a Pap test if your uterus was removed (hysterectomy) and you have not had cancer.      You should be tested each year for STDs (sexually transmitted diseases), if you're at risk.     Ask your doctor if you should have a mammogram.      Have a colonoscopy (test for colon cancer) if someone in your family has had colon cancer or polyps before age 50.       Have a cholesterol test every 5 years.       Have a diabetes test (fasting glucose) after age 45. If you are at risk for diabetes, you should have this test every 3 years.    Shots: Get a flu shot each year. Get a tetanus shot every 10 years.     Nutrition:     Eat at least 5 servings of fruits and vegetables each day.    Eat whole-grain bread, whole-wheat pasta and brown rice instead of white grains and rice.    Get adequate Calcium and Vitamin D.      Lifestyle    Exercise at least 150 minutes a week (an average of 30 minutes a day, 5 days a week). This will help you control your weight and prevent disease.    Limit alcohol to one drink per day.    No smoking.     Wear sunscreen to prevent skin cancer.    See your dentist every six months for an exam and cleaning.

## 2019-10-03 NOTE — PATIENT INSTRUCTIONS
Flu shot  Tetanus shot    Working on calcium intake     Working on weight loss    Send me a message about a week before next pain pills are needed    Preventive Health Recommendations  Female Ages 40 to 49    Yearly exam:     See your health care provider every year in order to  1. Review health changes.   2. Discuss preventive care.    3. Review your medicines if your doctor prescribed any.      Get a Pap test every three years (unless you have an abnormal result and your provider advises testing more often).      If you get Pap tests with HPV test, you only need to test every 5 years, unless you have an abnormal result. You do not need a Pap test if your uterus was removed (hysterectomy) and you have not had cancer.      You should be tested each year for STDs (sexually transmitted diseases), if you're at risk.     Ask your doctor if you should have a mammogram.      Have a colonoscopy (test for colon cancer) if someone in your family has had colon cancer or polyps before age 50.       Have a cholesterol test every 5 years.       Have a diabetes test (fasting glucose) after age 45. If you are at risk for diabetes, you should have this test every 3 years.    Shots: Get a flu shot each year. Get a tetanus shot every 10 years.     Nutrition:     Eat at least 5 servings of fruits and vegetables each day.    Eat whole-grain bread, whole-wheat pasta and brown rice instead of white grains and rice.    Get adequate Calcium and Vitamin D.      Lifestyle    Exercise at least 150 minutes a week (an average of 30 minutes a day, 5 days a week). This will help you control your weight and prevent disease.    Limit alcohol to one drink per day.    No smoking.     Wear sunscreen to prevent skin cancer.    See your dentist every six months for an exam and cleaning.

## 2019-10-03 NOTE — NURSING NOTE
"Chief Complaint   Patient presents with     Physical       Initial /68   Pulse 82   Temp 98.6  F (37  C) (Oral)   Resp 16   Ht 1.651 m (5' 5\")   Wt 89.1 kg (196 lb 6.4 oz)   LMP  (LMP Unknown)   SpO2 99%   BMI 32.68 kg/m   Estimated body mass index is 32.68 kg/m  as calculated from the following:    Height as of this encounter: 1.651 m (5' 5\").    Weight as of this encounter: 89.1 kg (196 lb 6.4 oz).    Patient presents to the clinic using No DME    Health Maintenance that is potentially due pending provider review:  Tetanus and influenza vaccine     Possibly completing today per provider review.    Is there anyone who you would like to be able to receive your results? No  If yes have patient fill out CICI      "

## 2019-10-04 LAB
BACTERIA SPEC CULT: NORMAL
Lab: NORMAL
SPECIMEN SOURCE: NORMAL

## 2019-10-07 ENCOUNTER — TRANSFERRED RECORDS (OUTPATIENT)
Dept: HEALTH INFORMATION MANAGEMENT | Facility: CLINIC | Age: 49
End: 2019-10-07

## 2019-10-07 ENCOUNTER — HOSPITAL ENCOUNTER (OUTPATIENT)
Dept: PALLIATIVE MEDICINE | Facility: OTHER | Age: 49
Discharge: HOME | End: 2019-10-07
Attending: NURSE PRACTITIONER

## 2019-10-07 DIAGNOSIS — G89.4 CHRONIC PAIN SYNDROME: ICD-10-CM

## 2019-10-07 ASSESSMENT — MIFFLIN-ST. JEOR: SCORE: 1523.99

## 2019-10-07 NOTE — RESULT ENCOUNTER NOTE
Karla -    Urine culture did NOT grow out an infection.  Symptoms should resolve on their house.    Rosaura

## 2019-10-08 ENCOUNTER — MYC MEDICAL ADVICE (OUTPATIENT)
Dept: FAMILY MEDICINE | Facility: CLINIC | Age: 49
End: 2019-10-08

## 2019-10-08 DIAGNOSIS — G89.4 CHRONIC PAIN DISORDER: Primary | ICD-10-CM

## 2019-10-15 ENCOUNTER — MYC MEDICAL ADVICE (OUTPATIENT)
Dept: FAMILY MEDICINE | Facility: CLINIC | Age: 49
End: 2019-10-15

## 2019-10-15 DIAGNOSIS — F33.1 MAJOR DEPRESSIVE DISORDER, RECURRENT EPISODE, MODERATE (H): ICD-10-CM

## 2019-10-15 DIAGNOSIS — Z72.0 TOBACCO ABUSE: Primary | ICD-10-CM

## 2019-10-15 DIAGNOSIS — F41.1 GENERALIZED ANXIETY DISORDER: ICD-10-CM

## 2019-10-15 DIAGNOSIS — F41.0 PANIC ANXIETY SYNDROME: ICD-10-CM

## 2019-10-16 ENCOUNTER — MYC MEDICAL ADVICE (OUTPATIENT)
Dept: FAMILY MEDICINE | Facility: CLINIC | Age: 49
End: 2019-10-16

## 2019-10-16 ENCOUNTER — TRANSFERRED RECORDS (OUTPATIENT)
Dept: HEALTH INFORMATION MANAGEMENT | Facility: CLINIC | Age: 49
End: 2019-10-16

## 2019-10-16 DIAGNOSIS — N20.0 KIDNEY STONE: Primary | ICD-10-CM

## 2019-10-16 DIAGNOSIS — R74.8 ELEVATED LIPASE: ICD-10-CM

## 2019-10-16 RX ORDER — BUPROPION HYDROCHLORIDE 300 MG/1
300 TABLET ORAL EVERY MORNING
Qty: 90 TABLET | Refills: 3 | Status: SHIPPED | OUTPATIENT
Start: 2019-10-16 | End: 2019-11-21

## 2019-10-17 ENCOUNTER — MYC MEDICAL ADVICE (OUTPATIENT)
Dept: FAMILY MEDICINE | Facility: CLINIC | Age: 49
End: 2019-10-17

## 2019-10-17 ENCOUNTER — COMMUNICATION - HEALTHEAST (OUTPATIENT)
Dept: UROLOGY | Facility: CLINIC | Age: 49
End: 2019-10-17

## 2019-10-17 DIAGNOSIS — N20.0 KIDNEY STONE: Primary | ICD-10-CM

## 2019-10-17 RX ORDER — OXYCODONE AND ACETAMINOPHEN 5; 325 MG/1; MG/1
1 TABLET ORAL EVERY 4 HOURS PRN
Qty: 18 TABLET | Refills: 0 | Status: SHIPPED | OUTPATIENT
Start: 2019-10-17 | End: 2019-10-21

## 2019-10-17 RX ORDER — TAMSULOSIN HYDROCHLORIDE 0.4 MG/1
0.4 CAPSULE ORAL DAILY
Qty: 14 CAPSULE | Refills: 0 | Status: SHIPPED | OUTPATIENT
Start: 2019-10-17 | End: 2020-05-06

## 2019-10-21 ENCOUNTER — MYC MEDICAL ADVICE (OUTPATIENT)
Dept: FAMILY MEDICINE | Facility: CLINIC | Age: 49
End: 2019-10-21

## 2019-10-21 DIAGNOSIS — N20.0 KIDNEY STONE: Primary | ICD-10-CM

## 2019-10-21 RX ORDER — OXYCODONE AND ACETAMINOPHEN 5; 325 MG/1; MG/1
1 TABLET ORAL EVERY 4 HOURS PRN
Qty: 18 TABLET | Refills: 0 | Status: SHIPPED | OUTPATIENT
Start: 2019-10-21 | End: 2019-12-20

## 2019-10-22 ENCOUNTER — MYC MEDICAL ADVICE (OUTPATIENT)
Dept: FAMILY MEDICINE | Facility: CLINIC | Age: 49
End: 2019-10-22

## 2019-10-25 ENCOUNTER — MYC MEDICAL ADVICE (OUTPATIENT)
Dept: FAMILY MEDICINE | Facility: CLINIC | Age: 49
End: 2019-10-25

## 2019-10-28 ENCOUNTER — MYC MEDICAL ADVICE (OUTPATIENT)
Dept: FAMILY MEDICINE | Facility: CLINIC | Age: 49
End: 2019-10-28

## 2019-11-04 ENCOUNTER — COMMUNICATION - HEALTHEAST (OUTPATIENT)
Dept: PALLIATIVE MEDICINE | Facility: OTHER | Age: 49
End: 2019-11-04

## 2019-11-04 ENCOUNTER — HOSPITAL ENCOUNTER (OUTPATIENT)
Dept: PALLIATIVE MEDICINE | Facility: OTHER | Age: 49
Discharge: HOME | End: 2019-11-04
Attending: NURSE PRACTITIONER

## 2019-11-04 DIAGNOSIS — M54.50 MIDLINE LOW BACK PAIN WITHOUT SCIATICA, UNSPECIFIED CHRONICITY: ICD-10-CM

## 2019-11-04 DIAGNOSIS — Z98.1 S/P LUMBAR FUSION: ICD-10-CM

## 2019-11-04 DIAGNOSIS — G89.4 CHRONIC PAIN SYNDROME: ICD-10-CM

## 2019-11-04 ASSESSMENT — MIFFLIN-ST. JEOR: SCORE: 1532.61

## 2019-11-05 ENCOUNTER — MYC MEDICAL ADVICE (OUTPATIENT)
Dept: FAMILY MEDICINE | Facility: CLINIC | Age: 49
End: 2019-11-05

## 2019-11-05 ENCOUNTER — COMMUNICATION - HEALTHEAST (OUTPATIENT)
Dept: PALLIATIVE MEDICINE | Facility: OTHER | Age: 49
End: 2019-11-05

## 2019-11-05 ENCOUNTER — TELEPHONE (OUTPATIENT)
Dept: FAMILY MEDICINE | Facility: CLINIC | Age: 49
End: 2019-11-05

## 2019-11-05 DIAGNOSIS — G89.4 CHRONIC PAIN SYNDROME: ICD-10-CM

## 2019-11-05 NOTE — TELEPHONE ENCOUNTER
Reason for Call:  Pain Management Clinic    Detailed comments: Leonor Ramirez NP from [facility] is calling to speak with you about patient.  Patient was in today for appt with Leonor and is reporting to that you want her to see another pain clinic because you do not agree with [facility] plan of care.  Leonor would like to speak with you to know that you are both on the same page with patient.    Phone Number Patient can be reached at: Other phone number: Leonor's direct extension.  [phone] ext [phone]    Best Time: Any    Can we leave a detailed message on this number? YES    Call taken on 11/5/2019 at 2:14 PM by Marion Lofton

## 2019-11-06 ENCOUNTER — COMMUNICATION - HEALTHEAST (OUTPATIENT)
Dept: PALLIATIVE MEDICINE | Facility: OTHER | Age: 49
End: 2019-11-06

## 2019-11-06 NOTE — TELEPHONE ENCOUNTER
Discussed.  Plan was low dose buprenorphine with short acting narcotic.  May be going back to oxycontin due to insurance coverage.

## 2019-11-10 DIAGNOSIS — I10 BENIGN ESSENTIAL HYPERTENSION: ICD-10-CM

## 2019-11-10 NOTE — TELEPHONE ENCOUNTER
"LISINOPRIL-HCTZ 20/12.5MG TABLETS  Last Written Prescription Date:  4/22/2019  Last Fill Quantity: 90,  # refills: 1   Last office visit: 10/3/2019 with prescribing provider:     Future Office Visit:    Requested Prescriptions   Pending Prescriptions Disp Refills     lisinopril-hydrochlorothiazide (PRINZIDE/ZESTORETIC) 20-12.5 MG tablet [Pharmacy Med Name: LISINOPRIL-HCTZ 20/12.5MG TABLETS] 90 tablet 0     Sig: TAKE 1 TABLET BY MOUTH DAILY.       Diuretics (Including Combos) Protocol Passed - 11/10/2019  9:26 AM        Passed - Blood pressure under 140/90 in past 12 months     BP Readings from Last 3 Encounters:   10/03/19 104/68   08/25/19 119/81   08/07/19 122/84                 Passed - Recent (12 mo) or future (30 days) visit within the authorizing provider's specialty     Patient has had an office visit with the authorizing provider or a provider within the authorizing providers department within the previous 12 mos or has a future within next 30 days. See \"Patient Info\" tab in inbasket, or \"Choose Columns\" in Meds & Orders section of the refill encounter.              Passed - Medication is active on med list        Passed - Patient is age 18 or older        Passed - No active pregancy on record        Passed - Normal serum creatinine on file in past 12 months     Recent Labs   Lab Test 01/18/19  1159   CR 0.84              Passed - Normal serum potassium on file in past 12 months     Recent Labs   Lab Test 01/18/19  1159   POTASSIUM 4.3                    Passed - Normal serum sodium on file in past 12 months     Recent Labs   Lab Test 01/18/19  1159                 Passed - No positive pregnancy test in past 12 months          "

## 2019-11-11 RX ORDER — LISINOPRIL AND HYDROCHLOROTHIAZIDE 12.5; 2 MG/1; MG/1
TABLET ORAL
Qty: 90 TABLET | Refills: 1 | Status: SHIPPED | OUTPATIENT
Start: 2019-11-11 | End: 2021-03-04

## 2019-11-21 DIAGNOSIS — F33.1 MAJOR DEPRESSIVE DISORDER, RECURRENT EPISODE, MODERATE (H): ICD-10-CM

## 2019-11-21 DIAGNOSIS — Z72.0 TOBACCO ABUSE: ICD-10-CM

## 2019-11-21 RX ORDER — BUPROPION HYDROCHLORIDE 300 MG/1
TABLET ORAL
Qty: 90 TABLET | Refills: 0 | Status: SHIPPED | OUTPATIENT
Start: 2019-11-21 | End: 2020-04-03

## 2019-11-25 ENCOUNTER — COMMUNICATION - HEALTHEAST (OUTPATIENT)
Dept: PALLIATIVE MEDICINE | Facility: OTHER | Age: 49
End: 2019-11-25

## 2019-11-25 DIAGNOSIS — G89.4 CHRONIC PAIN SYNDROME: ICD-10-CM

## 2019-11-26 ENCOUNTER — COMMUNICATION - HEALTHEAST (OUTPATIENT)
Dept: PALLIATIVE MEDICINE | Facility: OTHER | Age: 49
End: 2019-11-26

## 2019-11-27 ENCOUNTER — COMMUNICATION - HEALTHEAST (OUTPATIENT)
Dept: PALLIATIVE MEDICINE | Facility: OTHER | Age: 49
End: 2019-11-27

## 2019-11-27 DIAGNOSIS — G89.4 CHRONIC PAIN SYNDROME: ICD-10-CM

## 2019-12-06 DIAGNOSIS — Z98.1 STATUS POST LAMINECTOMY WITH SPINAL FUSION: ICD-10-CM

## 2019-12-06 RX ORDER — GABAPENTIN 600 MG/1
TABLET ORAL
Qty: 120 TABLET | Refills: 0 | Status: SHIPPED | OUTPATIENT
Start: 2019-12-06 | End: 2019-12-20

## 2019-12-06 NOTE — TELEPHONE ENCOUNTER
gabapentin (NEURONTIN) 600 MG tablet      Last Written Prescription Date:  3/11/19  Last Fill Quantity: 120,   # refills: 5  Last Office Visit: Mark  Future Office visit:    Next 5 appointments (look out 90 days)    Dec 20, 2019  7:40 AM CST  SHORT with Ros[facility] [address] [place] [id number]  [phone]           Routing refill request to provider for review/approval because:  Drug not on the [facility], [facility] or [facility] refill protocol or controlled substance

## 2019-12-11 ENCOUNTER — MYC MEDICAL ADVICE (OUTPATIENT)
Dept: FAMILY MEDICINE | Facility: CLINIC | Age: 49
End: 2019-12-11

## 2019-12-11 DIAGNOSIS — L98.9 SKIN PROBLEM: Primary | ICD-10-CM

## 2019-12-20 ENCOUNTER — OFFICE VISIT (OUTPATIENT)
Dept: FAMILY MEDICINE | Facility: CLINIC | Age: 49
End: 2019-12-20
Payer: COMMERCIAL

## 2019-12-20 VITALS
OXYGEN SATURATION: 97 % | HEART RATE: 69 BPM | SYSTOLIC BLOOD PRESSURE: 122 MMHG | BODY MASS INDEX: 32.62 KG/M2 | WEIGHT: 196 LBS | TEMPERATURE: 98.1 F | DIASTOLIC BLOOD PRESSURE: 80 MMHG

## 2019-12-20 DIAGNOSIS — I10 BENIGN ESSENTIAL HYPERTENSION: ICD-10-CM

## 2019-12-20 DIAGNOSIS — G89.4 CHRONIC PAIN DISORDER: ICD-10-CM

## 2019-12-20 DIAGNOSIS — Z98.1 STATUS POST LAMINECTOMY WITH SPINAL FUSION: Primary | ICD-10-CM

## 2019-12-20 DIAGNOSIS — Z23 NEED FOR PROPHYLACTIC VACCINATION AND INOCULATION AGAINST INFLUENZA: ICD-10-CM

## 2019-12-20 DIAGNOSIS — Z72.0 TOBACCO ABUSE: ICD-10-CM

## 2019-12-20 DIAGNOSIS — F32.1 MODERATE MAJOR DEPRESSION (H): ICD-10-CM

## 2019-12-20 PROCEDURE — 99214 OFFICE O/P EST MOD 30 MIN: CPT | Mod: 25 | Performed by: PHYSICIAN ASSISTANT

## 2019-12-20 PROCEDURE — 90471 IMMUNIZATION ADMIN: CPT | Performed by: PHYSICIAN ASSISTANT

## 2019-12-20 PROCEDURE — 90686 IIV4 VACC NO PRSV 0.5 ML IM: CPT | Performed by: PHYSICIAN ASSISTANT

## 2019-12-20 RX ORDER — DULOXETIN HYDROCHLORIDE 60 MG/1
60 CAPSULE, DELAYED RELEASE ORAL DAILY
Qty: 30 CAPSULE | Refills: 5 | Status: SHIPPED | OUTPATIENT
Start: 2019-12-20 | End: 2020-04-06

## 2019-12-20 RX ORDER — OXYCODONE HCL 10 MG/1
10 TABLET, FILM COATED, EXTENDED RELEASE ORAL EVERY 12 HOURS
Qty: 56 TABLET | Refills: 0 | Status: SHIPPED | OUTPATIENT
Start: 2019-12-30 | End: 2020-01-10

## 2019-12-20 RX ORDER — OXYCODONE AND ACETAMINOPHEN 7.5; 325 MG/1; MG/1
1 TABLET ORAL 2 TIMES DAILY
Qty: 12 TABLET | Refills: 0 | Status: SHIPPED | OUTPATIENT
Start: 2019-12-30 | End: 2020-01-10

## 2019-12-20 RX ORDER — OXYCODONE AND ACETAMINOPHEN 7.5; 325 MG/1; MG/1
1 TABLET ORAL 3 TIMES DAILY PRN
Qty: 90 TABLET | Refills: 0 | Status: SHIPPED | OUTPATIENT
Start: 2019-12-20 | End: 2020-01-28

## 2019-12-20 RX ORDER — GABAPENTIN 600 MG/1
TABLET ORAL
Qty: 120 TABLET | Refills: 5 | Status: SHIPPED | OUTPATIENT
Start: 2019-12-20 | End: 2020-04-03

## 2019-12-20 ASSESSMENT — ANXIETY QUESTIONNAIRES
5. BEING SO RESTLESS THAT IT IS HARD TO SIT STILL: NOT AT ALL
GAD7 TOTAL SCORE: 5
2. NOT BEING ABLE TO STOP OR CONTROL WORRYING: SEVERAL DAYS
3. WORRYING TOO MUCH ABOUT DIFFERENT THINGS: SEVERAL DAYS
GAD7 TOTAL SCORE: 5
1. FEELING NERVOUS, ANXIOUS, OR ON EDGE: SEVERAL DAYS
7. FEELING AFRAID AS IF SOMETHING AWFUL MIGHT HAPPEN: NOT AT ALL
7. FEELING AFRAID AS IF SOMETHING AWFUL MIGHT HAPPEN: NOT AT ALL
GAD7 TOTAL SCORE: 5
4. TROUBLE RELAXING: SEVERAL DAYS
6. BECOMING EASILY ANNOYED OR IRRITABLE: SEVERAL DAYS

## 2019-12-20 ASSESSMENT — PATIENT HEALTH QUESTIONNAIRE - PHQ9
SUM OF ALL RESPONSES TO PHQ QUESTIONS 1-9: 5
SUM OF ALL RESPONSES TO PHQ QUESTIONS 1-9: 5
10. IF YOU CHECKED OFF ANY PROBLEMS, HOW DIFFICULT HAVE THESE PROBLEMS MADE IT FOR YOU TO DO YOUR WORK, TAKE CARE OF THINGS AT HOME, OR GET ALONG WITH OTHER PEOPLE: SOMEWHAT DIFFICULT

## 2019-12-20 NOTE — PATIENT INSTRUCTIONS
No change to pain meds today, gives more time to see how things are going.  Message when need next refills near end of Jan  Pain clinic appt 2/5  Follow up depending on plan at pain clinic    Ok to stay off BP med if you keep monitoring BP --- goal under 130/80

## 2019-12-20 NOTE — PROGRESS NOTES
Subjective     Cynthia Lyons is a 49 year old female who presents to clinic today for the following health issues:    HPI   Chronic Pain Follow-Up       Type / Location of Pain: Neck and Back  Analgesia/pain control:       Recent changes:  same      Overall control: Tolerable with discomfort  Activity level/function:      Daily activities:  Able to do light housework, cooking    Work:  Able to work part time with limitations  Adverse effects:  No  Adherance    Taking medication as directed?  Yes    Participating in other treatments: yes pain clinic  Risk Factors:    Sleep:  Poor    Mood/anxiety:  worsened    Recent family or social stressors:  Is in the process of moving    Other aggravating factors: none  PHQ-9 SCORE 3/11/2019 8/7/2019 12/20/2019   PHQ-9 Total Score - - -   PHQ-9 Total Score MyChart - 2 (Minimal depression) 5 (Mild depression)   PHQ-9 Total Score 0 2 5     NANCI-7 SCORE 3/11/2019 8/7/2019 12/20/2019   Total Score - - -   Total Score - 0 (minimal anxiety) 5 (mild anxiety)   Total Score 0 0 5     Encounter-Level CSA - 08/11/2016:    Controlled Substance Agreement - Scan on 9/8/2016  8:37 AM: CONTROLLED SUBSTANCE AGREEMENT 08/11/2016     Encounter-Level CSA - 09/10/2014:    Controlled Substance Agreement - Scan on 9/16/2014  3:59 PM: [facility] Controlled Medication Agreement 9/10/14     Patient-Level CSA:    There are no patient-level csa.         How many servings of fruits and vegetables do you eat daily?  0-1    On average, how many sweetened beverages do you drink each day (Examples: soda, juice, sweet tea, etc.  Do NOT count diet or artificially sweetened beverages)?   1    How many days per week do you miss taking your medication? 0    Had tried [facility] clinic [facility]  Switched from norco to percocet plus MS Contin, but unsure that it helped at all.  Does note, however, that she has had very rough last few weeks with moving away from [place], also her favorite dog dying last week.  Was to try  belbuca for pain but she did not end up filling prescription.  She trialed off gabapentin but ended up resuming it.  She has appt with [place] pain clinic 2/5.  Has been off her BP med.  Is still trying to work on quitting smoking, but not using patches at this time.  Wt stable since Oct.      BP Readings from Last 3 Encounters:   12/20/19 122/80   10/03/19 104/68   08/25/19 119/81    Wt Readings from Last 3 Encounters:   12/20/19 88.9 kg (196 lb)   10/03/19 89.1 kg (196 lb 6.4 oz)   08/25/19 92.1 kg (203 lb)         Reviewed and updated as needed this visit by Provider  Tobacco  Allergies  Meds  Problems  Med Hx  Surg Hx  Fam Hx         Review of Systems   ROS COMP: Constitutional, musculoskeletal, neuro systems are negative, except as otherwise noted.      Objective    /80 (BP Location: Right arm, Patient Position: Chair, Cuff Size: Adult Large)   Pulse 69   Temp 98.1  F (36.7  C) (Tympanic)   Wt 88.9 kg (196 lb)   LMP  (LMP Unknown)   SpO2 97%   BMI 32.62 kg/m    Body mass index is 32.62 kg/m .  Physical Exam   GENERAL: healthy, alert and no distress  PSYCH: mentation appears normal, affect normal/bright     reviewed today          Assessment & Plan       ICD-10-CM    1. Status post laminectomy with spinal fusion Z98.1 oxyCODONE-acetaminophen (PERCOCET) 7.5-325 MG per tablet     oxyCODONE (OXYCONTIN) 10 MG 12 hr tablet     oxyCODONE-acetaminophen (PERCOCET) 7.5-325 MG per tablet     gabapentin (NEURONTIN) 600 MG tablet   2. Chronic pain disorder G89.4 oxyCODONE-acetaminophen (PERCOCET) 7.5-325 MG per tablet     oxyCODONE (OXYCONTIN) 10 MG 12 hr tablet     oxyCODONE-acetaminophen (PERCOCET) 7.5-325 MG per tablet     DULoxetine (CYMBALTA) 60 MG capsule   3. Moderate major depression (H) F32.1 DULoxetine (CYMBALTA) 60 MG capsule  Recent situational worsening, mild   4. Need for prophylactic vaccination and inoculation against influenza Z23 INFLUENZA VACCINE IM > 6 MONTHS VALENT IIV4 [75143]      Vaccine Administration, Initial [32564]   5. Tobacco abuse Z72.0 Encouraged to continue   6. Benign essential hypertension I10 Monitoring off med     Patient Instructions   No change to pain meds today, gives more time to see how things are going.  Message when need next refills near end of Jan  Pain clinic appt 2/5  Follow up depending on plan at pain clinic    Ok to stay off BP med if you keep monitoring BP --- goal under 130/80      Return if symptoms worsen or fail to improve.    Rosaura Flores PA-C  [facility]

## 2019-12-21 ASSESSMENT — PATIENT HEALTH QUESTIONNAIRE - PHQ9: SUM OF ALL RESPONSES TO PHQ QUESTIONS 1-9: 5

## 2019-12-21 ASSESSMENT — ANXIETY QUESTIONNAIRES: GAD7 TOTAL SCORE: 5

## 2020-01-03 ENCOUNTER — AMBULATORY - HEALTHEAST (OUTPATIENT)
Dept: PALLIATIVE MEDICINE | Facility: OTHER | Age: 50
End: 2020-01-03

## 2020-01-09 ENCOUNTER — MYC MEDICAL ADVICE (OUTPATIENT)
Dept: FAMILY MEDICINE | Facility: CLINIC | Age: 50
End: 2020-01-09

## 2020-01-09 DIAGNOSIS — Z98.1 STATUS POST LAMINECTOMY WITH SPINAL FUSION: ICD-10-CM

## 2020-01-09 DIAGNOSIS — G89.4 CHRONIC PAIN DISORDER: ICD-10-CM

## 2020-01-09 NOTE — LETTER
[facility] [address]  [address] [place] [id number]  Phone: [phone]  Fax: [phone]    01/10/20    Cynthia LUI Serena  [id number] [address] [address]      To whom it may concern:     Cynthia has been under my medical care since 2016.  Due to medical condition, she has the following work restrictions.  Work shifts need to be limited to 5 hours, or she needs a break every 2 hours.  Additionally she has lifting restrictions of 10 pounds.  These restrictions are lifelong.    Sincerely,      Rosaura Flores PA-C

## 2020-01-10 ENCOUNTER — MYC MEDICAL ADVICE (OUTPATIENT)
Dept: FAMILY MEDICINE | Facility: CLINIC | Age: 50
End: 2020-01-10

## 2020-01-10 RX ORDER — OXYCODONE HCL 10 MG/1
10 TABLET, FILM COATED, EXTENDED RELEASE ORAL EVERY 12 HOURS
Qty: 56 TABLET | Refills: 0 | Status: SHIPPED | OUTPATIENT
Start: 2020-01-27 | End: 2020-01-28

## 2020-01-10 RX ORDER — OXYCODONE AND ACETAMINOPHEN 7.5; 325 MG/1; MG/1
1 TABLET ORAL 2 TIMES DAILY PRN
Qty: 56 TABLET | Refills: 0 | Status: SHIPPED | OUTPATIENT
Start: 2020-01-10 | End: 2020-01-28

## 2020-01-10 NOTE — TELEPHONE ENCOUNTER
Discussed via phone with patient -  Inadvertent increase to percocet tid last month, confusion with the switching back from pain specialist and not taking the belluca, and difficulty following the .  Used to be on percocet tid when she wasn't on long acting medication, had hated morphine years ago.  Now with MS Contin, we should have reduced dose to bid.  Prescriptions corrected today.

## 2020-01-18 DIAGNOSIS — Z98.1 STATUS POST LAMINECTOMY WITH SPINAL FUSION: ICD-10-CM

## 2020-01-18 NOTE — TELEPHONE ENCOUNTER
Requested Prescriptions   Pending Prescriptions Disp Refills     methocarbamol (ROBAXIN) 750 MG tablet [Pharmacy Med Name: METHOCARBAMOL 750MG TABLETS] 120 tablet 4     Sig: TAKE 1 TO 2 TABLETS BY MOUTH UP TO THREE TIMES DAILY(4TH TIME PER DAY ON OCCASION IS OK)  Last Written Prescription Date:  10/03/2019 #120 x 4  Last filled - not provided  Last office visit: 12/20/2019 PORFIRIO Flores   Future Office Visit:  None         There is no refill protocol information for this order

## 2020-01-20 RX ORDER — METHOCARBAMOL 750 MG/1
TABLET, FILM COATED ORAL
Qty: 120 TABLET | Refills: 4 | OUTPATIENT
Start: 2020-01-20

## 2020-01-20 NOTE — TELEPHONE ENCOUNTER
I talked with the pharmacist and she does have refills left on the methocarbamol.  Mariaa Sierra RN

## 2020-01-27 ENCOUNTER — MYC MEDICAL ADVICE (OUTPATIENT)
Dept: FAMILY MEDICINE | Facility: CLINIC | Age: 50
End: 2020-01-27

## 2020-01-28 DIAGNOSIS — G89.4 CHRONIC PAIN DISORDER: ICD-10-CM

## 2020-01-28 DIAGNOSIS — Z98.1 STATUS POST LAMINECTOMY WITH SPINAL FUSION: ICD-10-CM

## 2020-01-28 RX ORDER — OXYCODONE AND ACETAMINOPHEN 7.5; 325 MG/1; MG/1
1 TABLET ORAL 2 TIMES DAILY PRN
Qty: 56 TABLET | Refills: 0 | Status: SHIPPED | OUTPATIENT
Start: 2020-01-28 | End: 2020-02-13

## 2020-01-28 RX ORDER — OXYCODONE HCL 10 MG/1
10 TABLET, FILM COATED, EXTENDED RELEASE ORAL EVERY 12 HOURS
Qty: 56 TABLET | Refills: 0 | Status: SHIPPED | OUTPATIENT
Start: 2020-01-28 | End: 2020-02-13

## 2020-01-28 NOTE — TELEPHONE ENCOUNTER
I talked with the pharmacist at [facility].  Cancelled the percocet RX.    Mariaa Sierra RN

## 2020-01-31 ENCOUNTER — ANCILLARY PROCEDURE (OUTPATIENT)
Dept: GENERAL RADIOLOGY | Facility: CLINIC | Age: 50
End: 2020-01-31
Attending: NURSE PRACTITIONER
Payer: COMMERCIAL

## 2020-01-31 ENCOUNTER — OFFICE VISIT (OUTPATIENT)
Dept: FAMILY MEDICINE | Facility: CLINIC | Age: 50
End: 2020-01-31
Payer: COMMERCIAL

## 2020-01-31 VITALS
DIASTOLIC BLOOD PRESSURE: 88 MMHG | HEART RATE: 91 BPM | SYSTOLIC BLOOD PRESSURE: 130 MMHG | OXYGEN SATURATION: 97 % | TEMPERATURE: 98 F | BODY MASS INDEX: 34.82 KG/M2 | HEIGHT: 65 IN | RESPIRATION RATE: 16 BRPM | WEIGHT: 209 LBS

## 2020-01-31 DIAGNOSIS — M25.511 ACUTE PAIN OF RIGHT SHOULDER: Primary | ICD-10-CM

## 2020-01-31 DIAGNOSIS — M25.511 ACUTE PAIN OF RIGHT SHOULDER: ICD-10-CM

## 2020-01-31 PROCEDURE — 73030 X-RAY EXAM OF SHOULDER: CPT | Mod: RT

## 2020-01-31 PROCEDURE — 99213 OFFICE O/P EST LOW 20 MIN: CPT | Performed by: NURSE PRACTITIONER

## 2020-01-31 ASSESSMENT — MIFFLIN-ST. JEOR: SCORE: 1573.9

## 2020-01-31 NOTE — PATIENT INSTRUCTIONS
1.  Continue Aleve for pain, use ice today and then ice or heat 3-4 times daily, salon spas can be apply every 24 hour and wear as directed.  2.  Get MRI completed and I will call you with results.  3.  Wear sling to help support the arm as needed.  4.  I recommend pendulum swings a couple times daily to promote movement in the shoulder.        Patient Education     Pendulum (Flexibility)    1. Lean over next to a table, with your left arm supporting your weight on the table.  2. Relax your right arm and let it hang straight down.  3. Slowly begin to swing your right arm in a small [place]. Gradually make the [place] bigger if you can. Change direction after 1 minute of motion.  4. Next, swing your right arm backward and forward. Then move it side to side. Change direction after 1 minute of motion.  5. Repeat these movements for about 5 minutes.  6. Do this exercise 3 times a day, or as often as instructed.  Date Last Reviewed: 3/10/2016    7510-0516 [employer]. [address] [place] [address], [place] [address]. All rights reserved. This information is not intended as a substitute for professional medical care. Always follow your healthcare professional's instructions.
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
## 2020-02-07 ENCOUNTER — MYC MEDICAL ADVICE (OUTPATIENT)
Dept: FAMILY MEDICINE | Facility: CLINIC | Age: 50
End: 2020-02-07

## 2020-02-10 ENCOUNTER — TELEPHONE (OUTPATIENT)
Dept: FAMILY MEDICINE | Facility: CLINIC | Age: 50
End: 2020-02-10

## 2020-02-10 NOTE — TELEPHONE ENCOUNTER
Reason for Call:prescription    Detailed comments: [facility] is calling regarding Pt's Belbuca 150 mcg 2 x daily. The pharmacy said this was ordered by Leonor Ramirez NP from [facility].  Concerned she is on Oxycontin and Percocet plus Belbuca from another provider.   Please Advise.    Phone Number Patient can be reached at: Other phone number:  [facility] [phone]    Best Time: Any Time      Can we leave a detailed message on this number? YES    Call taken on 2/10/2020 at 12:49 PM by Loren Santos

## 2020-02-10 NOTE — TELEPHONE ENCOUNTER
Should not be on belbuca plus other narcotics.  That said, she sent us a my chart message on Friday that she destroyed her pain meds and then realized how bad her pain was without pain meds.  I declined to replace them, so I'm guessing she's not overlapping belbuca with her pain pills.  Advise to get ok from [facility] before filling.

## 2020-02-10 NOTE — TELEPHONE ENCOUNTER
I just sent patient my chart message to do a urine drug screen before I'll ok belbuca.  Or patient can contact her pain clinic for the ok.  Notify pharmacy.

## 2020-02-11 NOTE — TELEPHONE ENCOUNTER
I talked with the pharmacist at [facility] and told her no OK for the Belbuca at this time  Mariaa Sierra RN

## 2020-02-13 DIAGNOSIS — G89.4 CHRONIC PAIN DISORDER: ICD-10-CM

## 2020-02-19 ENCOUNTER — COMMUNICATION - HEALTHEAST (OUTPATIENT)
Dept: PALLIATIVE MEDICINE | Facility: OTHER | Age: 50
End: 2020-02-19

## 2020-02-21 ENCOUNTER — HOSPITAL ENCOUNTER (OUTPATIENT)
Dept: PALLIATIVE MEDICINE | Facility: OTHER | Age: 50
Discharge: HOME | End: 2020-02-21
Attending: NURSE PRACTITIONER

## 2020-02-21 DIAGNOSIS — G89.4 CHRONIC PAIN SYNDROME: ICD-10-CM

## 2020-02-21 ASSESSMENT — MIFFLIN-ST. JEOR: SCORE: 1482.71

## 2020-02-23 LAB
6MAM UR QL: NOT DETECTED
7AMINOCLONAZEPAM UR QL: NOT DETECTED
A-OH ALPRAZ UR QL: NOT DETECTED
ALPRAZ UR QL: NOT DETECTED
AMPHET UR QL SCN: NOT DETECTED
BARBITURATES UR QL: NOT DETECTED
BUPRENORPHINE UR QL: PRESENT
BZE UR QL: NOT DETECTED
CARBOXYTHC UR QL: NOT DETECTED
CARISOPRODOL UR QL: NOT DETECTED
CLONAZEPAM UR QL: NOT DETECTED
CODEINE UR QL: NOT DETECTED
CREAT UR-MCNC: 81.2 MG/DL (ref 20–400)
DIAZEPAM UR QL: NOT DETECTED
ETHYL GLUCURONIDE UR QL: NOT DETECTED
FENTANYL UR QL: NOT DETECTED
HYDROCODONE UR QL: NOT DETECTED
HYDROMORPHONE UR QL: NOT DETECTED
LORAZEPAM UR QL: NOT DETECTED
MDA UR QL: NOT DETECTED
MDEA UR QL: NOT DETECTED
MDMA UR QL: NOT DETECTED
ME-PHENIDATE UR QL: NOT DETECTED
MEPERIDINE UR QL: NOT DETECTED
METHADONE UR QL: NOT DETECTED
METHAMPHET UR QL: NOT DETECTED
MIDAZOLAM UR QL SCN: NOT DETECTED
MORPHINE UR QL: NOT DETECTED
NORBUPRENORPHINE UR QL CFM: PRESENT
NORDIAZEPAM UR QL: NOT DETECTED
NORFENTANYL UR QL: NOT DETECTED
NORHYDROCODONE UR QL CFM: NOT DETECTED
NOROXYCODONE UR QL CFM: NOT DETECTED
NOROXYMORPHONE UR QL SCN: NOT DETECTED
OXAZEPAM UR QL: NOT DETECTED
OXYCODONE UR QL: NOT DETECTED
OXYMORPHONE UR QL: NOT DETECTED
PATHOLOGY STUDY: NORMAL
PCP UR QL: NOT DETECTED
PHENTERMINE UR QL: NOT DETECTED
PROPOXYPH UR QL: NOT DETECTED
SERVICE CMNT-IMP: NORMAL
TAPENTADOL UR QL SCN: NOT DETECTED
TAPENTADOL UR QL SCN: NOT DETECTED
TEMAZEPAM UR QL: NOT DETECTED
TRAMADOL UR QL: NOT DETECTED
ZOLPIDEM UR QL: NOT DETECTED

## 2020-02-28 ENCOUNTER — TRANSFERRED RECORDS (OUTPATIENT)
Dept: HEALTH INFORMATION MANAGEMENT | Facility: CLINIC | Age: 50
End: 2020-02-28

## 2020-02-28 ENCOUNTER — HOSPITAL ENCOUNTER (OUTPATIENT)
Dept: PALLIATIVE MEDICINE | Facility: OTHER | Age: 50
Discharge: HOME | End: 2020-02-28
Attending: NURSE PRACTITIONER

## 2020-02-28 DIAGNOSIS — G89.4 CHRONIC PAIN SYNDROME: ICD-10-CM

## 2020-02-28 DIAGNOSIS — M54.40 BILATERAL LOW BACK PAIN WITH SCIATICA, SCIATICA LATERALITY UNSPECIFIED, UNSPECIFIED CHRONICITY: ICD-10-CM

## 2020-02-28 ASSESSMENT — MIFFLIN-ST. JEOR: SCORE: 1614.26

## 2020-02-29 ENCOUNTER — COMMUNICATION - HEALTHEAST (OUTPATIENT)
Dept: PALLIATIVE MEDICINE | Facility: OTHER | Age: 50
End: 2020-02-29

## 2020-03-04 ENCOUNTER — TELEPHONE (OUTPATIENT)
Dept: FAMILY MEDICINE | Facility: CLINIC | Age: 50
End: 2020-03-04

## 2020-03-04 ENCOUNTER — OFFICE VISIT (OUTPATIENT)
Dept: DERMATOLOGY | Facility: CLINIC | Age: 50
End: 2020-03-04
Attending: PHYSICIAN ASSISTANT
Payer: COMMERCIAL

## 2020-03-04 VITALS — OXYGEN SATURATION: 96 % | HEART RATE: 88 BPM | SYSTOLIC BLOOD PRESSURE: 113 MMHG | DIASTOLIC BLOOD PRESSURE: 73 MMHG

## 2020-03-04 DIAGNOSIS — D18.01 ANGIOMA OF SKIN: ICD-10-CM

## 2020-03-04 DIAGNOSIS — L81.4 LENTIGO: ICD-10-CM

## 2020-03-04 DIAGNOSIS — L82.1 SEBORRHEIC KERATOSIS: ICD-10-CM

## 2020-03-04 DIAGNOSIS — L98.9 SKIN PROBLEM: ICD-10-CM

## 2020-03-04 DIAGNOSIS — L70.0 COMEDONE: Primary | ICD-10-CM

## 2020-03-04 DIAGNOSIS — D23.9 DERMAL NEVUS: ICD-10-CM

## 2020-03-04 DIAGNOSIS — L70.0 COMEDONE: ICD-10-CM

## 2020-03-04 PROCEDURE — 99204 OFFICE O/P NEW MOD 45 MIN: CPT | Performed by: DERMATOLOGY

## 2020-03-04 RX ORDER — OXYCODONE AND ACETAMINOPHEN 5; 325 MG/1; MG/1
1-2 TABLET ORAL
COMMUNITY
Start: 2020-02-28 | End: 2020-05-06

## 2020-03-04 RX ORDER — TRETINOIN 0.5 MG/G
CREAM TOPICAL AT BEDTIME
Qty: 20 G | Refills: 3 | Status: SHIPPED | OUTPATIENT
Start: 2020-03-04 | End: 2020-03-09
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
## 2020-03-04 NOTE — PATIENT INSTRUCTIONS
*Stop picking!!  *Use a daily moisturizer in the morning with SPF 30 in it  *Use a salicylic acid wash or pads daily  *RX cream at bedtime  *Moisturize face after (cetaphil, cerave, vanicream)
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
## 2020-03-04 NOTE — TELEPHONE ENCOUNTER
Let ptient know    She can buy out of pocket here or good rxPrior Authorization Retail Medication Request    Medication/Dose: Tretinoin 0.05% cream   ICD code (if different than what is on RX):    Previously Tried and Failed:    Rationale:  MAXIMUM PATIENT AGE OF 36  (PHARMACY HELP DESK [phone])    Insurance Name:  [employer]   Insurance ID:  [id number]      Pharmacy Information (if different than what is on RX)  Name:    Phone:

## 2020-03-09 RX ORDER — TRETINOIN 0.5 MG/G
CREAM TOPICAL AT BEDTIME
Qty: 20 G | Refills: 3 | Status: SHIPPED | OUTPATIENT
Start: 2020-03-09 | End: 2021-03-04

## 2020-03-09 NOTE — TELEPHONE ENCOUNTER
Message left to return call.     Per Dr. Melton addended note below:     Let patient know- She can buy out of pocket or goodrx.    (May need to use a [facility] pharmacy to get 20 g for approx $32..)     Meliza Saavedra RN

## 2020-03-09 NOTE — TELEPHONE ENCOUNTER
Patient notified. Patient verbalized understanding. She wants to try medication and will send to [facility] coupon mailed to her home address which I verified. Meliza Saavedra RN

## 2020-03-11 ENCOUNTER — TRANSFERRED RECORDS (OUTPATIENT)
Dept: HEALTH INFORMATION MANAGEMENT | Facility: CLINIC | Age: 50
End: 2020-03-11

## 2020-03-16 ENCOUNTER — COMMUNICATION - HEALTHEAST (OUTPATIENT)
Dept: PALLIATIVE MEDICINE | Facility: OTHER | Age: 50
End: 2020-03-16

## 2020-03-18 ENCOUNTER — COMMUNICATION - HEALTHEAST (OUTPATIENT)
Dept: PALLIATIVE MEDICINE | Facility: OTHER | Age: 50
End: 2020-03-18

## 2020-03-18 DIAGNOSIS — G89.4 CHRONIC PAIN SYNDROME: ICD-10-CM

## 2020-03-18 DIAGNOSIS — M54.40 BILATERAL LOW BACK PAIN WITH SCIATICA, SCIATICA LATERALITY UNSPECIFIED, UNSPECIFIED CHRONICITY: ICD-10-CM

## 2020-03-20 ENCOUNTER — COMMUNICATION - HEALTHEAST (OUTPATIENT)
Dept: PALLIATIVE MEDICINE | Facility: OTHER | Age: 50
End: 2020-03-20

## 2020-03-20 ENCOUNTER — TRANSFERRED RECORDS (OUTPATIENT)
Dept: HEALTH INFORMATION MANAGEMENT | Facility: CLINIC | Age: 50
End: 2020-03-20

## 2020-03-23 ENCOUNTER — COMMUNICATION - HEALTHEAST (OUTPATIENT)
Dept: PALLIATIVE MEDICINE | Facility: OTHER | Age: 50
End: 2020-03-23

## 2020-03-23 DIAGNOSIS — M54.40 BILATERAL LOW BACK PAIN WITH SCIATICA, SCIATICA LATERALITY UNSPECIFIED, UNSPECIFIED CHRONICITY: ICD-10-CM

## 2020-03-23 DIAGNOSIS — G89.4 CHRONIC PAIN SYNDROME: ICD-10-CM

## 2020-03-24 ENCOUNTER — E-VISIT (OUTPATIENT)
Dept: FAMILY MEDICINE | Facility: CLINIC | Age: 50
End: 2020-03-24
Payer: COMMERCIAL

## 2020-03-24 DIAGNOSIS — J01.90 ACUTE SINUSITIS WITH SYMPTOMS > 10 DAYS: ICD-10-CM

## 2020-03-24 PROCEDURE — 99421 OL DIG E/M SVC 5-10 MIN: CPT | Performed by: PHYSICIAN ASSISTANT

## 2020-04-03 ENCOUNTER — E-VISIT (OUTPATIENT)
Dept: FAMILY MEDICINE | Facility: CLINIC | Age: 50
End: 2020-04-03
Payer: COMMERCIAL

## 2020-04-03 ENCOUNTER — MYC MEDICAL ADVICE (OUTPATIENT)
Dept: FAMILY MEDICINE | Facility: CLINIC | Age: 50
End: 2020-04-03

## 2020-04-03 DIAGNOSIS — Z98.1 STATUS POST LAMINECTOMY WITH SPINAL FUSION: ICD-10-CM

## 2020-04-03 DIAGNOSIS — G89.4 CHRONIC PAIN DISORDER: ICD-10-CM

## 2020-04-03 DIAGNOSIS — F32.1 MODERATE MAJOR DEPRESSION (H): ICD-10-CM

## 2020-04-03 DIAGNOSIS — Z72.0 TOBACCO ABUSE: ICD-10-CM

## 2020-04-03 DIAGNOSIS — F33.1 MAJOR DEPRESSIVE DISORDER, RECURRENT EPISODE, MODERATE (H): ICD-10-CM

## 2020-04-03 PROCEDURE — 99423 OL DIG E/M SVC 21+ MIN: CPT | Performed by: PHYSICIAN ASSISTANT

## 2020-04-03 RX ORDER — GABAPENTIN 600 MG/1
TABLET ORAL
Qty: 120 TABLET | Refills: 5 | Status: SHIPPED | OUTPATIENT
Start: 2020-04-03 | End: 2020-11-11

## 2020-04-03 RX ORDER — BUPROPION HYDROCHLORIDE 300 MG/1
TABLET ORAL
Qty: 90 TABLET | Refills: 1 | Status: SHIPPED | OUTPATIENT
Start: 2020-04-03 | End: 2020-05-07

## 2020-04-03 ASSESSMENT — PATIENT HEALTH QUESTIONNAIRE - PHQ9
SUM OF ALL RESPONSES TO PHQ QUESTIONS 1-9: 20
SUM OF ALL RESPONSES TO PHQ QUESTIONS 1-9: 20
10. IF YOU CHECKED OFF ANY PROBLEMS, HOW DIFFICULT HAVE THESE PROBLEMS MADE IT FOR YOU TO DO YOUR WORK, TAKE CARE OF THINGS AT HOME, OR GET ALONG WITH OTHER PEOPLE: SOMEWHAT DIFFICULT

## 2020-04-03 ASSESSMENT — ANXIETY QUESTIONNAIRES
1. FEELING NERVOUS, ANXIOUS, OR ON EDGE: NEARLY EVERY DAY
3. WORRYING TOO MUCH ABOUT DIFFERENT THINGS: NEARLY EVERY DAY
7. FEELING AFRAID AS IF SOMETHING AWFUL MIGHT HAPPEN: SEVERAL DAYS
GAD7 TOTAL SCORE: 18
6. BECOMING EASILY ANNOYED OR IRRITABLE: NEARLY EVERY DAY
5. BEING SO RESTLESS THAT IT IS HARD TO SIT STILL: MORE THAN HALF THE DAYS
2. NOT BEING ABLE TO STOP OR CONTROL WORRYING: NEARLY EVERY DAY
7. FEELING AFRAID AS IF SOMETHING AWFUL MIGHT HAPPEN: SEVERAL DAYS
4. TROUBLE RELAXING: NEARLY EVERY DAY
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
## 2020-04-04 ENCOUNTER — VIRTUAL VISIT (OUTPATIENT)
Dept: FAMILY MEDICINE | Facility: OTHER | Age: 50
End: 2020-04-04

## 2020-04-04 ENCOUNTER — MYC MEDICAL ADVICE (OUTPATIENT)
Dept: FAMILY MEDICINE | Facility: CLINIC | Age: 50
End: 2020-04-04

## 2020-04-04 ASSESSMENT — ANXIETY QUESTIONNAIRES: GAD7 TOTAL SCORE: 18

## 2020-04-04 ASSESSMENT — PATIENT HEALTH QUESTIONNAIRE - PHQ9: SUM OF ALL RESPONSES TO PHQ QUESTIONS 1-9: 20

## 2020-04-04 NOTE — PROGRESS NOTES
"Date: 2020 10:52:21  Clinician: Blanca Lao  Clinician NPI: [id number]  Patient: Cynthia Lyons  Patient : 1970  Patient Address: [address] [place], [place] [id number]  Patient Phone: [phone]  Visit Protocol: URI  Patient Summary:  Cynthia is a 50 year old ( : 1970 ) female who initiated a Visit for COVID-19 (Coronavirus) evaluation and screening. When asked the question \"Please sign me up to receive news, health information and promotions. \", Cynthia responded \"Yes\".    Cynthia states her symptoms started 1-2 days ago.   Her symptoms consist of myalgia, a sore throat, a cough, malaise, chills, and a headache. Cynthia also feels feverish.   Symptom details     Cough: Cynthia coughs a few times an hour and her cough is not more bothersome at night. Phlegm does not come into her throat when she coughs. She does not believe her cough is caused by post-nasal drip.     Sore throat: Cynthia reports having mild throat pain (1-3 on a 10 point pain scale), does not have exudate on her tonsils, and can swallow liquids. She is not sure if the lymph nodes in her neck are enlarged. A rash has not appeared on the skin since the sore throat started.     Temperature: Her current temperature is 102.3 degrees Fahrenheit. Cynthia has had a temperature over 100 degrees Fahrenheit for 1-2 days.     Headache: She states the headache is severe (7-9 on a 10 point pain scale).      Cynthia denies having rhinitis, facial pain or pressure, nasal congestion, ear pain, diarrhea, vomiting, nausea, teeth pain, and wheezing. She also denies having recent facial or sinus surgery in the past 60 days. She is not experiencing dyspnea.   Precipitating events  Within the past week, Cynthia has not been exposed to someone with strep throat. She has not recently been exposed to someone with influenza. Cynthia has been in close contact with the following high risk individuals: children under the age of 5.   " Pertinent COVID-19 (Coronavirus) information  Cynthia has not traveled internationally or to the areas where COVID-19 (Coronavirus) is widespread, including cruise ship travel in the last 14 days before the start of her symptoms.   Cynthia has not had a close contact with a laboratory-confirmed COVID-19 patient within 14 days of symptom onset. She also has not had a close contact with a suspected COVID-19 patient within 14 days of symptom onset.   Cynthia does not work or volunteer as healthcare worker or a  and does not work or volunteer in a healthcare facility. She does not live with a healthcare worker.   Triage Point(s) temporarily suspended for COVID-19 (Coronavirus) screening  Cynthia reported the following symptoms which were previously protocol referral points. These protocol referral points have temporarily been removed for purposes of COVID-19 (Coronavirus) screening.   Temperature &gt; 102. Current temperature: 102.3    Pertinent medical history  Cynthia had 1 sinus infection within the past year.   Cynthia has taken an antibiotic medication in the past month. Antibiotic details as reported by the patient (free text): Augmentin last week. Possible sinus infection   Cynthia typically gets a yeast infection when she takes antibiotics. She has used fluconazole (Diflucan) to treat previous yeast infections. 2 doses of fluconazole (Diflucan) has typically been needed for symptoms to resolve in the past.  Cynthia does not need a return to work/school note.   Weight: 190 lbs   Cynthia smokes or uses smokeless tobacco.   She denies pregnancy and denies breastfeeding. She does not menstruate.   Weight: 190 lbs    MEDICATIONS: lisinopril oral, Belbuca buccal, Percocet oral, Cymbalta oral, Wellbutrin SR oral, Zyrtec oral, gabapentin oral, methocarbamol oral, ALLERGIES: NKDA  Clinician Response:  Dear Boydchante,  Based on the information provided, you have an influenza-like illness. This is an  infection that has the same symptoms of the flu, but the specific virus is not known. Lab testing would be required to confirm the flu virus, but this is often not necessary because the treatment will be the same no matter what is causing your symptoms.  Your symptoms should improve gradually over the next week.  Medication information  I am prescribing:       Azelastine 137 mcg (0.1 %) nasal aerosol, spray. Inhale 1-2 sprays in each nostril 2 times per day. There are no refills with this prescription.      Benzonatate (Tessalon Perles) 100 mg oral capsule. Take 1-2 capsules by mouth 3 times per day as needed for your cough. There are no refills with this prescription.     The CDC recommends treatment for flu only if antiviral medications can be started within 48 hours of the first flu symptoms or if you fall into one of the high-risk groups that are more likely to get flu complications.  Since you do not meet guidelines for treatment with antiviral medications, antiviral treatment is not recommended for you. Treatment focuses on controlling your symptoms.  Unless you are allergic to the over-the-counter medication(s) below, I recommend using:       Acetaminophen (Tylenol or store brand) oral tablet. Take 1-2 tablets by mouth every 4-6 hours to help with the discomfort.    A decongestant such as Sudafed PE or store brand.      Dextromethorphan (Robitussin DM or store brand). This medication is a cough suppressant that works by decreasing the feeling of needing to cough. Please follow the instructions on the package.    A sinus irrigation kit such as Sinus Rinse, Neti Pot, SinuCleanse, or store brand. Be sure to use sterile or previously boiled water to prevent unwanted infections.     Over-the-counter medications do not require a prescription. Ask the pharmacist if you have any questions.  Self care  Steps you can take to be as comfortable as possible:     Rest.    Drink plenty of fluids.    Use throat lozenges.     Suck on frozen items such as popsicles.    Drink hot tea with lemon and honey.    Gargle with warm salt water (1/4 teaspoon of salt per 8 ounce glass of water).    Take a spoonful of honey to reduce your cough.     If you have a fever, stay home until your temperature has returned to normal for 24 hours and you feel well enough for daily activities. And of course, wash your hands often to prevent spreading the flu and other illnesses. However, the best way to prevent the flu is to get a flu shot before each flu season.  Also, as your provider, I need you to know that becoming tobacco-free is the most important thing you can do to protect your current and future health.  When to seek care  Please be seen in a clinic or urgent care if any of the following occur:   New symptoms develop, or symptoms become worse   Call ahead before going to the clinic or urgent care.  Call 911 or go to the emergency room if you feel that your throat is closing off, you suddenly develop a rash, you are unable to swallow fluids, you are drooling, or you are having difficulty breathing.  Additional treatment plan   Based on the information you have provided, you do have symptoms that are consistent with Coronavirus (COVID-19).  The coronavirus causes mild to severe respiratory illness with the most common symptoms including fever, cough and difficulty breathing. Unfortunately, many viruses cause similar symptoms and it can be difficult to distinguish between viruses, especially in mild cases, so we are presuming that anyone with cough or fever has coronavirus at this time.  Coronavirus/COVID-19 has reached the point of community spread in [place], meaning that we are finding the virus in people with no known exposure risk for silvana the virus. Given the increasing commonness of coronavirus in the community we are no longer testing patients who are not critically ill.  If you are a health care worker, you should refer to your  employee health office for instructions about testing and returning to work.  For everyone else who has cough or fever, you should assume you are infected with coronavirus. Since you will not be tested but have symptoms that may be consistent with coronavirus, the CDC recommends you stay in self-isolation until these three things have happened:    You have had no fever for at least 72 hours (that is three full days of no fever without the use of medicine that reduces fevers)    AND   Other symptoms have improved (for example, when your cough or shortness of breath have improved)   AND   At least 7 days have passed since your symptoms first appeared.   How to Isolate:   Isolate yourself at home.  Do Not allow any visitors  Do Not go to work or school  Do Not go to [employer],  centers, shopping, or other public places.  Do Not shake hands.  Avoid close contact with others (hugging, kissing).   Protect Others:   Cover Your Mouth and Nose with a mask, disposable tissue or wash cloth to avoid spreading germs to others.  Wash your hands and face frequently with soap and water.   We know it can be scary to hear that you might have COVID-19. Our team can help track your symptoms and make sure you are doing ok over the next two weeks using a program called [employer] to keep in touch. When you receive an email from [employer], please consider enrolling in our monitoring program. There is no cost to you for monitoring. Here is a URL where you can learn more: [email or web address]  Managing Symptoms:   At this time, we primarily recommend Tylenol (Acetaminophen) for fever or pain. If you have liver or kidney problems, contact your primary care provider for instructions on use of tylenol. Adults can take 650 mg (two 325 mg pills) by mouth every 4-6 hours as needed OR 1,000 mg (two 500 mg pills) every 8 hours as needed. MAXIMUM DAILY DOSE: 3,000mg. For children, refer to dosing on bottle based on age or  weight.   If you develop significant shortness of breath that prevents you from doing normal activities, please call 911 or proceed to the nearest emergency room and alert them immediately that you have been in self-isolation for possible coronavirus.  If you have a higher risk medical condition such as cancer, heart failure, end stage renal disease on dialysis or have a transplant, please reach out to your specialist's clinic to advise them of your [facility] visit should you not improve within the next two days.   For more information about COVID19 and options for caring for yourself at home, please visit the CDC website at https://www.cdc.gov/coronavirus/2019-ncov/about/steps-when-sick.htmlFor more options for care at [facility], please visit our website at [email or web address]    COVID-19 (Coronavirus) General Information  With the increase in the number of COVID-19 (Coronavirus) cases, we understand you may have some questions. Below is some helpful information on COVID-19 (Coronavirus).  How can I protect myself and others from the COVID-19 (Coronavirus)?  Because there is currently no vaccine to prevent infection, the best way to protect yourself is to avoid being exposed to this virus. Put distance between yourself and other people if COVID-19 (Coronavirus) is spreading in your community. The virus is thought to spread mainly from person-to-person.     Between people who are in close contact with one another (within about 6 about) for a prolonged period (10 minutes or longer).    Through respiratory droplets produced when an infected person coughs or sneezes.     The CDC recommends the following additional steps to protect yourself and others:     Wash your hands often with soap and water for at least 20 seconds, especially after blowing your nose, coughing, or sneezing; going to the bathroom; and before eating or preparing food.  Use an alcohol-based hand  that  contains at least 60 percent alcohol if soap and water are not available.        Avoid touching your eyes, nose and mouth with unwashed hands.    Avoid close contact with people who are sick.    Stay home when you are sick.    Cover your cough or sneeze with a tissue, then throw the tissue in the trash.    Clean and disinfect frequently touched objects and surfaces.     You can help stop COVID-19 (Coronavirus) by knowing the signs and symptoms:     Fever    Cough    Shortness of breath     Contact your healthcare provider if   Develop symptoms   AND   Have been in close contact with a person known to have COVID-19 (Coronavirus) or live in or have recently traveled from an area with ongoing spread of COVID-19 (Coronavirus). Call ahead before you go to a doctor's office or emergency room. Tell them about your recent travel and your symptoms.   For the most up to date information, visit the CDC's website.  Self-monitoring  Self-monitoring means people should monitor themselves for fever by taking their temperatures twice a day and remain alert for a cough or difficulty breathing.  It is important to check your health two times each day for 14 days after a potential exposure to a person with COVID-19 (Coronavirus) or after travel from a location where COVID-19 (Coronavirus) is widespread. If you have been exposed to a person with COVID-19 (Coronavirus), it may take up to 14 days to know if you will get sick. Follow the steps below to check and record your health.     Take your temperature with a thermometer twice a day, once in the morning and once in the evening, and watch for a cough or difficulty breathing for 14 days.    Write down your temperature and any COVID-19 symptoms you may have: feeling feverish, coughing, or difficulty breathing.    Stay home from work or school.    Do not take public transportation, taxis, or ride-shares.    Avoid crowded places (such as shopping centers and movie theaters) and limit your  activities in public.    Keep your distance from others (about 6 feet or 2 meters).    If you get sick with fever, cough, or trouble breathing, contact your healthcare provider and tell them about your recent travel and/or your symptoms.    If you need to seek medical care for other reasons, such as dialysis, call ahead to your doctor and tell them about your recent travel.     Steps to help prevent the spread of COVID-19 (Coronavirus) if you are sick  If you are sick with COVID-19 (Coronavirus) or suspect you are infected with the virus that causes COVID-19 (Coronavirus), follow the steps below to help prevent the disease from spreading&nbsp;to people in your home and community.     Stay home except to get medical care. Home isolation may be started in consultation with your healthcare clinician.    Separate yourself from other people and animals in your home.    Call ahead before visiting your doctor if you have a medical appointment.    Wear a facemask when you are around other people.    Cover your cough and sneezes.    Clean your hands often.    Avoid sharing personal household items.    Clean and disinfect frequently touched objects and surfaces everyday.    You will need to have someone drop off medications or household supplies (if needed) at your house without coming inside or in contact with you or others living in your house.    Monitor your symptoms and seek prompt medical care if your illness is worsening (e.g. Difficulty breathing).    Discontinue home isolation only in consultation with your healthcare provider.     For more detailed and up to date information on what to do if you are sick, visit this link: What to Do If You Are Sick With COVID-19.  Do I need to be tested for COVID-19 (Coronavirus)?     Not everyone needs to be tested for COVID-19 (Coronavirus). Decisions on which patients receive testing will be based on the local spread of COVID-19 (Coronavirus) as well as the symptoms. Your  "healthcare provider will make the final decision on whether you should be tested.    In the meantime, if you have concerns that you may have been exposed, it is reasonable to practice \"social distancing.\"&nbsp; If you are ill with a cold or flu-like illness, please monitor your symptoms and call your healthcare provider if your symptoms worsen.    For more up to date information, visit this link: COVID-19 (Coronavirus) Frequently Asked Questions and Answers.      Diagnosis: Coronavirus infection, unspecified  Diagnosis ICD: B34.2  Prescription: azelastine 137 mcg (0.1 %) nasal aerosol,spray 1 200 spray squeeze bottle, 30 days supply. Inhale 2 sprays in each nostril 2 times per day. Refills: 0, Refill as needed: no, Allow substitutions: yes  Prescription: benzonatate (Tessalon Perles) 100 mg oral capsule 30 capsule, 5 days supply. Take 1-2 capsules by mouth 3 times per day as needed. Refills: 0, Refill as needed: no, Allow substitutions: yes  "

## 2020-04-06 RX ORDER — DULOXETIN HYDROCHLORIDE 30 MG/1
CAPSULE, DELAYED RELEASE ORAL
Qty: 14 CAPSULE | Refills: 0 | Status: SHIPPED | OUTPATIENT
Start: 2020-04-06 | End: 2020-05-07

## 2020-04-22 ENCOUNTER — HOSPITAL ENCOUNTER (OUTPATIENT)
Dept: PALLIATIVE MEDICINE | Facility: OTHER | Age: 50
Discharge: HOME | End: 2020-04-22
Attending: NURSE PRACTITIONER

## 2020-04-22 DIAGNOSIS — G89.4 CHRONIC PAIN SYNDROME: ICD-10-CM

## 2020-04-22 DIAGNOSIS — M54.40 BILATERAL LOW BACK PAIN WITH SCIATICA, SCIATICA LATERALITY UNSPECIFIED, UNSPECIFIED CHRONICITY: ICD-10-CM

## 2020-04-22 ASSESSMENT — MIFFLIN-ST. JEOR: SCORE: 1550.75

## 2020-05-06 ENCOUNTER — VIRTUAL VISIT (OUTPATIENT)
Dept: PALLIATIVE MEDICINE | Facility: CLINIC | Age: 50
End: 2020-05-06
Payer: COMMERCIAL

## 2020-05-06 DIAGNOSIS — M96.1 FAILED BACK SURGICAL SYNDROME: Primary | ICD-10-CM

## 2020-05-06 DIAGNOSIS — M54.2 CERVICALGIA: ICD-10-CM

## 2020-05-06 PROCEDURE — 99204 OFFICE O/P NEW MOD 45 MIN: CPT | Mod: TEL | Performed by: PSYCHIATRY & NEUROLOGY

## 2020-05-06 RX ORDER — OXYCODONE AND ACETAMINOPHEN 5; 325 MG/1; MG/1
1-2 TABLET ORAL
COMMUNITY
Start: 2020-05-06 | End: 2020-05-21

## 2020-05-06 ASSESSMENT — PAIN SCALES - GENERAL: PAINLEVEL: SEVERE PAIN (7)
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
## 2020-05-07 ENCOUNTER — COMMUNICATION - HEALTHEAST (OUTPATIENT)
Dept: PHARMACY | Facility: CLINIC | Age: 50
End: 2020-05-07

## 2020-05-07 ENCOUNTER — E-VISIT (OUTPATIENT)
Dept: FAMILY MEDICINE | Facility: CLINIC | Age: 50
End: 2020-05-07
Payer: COMMERCIAL

## 2020-05-07 ENCOUNTER — MYC MEDICAL ADVICE (OUTPATIENT)
Dept: FAMILY MEDICINE | Facility: CLINIC | Age: 50
End: 2020-05-07

## 2020-05-07 DIAGNOSIS — F32.1 MODERATE MAJOR DEPRESSION (H): ICD-10-CM

## 2020-05-07 DIAGNOSIS — M62.838 MUSCLE SPASM: Primary | ICD-10-CM

## 2020-05-07 DIAGNOSIS — E78.5 DYSLIPIDEMIA: ICD-10-CM

## 2020-05-07 PROCEDURE — 99423 OL DIG E/M SVC 21+ MIN: CPT | Performed by: PHYSICIAN ASSISTANT

## 2020-05-07 RX ORDER — ATORVASTATIN CALCIUM 20 MG/1
20 TABLET, FILM COATED ORAL DAILY
Qty: 90 TABLET | Refills: 1 | Status: SHIPPED | OUTPATIENT
Start: 2020-05-07 | End: 2021-03-08

## 2020-05-07 RX ORDER — FLUOXETINE 40 MG/1
40 CAPSULE ORAL DAILY
Qty: 90 CAPSULE | Refills: 1 | Status: SHIPPED | OUTPATIENT
Start: 2020-05-07 | End: 2020-08-22

## 2020-05-11 ENCOUNTER — COMMUNICATION - HEALTHEAST (OUTPATIENT)
Dept: PALLIATIVE MEDICINE | Facility: OTHER | Age: 50
End: 2020-05-11

## 2020-05-11 DIAGNOSIS — F33.1 MAJOR DEPRESSIVE DISORDER, RECURRENT EPISODE, MODERATE (H): ICD-10-CM

## 2020-05-11 DIAGNOSIS — A60.00 HERPES SIMPLEX INFECTION OF GENITOURINARY SYSTEM: ICD-10-CM

## 2020-05-11 DIAGNOSIS — G89.4 CHRONIC PAIN SYNDROME: ICD-10-CM

## 2020-05-11 DIAGNOSIS — F41.1 GENERALIZED ANXIETY DISORDER: ICD-10-CM

## 2020-05-11 DIAGNOSIS — Z98.1 STATUS POST LAMINECTOMY WITH SPINAL FUSION: ICD-10-CM

## 2020-05-11 DIAGNOSIS — J30.89 OTHER ALLERGIC RHINITIS: ICD-10-CM

## 2020-05-11 DIAGNOSIS — G89.4 CHRONIC PAIN DISORDER: ICD-10-CM

## 2020-05-12 ENCOUNTER — E-VISIT (OUTPATIENT)
Dept: FAMILY MEDICINE | Facility: CLINIC | Age: 50
End: 2020-05-12
Payer: COMMERCIAL

## 2020-05-12 ENCOUNTER — TELEPHONE (OUTPATIENT)
Dept: FAMILY MEDICINE | Facility: CLINIC | Age: 50
End: 2020-05-12

## 2020-05-12 DIAGNOSIS — R19.8 GI SYMPTOM: Primary | ICD-10-CM

## 2020-05-12 PROCEDURE — 99207 ZZC NO BILLABLE SERVICE THIS VISIT: CPT | Performed by: PHYSICIAN ASSISTANT

## 2020-05-12 NOTE — TELEPHONE ENCOUNTER
Cynthia Lyons is a 50 year old female  who calls with abdominal pain.    NURSING ASSESSMENT:  The pain began 4 days ago.    Pain scale (0-10): 7/10  LMP  was  N/A.  The pain is described as dull and cramping and starts in stomach and goes through the intestinal tract.   She does not sound ill.  Symptom associated with the abdominal pain: nausea, bloating, flatus and chills.  Patient has not had previous abdominal surgery, including none.  Pain is aggravated by eating, and relieved by nothing.  Allergies:   Allergies   Allergen Reactions     No Clinical Screening - See Comments Hives     Oxycontin [Oxycodone Hcl] Itching and Rash       MEDICATIONS:   Taking medication(s) as prescribed? Yes  Taking over the counter medication(s)? No  Any medication side effects? No significant side effects    Any barriers to taking medication(s) as prescribed?  No  Medication(s) improving/managing symptoms?  N/A  Medication reconciliation completed: No    NURSING PLAN: Nursing advice to patient advised that Rosaura will see the E Visit tomorrow    RECOMMENDED DISPOSITION:  Home care advice - fluids,  If eating makes you sick, start out slow, just be sure getting fluids.  ED only if worse  Will comply with recommendation: Yes  If further questions/concerns or if symptoms do not improve, worsen or new symptoms develop, call your PCP or [facility] as soon as possible.        Mariaa Sierra RN

## 2020-05-12 NOTE — TELEPHONE ENCOUNTER
Routing refill request to provider for review/approval because:  Labs not current:  Cr  Creatinine   Date Value Ref Range Status   01/18/2019 0.84 0.52 - 1.04 mg/dL Final     Chapis ARMANDO RN, BSN

## 2020-05-12 NOTE — TELEPHONE ENCOUNTER
"Reason for call:  Patient reporting a symptom    Symptom or request: Patient sent Rosaura an e-visit today.  Rosaura is out, patient is now calling about it.  As stated in E-visit note from patient \"Day 4 of severe stomach pain and intestinal discomfort.  If I eat , it triggers it bad.  I dont have diahhrea but that's how I feel...like my body is trying to eliminate whatever it is.  I'm having trouble sleeping and then it causes hot flashes when the pain starts after I eat.  I just feel all around blah\"  Patient states that eating is what starts it and her stomach gets very loud and gurgling.    Duration (how long have symptoms been present): 4 days    Have you been treated for this before? No    Additional comments:     Phone Number patient can be reached at:  Home number on file [phone] (home)    Best Time:  Any    Can we leave a detailed message on this number:  YES    Call taken on 5/12/2020 at 1:31 PM by Marion Lofton  "

## 2020-05-13 RX ORDER — VALACYCLOVIR HYDROCHLORIDE 500 MG/1
500 TABLET, FILM COATED ORAL DAILY
Qty: 30 TABLET | Refills: 5 | Status: SHIPPED | OUTPATIENT
Start: 2020-05-13 | End: 2021-08-12

## 2020-05-21 ENCOUNTER — MYC MEDICAL ADVICE (OUTPATIENT)
Dept: FAMILY MEDICINE | Facility: CLINIC | Age: 50
End: 2020-05-21
Payer: COMMERCIAL

## 2020-05-21 ENCOUNTER — VIRTUAL VISIT (OUTPATIENT)
Dept: FAMILY MEDICINE | Facility: CLINIC | Age: 50
End: 2020-05-21
Payer: COMMERCIAL

## 2020-05-21 VITALS
HEART RATE: 79 BPM | DIASTOLIC BLOOD PRESSURE: 70 MMHG | WEIGHT: 215 LBS | OXYGEN SATURATION: 97 % | RESPIRATION RATE: 20 BRPM | BODY MASS INDEX: 35.78 KG/M2 | TEMPERATURE: 97.6 F | SYSTOLIC BLOOD PRESSURE: 118 MMHG

## 2020-05-21 DIAGNOSIS — R10.11 RUQ ABDOMINAL PAIN: Primary | ICD-10-CM

## 2020-05-21 DIAGNOSIS — J32.0 CHRONIC MAXILLARY SINUSITIS: ICD-10-CM

## 2020-05-21 DIAGNOSIS — R50.9 FEVER, UNSPECIFIED FEVER CAUSE: ICD-10-CM

## 2020-05-21 DIAGNOSIS — R53.81 MALAISE: ICD-10-CM

## 2020-05-21 DIAGNOSIS — R10.9 ABDOMINAL CRAMPS: ICD-10-CM

## 2020-05-21 DIAGNOSIS — R73.01 ELEVATED FASTING GLUCOSE: Primary | ICD-10-CM

## 2020-05-21 LAB
ALBUMIN SERPL-MCNC: 3.9 G/DL (ref 3.4–5)
ALP SERPL-CCNC: 66 U/L (ref 40–150)
ALT SERPL W P-5'-P-CCNC: 23 U/L (ref 0–50)
ANION GAP SERPL CALCULATED.3IONS-SCNC: 4 MMOL/L (ref 3–14)
AST SERPL W P-5'-P-CCNC: 17 U/L (ref 0–45)
BILIRUB SERPL-MCNC: 0.3 MG/DL (ref 0.2–1.3)
BUN SERPL-MCNC: 21 MG/DL (ref 7–30)
CALCIUM SERPL-MCNC: 9.3 MG/DL (ref 8.5–10.1)
CHLORIDE SERPL-SCNC: 102 MMOL/L (ref 94–109)
CO2 SERPL-SCNC: 29 MMOL/L (ref 20–32)
CREAT SERPL-MCNC: 1.01 MG/DL (ref 0.52–1.04)
ERYTHROCYTE [DISTWIDTH] IN BLOOD BY AUTOMATED COUNT: 15.1 % (ref 10–15)
ERYTHROCYTE [SEDIMENTATION RATE] IN BLOOD BY WESTERGREN METHOD: 17 MM/H (ref 0–20)
GFR SERPL CREATININE-BSD FRML MDRD: 65 ML/MIN/{1.73_M2}
GLUCOSE SERPL-MCNC: 130 MG/DL (ref 70–99)
HBA1C MFR BLD: 5.7 % (ref 0–5.6)
HCT VFR BLD AUTO: 38.2 % (ref 35–47)
HGB BLD-MCNC: 12.5 G/DL (ref 11.7–15.7)
MCH RBC QN AUTO: 30.6 PG (ref 26.5–33)
MCHC RBC AUTO-ENTMCNC: 32.7 G/DL (ref 31.5–36.5)
MCV RBC AUTO: 94 FL (ref 78–100)
PLATELET # BLD AUTO: 444 10E9/L (ref 150–450)
POTASSIUM SERPL-SCNC: 4.1 MMOL/L (ref 3.4–5.3)
PROT SERPL-MCNC: 8 G/DL (ref 6.8–8.8)
RBC # BLD AUTO: 4.08 10E12/L (ref 3.8–5.2)
SODIUM SERPL-SCNC: 135 MMOL/L (ref 133–144)
WBC # BLD AUTO: 11.3 10E9/L (ref 4–11)

## 2020-05-21 PROCEDURE — 85027 COMPLETE CBC AUTOMATED: CPT | Performed by: PHYSICIAN ASSISTANT

## 2020-05-21 PROCEDURE — 86709 HEPATITIS A IGM ANTIBODY: CPT | Performed by: PHYSICIAN ASSISTANT

## 2020-05-21 PROCEDURE — 85652 RBC SED RATE AUTOMATED: CPT | Performed by: PHYSICIAN ASSISTANT

## 2020-05-21 PROCEDURE — 80053 COMPREHEN METABOLIC PANEL: CPT | Performed by: PHYSICIAN ASSISTANT

## 2020-05-21 PROCEDURE — 36415 COLL VENOUS BLD VENIPUNCTURE: CPT | Performed by: PHYSICIAN ASSISTANT

## 2020-05-21 PROCEDURE — 99214 OFFICE O/P EST MOD 30 MIN: CPT | Performed by: PHYSICIAN ASSISTANT

## 2020-05-21 PROCEDURE — 83036 HEMOGLOBIN GLYCOSYLATED A1C: CPT | Performed by: PHYSICIAN ASSISTANT

## 2020-05-21 RX ORDER — OXYCODONE HYDROCHLORIDE 10 MG/1
10 TABLET ORAL
COMMUNITY
Start: 2020-05-19 | End: 2020-06-16

## 2020-05-21 NOTE — PROGRESS NOTES
Cynthia Lyons is a 50 year old female who presents today  visit for the following health issues:    HPI  Abdominal Pain      Duration: On going off and on for a month. Says overall for the last week she just doesn't feel good. Says this morning she had a low grade fever     Description (location/character/radiation): Patient states that she just does not feel good. Gassy, Bloating, Feels crampy like she is going to have diarrhea but never does        Associated flank pain: possibly isn't really sure due to other back issues     Intensity:  moderate, severe    Accompanying signs and symptoms:        Fever/Chills: YES- 100.6 this morning        Gas/Bloating: YES       Nausea/vomitting: YES- nausea        Diarrhea: no        Dysuria or Hematuria: no     History (previous similar pain/trauma/previous testing): none    Precipitating or alleviating factors:       Pain worse with eating/BM/urination: eating       Pain relieved by BM: YES- possibly     Therapies tried and outcome: None    LMP:  not applicable    Initially done as a video visit which converted to in person visit.    5/8-5/12 severe stomach pain and intestinal discomfort.      Now stomach symptoms come and go, worse after she eats, gets very gurgly.  Gets hungry but quits eating mid way through meal due to feeling sick.  .  And tired for past 1 wk.  Fever 100.6 this morning.  Pain upper right, other locations more cramping.  No diarrhea but stooling more frequently.  Feels like if she could go more, she'd feel better.  Does feel some better after the BM.  Doesn't feel she's constipated.  No nocturnal stooling.  A bit more thirsty.  Joints stiff hands and feet, and feet swell.  No rash.  Some HA, but thinks has sinus infection starting.  Allergies have been bad.  No cough.      BP Readings from Last 3 Encounters:   05/21/20 118/70   03/04/20 113/73   01/31/20 130/88    Wt Readings from Last 3 Encounters:   05/21/20 97.5 kg (215 lb)   01/31/20 94.8 kg (209  "lb)   12/20/19 88.9 kg (196 lb)         Reviewed and updated as needed this visit by Provider         Review of Systems   Constitutional, HEENT, cardiovascular, pulmonary, GI, , musculoskeletal, neuro, skin systems are negative, except as otherwise noted.      Objective    /70 (BP Location: Right arm, Patient Position: Sitting, Cuff Size: Adult Large)   Pulse 79   Temp 97.6  F (36.4  C) (Tympanic)   Resp 20   Wt 97.5 kg (215 lb)   LMP  (LMP Unknown)   SpO2 97%   BMI 35.78 kg/m    Estimated body mass index is 35.78 kg/m  as calculated from the following:    Height as of 1/31/20: 1.651 m (5' 5\").    Weight as of this encounter: 97.5 kg (215 lb).  Physical Exam     GENERAL: Healthy, alert and no distress  EYES: Eyes grossly normal to inspection.  No discharge or erythema, or obvious scleral/conjunctival abnormalities.  RESP: No audible wheeze, cough, or visible cyanosis.  No visible retractions or increased work of breathing.    Abdomen: bowel sounds hyperactive, slight epigastric tenderness, otherwise no tenderness, no guarding or rebound  SKIN: Visible skin clear. No significant rash, abnormal pigmentation or lesions.  NEURO: Cranial nerves grossly intact.  Mentation and speech appropriate for age.  PSYCH: Mentation appears normal, affect normal/bright, judgement and insight intact, normal speech and appearance well-groomed.      Diagnostic Test Results:  Labs reviewed in Epic        Assessment & Plan       ICD-10-CM    1. RUQ abdominal pain  R10.11 CBC with platelets     Comprehensive metabolic panel (BMP + Alb, Alk Phos, ALT, AST, Total. Bili, TP)     Hepatitis A antibody IgM     ESR: Erythrocyte sedimentation rate   2. Fever, unspecified fever cause  R50.9 CBC with platelets     Comprehensive metabolic panel (BMP + Alb, Alk Phos, ALT, AST, Total. Bili, TP)     Hepatitis A antibody IgM     ESR: Erythrocyte sedimentation rate   3. Malaise  R53.81 CBC with platelets     Comprehensive metabolic panel " (BMP + Alb, Alk Phos, ALT, AST, Total. Bili, TP)     Hepatitis A antibody IgM     ESR: Erythrocyte sedimentation rate   4. Abdominal cramps  R10.9 CBC with platelets     Comprehensive metabolic panel (BMP + Alb, Alk Phos, ALT, AST, Total. Bili, TP)     Hepatitis A antibody IgM     ESR: Erythrocyte sedimentation rate       Patient Instructions   Will contact you with lab results      No follow-ups on file.    Rosaura Flores PA-C  [facility]      No follow-ups on file.       Rosaura Flores PA-C

## 2020-05-21 NOTE — RESULT ENCOUNTER NOTE
Karla -    Today's labs look fine.  No big inflammation or liver function abnormalities.  Hep A test in process.  See how symptoms go over next week or so.    Rosaura

## 2020-05-22 DIAGNOSIS — G89.4 CHRONIC PAIN DISORDER: ICD-10-CM

## 2020-05-22 DIAGNOSIS — Z98.1 STATUS POST LAMINECTOMY WITH SPINAL FUSION: ICD-10-CM

## 2020-05-22 DIAGNOSIS — F41.1 GENERALIZED ANXIETY DISORDER: ICD-10-CM

## 2020-05-22 DIAGNOSIS — F33.1 MAJOR DEPRESSIVE DISORDER, RECURRENT EPISODE, MODERATE (H): ICD-10-CM

## 2020-05-22 DIAGNOSIS — A60.00 HERPES SIMPLEX INFECTION OF GENITOURINARY SYSTEM: ICD-10-CM

## 2020-05-22 DIAGNOSIS — J30.89 OTHER ALLERGIC RHINITIS: ICD-10-CM

## 2020-05-22 PROBLEM — R73.03 PREDIABETES: Status: ACTIVE | Noted: 2020-05-22

## 2020-05-22 LAB — HAV IGM SERPL QL IA: NONREACTIVE

## 2020-05-22 RX ORDER — CETIRIZINE HYDROCHLORIDE 10 MG/1
10 TABLET ORAL 2 TIMES DAILY
Qty: 180 TABLET | Refills: 1 | Status: SHIPPED | OUTPATIENT
Start: 2020-05-22 | End: 2022-07-11

## 2020-05-22 NOTE — RESULT ENCOUNTER NOTE
Cynthia,    Hep A lab was normal.  See how this goes over next 1 week.  If worsening pain, fever not resolving, etc - be seen.    See other result note for a1c.    Rosaura

## 2020-05-24 ENCOUNTER — MYC MEDICAL ADVICE (OUTPATIENT)
Dept: FAMILY MEDICINE | Facility: CLINIC | Age: 50
End: 2020-05-24

## 2020-05-24 DIAGNOSIS — J01.01 ACUTE RECURRENT MAXILLARY SINUSITIS: Primary | ICD-10-CM

## 2020-05-26 RX ORDER — AMOXICILLIN 500 MG/1
500 CAPSULE ORAL 2 TIMES DAILY
Qty: 42 CAPSULE | Refills: 0 | Status: SHIPPED | OUTPATIENT
Start: 2020-05-26 | End: 2020-08-22

## 2020-06-15 ENCOUNTER — COMMUNICATION - HEALTHEAST (OUTPATIENT)
Dept: PALLIATIVE MEDICINE | Facility: OTHER | Age: 50
End: 2020-06-15

## 2020-06-15 DIAGNOSIS — G89.4 CHRONIC PAIN SYNDROME: ICD-10-CM

## 2020-06-15 DIAGNOSIS — M54.40 BILATERAL LOW BACK PAIN WITH SCIATICA, SCIATICA LATERALITY UNSPECIFIED, UNSPECIFIED CHRONICITY: ICD-10-CM

## 2020-06-17 ENCOUNTER — COMMUNICATION - HEALTHEAST (OUTPATIENT)
Dept: PALLIATIVE MEDICINE | Facility: OTHER | Age: 50
End: 2020-06-17

## 2020-06-17 ENCOUNTER — HOSPITAL ENCOUNTER (OUTPATIENT)
Dept: PALLIATIVE MEDICINE | Facility: OTHER | Age: 50
Discharge: HOME | End: 2020-06-17
Attending: NURSE PRACTITIONER

## 2020-06-17 DIAGNOSIS — M54.40 BILATERAL LOW BACK PAIN WITH SCIATICA, SCIATICA LATERALITY UNSPECIFIED, UNSPECIFIED CHRONICITY: ICD-10-CM

## 2020-06-17 DIAGNOSIS — G89.4 CHRONIC PAIN SYNDROME: ICD-10-CM

## 2020-06-30 DIAGNOSIS — K59.00 CONSTIPATION: ICD-10-CM

## 2020-06-30 RX ORDER — LACTULOSE 10 G/15ML
SOLUTION ORAL
Qty: 473 ML | Refills: 0 | Status: SHIPPED | OUTPATIENT
Start: 2020-06-30 | End: 2021-04-21

## 2020-06-30 RX ORDER — LACTULOSE 10 G/15ML
SOLUTION ORAL
Qty: 473 ML | Refills: 0 | Status: SHIPPED | OUTPATIENT
Start: 2020-06-30 | End: 2020-06-30

## 2020-07-13 ENCOUNTER — NURSE TRIAGE (OUTPATIENT)
Dept: NURSING | Facility: CLINIC | Age: 50
End: 2020-07-13

## 2020-07-13 NOTE — TELEPHONE ENCOUNTER
Calling to find out where she goes for serology testing/she has an order but could not go when scheduled initially and wants to go today/ connected to the serology scheduling staff  Trevon Platt RN -[phone]

## 2020-07-13 NOTE — TELEPHONE ENCOUNTER
Caller called regarding antibody testing that was ordered in June (6/10).  States she has been trying to have this scheduled-sounded frustrated.  Caller hung up abruptly while this triage nurse was transferring her call to a .  This triage RN, called the caller back-no answetr  Julianne Alicea RN  Additional Information    Question about upcoming scheduled test, no triage required and triager able to answer question     [place] virus testing ordered on 6/10    Protocols used: INFORMATION ONLY CALL-A-

## 2020-07-17 ENCOUNTER — COMMUNICATION - HEALTHEAST (OUTPATIENT)
Dept: PALLIATIVE MEDICINE | Facility: OTHER | Age: 50
End: 2020-07-17

## 2020-07-20 ENCOUNTER — COMMUNICATION - HEALTHEAST (OUTPATIENT)
Dept: PALLIATIVE MEDICINE | Facility: OTHER | Age: 50
End: 2020-07-20

## 2020-07-21 ENCOUNTER — COMMUNICATION - HEALTHEAST (OUTPATIENT)
Dept: PALLIATIVE MEDICINE | Facility: OTHER | Age: 50
End: 2020-07-21

## 2020-07-29 ENCOUNTER — NURSE TRIAGE (OUTPATIENT)
Dept: NURSING | Facility: CLINIC | Age: 50
End: 2020-07-29

## 2020-07-29 ENCOUNTER — MYC MEDICAL ADVICE (OUTPATIENT)
Dept: FAMILY MEDICINE | Facility: CLINIC | Age: 50
End: 2020-07-29

## 2020-07-29 NOTE — TELEPHONE ENCOUNTER
Call from Karla.  2/10/2009 had lap band with Dr Machuca, has not been having any problems since procedure until 1 week ago.  Has a 40-45 pound puppy, who loves to jump.  She was lying down and the puppy jumped and landed on her stomach.  Since then, when tries to eat anything, food feels it is stuck at band, develops pain 9/10 and needs to make herself throw up to relieve pain and sense of food being stuck.  Has not changed anything about her diet.  Knows what foods to eat, chews very well.  Is asking to be seen today or tomorrow.  She is able to drink fluids without difficulty, Urinating.  No change in bowels.  Denies abdominal pain unless tries to eat.  Denies bloating or firmness of abdomen.  Best number to reach Karla is [phone].    Is leaving to go out of state at 5 pm tomorrow, returning Sunday    Additional Information    Negative: Sounds like a life-threatening emergency to the triager    Negative: Chest pain    Negative: Difficulty breathing    Negative: Surgical incision symptoms and questions    Negative: Discomfort (pain, burning or stinging) when passing urine and male    Negative: Discomfort (pain, burning or stinging) when passing urine and female    Negative: Constipation    Negative: New or worsening leg (calf, thigh) pain    Negative: New or worsening leg swelling    Negative: Dizziness is severe, or persists > 24 hours after surgery    Negative: Symptoms arising from use of a urinary catheter (Ramos or Coude)    Negative: Cast problems or questions    Negative: Bright red, wide-spread, sunburn-like rash    Negative: SEVERE headache and after spinal (epidural) anesthesia    Negative: Vomiting and persists > 4 hours    Negative: Vomiting and abdomen looks much more swollen than usual    Negative: Drinking very little and dehydration suspected (e.g., no urine > 12 hours, very dry mouth, very lightheaded)    Negative: Patient sounds very sick or weak to the triager    Negative: Sounds like a  "serious complication to the triager    Negative: Fever > 100.4 F (38.0 C)    Caller has URGENT question and triager unable to answer question    Answer Assessment - Initial Assessment Questions  1. SYMPTOM: \"What's the main symptom you're concerned about?\" (e.g., pain, fever, vomiting)      Food getting stuck and vomiting  2. ONSET: \"When did food getting stuck and vomiting  start?\"      1 week ago  3. SURGERY: \"What surgery was performed?\"      Lap band  4. DATE of SURGERY: \"When was surgery performed?\"       2/10/2009  5. ANESTHESIA: \" What type of anesthesia did you have?\" (e.g., general, spinal, epidural, local)  General  6. PAIN: \"Is there any pain?\" If so, ask: \"How bad is it?\"  (Scale 1-10; or mild, moderate, severe)      When food is stuck 9/10  7. FEVER: \"Do you have a fever?\" If so, ask: \"What is your temperature, how was it measured, and when did it start?\"      Denies  8. VOMITING: \"Is there any vomiting?\" If yes, ask: \"How many times?\"      Yes, makes self throw up due to food being stuck and pain  9. BLEEDING: \"Is there any bleeding?\" If so, ask: \"How much?\" and \"Where?\"      no  10. OTHER SYMPTOMS: \"Do you have any other symptoms?\" (e.g., drainage from wound, painful urination, constipation)        None    Protocols used: POST-OP SYMPTOMS AND [id number]      "

## 2020-07-29 NOTE — TELEPHONE ENCOUNTER
Last Friday her puppy jumped on her.  Through the weekend she noticed that food was getting stuck.  Able to get liquids down.  Was also able to eat some peanut butter also.  Is also experiencing some pain by the port. Denies swelling or bruising.    States there is not any water in the band.  She has still noticed restriction with the eating but has gained weight within the last few months.      Patient is planning on going out of town tomorrow evening.    Routed to provider for review.

## 2020-07-30 DIAGNOSIS — Z98.84 HX OF LAPAROSCOPIC ADJUSTABLE GASTRIC BANDING: Primary | ICD-10-CM

## 2020-07-30 DIAGNOSIS — E66.01 SEVERE OBESITY (BMI 35.0-39.9) WITH COMORBIDITY (H): ICD-10-CM

## 2020-08-03 ENCOUNTER — MYC MEDICAL ADVICE (OUTPATIENT)
Dept: FAMILY MEDICINE | Facility: CLINIC | Age: 50
End: 2020-08-03

## 2020-08-03 ENCOUNTER — HOSPITAL ENCOUNTER (OUTPATIENT)
Dept: GENERAL RADIOLOGY | Facility: CLINIC | Age: 50
Discharge: HOME | End: 2020-08-03
Attending: PHYSICIAN ASSISTANT | Admitting: PHYSICIAN ASSISTANT
Payer: COMMERCIAL

## 2020-08-03 DIAGNOSIS — Z98.84 HX OF LAPAROSCOPIC ADJUSTABLE GASTRIC BANDING: ICD-10-CM

## 2020-08-03 DIAGNOSIS — E66.01 SEVERE OBESITY (BMI 35.0-39.9) WITH COMORBIDITY (H): ICD-10-CM

## 2020-08-03 PROCEDURE — 74240 X-RAY XM UPR GI TRC 1CNTRST: CPT

## 2020-08-04 ENCOUNTER — COMMUNICATION - HEALTHEAST (OUTPATIENT)
Dept: PALLIATIVE MEDICINE | Facility: OTHER | Age: 50
End: 2020-08-04

## 2020-08-04 ENCOUNTER — E-VISIT (OUTPATIENT)
Dept: FAMILY MEDICINE | Facility: CLINIC | Age: 50
End: 2020-08-04
Payer: COMMERCIAL

## 2020-08-04 DIAGNOSIS — Z53.9 ERRONEOUS ENCOUNTER--DISREGARD: Primary | ICD-10-CM

## 2020-08-05 ENCOUNTER — HOSPITAL ENCOUNTER (OUTPATIENT)
Dept: PALLIATIVE MEDICINE | Facility: OTHER | Age: 50
Discharge: HOME | End: 2020-08-05
Attending: NURSE PRACTITIONER

## 2020-08-05 DIAGNOSIS — G89.4 CHRONIC PAIN SYNDROME: ICD-10-CM

## 2020-08-05 DIAGNOSIS — M54.40 BILATERAL LOW BACK PAIN WITH SCIATICA, SCIATICA LATERALITY UNSPECIFIED, UNSPECIFIED CHRONICITY: ICD-10-CM

## 2020-08-06 ENCOUNTER — MYC MEDICAL ADVICE (OUTPATIENT)
Dept: FAMILY MEDICINE | Facility: CLINIC | Age: 50
End: 2020-08-06

## 2020-08-06 DIAGNOSIS — K22.4 ESOPHAGEAL DYSMOTILITY: ICD-10-CM

## 2020-08-06 DIAGNOSIS — Z98.84 LAP-BAND SURGERY STATUS: Primary | ICD-10-CM

## 2020-08-17 ENCOUNTER — TRANSFERRED RECORDS (OUTPATIENT)
Dept: HEALTH INFORMATION MANAGEMENT | Facility: CLINIC | Age: 50
End: 2020-08-17

## 2020-08-19 ENCOUNTER — TRANSFERRED RECORDS (OUTPATIENT)
Dept: HEALTH INFORMATION MANAGEMENT | Facility: CLINIC | Age: 50
End: 2020-08-19

## 2020-08-21 ENCOUNTER — E-VISIT (OUTPATIENT)
Dept: FAMILY MEDICINE | Facility: CLINIC | Age: 50
End: 2020-08-21
Payer: COMMERCIAL

## 2020-08-21 DIAGNOSIS — K22.4 ESOPHAGEAL DYSMOTILITY: ICD-10-CM

## 2020-08-21 DIAGNOSIS — F32.1 MODERATE MAJOR DEPRESSION (H): ICD-10-CM

## 2020-08-21 DIAGNOSIS — J30.9 ALLERGIC RHINITIS, UNSPECIFIED SEASONALITY, UNSPECIFIED TRIGGER: ICD-10-CM

## 2020-08-21 PROCEDURE — 99421 OL DIG E/M SVC 5-10 MIN: CPT | Performed by: PHYSICIAN ASSISTANT

## 2020-08-21 ASSESSMENT — ANXIETY QUESTIONNAIRES
GAD7 TOTAL SCORE: 6
GAD7 TOTAL SCORE: 6
2. NOT BEING ABLE TO STOP OR CONTROL WORRYING: SEVERAL DAYS
3. WORRYING TOO MUCH ABOUT DIFFERENT THINGS: SEVERAL DAYS
5. BEING SO RESTLESS THAT IT IS HARD TO SIT STILL: SEVERAL DAYS
GAD7 TOTAL SCORE: 6
7. FEELING AFRAID AS IF SOMETHING AWFUL MIGHT HAPPEN: NOT AT ALL
7. FEELING AFRAID AS IF SOMETHING AWFUL MIGHT HAPPEN: NOT AT ALL
6. BECOMING EASILY ANNOYED OR IRRITABLE: SEVERAL DAYS
4. TROUBLE RELAXING: SEVERAL DAYS
1. FEELING NERVOUS, ANXIOUS, OR ON EDGE: SEVERAL DAYS

## 2020-08-21 ASSESSMENT — PATIENT HEALTH QUESTIONNAIRE - PHQ9
10. IF YOU CHECKED OFF ANY PROBLEMS, HOW DIFFICULT HAVE THESE PROBLEMS MADE IT FOR YOU TO DO YOUR WORK, TAKE CARE OF THINGS AT HOME, OR GET ALONG WITH OTHER PEOPLE: SOMEWHAT DIFFICULT
SUM OF ALL RESPONSES TO PHQ QUESTIONS 1-9: 5
SUM OF ALL RESPONSES TO PHQ QUESTIONS 1-9: 5

## 2020-08-22 ENCOUNTER — MYC MEDICAL ADVICE (OUTPATIENT)
Dept: FAMILY MEDICINE | Facility: CLINIC | Age: 50
End: 2020-08-22

## 2020-08-22 ASSESSMENT — ANXIETY QUESTIONNAIRES: GAD7 TOTAL SCORE: 6

## 2020-08-22 ASSESSMENT — PATIENT HEALTH QUESTIONNAIRE - PHQ9: SUM OF ALL RESPONSES TO PHQ QUESTIONS 1-9: 5

## 2020-08-22 NOTE — PATIENT INSTRUCTIONS
Depression: Tips to Help Yourself    As your healthcare providers help treat your depression, you can also help yourself. Keep in mind that your illness affects you emotionally, physically, mentally, and socially. So full recovery will take time. Take care of your body and your soul, and be patient with yourself as you get better.  Self-care    Educate yourself. Read about treatment and medicine options. If you have the energy, attend local conferences or support groups. Keep a list of useful websites and helpful books and use them as needed. This illness is not your fault. Don t blame yourself for your depression.    Manage early symptoms. If you notice symptoms returning, experience triggers, or identify other factors that may lead to a depressive episode, get help as soon as possible. Ask trusted friends and family to monitor your behavior and let you know if they see anything of concern.    Work with your provider. Find a provider you can trust. Communicate honestly with that person and share information on your treatment for depression and your reaction to medicines.    Be prepared for a crisis. Know what to do if you experience a crisis. Keep the phone number of a crisis hotline and know the location of your community's urgent care centers and the closest emergency department.    Hold off on big decisions. Depression can cloud your judgment. So wait until you feel better before making major life decisions, such as changing jobs, moving, or getting  or .    Be patient. Recovering from depression is a process. Don t be discouraged if it takes some time to feel better.    Keep it simple. Depression saps your energy and concentration. So you won t be able to do all the things you used to do. Set small goals and do what you can.    Be with others. Don t isolate yourself--you ll only feel worse. Try to be with other people. And take part in fun activities when you can. Go to a movie, ballgame,  Jew service, or social event. Talk openly with people you can trust. And accept help when it s offered.  Take care of your body  People with depression often lose the desire to take care of themselves. That only makes their problems worse. During treatment and afterward, make a point to:    Exercise. It s a great way to take care of your body. And studies have shown that exercise helps fight depression.    Avoid drugs and alcohol. These may ease the pain in the short term. But they ll only make your problems worse in the long run.    Get relief from stress. Ask your healthcare provider for relaxation exercises and techniques to help relieve stress.    Eat right. A balanced and healthy diet helps keep your body healthy.  Date Last Reviewed: 1/1/2017 2000-2019 [employer]. [address] [place] [address], [place] [address]. All rights reserved. This information is not intended as a substitute for professional medical care. Always follow your healthcare professional's instructions.

## 2020-09-16 ENCOUNTER — TRANSFERRED RECORDS (OUTPATIENT)
Dept: HEALTH INFORMATION MANAGEMENT | Facility: CLINIC | Age: 50
End: 2020-09-16

## 2020-09-30 ENCOUNTER — HOSPITAL ENCOUNTER (OUTPATIENT)
Dept: PALLIATIVE MEDICINE | Facility: OTHER | Age: 50
Discharge: HOME | End: 2020-09-30
Attending: NURSE PRACTITIONER

## 2020-09-30 ENCOUNTER — TRANSFERRED RECORDS (OUTPATIENT)
Dept: HEALTH INFORMATION MANAGEMENT | Facility: CLINIC | Age: 50
End: 2020-09-30

## 2020-09-30 DIAGNOSIS — M54.40 BILATERAL LOW BACK PAIN WITH SCIATICA, SCIATICA LATERALITY UNSPECIFIED, UNSPECIFIED CHRONICITY: ICD-10-CM

## 2020-09-30 DIAGNOSIS — K30 CHRONIC UPSET STOMACH: ICD-10-CM

## 2020-09-30 DIAGNOSIS — G89.4 CHRONIC PAIN SYNDROME: ICD-10-CM

## 2020-10-01 ENCOUNTER — COMMUNICATION - HEALTHEAST (OUTPATIENT)
Dept: PALLIATIVE MEDICINE | Facility: OTHER | Age: 50
End: 2020-10-01

## 2020-10-21 ENCOUNTER — TRANSFERRED RECORDS (OUTPATIENT)
Dept: HEALTH INFORMATION MANAGEMENT | Facility: CLINIC | Age: 50
End: 2020-10-21

## 2020-10-27 ENCOUNTER — COMMUNICATION - HEALTHEAST (OUTPATIENT)
Dept: PALLIATIVE MEDICINE | Facility: OTHER | Age: 50
End: 2020-10-27

## 2020-10-30 ENCOUNTER — VIRTUAL VISIT (OUTPATIENT)
Dept: FAMILY MEDICINE | Facility: OTHER | Age: 50
End: 2020-10-30

## 2020-10-30 ENCOUNTER — MYC MEDICAL ADVICE (OUTPATIENT)
Dept: FAMILY MEDICINE | Facility: CLINIC | Age: 50
End: 2020-10-30

## 2020-10-30 NOTE — PROGRESS NOTES
"Date: 10/30/2020 13:16:18  Clinician: Demetra Foster  Clinician NPI: [id number]  Patient: Cynthia Lyons  Patient : 1970  Patient Address: [address] [place] [address]  Patient Phone: [phone]  Visit Protocol: URI  Patient Summary:  Cynthia is a 50 year old ( : 1970 ) female who initiated a [facility] Visit for COVID-19 (Coronavirus) evaluation and screening. When asked the question \"Please sign me up to receive news, health information and promotions. \", Cynthia responded \"No\".    Cynthia states her symptoms started gradually 3-4 days ago. After her symptoms started, they improved and then got worse again.   Her symptoms consist of a headache, a cough, nausea, myalgia, chills, malaise, and a sore throat. Cynthia also feels feverish.   Symptom details     Cough: Cynthia coughs a few times an hour and her cough is more bothersome at night. Phlegm does not come into her throat when she coughs. She does not believe her cough is caused by post-nasal drip.     Sore throat: Cynthia reports having mild throat pain (1-3 on a 10 point pain scale), does not have exudate on her tonsils, and can swallow liquids. She is not sure if the lymph nodes in her neck are enlarged. A rash has not appeared on the skin since the sore throat started.     Temperature: Her current temperature is 100.8 degrees Fahrenheit. Cynthia has had a temperature over 100 degrees Fahrenheit for 1-2 days.     Headache: She states the headache is severe (7-9 on a 10 point pain scale).      Cynthia denies having ear pain, wheezing, nasal congestion, facial pain or pressure, teeth pain, ageusia, diarrhea, anosmia, vomiting, and rhinitis. She also denies having recent facial or sinus surgery in the past 60 days and taking antibiotic medication in the past month. She is not experiencing dyspnea.   Precipitating events  Within the past week, Cynthia has not been exposed to someone with strep throat. She has not " recently been exposed to someone with influenza. Cynthia has been in close contact with the following high risk individuals: children under the age of 5.   Pertinent COVID-19 (Coronavirus) information  Cynthia does not work or volunteer as healthcare worker or a . In the past 14 days, Cynthia has not worked or volunteered at a healthcare facility or group living setting.   In the past 14 days, she also has not lived in a congregate living setting.   Cynthia has had a close contact with a laboratory-confirmed COVID-19 patient within 14 days of symptom onset. She was not exposed at her work. She does not know when she was exposed to the laboratory-confirmed COVID-19 patient.   Additional information about contact with COVID-19 (Coronavirus) patient as reported by the patient (free text): my daughter worked with a coworker confirmed.  I had been to her place of employment...    Since December 2019, Cynthia has not been diagnosed with lab-confirmed COVID-19 test and has not had upper respiratory infection or influenza-like illness.   Pertinent medical history  Cynthia had 2 sinus infections within the past year.   Cynthia does not get yeast infections when she takes antibiotics.   Cynthia does not need a return to work/school note.   Weight: 205 lbs   Cynthia smokes or uses smokeless tobacco.   She denies pregnancy and denies breastfeeding. She does not menstruate.   Additional information as reported by the patient (free text): the last 2 days I have had spells when I stand up.  I get dizzy, see stars and feel like passing out.  This is never happened before.  It feels like my bp drops, but I dont know why...also, my headache is so bad I can't get rid of it.  Nothing helps it.  And 2 days ago I had chest pain, like heartburn but I've never had heartburn a day in my life.  These things are new, and something I've never experienced.   Weight: 205 lbs    MEDICATIONS: Prozac oral, oxycodone oral,  "methocarbamol oral, gabapentin oral, Zyrtec oral, Percocet oral, Belbuca buccal, lisinopril oral, ALLERGIES: NKDA  Clinician Response:  Dear Cynthia,   Your symptoms show that you may have coronavirus (COVID-19). This illness can cause fever, cough and trouble breathing. Many people get a mild case and get better on their own. Some people can get very sick.  What should I do?  We would like to test you for this virus.   1. Please call [phone] to schedule your visit. Explain that you were referred by [facility] to have a COVID-19 test. Be ready [facility].  The following will serve as your written order for this COVID Test, ordered by me, for the indication of suspected COVID [Z20.828]: The test will be ordered in [employer], our electronic health record, after you are scheduled. It will show as ordered and authorized by Victor Manuel Hand MD.  Order: COVID-19 (Coronavirus) PCR for SYMPTOMATIC testing from [facility].   2. When it's time for your COVID test:  Stay at least 6 feet away from others. (If someone will drive you to your test, stay in the backseat, as far away from the  as you can.)   Cover your mouth and nose with a mask, tissue or washcloth.  Go straight to the testing site. Don't make any stops on the way there or back.      3.Starting now: Stay home and away from others (self-isolate) until:   You've had no fever---and no medicine that reduces fever---for one full day (24 hours). And...   Your other symptoms have gotten better. For example, your cough or breathing has improved. And...   At least 10 days have passed since your symptoms started.       During this time, don't leave the house except for testing or medical care.   Stay in your own room, even for meals. Use your own bathroom if you can.   Stay away from others in your home. No hugging, kissing or shaking hands. No visitors.  Don't go to work, school or anywhere else.    Clean \"high touch\" surfaces often (doorknobs, counters, " handles, etc.). Use a household cleaning spray or wipes. You'll find a full list of  on the EPA website: www.epa.gov/pesticide-registration/list-n-disinfectants-use-against-sars-cov-2.   Cover your mouth and nose with a mask, tissue or washcloth to avoid spreading germs.  Wash your hands and face often. Use soap and water.  Caregivers in these groups are at risk for severe illness due to COVID-19:  o People 65 years and older  o People who live in a nursing home or long-term care facility  o People with chronic disease (lung, heart, cancer, diabetes, kidney, liver, immunologic)  o People who have a weakened immune system, including those who:   Are in cancer treatment  Take medicine that weakens the immune system, such as corticosteroids  Had a bone marrow or organ transplant  Have an immune deficiency  Have poorly controlled HIV or AIDS  Are obese (body mass index of 40 or higher)  Smoke regularly   o Caregivers should wear gloves while washing dishes, handling laundry and cleaning bedrooms and bathrooms.  o Use caution when washing and drying laundry: Don't shake dirty laundry, and use the warmest water setting that you can.  o For more tips, go to www.cdc.gov/coronavirus/2019-ncov/downloads/10Things.pdf.    4.Sign up for [employer]. We know it's scary to hear that you might have COVID-19. We want to track your symptoms to make sure you're okay over the next 2 weeks. Please look for an email from [employer]---this is a free, online program that we'll use to keep in touch. To sign up, follow the link in the email. Learn more at [email or web address]  How can I take care of myself?   Get lots of rest. Drink extra fluids (unless a doctor has told you not to).   Take Tylenol (acetaminophen) for fever or pain. If you have liver or kidney problems, ask your family doctor if it's okay to take Tylenol.   Adults can take either:    650 mg (two 325 mg pills) every 4 to 6 hours, or...   1,000 mg (two  500 mg pills) every 8 hours as needed.    Note: Don't take more than 3,000 mg in one day. Acetaminophen is found in many medicines (both prescribed and over-the-counter medicines). Read all labels to be sure you don't take too much.   For children, check the Tylenol bottle for the right dose. The dose is based on the child's age or weight.    If you have other health problems (like cancer, heart failure, an organ transplant or severe kidney disease): Call your specialty clinic if you don't feel better in the next 2 days.       Know when to call 911. Emergency warning signs include:    Trouble breathing or shortness of breath Pain or pressure in the chest that doesn't go away Feeling confused like you haven't felt before, or not being able to wake up Bluish-colored lips or face.  Where can I get more information?   [facility] -- About COVID-19: [email or web address]   CDC -- What to Do If You're Sick: www.cdc.gov/coronavirus/2019-ncov/about/steps-when-sick.html   CDC -- Ending Home Isolation: www.cdc.gov/coronavirus/2019-ncov/hcp/disposition-in-home-patients.html   CDC -- Caring for Someone: www.cdc.gov/coronavirus/2019-ncov/if-you-are-sick/care-for-someone.html   [facility] -- Interim Guidance for Hospital Discharge to Home: www.health.[facility].[place]/diseases/coronavirus/hcp/hospdischarge.pdf   [facility] clinical trials (COVID-19 research studies): clinicalaffairs.[facility]/n-clinical-trials    Below are the COVID-19 hotlines at the [place] Department of [facility] ([facility]). Interpreters are available.    For health questions: Call 221-620-3953 or 1-534.740.9095 (7 a.m. to 7 p.m.) For questions about schools and childcare: Call 547-314-8172 or 1-489.304.3716 (7 a.m. to 7 p.m.)    Diagnosis: Cough  Diagnosis ICD: R05

## 2020-11-02 ENCOUNTER — AMBULATORY - HEALTHEAST (OUTPATIENT)
Dept: FAMILY MEDICINE | Facility: CLINIC | Age: 50
End: 2020-11-02

## 2020-11-02 DIAGNOSIS — Z20.822 SUSPECTED COVID-19 VIRUS INFECTION: ICD-10-CM

## 2020-11-05 ENCOUNTER — MYC MEDICAL ADVICE (OUTPATIENT)
Dept: FAMILY MEDICINE | Facility: CLINIC | Age: 50
End: 2020-11-05

## 2020-11-06 ENCOUNTER — VIRTUAL VISIT (OUTPATIENT)
Dept: FAMILY MEDICINE | Facility: OTHER | Age: 50
End: 2020-11-06

## 2020-11-06 NOTE — PROGRESS NOTES
"Date: 2020 15:55:53  Clinician: Alanis Callahan  Clinician NPI: [id number]  Patient: Cynthia Lyons  Patient : 1970  Patient Address: [address] [place], [place] [address]  Patient Phone: [phone]  Visit Protocol: URI  Patient Summary:  Cynthia is a 50 year old ( : 1970 ) female who initiated a [facility] Visit for COVID-19 (Coronavirus) evaluation and screening. When asked the question \"Please sign me up to receive news, health information and promotions. \", Cynthia responded \"Yes\".    Cynthia states her symptoms started gradually 7-9 days ago. After her symptoms started, they improved and then got worse again.   Her symptoms consist of myalgia, chills, malaise, a sore throat, a headache, a cough, and nasal congestion. Cynthia also feels feverish.   Symptom details     Nasal secretions: The color of her mucus is clear.    Cough: Cynthia coughs a few times an hour and her cough is not more bothersome at night. Phlegm does not come into her throat when she coughs. She does not believe her cough is caused by post-nasal drip.     Sore throat: Cynthia reports having moderate throat pain (4-6 on a 10 point pain scale), does not have exudate on her tonsils, and can swallow liquids. She is not sure if the lymph nodes in her neck are enlarged. A rash has not appeared on the skin since the sore throat started.     Temperature: Her current temperature is 100.2 degrees Fahrenheit. Cynthia has had a temperature over 100 degrees Fahrenheit for 5-7 days.     Headache: She states the headache is severe (7-9 on a 10 point pain scale).      Cynthia denies having vomiting, rhinitis, facial pain or pressure, teeth pain, ageusia, diarrhea, ear pain, wheezing, nausea, and anosmia. She also denies taking antibiotic medication in the past month and having recent facial or sinus surgery in the past 60 days. She is not experiencing dyspnea.   Precipitating events  Within the past week, Cynthia has " not been exposed to someone with strep throat. She has not recently been exposed to someone with influenza. Cynthia has been in close contact with the following high risk individuals: adults 65 or older and children under the age of 5.   Pertinent COVID-19 (Coronavirus) information  Cynthia does not work or volunteer as healthcare worker or a . In the past 14 days, Cynthia has not worked or volunteered at a healthcare facility or group living setting.   In the past 14 days, she also has not lived in a congregate living setting.   Cynthia has had a close contact with a laboratory-confirmed COVID-19 patient within 14 days of symptom onset. She was not exposed at her work. She does not know when she was exposed to the laboratory-confirmed COVID-19 patient.   Additional information about contact with COVID-19 (Coronavirus) patient as reported by the patient (free text): I was sent to do a covid test due to my symptoms.  I did it last Saturday and it was a self administered test and came back in 4 days as negative.  I've been around people that were confirmed positive and am worried I did the test wrong since I did it myself.  Its been 8 days and I still feel horrible.  Im just wondering if I should do another covid test to make sure.  I take care of my 3 yr old granddaughter and need to be sure I dont have covid...    Since December 2019, Cynthia has been tested for COVID-19 and has had upper respiratory infection (URI) or influenza-like illness.      Result of COVID-19 test: Negative     Date of her COVID-19 test: 10/31/2020     Date(s) of previous URI or influenza-like illness (free-text): 10/28/2020     Symptoms Cynthia experienced during previous URI or influenza-like illness as reported by the patient (free-text): body aches, sore throat, fever        Pertinent medical history  Cynthia had 1 sinus infection within the past year.   Cynthia does not get yeast infections when she takes  antibiotics.   Cynthia does not need a return to work/school note.   Weight: 205 lbs   Cynthia smokes or uses smokeless tobacco.   She denies pregnancy and denies breastfeeding. She does not menstruate.   Weight: 205 lbs    MEDICATIONS: Prozac oral, oxycodone oral, methocarbamol oral, gabapentin oral, Zyrtec oral, Percocet oral, Belbuca buccal, lisinopril oral, ALLERGIES: NKDA  Clinician Response:  Dear Cynthia,   Your symptoms show that you may have coronavirus (COVID-19). This illness can cause fever, cough and trouble breathing. Many people get a mild case and get better on their own. Some people can get very sick.  What should I do?  We would like to test you for this virus.   1. Please call [phone] to schedule your visit. Explain that you were referred by [facility] to have a COVID-19 test. Be ready to share your [facility] visit ID number.  * If you need to schedule in [facility] please call [phone] or for [employer] [place] employees please call [phone].  * If you need to schedule in the [place] area please call [phone]. [employer] employees call [phone].  The following will serve as your written order for this COVID Test, ordered by me, for the indication of suspected COVID [Z20.828]: The test will be ordered in [facility], our electronic health record, after you are scheduled. It will show as ordered and authorized by Victor Manuel Hand MD.  Order: COVID-19 (Coronavirus) PCR for SYMPTOMATIC testing from [facility].   2. When it's time for your COVID test:  Stay at least 6 feet away from others. (If someone will drive you to your test, stay in the backseat, as far away from the  as you can.)   Cover your mouth and nose with a mask, tissue or washcloth.  Go straight to the testing site. Don't make any stops on the way there or back.      3.Starting now: Stay home and away from others (self-isolate) until:   You've had no fever---and no medicine that reduces fever---for one full day (24 hours). And...   Your  "other symptoms have gotten better. For example, your cough or breathing has improved. And...   At least 10 days have passed since your symptoms started.       During this time, don't leave the house except for testing or medical care.   Stay in your own room, even for meals. Use your own bathroom if you can.   Stay away from others in your home. No hugging, kissing or shaking hands. No visitors.  Don't go to work, school or anywhere else.    Clean \"high touch\" surfaces often (doorknobs, counters, handles, etc.). Use a household cleaning spray or wipes. You'll find a full list of  on the EPA website: www.epa.gov/pesticide-registration/list-n-disinfectants-use-against-sars-cov-2.   Cover your mouth and nose with a mask, tissue or washcloth to avoid spreading germs.  Wash your hands and face often. Use soap and water.  Caregivers in these groups are at risk for severe illness due to COVID-19:  o People 65 years and older  o People who live in a nursing home or long-term care facility  o People with chronic disease (lung, heart, cancer, diabetes, kidney, liver, immunologic)  o People who have a weakened immune system, including those who:   Are in cancer treatment  Take medicine that weakens the immune system, such as corticosteroids  Had a bone marrow or organ transplant  Have an immune deficiency  Have poorly controlled HIV or AIDS  Are obese (body mass index of 40 or higher)  Smoke regularly   o Caregivers should wear gloves while washing dishes, handling laundry and cleaning bedrooms and bathrooms.  o Use caution when washing and drying laundry: Don't shake dirty laundry, and use the warmest water setting that you can.  o For more tips, go to www.cdc.gov/coronavirus/2019-ncov/downloads/10Things.pdf.    4.Sign up for [employer]. We know it's scary to hear that you might have COVID-19. We want to track your symptoms to make sure you're okay over the next 2 weeks. Please look for an email from [employer]---this is a free, online program that we'll use to keep in touch. To sign up, follow the link in the email. Learn more at [email or web address]  How can I take care of myself?   Get lots of rest. Drink extra fluids (unless a doctor has told you not to).   Take Tylenol (acetaminophen) for fever or pain. If you have liver or kidney problems, ask your family doctor if it's okay to take Tylenol.   Adults can take either:    650 mg (two 325 mg pills) every 4 to 6 hours, or...   1,000 mg (two 500 mg pills) every 8 hours as needed.    Note: Don't take more than 3,000 mg in one day. Acetaminophen is found in many medicines (both prescribed and over-the-counter medicines). Read all labels to be sure you don't take too much.   For children, check the Tylenol bottle for the right dose. The dose is based on the child's age or weight.    If you have other health problems (like cancer, heart failure, an organ transplant or severe kidney disease): Call your specialty clinic if you don't feel better in the next 2 days.       Know when to call 911. Emergency warning signs include:    Trouble breathing or shortness of breath Pain or pressure in the chest that doesn't go away Feeling confused like you haven't felt before, or not being able to wake up Bluish-colored lips or face.  Where can I get more information?   [facility] [place] -- About COVID-19: [email or web address]   CDC -- What to Do If You're Sick: www.cdc.gov/coronavirus/2019-ncov/about/steps-when-sick.html   CDC -- Ending Home Isolation: www.cdc.gov/coronavirus/2019-ncov/hcp/disposition-in-home-patients.html   CDC -- Caring for Someone: www.cdc.gov/coronavirus/2019-ncov/if-you-are-sick/care-for-someone.html   [facility] -- Interim Guidance for Hospital Discharge to Home: www.health.[facility].[place]/diseases/coronavirus/hcp/hospdischarge.pdf   [facility] clinical trials (COVID-19 research studies): clinicalaffairs.[facility]-clinical-trials     Below are the COVID-19 hotlines at the [place] [facility] ([facility]). Interpreters are available.    For health questions: Call 444-697-4293 or 1-570.816.5518 (7 a.m. to 7 p.m.) For questions about schools and childcare: Call 421-873-1127 or 1-405.782.2280 (7 a.m. to 7 p.m.)    Diagnosis: Contact with and (suspected) exposure to other viral communicable diseases  Diagnosis ICD: Z20.828

## 2020-11-08 DIAGNOSIS — Z98.1 STATUS POST LAMINECTOMY WITH SPINAL FUSION: ICD-10-CM

## 2020-11-09 ENCOUNTER — NURSE TRIAGE (OUTPATIENT)
Dept: NURSING | Facility: CLINIC | Age: 50
End: 2020-11-09

## 2020-11-09 ENCOUNTER — MYC MEDICAL ADVICE (OUTPATIENT)
Dept: FAMILY MEDICINE | Facility: CLINIC | Age: 50
End: 2020-11-09

## 2020-11-09 NOTE — TELEPHONE ENCOUNTER
Karla has an order for antibody testing in her chart. She wants to get scheduled for it.  Sent her to the antibody testing line.

## 2020-11-11 RX ORDER — GABAPENTIN 600 MG/1
TABLET ORAL
Qty: 120 TABLET | Refills: 5 | Status: SHIPPED | OUTPATIENT
Start: 2020-11-11 | End: 2021-05-11

## 2020-11-23 ENCOUNTER — HOSPITAL ENCOUNTER (OUTPATIENT)
Dept: PALLIATIVE MEDICINE | Facility: OTHER | Age: 50
Discharge: HOME | End: 2020-11-23
Attending: NURSE PRACTITIONER

## 2020-11-23 DIAGNOSIS — G89.4 CHRONIC PAIN SYNDROME: ICD-10-CM

## 2020-11-23 DIAGNOSIS — M54.40 BILATERAL LOW BACK PAIN WITH SCIATICA, SCIATICA LATERALITY UNSPECIFIED, UNSPECIFIED CHRONICITY: ICD-10-CM

## 2020-12-09 ENCOUNTER — COMMUNICATION - HEALTHEAST (OUTPATIENT)
Dept: PALLIATIVE MEDICINE | Facility: OTHER | Age: 50
End: 2020-12-09

## 2020-12-13 ENCOUNTER — COMMUNICATION - HEALTHEAST (OUTPATIENT)
Dept: PALLIATIVE MEDICINE | Facility: OTHER | Age: 50
End: 2020-12-13

## 2021-01-03 ENCOUNTER — COMMUNICATION - HEALTHEAST (OUTPATIENT)
Dept: PALLIATIVE MEDICINE | Facility: OTHER | Age: 51
End: 2021-01-03

## 2021-01-03 DIAGNOSIS — G89.4 CHRONIC PAIN SYNDROME: ICD-10-CM

## 2021-01-03 DIAGNOSIS — M54.40 BILATERAL LOW BACK PAIN WITH SCIATICA, SCIATICA LATERALITY UNSPECIFIED, UNSPECIFIED CHRONICITY: ICD-10-CM

## 2021-01-11 ENCOUNTER — HOSPITAL ENCOUNTER (OUTPATIENT)
Dept: PALLIATIVE MEDICINE | Facility: OTHER | Age: 51
Discharge: HOME | End: 2021-01-11
Attending: NURSE PRACTITIONER

## 2021-01-11 DIAGNOSIS — M54.40 BILATERAL LOW BACK PAIN WITH SCIATICA, SCIATICA LATERALITY UNSPECIFIED, UNSPECIFIED CHRONICITY: ICD-10-CM

## 2021-01-11 DIAGNOSIS — G89.4 CHRONIC PAIN SYNDROME: ICD-10-CM

## 2021-01-14 ENCOUNTER — HEALTH MAINTENANCE LETTER (OUTPATIENT)
Age: 51
End: 2021-01-14

## 2021-01-17 ENCOUNTER — HEALTH MAINTENANCE LETTER (OUTPATIENT)
Age: 51
End: 2021-01-17

## 2021-01-25 ENCOUNTER — COMMUNICATION - HEALTHEAST (OUTPATIENT)
Dept: PALLIATIVE MEDICINE | Facility: OTHER | Age: 51
End: 2021-01-25

## 2021-01-27 PROCEDURE — 36415 COLL VENOUS BLD VENIPUNCTURE: CPT | Performed by: FAMILY MEDICINE

## 2021-01-27 PROCEDURE — 86769 SARS-COV-2 COVID-19 ANTIBODY: CPT | Performed by: FAMILY MEDICINE

## 2021-01-28 LAB
LAB TEST METHOD: NORMAL
SARS-COV-2 AB PNL SERPL IA: NEGATIVE

## 2021-02-11 ENCOUNTER — COMMUNICATION - HEALTHEAST (OUTPATIENT)
Dept: PALLIATIVE MEDICINE | Facility: OTHER | Age: 51
End: 2021-02-11

## 2021-02-11 DIAGNOSIS — G89.4 CHRONIC PAIN SYNDROME: ICD-10-CM

## 2021-02-11 DIAGNOSIS — M54.40 BILATERAL LOW BACK PAIN WITH SCIATICA, SCIATICA LATERALITY UNSPECIFIED, UNSPECIFIED CHRONICITY: ICD-10-CM

## 2021-03-04 ENCOUNTER — ANCILLARY PROCEDURE (OUTPATIENT)
Dept: GENERAL RADIOLOGY | Facility: CLINIC | Age: 51
End: 2021-03-04
Attending: PHYSICIAN ASSISTANT
Payer: COMMERCIAL

## 2021-03-04 ENCOUNTER — OFFICE VISIT (OUTPATIENT)
Dept: FAMILY MEDICINE | Facility: CLINIC | Age: 51
End: 2021-03-04
Payer: COMMERCIAL

## 2021-03-04 VITALS
TEMPERATURE: 97.5 F | HEART RATE: 88 BPM | RESPIRATION RATE: 20 BRPM | BODY MASS INDEX: 36.11 KG/M2 | OXYGEN SATURATION: 96 % | DIASTOLIC BLOOD PRESSURE: 82 MMHG | WEIGHT: 217 LBS | SYSTOLIC BLOOD PRESSURE: 122 MMHG

## 2021-03-04 DIAGNOSIS — K22.4 ESOPHAGEAL DYSMOTILITY: ICD-10-CM

## 2021-03-04 DIAGNOSIS — Z12.11 COLON CANCER SCREENING: ICD-10-CM

## 2021-03-04 DIAGNOSIS — Z82.61 FAMILY HISTORY OF RHEUMATOID ARTHRITIS: ICD-10-CM

## 2021-03-04 DIAGNOSIS — F32.1 MODERATE MAJOR DEPRESSION (H): ICD-10-CM

## 2021-03-04 DIAGNOSIS — M25.50 POLYARTHRALGIA: ICD-10-CM

## 2021-03-04 DIAGNOSIS — M25.50 POLYARTHRALGIA: Primary | ICD-10-CM

## 2021-03-04 DIAGNOSIS — K22.70 BARRETT'S ESOPHAGUS WITHOUT DYSPLASIA: ICD-10-CM

## 2021-03-04 DIAGNOSIS — E78.5 DYSLIPIDEMIA: ICD-10-CM

## 2021-03-04 LAB
CHOLEST SERPL-MCNC: 280 MG/DL
ERYTHROCYTE [SEDIMENTATION RATE] IN BLOOD BY WESTERGREN METHOD: 15 MM/H (ref 0–30)
HDLC SERPL-MCNC: 50 MG/DL
LDLC SERPL CALC-MCNC: 200 MG/DL
NONHDLC SERPL-MCNC: 230 MG/DL
TRIGL SERPL-MCNC: 148 MG/DL

## 2021-03-04 PROCEDURE — 86038 ANTINUCLEAR ANTIBODIES: CPT | Performed by: PHYSICIAN ASSISTANT

## 2021-03-04 PROCEDURE — 99214 OFFICE O/P EST MOD 30 MIN: CPT | Performed by: PHYSICIAN ASSISTANT

## 2021-03-04 PROCEDURE — 86431 RHEUMATOID FACTOR QUANT: CPT | Performed by: PHYSICIAN ASSISTANT

## 2021-03-04 PROCEDURE — 73130 X-RAY EXAM OF HAND: CPT | Mod: LT | Performed by: RADIOLOGY

## 2021-03-04 PROCEDURE — 36415 COLL VENOUS BLD VENIPUNCTURE: CPT | Performed by: PHYSICIAN ASSISTANT

## 2021-03-04 PROCEDURE — 85652 RBC SED RATE AUTOMATED: CPT | Performed by: PHYSICIAN ASSISTANT

## 2021-03-04 PROCEDURE — 73565 X-RAY EXAM OF KNEES: CPT | Performed by: RADIOLOGY

## 2021-03-04 PROCEDURE — 86200 CCP ANTIBODY: CPT | Performed by: PHYSICIAN ASSISTANT

## 2021-03-04 PROCEDURE — 80061 LIPID PANEL: CPT | Performed by: PHYSICIAN ASSISTANT

## 2021-03-04 RX ORDER — ATORVASTATIN CALCIUM 20 MG/1
20 TABLET, FILM COATED ORAL DAILY
Qty: 90 TABLET | Refills: 1 | Status: CANCELLED | OUTPATIENT
Start: 2021-03-04

## 2021-03-04 RX ORDER — CELECOXIB 100 MG/1
200 CAPSULE ORAL 2 TIMES DAILY PRN
Qty: 60 CAPSULE | Refills: 0 | Status: SHIPPED | OUTPATIENT
Start: 2021-03-04 | End: 2021-08-12

## 2021-03-04 ASSESSMENT — PATIENT HEALTH QUESTIONNAIRE - PHQ9
SUM OF ALL RESPONSES TO PHQ QUESTIONS 1-9: 12
SUM OF ALL RESPONSES TO PHQ QUESTIONS 1-9: 12
10. IF YOU CHECKED OFF ANY PROBLEMS, HOW DIFFICULT HAVE THESE PROBLEMS MADE IT FOR YOU TO DO YOUR WORK, TAKE CARE OF THINGS AT HOME, OR GET ALONG WITH OTHER PEOPLE: EXTREMELY DIFFICULT

## 2021-03-04 ASSESSMENT — ANXIETY QUESTIONNAIRES
GAD7 TOTAL SCORE: 1
4. TROUBLE RELAXING: NOT AT ALL
1. FEELING NERVOUS, ANXIOUS, OR ON EDGE: NOT AT ALL
3. WORRYING TOO MUCH ABOUT DIFFERENT THINGS: NOT AT ALL
7. FEELING AFRAID AS IF SOMETHING AWFUL MIGHT HAPPEN: NOT AT ALL
GAD7 TOTAL SCORE: 1
2. NOT BEING ABLE TO STOP OR CONTROL WORRYING: NOT AT ALL
6. BECOMING EASILY ANNOYED OR IRRITABLE: SEVERAL DAYS
5. BEING SO RESTLESS THAT IT IS HARD TO SIT STILL: NOT AT ALL
7. FEELING AFRAID AS IF SOMETHING AWFUL MIGHT HAPPEN: NOT AT ALL
GAD7 TOTAL SCORE: 1

## 2021-03-04 NOTE — NURSING NOTE
"Chief Complaint   Patient presents with     Arthritis     Lipids     Hypertension       Initial /82 (BP Location: Right arm)   Pulse 88   Temp 97.5  F (36.4  C) (Tympanic)   Resp 20   Wt 98.4 kg (217 lb)   LMP  (LMP Unknown)   SpO2 96%   BMI 36.11 kg/m   Estimated body mass index is 36.11 kg/m  as calculated from the following:    Height as of 1/31/20: 1.651 m (5' 5\").    Weight as of this encounter: 98.4 kg (217 lb).    Patient presents to the clinic using No DME    Health Maintenance that is potentially due pending provider review:  Mammogram and Colonoscopy/FIT    Gave pt phone number/pended order to schedule mammo and/or colonoscopy(or FIT)    Is there anyone who you would like to be able to receive your results? No  If yes have patient fill out CICI    "

## 2021-03-04 NOTE — PROGRESS NOTES
Assessment & Plan     Polyarthralgia  Family history of rheumatoid arthritis  Suspect OA and possible PFS  - XR Knee AP Standing Bilateral; Future  - Anti Nuclear Diamond IgG by IFA with Reflex  - Rheumatoid factor  - Cyclic Citrullinated Peptide Antibody IgG  - Erythrocyte sedimentation rate auto  - XR Hand Bilateral G/E 3 Views; Future  - celecoxib (CELEBREX) 100 MG capsule; Take 2 capsules (200 mg) by mouth 2 times daily as needed for moderate pain    Dyslipidemia  - Lipid panel reflex to direct LDL Non-fasting    Guzman's esophagus without dysplasia  Esophageal dysmotility  - omeprazole (PRILOSEC) 20 MG DR capsule; Take 1 capsule (20 mg) by mouth daily    Moderate major depression (H)  stable  - FLUoxetine (PROZAC) 20 MG capsule; Take 3 capsules (60 mg) by mouth daily    Colon cancer screening  - GASTROENTEROLOGY ADULT REF PROCEDURE ONLY; Future    Patient Instructions   xrays and lab     Start omeprazole to see if helps nausea and due to question of barretts.  Check your records to confirm if you have barretts.      Try celebrex (celecoxib) to help joint pains.  Better tolerated by sensitive stomachs.      Can stay off the BP medication    Recheck 4 wks      No follow-ups on file.    RAQUEL Trotter [facility] [place]    Kaushik Acosta is a 51 year old who presents for the following health issues     HPI     Never feels good.  Can't eat, sick to stomach, working with GI on this.  Barretts and esophageal dysmotility.  Not on PPI?    Knee pain - bilaterally.  Worst with stairs and sitting on the toilet.  Hasn't noticed swelling.    Pain in all finger joints.  Concern for appearance of DIP joints.  Worse in mornings.  No swelling,.  Worse with elizalde, and maybe when humid.  Flares for a week of the month.      Hyperlipidemia Follow-Up      Are you regularly taking any medication or supplement to lower your cholesterol?   No  Is out, unsure how long as she didn't realize she'd  stopped.    Hypertension Follow-up      Do you check your blood pressure regularly outside of the clinic? No     Are you following a low salt diet? No    Depression and Anxiety Follow-Up    How are you doing with your depression since your last visit? No change    How are you doing with your anxiety since your last visit?  No change    Are you having other symptoms that might be associated with depression or anxiety? No    Have you had a significant life event? Health Concerns     Do you have any concerns with your use of alcohol or other drugs? No    Social History     Tobacco Use     Smoking status: Light Tobacco Smoker     Packs/day: 0.50     Years: 23.00     Pack years: 11.50     Types: Cigarettes     Smokeless tobacco: Never Used     Tobacco comment: started smoking age 22   Substance Use Topics     Alcohol use: No     Alcohol/week: 0.0 standard drinks     Drug use: No     PHQ 4/3/2020 8/21/2020 3/4/2021   PHQ-9 Total Score 20 5 12   Q9: Thoughts of better off dead/self-harm past 2 weeks Not at all Not at all Not at all     NANCI-7 SCORE 4/3/2020 8/21/2020 3/4/2021   Total Score - - -   Total Score 18 (severe anxiety) 6 (mild anxiety) 1 (minimal anxiety)   Total Score 18 6 1     Last PHQ-9 3/4/2021   1.  Little interest or pleasure in doing things 3   2.  Feeling down, depressed, or hopeless 1   3.  Trouble falling or staying asleep, or sleeping too much 2   4.  Feeling tired or having little energy 3   5.  Poor appetite or overeating 3   6.  Feeling bad about yourself 0   7.  Trouble concentrating 0   8.  Moving slowly or restless 0   Q9: Thoughts of better off dead/self-harm past 2 weeks 0   PHQ-9 Total Score 12     NANCI-7  3/4/2021   1. Feeling nervous, anxious, or on edge 0   2. Not being able to stop or control worrying 0   3. Worrying too much about different things 0   4. Trouble relaxing 0   5. Being so restless that it is hard to sit still 0   6. Becoming easily annoyed or irritable 1   7. Feeling afraid,  as if something awful might happen 0   NANCI-7 Total Score 1         Objective    /82 (BP Location: Right arm)   Pulse 88   Temp 97.5  F (36.4  C) (Tympanic)   Resp 20   Wt 98.4 kg (217 lb)   LMP  (LMP Unknown)   SpO2 96%   BMI 36.11 kg/m    Body mass index is 36.11 kg/m .  Physical Exam   GENERAL: healthy, alert and no distress  MS: DIP enlargements, no synovitis or deformities to hands or wrists otherwise, unremarkable knee exams

## 2021-03-04 NOTE — PATIENT INSTRUCTIONS
xrays and lab     Start omeprazole to see if helps nausea and due to question of barretts.  Check your records to confirm if you have barretts.      Try celebrex (celecoxib) to help joint pains.  Better tolerated by sensitive stomachs.      Can stay off the BP medication    Recheck 4 wks

## 2021-03-05 LAB
ANA SER QL IF: NEGATIVE
CCP AB SER IA-ACNC: 1 U/ML

## 2021-03-05 ASSESSMENT — PATIENT HEALTH QUESTIONNAIRE - PHQ9: SUM OF ALL RESPONSES TO PHQ QUESTIONS 1-9: 12

## 2021-03-05 ASSESSMENT — ANXIETY QUESTIONNAIRES: GAD7 TOTAL SCORE: 1

## 2021-03-05 NOTE — RESULT ENCOUNTER NOTE
Karla -  Hands look like osteoarthritis (wear n tear of age, not rheumatoid).  Knees actually look good.  Labs will probably be back on Monday.  Continue plan of anti inflammatory medication.    Rosaura

## 2021-03-06 LAB — RHEUMATOID FACT SER NEPH-ACNC: <7 IU/ML (ref 0–20)

## 2021-03-08 ENCOUNTER — COMMUNICATION - HEALTHEAST (OUTPATIENT)
Dept: PALLIATIVE MEDICINE | Facility: OTHER | Age: 51
End: 2021-03-08

## 2021-03-08 DIAGNOSIS — E78.5 DYSLIPIDEMIA: ICD-10-CM

## 2021-03-08 DIAGNOSIS — M54.40 BILATERAL LOW BACK PAIN WITH SCIATICA, SCIATICA LATERALITY UNSPECIFIED, UNSPECIFIED CHRONICITY: ICD-10-CM

## 2021-03-08 DIAGNOSIS — G89.4 CHRONIC PAIN SYNDROME: ICD-10-CM

## 2021-03-08 RX ORDER — ATORVASTATIN CALCIUM 20 MG/1
20 TABLET, FILM COATED ORAL DAILY
Qty: 90 TABLET | Refills: 1 | Status: SHIPPED | OUTPATIENT
Start: 2021-03-08 | End: 2022-06-09

## 2021-03-08 NOTE — RESULT ENCOUNTER NOTE
Karla -  Labs agree with xrays - no rheumatoid arthritis at this time.  Cholesterol quite high again - restart the atorvastatin.  I just sent the prescription.    Rosaura

## 2021-03-11 ENCOUNTER — HOSPITAL ENCOUNTER (OUTPATIENT)
Dept: PALLIATIVE MEDICINE | Facility: OTHER | Age: 51
Discharge: HOME | End: 2021-03-11
Attending: NURSE PRACTITIONER

## 2021-03-11 DIAGNOSIS — G89.4 CHRONIC PAIN SYNDROME: ICD-10-CM

## 2021-03-11 DIAGNOSIS — M54.40 BILATERAL LOW BACK PAIN WITH SCIATICA, SCIATICA LATERALITY UNSPECIFIED, UNSPECIFIED CHRONICITY: ICD-10-CM

## 2021-03-15 ENCOUNTER — COMMUNICATION - HEALTHEAST (OUTPATIENT)
Dept: PALLIATIVE MEDICINE | Facility: OTHER | Age: 51
End: 2021-03-15

## 2021-03-22 ENCOUNTER — COMMUNICATION - HEALTHEAST (OUTPATIENT)
Dept: PALLIATIVE MEDICINE | Facility: OTHER | Age: 51
End: 2021-03-22

## 2021-03-22 ENCOUNTER — MYC MEDICAL ADVICE (OUTPATIENT)
Dept: FAMILY MEDICINE | Facility: CLINIC | Age: 51
End: 2021-03-22

## 2021-03-23 NOTE — TELEPHONE ENCOUNTER
Pt advised ED as noted per MD.  States she will contact [facility] first, then ED.  Advised attempt for small amt of water frequently.  BENTLEY Lyons RN

## 2021-03-23 NOTE — TELEPHONE ENCOUNTER
"S-(situation): Increased difficulty swallowing soft foods, fluids, meds since Sun. Foods/ fluids/ med \"get stuck\" in esophagus.     B-(background): 03-04-21 appt with Mike Kaplan Guzman's esophagus without dysplasia. Hx bariatric surg- lap band. Follow with [facility]-  had UGI 08-19-20- see report. States next step would be gastric emptying study.     A-(assessment): Started omeprazole 20 mg daily per per 03-04-21 OV. States sx much improved for several days. Since has been having increasing GI sx, worse since Sun. Having difficulty swallowing water, soups, yogurt, meds- states \"gets stuck\", painful, then vomits. Was able to drink small amt of water frequently last luciano as was thirsty. Reports decreased urine output.     R-(recommendations): Advised to contact [facility] to report sx, request appt.  Forwarded to Dr. Castro in PCP absence for review.  Advise ED?  BENTLEY Lyons RN    "

## 2021-04-01 ENCOUNTER — COMMUNICATION - HEALTHEAST (OUTPATIENT)
Dept: PALLIATIVE MEDICINE | Facility: OTHER | Age: 51
End: 2021-04-01

## 2021-04-06 DIAGNOSIS — F32.1 MODERATE MAJOR DEPRESSION (H): ICD-10-CM

## 2021-04-06 DIAGNOSIS — K22.4 ESOPHAGEAL DYSMOTILITY: ICD-10-CM

## 2021-04-06 DIAGNOSIS — K22.70 BARRETT'S ESOPHAGUS WITHOUT DYSPLASIA: ICD-10-CM

## 2021-04-15 ENCOUNTER — HOSPITAL ENCOUNTER (OUTPATIENT)
Dept: NUCLEAR MEDICINE | Facility: CLINIC | Age: 51
Setting detail: NUCLEAR MEDICINE
Discharge: HOME | End: 2021-04-15
Attending: PHYSICIAN ASSISTANT | Admitting: PHYSICIAN ASSISTANT
Payer: COMMERCIAL

## 2021-04-15 DIAGNOSIS — R11.0 NAUSEA: ICD-10-CM

## 2021-04-15 PROCEDURE — 343N000001 HC RX 343: Performed by: PHYSICIAN ASSISTANT

## 2021-04-15 PROCEDURE — 78264 GASTRIC EMPTYING IMG STUDY: CPT | Mod: 52

## 2021-04-15 PROCEDURE — A9541 TC99M SULFUR COLLOID: HCPCS | Performed by: PHYSICIAN ASSISTANT

## 2021-04-15 RX ADMIN — Medication 1.5 MILLICURIE: at 08:05

## 2021-05-06 ENCOUNTER — HOSPITAL ENCOUNTER (OUTPATIENT)
Dept: PALLIATIVE MEDICINE | Facility: OTHER | Age: 51
Discharge: HOME | End: 2021-05-06
Attending: NURSE PRACTITIONER
Payer: COMMERCIAL

## 2021-05-06 DIAGNOSIS — M54.40 BILATERAL LOW BACK PAIN WITH SCIATICA, SCIATICA LATERALITY UNSPECIFIED, UNSPECIFIED CHRONICITY: ICD-10-CM

## 2021-05-06 DIAGNOSIS — G89.4 CHRONIC PAIN SYNDROME: ICD-10-CM

## 2021-05-10 DIAGNOSIS — K22.4 ESOPHAGEAL DYSMOTILITY: ICD-10-CM

## 2021-05-10 DIAGNOSIS — K22.70 BARRETT'S ESOPHAGUS WITHOUT DYSPLASIA: ICD-10-CM

## 2021-05-10 DIAGNOSIS — Z98.1 STATUS POST LAMINECTOMY WITH SPINAL FUSION: ICD-10-CM

## 2021-05-11 RX ORDER — GABAPENTIN 600 MG/1
TABLET ORAL
Qty: 120 TABLET | Refills: 5 | Status: SHIPPED | OUTPATIENT
Start: 2021-05-11 | End: 2021-11-17

## 2021-05-13 ENCOUNTER — HOSPITAL ENCOUNTER (OUTPATIENT)
Dept: PALLIATIVE MEDICINE | Facility: OTHER | Age: 51
Discharge: HOME | End: 2021-05-13
Payer: COMMERCIAL

## 2021-05-13 DIAGNOSIS — G89.4 CHRONIC PAIN SYNDROME: ICD-10-CM

## 2021-05-17 LAB
6MAM UR QL: NOT DETECTED
7AMINOCLONAZEPAM UR QL: NOT DETECTED
A-OH ALPRAZ UR QL: NOT DETECTED
ALPHA-OH-MIDAZOLAM (CUTOFF 20 NG/ML) - HISTORICAL: NOT DETECTED
ALPRAZ UR QL: NOT DETECTED
AMPHET UR QL SCN: NOT DETECTED
BARBITURATES UR QL: NOT DETECTED
BUPRENORPHINE UR QL: PRESENT
BZE UR QL: NOT DETECTED
CARBOXYTHC UR QL: NOT DETECTED
CARISOPRODOL UR QL: NOT DETECTED
CLONAZEPAM UR QL: NOT DETECTED
CODEINE UR QL: NOT DETECTED
CREAT UR-MCNC: 22.1 MG/DL (ref 20–400)
DIAZEPAM UR QL: NOT DETECTED
ETHYL GLUCURONIDE UR QL: NOT DETECTED
FENTANYL UR QL: NOT DETECTED
GABAPENTIN (CUTOFF 100 NG/ML) - HISTORICAL: PRESENT
HYDROCODONE UR QL: NOT DETECTED
HYDROMORPHONE UR QL: NOT DETECTED
LORAZEPAM UR QL: NOT DETECTED
MDA UR QL: NOT DETECTED
MDEA UR QL: NOT DETECTED
MDMA UR QL: NOT DETECTED
ME-PHENIDATE UR QL: NOT DETECTED
MEPERIDINE UR QL: NOT DETECTED
METHADONE UR QL: NOT DETECTED
METHAMPHET UR QL: NOT DETECTED
MIDAZOLAM UR QL SCN: NOT DETECTED
MORPHINE UR QL: NOT DETECTED
NALOXONE (CUTOFF 100 NG/ML) - HISTORICAL: NOT DETECTED
NORBUPRENORPHINE UR QL CFM: NOT DETECTED
NORDIAZEPAM UR QL: NOT DETECTED
NORFENTANYL UR QL: NOT DETECTED
NORHYDROCODONE UR QL CFM: NOT DETECTED
NOROXYCODONE UR QL CFM: PRESENT
NOROXYMORPHONE UR QL SCN: NOT DETECTED
OXAZEPAM UR QL: NOT DETECTED
OXYCODONE UR QL: PRESENT
OXYMORPHONE UR QL: PRESENT
PATHOLOGY STUDY: NORMAL
PCP UR QL: NOT DETECTED
PHENTERMINE UR QL: NOT DETECTED
PREGABALIN (CUTOFF 100 NG/ML) - HISTORICAL: NOT DETECTED
SERVICE CMNT-IMP: NORMAL
TAPENTADOL UR QL SCN: NOT DETECTED
TAPENTADOL UR QL SCN: NOT DETECTED
TEMAZEPAM UR QL: NOT DETECTED
TRAMADOL UR QL: NOT DETECTED
ZOLPIDEM METABOLITE (CUTOFF 100 NG/ML) - HISTORICAL: NOT DETECTED
ZOLPIDEM UR QL: NOT DETECTED

## 2021-06-01 NOTE — PROGRESS NOTES
Patient presents to the clinic today for a follow up with Leonor Ramirez CNP regarding back and neck pain. Patient describes their pain as stabbing and dull ache. Her/His function score is 8.

## 2021-06-01 NOTE — PROGRESS NOTES
[facility]  New patient consultation        CC-  Chief Complaint   Patient presents with     Consult     back and neck pain     Cynthia Lyons 49 y.o. is here today, sent to me by  to discuss the patients pain.  Associated symptoms with pain include neck pain and low back pain that radiates down her legs on both sides. Patient notes that she is had a fusion done in the past and presents today to establish cares. She was previously seeing [facility] at the [facility] and now follows along with her PCP who has been giving her pain medications as well as her Primary care and notes that now with the law changes she is unable to continue providing opioids. Dr. Li and Dr. Pearce at [facility] for her spine cares. 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 MVC for the neck pain- case that is open.     Alleviating factors: Include-  Opioids and position changes. Jacuzzi tub helps  Aggravating factors: activity including bending, standing is the worse position, laying down or lifting, The patient denies using any assistive devices to help with mobilization. Sleep for about 5 hours at a time.   Pain level today: On a scale of 1-10, the patient rates their pain at a 7  Function Rating:affects her ability to walk, sit and stand. She is unable to work at this time.       HPI  No past medical history on file.  No past surgical history on file.  No family history on file.  Social History     Tobacco Use   Smoking Status Current Every Day Smoker   Smokeless Tobacco Never Used     Social History     Substance and Sexual Activity   Alcohol Use Not Currently     Social History     Substance and Sexual Activity   Drug Use Yes     Types: Oxycodone     Social History     Substance and Sexual Activity   Sexual Activity Not Currently       Chemical Dependency History: Patient Endorses tobacco use,  Denies alcohol use, illicit substance use including THC.   Denies any chemical dependency evaluation or treatment in the past.  Denies any legal issues related to substance use.    Psychiatric History:  Patient reports no current psychiatrist or psychologist.  Denies any suicidal ideation.  Denies any hospitalizations for mental illness.    ORT     low risk     Pertinent Pain Medications:    She currently takes Cymbalta, Wellbutrin, gabapentin, robaxin and percocet three times a day prn     previously had tried ms contin, effexor, fentanyl patches and tramadol       Current Outpatient Medications:      amoxicillin-clavulanate (AUGMENTIN) 875-125 mg per tablet, Take 1 tablet by mouth 2 (two) times a day., Disp: , Rfl:      buPROPion (WELLBUTRIN SR) 200 MG 12 hr tablet, Take 200 mg by mouth daily., Disp: , Rfl:      cetirizine (ZYRTEC) 10 MG tablet, Take 10 mg by mouth 2 (two) times a day., Disp: , Rfl:      diphenhydramine HCl (BENADRYL ALLERGY ORAL), Take by mouth at bedtime., Disp: , Rfl:      DULoxetine (CYMBALTA) 60 MG capsule, Take 60 mg by mouth., Disp: , Rfl:      gabapentin (NEURONTIN) 600 MG tablet, Take 600 mg by mouth 4 (four) times a day., Disp: , Rfl:      lisinopril-hydrochlorothiazide (PRINZIDE,ZESTORETIC) 20-12.5 mg per tablet, Take 1 tablet by mouth daily., Disp: , Rfl:      methocarbamol (ROBAXIN) 750 MG tablet, Take 750 mg by mouth 3 (three) times a day., Disp: , Rfl:      oxyCODONE-acetaminophen (PERCOCET/ENDOCET) 7.5-325 mg per tablet, Take 1 tablet by mouth every 8 (eight) hours as needed for pain., Disp: , Rfl:      phenylephrine HCl 5 mg Tab, Take by mouth as needed., Disp: , Rfl:     Therapeutic interventions previously tried-   I spine in the past with alternative medications    Review of Systems:  12 point systems were reviewed with pt as documented on pt health form of 9/13/2019. ROS was positive for ongoing neck and low back pain the rest of the systems were pertinent negative.    Exam  Vitals:    09/13/19 1054   BP: 128/70   Patient Site: Left  "Arm   Patient Position: Sitting   Pulse: (!) 101   Resp: 16   Weight: 196 lb (88.9 kg)   Height: 5' 5\" (1.651 m)     Constitutional-General:  Pleasant, straightforward, healthy appearing individual.   Psych-Mental Status: A & O in no acute distress. Speech is fluent.  Recent and remote memory are intact.  Attention span and concentration are normal. Displays appropriate mood and affect.   H,E,N,T- Symmetrical, Eyes- Perrla, Nares- patents bilaterally, throat- trachea is midline, airway is patent, unlabored respiratory effort.  Lymph-cervical lymph system (anterior and posterior) without palpable nodules or tenderness throughout. Supraclavicular chain without palpable nodules or tenderness throughout.   Cardiovascular- Regular S1, S2 rhythm without murmurs, gallops, clicks or rubs. legs and feet are warm.  Pulses are palpable and grade 2 at the posterior tibial arteries bilaterally, no edema noted.  Pulmonary- lungs are clear to auscultation throughout   Musckuloskeletal  Gait:  Gait is normal.   Gross Motor: Toe walking, heel walking difficult but able, and Romberg tests are normal.   Strength:  Bilateral upper and lower extremity strength is normal and symmetric 5/5. No atrophy or tone abnormalities noted.   Sensation:  No loss of sensation noted to light touch in both upper and lower extremities. No dermatomal abnormal distributions noted.  Spine ROM:  Normal range of motion with no pain reproduction in the cervical, thoracic and lumbar spine.   Provocative Maneuvers:  Upper extremity, Hip, sacroiliac, and knee provocative maneuvers are negative,Tinel's test negative bilaterally, Facet loading test negative bilaterally. Impingement tests of rotator cuff pathology, negative bilaterally. Knee exam: Ligaments test was negative, Linnea test was negative. SLR test was negative bilaterally..   Palpation:  Inspection and palpatory examination of the spine and upper/lower extremities is unremarkable. Tenderness is noted " in the midline tenderness, c spine, t spine and lumbar spine as well as left SI joint pain.   Neuro:  Bilateral upper and lower extremity coordination and muscle stretch reflexes are physiologic and symmetric 2/4.  Babinski responses are downgoing.  + clonus bilaterally. Negative hoffmans sign bilaterally.   Integumentary: no rashes or breaks in the skin, no open wounds.     Lab:  Last UDS on 9/13/2019 was taken today and is pending.      Imaging:  No new imaging available to review    :  Dated 9/13/2019 was reviewed with the patient  Paper copy reviewed     Assessment -  Todays diagnosis includes   1. Depression, unspecified depression type    2. Chronic low back pain without sciatica, unspecified back pain laterality        After reviewing the patients chart and physical findings I agree that the patient would benefit from what the pain center has to offer. I have discussed with the  patient today the diagnosis and treatment recommendations.  We reviewed the natural progression of this problem at great length.  Some possible benefits and detriments of physical therapy, chiropractic care, medication management, behavorial therapy, anti-inflammatory diet and other alternative treatments were discussed.  We also discussed future possible treatment options in a stepwise fashion, including interventional pain procedures and injections and possible surgical referral if needed.     Chronic back pain- patient presents today to follow up on her ongoing chronic pain as well as difficulty with pain control. She has had a fair amount of different medications noted in her past and has even gone to a pain center for management, recently her PCP reports she is unable to provide continued medications. Currently she is on percocet 7.5 three times a day as well as other supportive medications. Discussed alternative tx such as injections etc.     Behavorial health- patient is tearful at times today due will send to [facility] or [facility]  to evaluate and treat for ongoing depression, she notes that she is tired from dealing with the chronic pain but is not suicidal.     Will order updated films on her low back and pelvis due to the ongoing discomfort she demonstrated today in the office.      The patient understands that pain medication is not prescribed during the initial patient consultation at the pain center. If the patient is on pain medications for chronic pain, the PCP or prescribing provider may choose  to prescribe until the patient presents for follow up at the pain center. Medication prescriptions provided by the pain center, will depend on the UA results, safe prescribing practices established by the CDC and patient response to their current treatment regimen at the follow up visit.      Patient set goals today in the office to achieve with the pain centers help. They are:  1. To treat her ongoing chronic pain as well as help her maintain her ongoing function that is independently.      Plan Interventions recommended today:  Assessment tool-        Follow up in 2-3 weeks, we will discuss a pain treatment plan at that time, which may include narcotics and alternative therapies. OVL at the next visit.     Sign a CICI at the  so we can obtain your records for our next visit- [facility] consultation/procedures notes as well as any previous surgerys    Continue your current regimen of alleviating factors    Please see your current provider for any continued prescription, they may choose to provide you prescriptions of your narcotics until we have your urine screen back. They will continue to manage your general health and have requested you see the pain center for pain management. Please discuss any health concerns with your PCP       [place] Precautions are taken with every patient which includes a  report and drug screen. A UA will be taken today for baseline screening.       Behavioral Therapy-get an intake packet for coping and  stress and notes, ok to get an intake packet.       Physical and Occupational Therapy- there are different types of PT including myofascial release and postural restoration for the local back pain      Injections-trigger point injections would be helpful to the localized areas of pain in the lumbar spine.       Imaging may be ordered depending on your physical exam and symptoms, if this has been ordered obtain this prior to your next visit.     Flexion/ext/ap/lat plain film as well standing pelvis xray.       Acupuncture- you will need to check with your insurance for coverage, get an intake packet from the  to schedule an appointment.      Non-opoid medical management includes- diet- previous lap-band- may need    Will draw a vitamin D level today- blood draw today       Some folks want to use CBD oil for pain control- it is made from the Hemp plant, that is ok, however it is difficult to find a reputable source, currently [employer] is a good resource. If you are employed check with your employer prior to starting. Recommended dose of 15-30 mg 1-2 times per day as needed. Needs to be 3rd party verified.       Education was provided to the patient today in regards to their specific diagnosis.    As a reminder- chronic pain is generally stable, if you experience a new injury or new different pain it is expected that you present to the ED or urgent care for evaluation. DO NOT use your chronic pain medications to treat any new or different pain other then what it is intended for. We do not replace any lost or stolen prescriptions or provide early refills for overtaking your medications.       Orders placed today   Orders Placed This Encounter   Procedures     XR Lumbar Spine 4 or More VWS     AP, Lateral, Flexion, Extension views     Standing Status:   Future     Standing Expiration Date:   9/13/2020     Order Specific Question:   Reason for Exam (Describe Symptoms):     Answer:   check  stability     Order Specific Question:   Is the patient pregnant?     Answer:   No     Order Specific Question:   Can the procedure be changed per Radiologist protocol?     Answer:   Yes     XR Pelvis AP     Standing Status:   Future     Standing Expiration Date:   9/13/2020     Order Specific Question:   Reason for Exam (Describe Symptoms):     Answer:   standing, check for uneven pelvis, no shoes.     Order Specific Question:   Is the patient pregnant?     Answer:   No     Order Specific Question:   Can the procedure be changed per Radiologist protocol?     Answer:   Yes     Vitamin D, Total (25-Hydroxy)     Ambulatory referral to Physical Therapy     Referral Priority:   Routine     Referral Type:   Physical Therapy     Referral Reason:   Evaluation and Treatment     Requested Specialty:   Physical Therapy     Number of Visits Requested:   1       Patient reminders:   Diagnostics: UDT/SWAB collected 9/13/2019 and results are pending.  UDT/SWAB:  Patient required a random Urine Drug Testing, due to the need to comply with [place] Model Policy Guidelines and CDC Guideline for the use of any controlled substances. This is to ensure that patient is compliant with treatment, and monitor for risks such as diversion, abuse, or any other aberrant behaviors. Patient is either being considered for or taking a controlled substance. Unexpected findings will be discussed and treatment decision may be adjusted. Testing is being implemented across the board randomly w/o bias related to age, race, gender, socioeconomic status or Restorationism affiliation.     SAFETY REMINDERS  No alcohol while taking controlled substances. Alcohol is not an illegal substance, it is unsafe to use in combination. It is a build up of substances in the body that can be extremely hazardous and may cause respirations to slow to a dangerous rate resulting in hospitalization, brain damage, or death.    Opioid medications have been associated with sharp  rise in unintentional overdose and death.  Overdose is a condition characterized by the consumption in excess of a particular drug causing adverse effects. This can happen b/c you are sick, accidentally or intentionally took an extra dose, are on multiple medication that can interact. Someone took your medication and they are not use to the medication.  Symptoms of overdose include:   !breathing slow and shallow, erratic or not at all  !pinpoint pupils, hallucinations  !confusion  !muscle jerks, slack muscles   !extreme sleepiness or loss of alertness   !awake but not able to talk   !face pale or clammy, vomiting, for lighter skinned people, the skin tone turns bluish purple, for darker skinned people, it turns grayish or ashen   If in a situation where overdose is a concern engage the emergency response system (dial 911).    In one study it was noted that 80% of unintentional overdoses occurred in people who were taking a combination of opioids and benzodiazepines.    Do not sell, loan, borrow or share your opioid medication with anyone. Deaths have occurred as a result of this practice. It is illegal and patients are being prosecuted.     Prevent unexpected access/loss of medication: Keep medication locked. Only carry what you need with you.    The patient agrees to the plan and has no further questions, if questions arise the patient knows to call [phone].     11:14 AM        Thank you for this consult and opportunity to assist with this patients care.    Leonor Ramirez, [facility]  [address]  [place] [address]  Ph: [phone]  Fax: [phone]

## 2021-06-01 NOTE — PATIENT INSTRUCTIONS - HE
Plan Interventions recommended today:  Assessment tool-        Follow up in 2-3 weeks, we will discuss a pain treatment plan at that time, which may include narcotics and alternative therapies. OVL at the next visit.       Sign a CICI at the  so we can obtain your records for our next visit- [facility] consultation/procedures notes as well as any previous surgerys      Continue your current regimen of alleviating factors    Please see your current provider for any continued prescription, they may choose to provide you prescriptions of your narcotics until we have your urine screen back. They will continue to manage your general health and have requested you see the pain center for pain management. Please discuss any health concerns with your PCP       [place] Precautions are taken with every patient which includes a  report and drug screen. A UA will be taken today for baseline screening.       Behavioral Therapy-get an intake packet for coping and stress and notes, ok to get an intake packet.       Physical and Occupational Therapy- there are different types of PT including myofascial release and postural restoration for the local back pain      Injections-trigger point injections would be helpful to the localized areas of pain in the lumbar spine.       Imaging may be ordered depending on your physical exam and symptoms, if this has been ordered obtain this prior to your next visit.     Flexion/ext/ap/lat plain film as well standing pelvis xray.       Acupuncture- you will need to check with your insurance for coverage, get an intake packet from the  to schedule an appointment.      Non-opoid medical management includes- diet- previous lap-band- may need    Will draw a vitamin D level today- blood draw today       Some folks want to use CBD oil for pain control- it is made from the Hemp plant, that is ok, however it is difficult to find a reputable source, currently [employer] online  is a good resource. If you are employed check with your employer prior to starting. Recommended dose of 15-30 mg 1-2 times per day as needed. Needs to be 3rd party verified.       Education was provided to the patient today in regards to their specific diagnosis.    As a reminder- chronic pain is generally stable, if you experience a new injury or new different pain it is expected that you present to the ED or urgent care for evaluation. DO NOT use your chronic pain medications to treat any new or different pain other then what it is intended for. We do not replace any lost or stolen prescriptions or provide early refills for overtaking your medications.       Orders placed today   Orders Placed This Encounter   Procedures     XR Lumbar Spine 4 or More VWS     AP, Lateral, Flexion, Extension views     Standing Status:   Future     Standing Expiration Date:   9/13/2020     Order Specific Question:   Reason for Exam (Describe Symptoms):     Answer:   check stability     Order Specific Question:   Is the patient pregnant?     Answer:   No     Order Specific Question:   Can the procedure be changed per Radiologist protocol?     Answer:   Yes     XR Pelvis AP     Standing Status:   Future     Standing Expiration Date:   9/13/2020     Order Specific Question:   Reason for Exam (Describe Symptoms):     Answer:   standing, check for uneven pelvis, no shoes.     Order Specific Question:   Is the patient pregnant?     Answer:   No     Order Specific Question:   Can the procedure be changed per Radiologist protocol?     Answer:   Yes     Vitamin D, Total (25-Hydroxy)     Ambulatory referral to Physical Therapy     Referral Priority:   Routine     Referral Type:   Physical Therapy     Referral Reason:   Evaluation and Treatment     Requested Specialty:   Physical Therapy     Number of Visits Requested:   1       Patient reminders:   Diagnostics: UDT/SWAB collected 9/13/2019 and results are pending.  UDT/SWAB:  Patient required  a random Urine Drug Testing, due to the need to comply with [place] Model Policy Guidelines and CDC Guideline for the use of any controlled substances. This is to ensure that patient is compliant with treatment, and monitor for risks such as diversion, abuse, or any other aberrant behaviors. Patient is either being considered for or taking a controlled substance. Unexpected findings will be discussed and treatment decision may be adjusted. Testing is being implemented across the board randomly w/o bias related to age, race, gender, socioeconomic status or Pentecostalism affiliation.     SAFETY REMINDERS  No alcohol while taking controlled substances. Alcohol is not an illegal substance, it is unsafe to use in combination. It is a build up of substances in the body that can be extremely hazardous and may cause respirations to slow to a dangerous rate resulting in hospitalization, brain damage, or death.    Opioid medications have been associated with sharp rise in unintentional overdose and death.  Overdose is a condition characterized by the consumption in excess of a particular drug causing adverse effects. This can happen b/c you are sick, accidentally or intentionally took an extra dose, are on multiple medication that can interact. Someone took your medication and they are not use to the medication.  Symptoms of overdose include:   !breathing slow and shallow, erratic or not at all  !pinpoint pupils, hallucinations  !confusion  !muscle jerks, slack muscles   !extreme sleepiness or loss of alertness   !awake but not able to talk   !face pale or clammy, vomiting, for lighter skinned people, the skin tone turns bluish purple, for darker skinned people, it turns grayish or ashen   If in a situation where overdose is a concern engage the emergency response system (dial 911).    In one study it was noted that 80% of unintentional overdoses occurred in people who were taking a combination of opioids and  benzodiazepines.    Do not sell, loan, borrow or share your opioid medication with anyone. Deaths have occurred as a result of this practice. It is illegal and patients are being prosecuted.     Prevent unexpected access/loss of medication: Keep medication locked. Only carry what you need with you.    The patient agrees to the plan and has no further questions, if questions arise the patient knows to call [phone].     11:14 AM    Thank you for this consult and opportunity to assist with this patients care.    Leonor Ramirez, [facility]  [address]  [place] [address]  Ph: [phone]  Fax: [phone]

## 2021-06-02 NOTE — PATIENT INSTRUCTIONS - HE
Plan:   Interventions-    Follow up in 4 weeks to evaluate the effectiveness of the treatment interventions ordered today. Scheduling your appointment prior to leaving assists in reduction of running out of medication prior to the next appointment.     If you are receiving medications from the pain clinic, it is your responsibility to call 3-5 days in advance for a refill. The clinic has up to five days to provide a refill for you.     Therapy- PT/OT- kinetic referral- postural restoration for the acetabular height difference- right is lower then the left. Orthotics referral will be placed.     Bowel health is important, use of magnesium 400 mg at bedtime, vitamin B 12 or complex needed at bedtime this will also support your nerve health.     Low back pain related to the exaggerated lordosis- yoga- superman lifts as discussed in the office to strengthen this.     Agree with continued care with psychiatry for anxiety     Will continue use the percocet as you are up to 3 per day ok to fill     Ok to reduce the gabapentin as you are unsure if it is helping. Reduce the nighttime dose to 600 mg for 5 days. Then eliminate a daytime dose for 5 days, then eliminate all daytime doses for 5 days, then the night time dose.     At anytime if your pain increases ok to resume your gabapentin.     At the next visit may consider adding a long acting such as butrans or belbuca     As a reminder- chronic pain is generally stable, if you experience a new injury or new different pain it is expected that you present to the ED or urgent care for evaluation. DO NOT use your chronic pain medications to treat any new or different pain other then what it is intended for. We do not replace any lost or stolen prescriptions or provide early refills for overtaking your medications.     Orders placed today  Medications that were ordered today   Requested Prescriptions     Signed Prescriptions Disp Refills     oxyCODONE-acetaminophen  (PERCOCET/ENDOCET) 7.5-325 mg per tablet 84 tablet 0     Sig: Take 1 tablet by mouth every 8 (eight) hours as needed for pain (up to 3 per day).     Orders Placed This Encounter   Procedures     Ambulatory referral to Adult PT- External     Referral Priority:   Routine     Referral Type:   Physical Therapy     Referral Reason:   Evaluation and Treatment     Requested Specialty:   Physical Therapy     Number of Visits Requested:   1       UDT/SWAB:  Patient required a random Urine Drug Testing, due to the need to comply with [place] Model Policy Guidelines and CDC Guideline for the use of any controlled substances. This is to ensure that patient is compliant with treatment, and monitor for risks such as diversion, abuse, or any other aberrant behaviors. Patient is either being considered for or taking a controlled substance. Unexpected findings will be discussed and treatment decision may be adjusted. Testing is being implemented across the board randomly w/o bias related to age, race, gender, socioeconomic status or Yarsanism affiliation.    The patient understand todays plan and has their questions answered in regards to expectations and current treatment plan.     SAFETY REMINDERS  No alcohol while taking controlled substances. Alcohol is not an illegal substance, it is unsafe to use in combination. It is a build up of substances in the body that can be extremely hazardous and may cause respirations to slow to a dangerous rate resulting in hospitalization, brain damage, or death.    Opioid medications have been associated with sharp rise in unintentional overdose and death.  Overdose is a condition characterized by the consumption in excess of a particular drug causing adverse effects. This can happen b/c you are sick, accidentally or intentionally took an extra dose, are on multiple medication that can interact. Someone took your medication and they are not use to the medication.  Symptoms of overdose include:    !breathing slow and shallow, erratic or not at all  !pinpoint pupils, hallucinations  !confusion  !muscle jerks, slack muscles   !extreme sleepiness or loss of alertness   !awake but not able to talk   !face pale or clammy, vomiting, for lighter skinned people, the skin tone turns bluish purple, for darker skinned people, it turns grayish or ashen   If in a situation where overdose is a concern engage the emergency response system (dial 911).    In one study it was noted that 80% of unintentional overdoses occurred in people who were taking a combination of opioids and benzodiazepines.    Do not sell, loan, borrow or share your opioid medication with anyone. Deaths have occurred as a result of this practice. It is illegal and patients are being prosecuted.     Prevent unexpected access/loss of medication: Keep medication locked. Only carry what you need with you.    Leonor Ramirez, [facility]  [address]  [place] [address]  Ph: [phone]  Fax: [phone]

## 2021-06-02 NOTE — PROGRESS NOTES
Patient presents to the clinic today for a follow up with Leonor Ramirez CNP regarding back and neck pain. Patient describes their pain as achy. Her/His function score is 8.

## 2021-06-02 NOTE — TELEPHONE ENCOUNTER
Spoke with patient regarding emergency room referral.  [facility] provider reviewed patient's CT scan and states that patient has a very small 1mm stone in her right kidney and no others.  Patient advised if she has continued pain, to follow-up with PCP, as this stone is not the cause of her pain.  Pham Noe RN

## 2021-06-03 VITALS — HEIGHT: 65 IN | BODY MASS INDEX: 33.17 KG/M2 | WEIGHT: 199.1 LBS

## 2021-06-03 VITALS — HEIGHT: 65 IN | WEIGHT: 201 LBS | BODY MASS INDEX: 33.49 KG/M2

## 2021-06-03 VITALS — BODY MASS INDEX: 32.65 KG/M2 | WEIGHT: 196 LBS | HEIGHT: 65 IN

## 2021-06-03 NOTE — PROGRESS NOTES
Patient presents to the clinic today for a follow up with Leonor Ramirez CNP regarding back and neck [pain. Patient describes their pain as dull ache and sharp shock. Her/His function score is 8.

## 2021-06-03 NOTE — TELEPHONE ENCOUNTER
Central PA team  [phone]  Pool: HE PA MED ([id number])          PA has been initiated.       PA form completed and faxed insurance via Cover My Meds     Key:  [id number]      Medication:  Belbuca 150MCG films      Insurance:  Aetna Commercial        Response will be received via fax and may take up to 5-10 business days depending on plan

## 2021-06-03 NOTE — TELEPHONE ENCOUNTER
Central PA team  [phone]  Pool: HE PA MED ([id number])          PA has been initiated.       PA form completed and faxed insurance via Cover My Meds     Key:  [id number]     Medication:  oxyCODONE (OXYCONTIN) 10 mg 12 hr tablet    Insurance:  AETNA         Response will be received via fax and may take up to 5-10 business days depending on plan

## 2021-06-03 NOTE — TELEPHONE ENCOUNTER
Caller would like to discuss an/a Return call for patient wants a disability sticker. Writer advised caller of callback within 24-72 hours.    Patient Name: Helen Arreola  Caller Name: same   Name of Facility:    Callback Number [phone]  Best Availability:   Can A Detailed Message Be left? yes  Fax Number:   Additional Info: patient states she wants to have a disability sticker, her neurologist explain that she will not sign off on this see encounter 4/11.  Patient wants to speak with Dr. Higgins, regarding this issue.  Please contact patient if she does not answer please leave VM.  Did you confirm the message with the caller?: yes    Thank you,  Sosa Rosa     Prior Authorization Request  Who s requesting:  Pharmacy/[place]  Ph[facility] and Location: [facility]  Medication Name: oxycontin 10mg, 1/day  Insurance Plan: [employer]  Insurance Member ID Number:  [id number]  Informed patient that prior authorizations can take up to 10 business days for response:   Yes  Okay to leave a detailed message: No

## 2021-06-03 NOTE — PROGRESS NOTES
PAIN CLINIC FOLLOW UP PROGRESS NOTE    CC:  Chief Complaint   Patient presents with     Back Pain     Neck Pain       HPI  Cynthia Lyons is a 49 y.o. female who presents for evaluation   Chief Complaint   Patient presents with     Back Pain     Neck Pain    that is causing continued pain.The patient denies any new medications, diagnoses since the last visit. Specific questions indicated the patient wanted addressed today include: to follow up on her initial consultation and notes that the gabapentin when trying to wean down caused her to have a return symptoms of the hand numbness that is secondary to her carpel tunnel. So she resumed use.     Major issues:  1. Midline low back pain without sciatica, unspecified chronicity    2. Chronic pain syndrome    3. S/P lumbar fusion           Alleviating factors: Include-  Opioids and position changes. Jacuzzi tub helps  Aggravating factors: activity including bending, standing is the worse position, laying down or lifting, The patient denies using any assistive devices to help with mobilization. Sleep for about 5 hours at a time.   Pain level today: On a scale of 1-10, the patient rates their pain at a 8  Function Rating:affects her ability to walk, sit and stand. She is unable to work at this time.       Previous Medical History  Social History     Substance and Sexual Activity   Alcohol Use Not Currently     Social History     Substance and Sexual Activity   Drug Use Yes     Types: Oxycodone     Social History     Tobacco Use   Smoking Status Current Every Day Smoker     Packs/day: 0.00   Smokeless Tobacco Never Used       Pertinent Pain Medications/interventions:    She  currently takes Cymbalta, Wellbutrin, gabapentin, robaxin and percocet three times a day prn   previously had tried ms contin, effexor, fentanyl patches and tramadol     Patient notes that she has been on percocet for sometime and would like to try it without tylenol.    Review of Systems:  12 point  "systems were reviewed with pt as documented on pt health form and the patient denies any new diagnosis or changes in 12 point system review since the last visit. She recently had a kidney stone that sent her to the ED and urgent care for right knee bursitis. Both were treated.     Physical Exam  Vitals:    11/04/19 1111   BP: 134/78   Patient Site: Right Arm   Patient Position: Sitting   Pulse: 80   Resp: 16   Weight: 201 lb (91.2 kg)   Height: 5' 5\" (1.651 m)       General- patient is alert and oriented, in NAD, well-groomed, well-nourished  Psych- Judgment and insight normal, AOx4, recent and remote memory normal, mood and affect normal  Eyes- pupils are equal and reactive, conjunctiva is clear bilaterally, no ptosis is noted.   Respiratory- breathing is non-labored  Cardiovascular- extremities warm and well perfused, no peripheral edema or varicosities.  Musculoskeletal- gait is normal, extremities with no joint swelling, erythema, or warmth.  Palpation:  Inspection and palpatory examination of the spine and upper/lower extremities is unremarkable. Tenderness is noted in the midline tenderness, c spine, t spine and lumbar spine as well as left SI joint pain.   Neuro:  Bilateral upper and lower extremity coordination and muscle stretch reflexes are physiologic and symmetric 2/4.  Babinski responses are downgoing.  + clonus bilaterally. Negative hoffmans sign bilaterally.   Integumentary: no rashes or breaks in the skin, no open wounds      Medications    Current Outpatient Medications:      atorvastatin (LIPITOR) 20 MG tablet, Take 20 mg by mouth daily., Disp: , Rfl: 1     buprenorphine HCl (BELBUCA) 150 mcg Film, Apply 150 mcg to cheek 2 (two) times a day., Disp: 60 each, Rfl: 0     buPROPion (WELLBUTRIN SR) 200 MG 12 hr tablet, Take 200 mg by mouth daily., Disp: , Rfl:      cetirizine (ZYRTEC) 10 MG tablet, Take 10 mg by mouth 2 (two) times a day., Disp: , Rfl:      diphenhydramine HCl (BENADRYL ALLERGY ORAL), " Take by mouth at bedtime., Disp: , Rfl:      DULoxetine (CYMBALTA) 60 MG capsule, Take 60 mg by mouth., Disp: , Rfl:      gabapentin (NEURONTIN) 600 MG tablet, Take 600 mg by mouth 4 (four) times a day., Disp: , Rfl:      lactulose (GENERLAC) 10 gram/15 mL solution, SEE NOTES, Disp: , Rfl:      lisinopril-hydrochlorothiazide (PRINZIDE,ZESTORETIC) 20-12.5 mg per tablet, Take 1 tablet by mouth daily., Disp: , Rfl:      methocarbamol (ROBAXIN) 750 MG tablet, Take 750 mg by mouth 3 (three) times a day., Disp: , Rfl:      nicotine (NICODERM CQ) 14 mg/24 hr, Place 1 patch on the skin., Disp: , Rfl:      nicotine (NICODERM CQ) 14 mg/24 hr, , Disp: , Rfl:      nicotine (NICODERM CQ) 7 mg/24 hr, Place 1 patch on the skin., Disp: , Rfl:      [START ON 11/29/2019] oxyCODONE 7.5 mg TbOr, Take 7.5 mg by mouth 3 (three) times a day as needed., Disp: 84 tablet, Rfl: 0     phenylephrine HCl 5 mg Tab, Take by mouth as needed., Disp: , Rfl:      valACYclovir (VALTREX) 500 MG tablet, TAKE 1 TABLET BY MOUTH DAILY, Disp: , Rfl:     Lab:  Last UDS on 9/13/2019 had expected results. Any abnormal results have been discussed with the patient and may change the plan of care. Please see the scanned document from the outside lab under the media tab. This was reviewed with the patient.      Imaging:  No new imaging.      Recent   Dated 11/4/2019 was reviewed with the patient today.   Paper copy reviewed     Assessment:   Cynthia Lyons is a 49 y.o. female seen in clinic today for   Chief Complaint   Patient presents with     Back Pain     Neck Pain     They are here for follow up and continued medical management of their pain. Today we reviewed the lab work of the UDT test and the results are expected because of the prescribed narcotics found in the urine drug screen.     I have also reviewed the information obtained from the last visit encounter after the CICI was signed and have asked any pertintent questions needed from other  healthcare providers/family/patient to continue care and formulate a treatment plan.     Mental health- she is following with her current provider-PCP, taking cymbalta.     Neck pain - following [facility]- as well as carpel tunnel bilaterally. Taking gabapentin    Ongoing chronic pain related her altered hand sensation, low back pain as well as uneven leg. Left hip is approximately 4 mm higher then the right, to get a shoe lift under the right heel. discussed the need to have orthotic on the right heel for support. Patient raises concerns for the curve in her back. She also notes that she has not had PT as of yet. She was sent to kinetic therapy but has not had a appointment yet. Start the belbuca- film q 12 hr. Will continue the short acting use of oxycodone 7.5 mg three times a day prn without tylenol.    She is getting cymbalta and gabapentin from her PCP.     Patients current MME is 33.75  Patient to call clinic for next month's prescription 5 days in advance. Based on the patients response to their previous narcotic therapy and quality of life, which includes their participation in ADLs, I recommend that the narcotics therapy continue, however the changes listed below will be incorporated into their plan of care.     Patient set goals to   1. To treat her ongoing chronic    Plan:   Interventions-    Follow up in 4 weeks to evaluate the effectiveness of the treatment interventions ordered today. Scheduling your appointment prior to leaving assists in reduction of running out of medication prior to the next appointment.     Will start the belbuca 150 mcg at night for 3 nights if no side effects ok to increase to two times a day. Ok to fill today. Patient notes that the adhesive does not stick to skin well with previous patches such as fentanyl.     referral to kinetic PT for postural restoration and orthotics to  for the right hip that is lower then the left.     Ok to take magnesium 400 mg at bedtime for  constipation     Will convert the oxycodone without tylenol- oxycodone 7.5 mg three times a day prn next fill is 11/29-12/27.     Continue the use of the cymbalta and gabapentin from your PCP.     As a reminder- chronic pain is generally stable, if you experience a new injury or new different pain it is expected that you present to the ED or urgent care for evaluation. DO NOT use your chronic pain medications to treat any new or different pain other then what it is intended for. We do not replace any lost or stolen prescriptions or provide early refills for overtaking your medications.     Orders placed today  Medications that were ordered today   Requested Prescriptions     Signed Prescriptions Disp Refills     oxyCODONE 7.5 mg TbOr 84 tablet 0     Sig: Take 7.5 mg by mouth 3 (three) times a day as needed.     buprenorphine HCl (BELBUCA) 150 mcg Film 60 each 0     Sig: Apply 150 mcg to cheek 2 (two) times a day.     Orders Placed This Encounter   Procedures     Ambulatory referral for Orthotics / Prosthetics     Referral Priority:   Urgent     Referral Type:   Consultation     Referral Reason:   Evaluation and Treatment     Referral Location:   [facility]     Requested Specialty:   Orthotics     Number of Visits Requested:   1       UDT/SWAB:  Patient required a random Urine Drug Testing, due to the need to comply with [place] Model Policy Guidelines and CDC Guideline for the use of any controlled substances. This is to ensure that patient is compliant with treatment, and monitor for risks such as diversion, abuse, or any other aberrant behaviors. Patient is either being considered for or taking a controlled substance. Unexpected findings will be discussed and treatment decision may be adjusted. Testing is being implemented across the board randomly w/o bias related to age, race, gender, socioeconomic status or Gnosticist affiliation.    The patient understand todays plan and has their  questions answered in regards to expectations and current treatment plan.     SAFETY REMINDERS  No alcohol while taking controlled substances. Alcohol is not an illegal substance, it is unsafe to use in combination. It is a build up of substances in the body that can be extremely hazardous and may cause respirations to slow to a dangerous rate resulting in hospitalization, brain damage, or death.    Opioid medications have been associated with sharp rise in unintentional overdose and death.  Overdose is a condition characterized by the consumption in excess of a particular drug causing adverse effects. This can happen b/c you are sick, accidentally or intentionally took an extra dose, are on multiple medication that can interact. Someone took your medication and they are not use to the medication.  Symptoms of overdose include:   !breathing slow and shallow, erratic or not at all  !pinpoint pupils, hallucinations  !confusion  !muscle jerks, slack muscles   !extreme sleepiness or loss of alertness   !awake but not able to talk   !face pale or clammy, vomiting, for lighter skinned people, the skin tone turns bluish purple, for darker skinned people, it turns grayish or ashen   If in a situation where overdose is a concern engage the emergency response system (dial 911).    In one study it was noted that 80% of unintentional overdoses occurred in people who were taking a combination of opioids and benzodiazepines.    Do not sell, loan, borrow or share your opioid medication with anyone. Deaths have occurred as a result of this practice. It is illegal and patients are being prosecuted.     Prevent unexpected access/loss of medication: Keep medication locked. Only carry what you need with you.    Leonor Ramirez, [facility]  [address]  [place] [address]  Ph: [phone]  Fax: [phone]

## 2021-06-03 NOTE — TELEPHONE ENCOUNTER
Left message this afternoon. Had been seen in clinic today. Given 2 rxs. One med will cost $400, and the other requires a PA. Wants to know what can be done.

## 2021-06-03 NOTE — PATIENT INSTRUCTIONS - HE
Plan:   Interventions-    Follow up in 4 weeks to evaluate the effectiveness of the treatment interventions ordered today. Scheduling your appointment prior to leaving assists in reduction of running out of medication prior to the next appointment.     Will start the belbuca 150 mcg at night for 3 nights if no side effects ok to increase to two times a day.     Ok to take magnesium 400 mg at bedtime for constipation     Will convert the oxycodone without tylenol- oxycodone 7.5 mg three times a day prn     Continue the use of the cymbalta and gabapentin from your PCP.     As a reminder- chronic pain is generally stable, if you experience a new injury or new different pain it is expected that you present to the ED or urgent care for evaluation. DO NOT use your chronic pain medications to treat any new or different pain other then what it is intended for. We do not replace any lost or stolen prescriptions or provide early refills for overtaking your medications.     Orders placed today  Medications that were ordered today   Requested Prescriptions     Signed Prescriptions Disp Refills     oxyCODONE 7.5 mg TbOr 84 tablet 0     Sig: Take 7.5 mg by mouth 3 (three) times a day as needed.     buprenorphine HCl (BELBUCA) 150 mcg Film 60 each 0     Sig: Apply 150 mcg to cheek 2 (two) times a day.     No orders of the defined types were placed in this encounter.      UDT/SWAB:  Patient required a random Urine Drug Testing, due to the need to comply with [facility] Model Policy Guidelines and CDC Guideline for the use of any controlled substances. This is to ensure that patient is compliant with treatment, and monitor for risks such as diversion, abuse, or any other aberrant behaviors. Patient is either being considered for or taking a controlled substance. Unexpected findings will be discussed and treatment decision may be adjusted. Testing is being implemented across the board randomly w/o bias related to age, race, gender,  socioeconomic status or Zoroastrianism affiliation.    The patient understand todays plan and has their questions answered in regards to expectations and current treatment plan.     SAFETY REMINDERS  No alcohol while taking controlled substances. Alcohol is not an illegal substance, it is unsafe to use in combination. It is a build up of substances in the body that can be extremely hazardous and may cause respirations to slow to a dangerous rate resulting in hospitalization, brain damage, or death.    Opioid medications have been associated with sharp rise in unintentional overdose and death.  Overdose is a condition characterized by the consumption in excess of a particular drug causing adverse effects. This can happen b/c you are sick, accidentally or intentionally took an extra dose, are on multiple medication that can interact. Someone took your medication and they are not use to the medication.  Symptoms of overdose include:   !breathing slow and shallow, erratic or not at all  !pinpoint pupils, hallucinations  !confusion  !muscle jerks, slack muscles   !extreme sleepiness or loss of alertness   !awake but not able to talk   !face pale or clammy, vomiting, for lighter skinned people, the skin tone turns bluish purple, for darker skinned people, it turns grayish or ashen   If in a situation where overdose is a concern engage the emergency response system (dial 911).    In one study it was noted that 80% of unintentional overdoses occurred in people who were taking a combination of opioids and benzodiazepines.    Do not sell, loan, borrow or share your opioid medication with anyone. Deaths have occurred as a result of this practice. It is illegal and patients are being prosecuted.     Prevent unexpected access/loss of medication: Keep medication locked. Only carry what you need with you.    Leonor Ramirez, [facility]  [address]  [place] [address]  Ph: [phone]  Fax:  [phone]

## 2021-06-03 NOTE — TELEPHONE ENCOUNTER
Prior Authorization Request  Who s requesting:  Pharmacy/[place]  Pharmacy Name and Location: [facility]  Medication Name: belbuca 150mcg film, 2/day  Insurance Plan: [employer]  Insurance Member ID Number:  [id number]  Informed patient that prior authorizations can take up to 10 business days for response:   Yes  Okay to leave a detailed message: No          IRIS    [id number]

## 2021-06-04 VITALS — HEIGHT: 65 IN | BODY MASS INDEX: 31.65 KG/M2 | WEIGHT: 190 LBS

## 2021-06-04 VITALS — HEIGHT: 65 IN | BODY MASS INDEX: 36.49 KG/M2 | WEIGHT: 219 LBS

## 2021-06-04 VITALS — HEIGHT: 65 IN | BODY MASS INDEX: 34.16 KG/M2 | WEIGHT: 205 LBS

## 2021-06-06 NOTE — PROGRESS NOTES
PAIN CLINIC FOLLOW UP PROGRESS NOTE    CC:  Chief Complaint   Patient presents with     Back Pain     Neck Pain       HPI  Cynthia Lyons is a 50 y.o. female who presents for evaluation   Chief Complaint   Patient presents with     Back Pain     Neck Pain    that is causing continued pain.The patient denies any new medications, diagnoses since the last visit. Specific questions indicated the patient wanted addressed today include: patient notes that she has had a difficult time with pain control, she reports that her PCP told her that the belbuca and butrans was for drug addiction and would not order it. She has since been put back on her regimen of OxyContin and oxycodone by her pcp and then sent to an addiction clinic- she has since been on subutex.     Major issues:  1. Bilateral low back pain with sciatica, sciatica laterality unspecified, unspecified chronicity    2. Chronic pain syndrome           Alleviating factors: Include-  Opioids and position changes. Jacuzzi tub helps  Aggravating factors: activity including bending, standing is the worse position, laying down or lifting, The patient denies using any assistive devices to help with mobilization. Sleep for about 5 hours at a time.   Pain level today: On a scale of 1-10, the patient rates their pain at a 9/10. She reports her current treatment is unsustainable and does not really help her.   Function Rating:affects her ability to walk, sit and stand. She is unable to work at this time.       Previous Medical History  Social History     Substance and Sexual Activity   Alcohol Use Not Currently     Social History     Substance and Sexual Activity   Drug Use Yes     Types: Oxycodone     Social History     Tobacco Use   Smoking Status Current Every Day Smoker     Packs/day: 0.00   Smokeless Tobacco Never Used       Pertinent Pain Medications/interventions:    Today after reviewing her UDT- will stop the subutex and start her on belbuca 450 mcg two times a  "day and percocet 5/325 mg up to 3 per day- agreement signed.      Patient reports that she was sick of the opioids and wanted off of them however she had been on it for > 20 years. She was told she had to go to an addiction treatment facility- they currently have her on subutex 12 mg per day. She reports that it is not helpful for her pain and she has to go daily, she also reports that she wants off of them and they keep increasing her dose. 2/21/2020    Since her last visit her PCP has put her back on her regimen of OxyContin 10 mg er two times a day and Percocet 7.5 mg  Two times a day prn -1/28/2020    She  currently takes Cymbalta, Wellbutrin, gabapentin, robaxin and percocet three times a day prn   previously had tried ms contin, effexor, fentanyl patches and tramadol     Patient notes that she has been on percocet for sometime and would like to try it without tylenol.    Review of Systems:  12 point systems were reviewed with pt as documented on pt health form and the patient denies any new diagnosis or changes in 12 point system review since the last visit. Low back pain and uneven leg length.     Physical Exam  Vitals:    02/28/20 1429   BP: 126/73   Patient Site: Left Arm   Patient Position: Sitting   Pulse: 84   Resp: 16   Weight: 219 lb (99.3 kg)   Height: 5' 5\" (1.651 m)       General- patient is alert and oriented, in NAD, well-groomed, well-nourished  Psych- Judgment and insight normal, AOx4, recent and remote memory normal, mood and affect normal  Eyes- pupils are equal and reactive, conjunctiva is clear bilaterally, no ptosis is noted.   Respiratory- breathing is non-labored  Cardiovascular- extremities warm and well perfused, no peripheral edema or varicosities.  Musculoskeletal- gait is normal, extremities with no joint swelling, erythema, or warmth.  Palpation:  Inspection and palpatory examination of the spine and upper/lower extremities is unremarkable. Lumbago   Neuro:  Bilateral upper and " lower extremity coordination and muscle stretch reflexes are physiologic and symmetric 2/4.  Babinski responses are downgoing.  + clonus bilaterally. Negative hoffmans sign bilaterally.   Integumentary: no rashes or breaks in the skin, no open wounds      Medications    Current Outpatient Medications:      atorvastatin (LIPITOR) 20 MG tablet, Take 20 mg by mouth daily., Disp: , Rfl: 1     buPROPion (WELLBUTRIN SR) 200 MG 12 hr tablet, Take 200 mg by mouth daily., Disp: , Rfl:      cetirizine (ZYRTEC) 10 MG tablet, Take 10 mg by mouth 2 (two) times a day., Disp: , Rfl:      diphenhydramine HCl (BENADRYL ALLERGY ORAL), Take by mouth at bedtime., Disp: , Rfl:      DULoxetine (CYMBALTA) 60 MG capsule, Take 60 mg by mouth., Disp: , Rfl:      gabapentin (NEURONTIN) 600 MG tablet, Take 600 mg by mouth 4 (four) times a day., Disp: , Rfl:      lactulose (GENERLAC) 10 gram/15 mL solution, SEE NOTES, Disp: , Rfl:      methocarbamol (ROBAXIN) 750 MG tablet, Take 750 mg by mouth 3 (three) times a day., Disp: , Rfl:      naloxone (NARCAN) 4 mg/actuation nasal spray, 4 mg into each nostril., Disp: , Rfl:      buprenorphine HCL (BELBUCA) 450 mcg Film buccal film, Apply 1 Film to cheek every 12 (twelve) hours., Disp: 60 Film, Rfl: 0     oxyCODONE-acetaminophen (PERCOCET/ENDOCET) 5-325 mg per tablet, Take 1-2 tablets by mouth every 6 (six) hours as needed for pain (up to 3 per day)., Disp: 84 tablet, Rfl: 0    Lab:  Last UDS on 9/13/2019 had expected results. Any abnormal results have been discussed with the patient and may change the plan of care. Please see the scanned document from the outside lab under the media tab. This was reviewed with the patient.      Imaging:  No new imaging.      Recent   Dated 2/28/2020 was reviewed with the patient today.   Paper copy reviewed     Assessment:   Cynthia Lyons is a 50 y.o. female seen in clinic today for   Chief Complaint   Patient presents with     Back Pain     Neck Pain        Mental health- she is following with her current provider-PCP, taking cymbalta. She is tearful in the office today relaying her frustrations with the system and inability to get off of the opioids but has to wait to get surgery. Update- today patient notes that she has a fair amount going on and notes that she thinks she can do it. Discussed the opioid agreement as well as informed consent. She is currently on subutex 12 mg er day- will change her plan to belbuca 450 mcg two times a day and start limited short acting percocet up to 3 per day. Patient notes she is glad that she did the subutex because she feels that her worst day on the opioid regimen she was on previously was not as bad as her pain is currently.     Neck pain - following [facility]- as well as carpel tunnel bilaterally. Taking gabapentin.     Discussed expectations for acute surgical pain to be managed by surgical team when the time comes for her carpel tunnel verses her low back surgery.     Patients current MME is 33.75  Patient to call clinic for next month's prescription 5 days in advance. Based on the patients response to their previous narcotic therapy and quality of life, which includes their participation in ADLs, I recommend that the narcotics therapy continue, however the changes listed below will be incorporated into their plan of care.     Patient set goals to   1. To treat her ongoing chronic    Plan:   Interventions-    Follow up in 4 weeks to evaluate the effectiveness of the treatment interventions ordered today.     20 minute visit ok     Pain journaling is every effective.     Will start the belbuca 450 mcg two times a day     Will start the percocet 5/325 mg up to 3 per day as needed.     Will take a UDT today.     Orders placed today  Medications that were ordered today   Requested Prescriptions     Signed Prescriptions Disp Refills     buprenorphine HCL (BELBUCA) 450 mcg Film buccal film 60 Film 0     Sig: Apply 1 Film to cheek  every 12 (twelve) hours.     oxyCODONE-acetaminophen (PERCOCET/ENDOCET) 5-325 mg per tablet 84 tablet 0     Sig: Take 1-2 tablets by mouth every 6 (six) hours as needed for pain (up to 3 per day).     Orders Placed This Encounter   Procedures     ECG 12 lead with MUSE     Standing Status:   Future     Standing Expiration Date:   2/27/2021       UDT/SWAB:  Patient required a random Urine Drug Testing, due to the need to comply with [facility] Model Policy Guidelines and CDC Guideline for the use of any controlled substances. This is to ensure that patient is compliant with treatment, and monitor for risks such as diversion, abuse, or any other aberrant behaviors. Patient is either being considered for or taking a controlled substance. Unexpected findings will be discussed and treatment decision may be adjusted. Testing is being implemented across the board randomly w/o bias related to age, race, gender, socioeconomic status or [employer] affiliation.    The patient understand todays plan and has their questions answered in regards to expectations and current treatment plan.     SAFETY REMINDERS  No alcohol while taking controlled substances. Alcohol is not an illegal substance, it is unsafe to use in combination. It is a build up of substances in the body that can be extremely hazardous and may cause respirations to slow to a dangerous rate resulting in hospitalization, brain damage, or death.    Opioid medications have been associated with sharp rise in unintentional overdose and death.  Overdose is a condition characterized by the consumption in excess of a particular drug causing adverse effects. This can happen b/c you are sick, accidentally or intentionally took an extra dose, are on multiple medication that can interact. Someone took your medication and they are not use to the medication.  Symptoms of overdose include:   !breathing slow and shallow, erratic or not at all  !pinpoint pupils,  hallucinations  !confusion  !muscle jerks, slack muscles   !extreme sleepiness or loss of alertness   !awake but not able to talk   !face pale or clammy, vomiting, for lighter skinned people, the skin tone turns bluish purple, for darker skinned people, it turns grayish or ashen   If in a situation where overdose is a concern engage the emergency response system (dial 911).    In one study it was noted that 80% of unintentional overdoses occurred in people who were taking a combination of opioids and benzodiazepines.    Do not sell, loan, borrow or share your opioid medication with anyone. Deaths have occurred as a result of this practice. It is illegal and patients are being prosecuted.     Prevent unexpected access/loss of medication: Keep medication locked. Only carry what you need with you.    Approximately 40 minutes was spent with the patient in the pain center, extending clinical visit beyond their scheduled time. Greater then 60% was face to face discussing overall issues related to their ongoing chronic pain as well as the treatments that may benefit them. The plan of care will be adjusted to accommodate the issues discussed, discussing management of their care and follow up that is recommended. 2/28/2020  Leonor Ramirez [facility]  [address]  [place] [address]  Ph: [phone]  Fax: [phone]

## 2021-06-06 NOTE — PROGRESS NOTES
Patient presents to the clinic today for a follow up with Leonor Ramirez CNP regarding back and neck pain. Patient describes their pain as 9-constant, dull, achy, stiffness,, sharp at times. Her/His function score is  8

## 2021-06-06 NOTE — PROGRESS NOTES
PAIN CLINIC FOLLOW UP PROGRESS NOTE    CC:  Chief Complaint   Patient presents with     Follow-up     neck and back pain       HPI  Cynthia Lyons is a 50 y.o. female who presents for evaluation   Chief Complaint   Patient presents with     Follow-up     neck and back pain    that is causing continued pain.The patient denies any new medications, diagnoses since the last visit. Specific questions indicated the patient wanted addressed today include: patient notes that she has had a difficult time with pain control, she reports that her PCP told her that the belbuca and butrans was for drug addiction and would not order it. She has since been put back on her regimen of OxyContin and oxycodone.     Major issues:  1. Chronic pain syndrome           Alleviating factors: Include-  Opioids and position changes. Jacuzzi tub helps  Aggravating factors: activity including bending, standing is the worse position, laying down or lifting, The patient denies using any assistive devices to help with mobilization. Sleep for about 5 hours at a time.   Pain level today: On a scale of 1-10, the patient rates their pain at a 9/10. She reports her current treatment is unsustainable and does not really help her.   Function Rating:affects her ability to walk, sit and stand. She is unable to work at this time.       Previous Medical History  Social History     Substance and Sexual Activity   Alcohol Use Not Currently     Social History     Substance and Sexual Activity   Drug Use Yes     Types: Oxycodone     Social History     Tobacco Use   Smoking Status Current Every Day Smoker     Packs/day: 0.00   Smokeless Tobacco Never Used       Pertinent Pain Medications/interventions:      Patient reports that she was sick of the opioids and wanted off of them however she had been on it for > 20 years. She was told she had to go to an addiction treatment facility- they currently have her on subutex 12 mg per day. She reports that it is not  "helpful for her pain and she has to go daily, she also reports that she wants off of them and they keep increasing her dose. 2/21/2020    Since her last visit her PCP has put her back on her regimen of OxyContin 10 mg er two times a day and Percocet 7.5 mg  Two times a day prn -1/28/2020    She  currently takes Cymbalta, Wellbutrin, gabapentin, robaxin and percocet three times a day prn   previously had tried ms contin, effexor, fentanyl patches and tramadol     Patient notes that she has been on percocet for sometime and would like to try it without tylenol.    Review of Systems:  12 point systems were reviewed with pt as documented on pt health form and the patient denies any new diagnosis or changes in 12 point system review since the last visit. Low back pain and uneven leg length.     Physical Exam  Vitals:    02/21/20 1437   BP: (!) 140/100   Patient Site: Left Arm   Patient Position: Sitting   Cuff Size: Adult Regular   Pulse: 78   Weight: 190 lb (86.2 kg)   Height: 5' 5\" (1.651 m)       General- patient is alert and oriented, in NAD, well-groomed, well-nourished  Psych- Judgment and insight normal, AOx4, recent and remote memory normal, mood and affect normal  Eyes- pupils are equal and reactive, conjunctiva is clear bilaterally, no ptosis is noted.   Respiratory- breathing is non-labored  Cardiovascular- extremities warm and well perfused, no peripheral edema or varicosities.  Musculoskeletal- gait is normal, extremities with no joint swelling, erythema, or warmth.  Palpation:  Inspection and palpatory examination of the spine and upper/lower extremities is unremarkable. Lumbago   Neuro:  Bilateral upper and lower extremity coordination and muscle stretch reflexes are physiologic and symmetric 2/4.  Babinski responses are downgoing.  + clonus bilaterally. Negative hoffmans sign bilaterally.   Integumentary: no rashes or breaks in the skin, no open wounds      Medications    Current Outpatient Medications: "      atorvastatin (LIPITOR) 20 MG tablet, Take 20 mg by mouth daily., Disp: , Rfl: 1     buPROPion (WELLBUTRIN SR) 200 MG 12 hr tablet, Take 200 mg by mouth daily., Disp: , Rfl:      cetirizine (ZYRTEC) 10 MG tablet, Take 10 mg by mouth 2 (two) times a day., Disp: , Rfl:      diphenhydramine HCl (BENADRYL ALLERGY ORAL), Take by mouth at bedtime., Disp: , Rfl:      DULoxetine (CYMBALTA) 60 MG capsule, Take 60 mg by mouth., Disp: , Rfl:      gabapentin (NEURONTIN) 600 MG tablet, Take 600 mg by mouth 4 (four) times a day., Disp: , Rfl:      lactulose (GENERLAC) 10 gram/15 mL solution, SEE NOTES, Disp: , Rfl:      methocarbamol (ROBAXIN) 750 MG tablet, Take 750 mg by mouth 3 (three) times a day., Disp: , Rfl:      oxyCODONE (OXYCONTIN) 10 mg 12 hr tablet, Take 1 tablet (10 mg total) by mouth daily as needed for pain., Disp: 28 tablet, Rfl: 0     oxyCODONE-acetaminophen (PERCOCET/ENDOCET) 7.5-325 mg per tablet, Take 1 tablet by mouth 2 (two) times a day as needed for pain (up to 2 per day)., Disp: 56 tablet, Rfl: 0     valACYclovir (VALTREX) 500 MG tablet, TAKE 1 TABLET BY MOUTH DAILY, Disp: , Rfl:      lisinopril-hydrochlorothiazide (PRINZIDE,ZESTORETIC) 20-12.5 mg per tablet, Take 1 tablet by mouth daily., Disp: , Rfl:      nicotine (NICODERM CQ) 14 mg/24 hr, Place 1 patch on the skin., Disp: , Rfl:      nicotine (NICODERM CQ) 14 mg/24 hr, , Disp: , Rfl:      nicotine (NICODERM CQ) 7 mg/24 hr, Place 1 patch on the skin., Disp: , Rfl:      phenylephrine HCl 5 mg Tab, Take by mouth as needed., Disp: , Rfl:     Lab:  Last UDS on 9/13/2019 had expected results. Any abnormal results have been discussed with the patient and may change the plan of care. Please see the scanned document from the outside lab under the media tab. This was reviewed with the patient.      Imaging:  No new imaging.      Recent   Dated 2/21/2020 was reviewed with the patient today.   Paper copy reviewed     Assessment:   Cynthia Lyons is a 50  y.o. female seen in clinic today for   Chief Complaint   Patient presents with     Follow-up     neck and back pain     They are here for follow up and continued medical management of their pain. Today we reviewed the lab work of the UDT test and the results are expected because of the prescribed narcotics found in the urine drug screen.     I have also reviewed the information obtained from the last visit encounter after the CICI was signed and have asked any pertintent questions needed from other healthcare providers/family/patient to continue care and formulate a treatment plan.     Mental health- she is following with her current provider-PCP, taking cymbalta. She is tearful in the office today relaying her frustrations with the system and inability to get off of the opioids but has to wait to get surgery.     Neck pain - following [facility]- as well as carpel tunnel bilaterally. Taking gabapentin.     PMH- visit-  Ongoing chronic pain related her altered hand sensation, low back pain as well as uneven leg. Left hip is approximately 4 mm higher then the right, to get a shoe lift under the right heel. discussed the need to have orthotic on the right heel for support. Patient raises concerns for the curve in her back. She also notes that she has not had PT as of yet. She was sent to kinetic therapy but has not had a appointment yet. Start the belbuca- film q 12 hr. Will continue the short acting use of oxycodone 7.5 mg three times a day prn without tylenol.    2/21/2020 Update- patient reports that she did not go and is going to a chiropractor for adjustments, she thinks this may help her. In regards to her uneven leg length the referral was placed for orthotics previously and can always be resent if needed.     In regards to the treatment of this patient it appears there are many misinformed individuals by report of the patient- currently this patient wants to wean off of her opioids. She is currently at an addiction  treatment center and is getting subutex. Will request the CICI to acquire the records today. Will update the UDT as well today. If all of the information does not reveal any addictive concerns will assume management and wean her down and off of her oxycontin.  Recommend the use of belbuca for a long acting pain medication as well as a short acting opioid for break through pain. Patient understands that mental health is also available. Patient also notes that her low back pain is so bad- she follows with Dr. Pearce and she needs surgery. She notes that she wants to wait for 3 years so that she can help her granddaughter with  issues. Patient understands that she will still have pain. Patient voices understanding she reports that she will follow up. Her souse also denies any questions.     Requested information on the patients pharmacist, research paper was provided to the patient today in regards to the use of buprenorphine for chronic pain.     Patients current MME is 33.75  Patient to call clinic for next month's prescription 5 days in advance. Based on the patients response to their previous narcotic therapy and quality of life, which includes their participation in ADLs, I recommend that the narcotics therapy continue, however the changes listed below will be incorporated into their plan of care.     Patient set goals to   1. To treat her ongoing chronic    Plan:   Interventions-    Follow up in 1-2 weeks to evaluate the effectiveness of the treatment interventions ordered today.     20 minute visit ok     Patient provided a copy of the agreement for review. Ok to resign and bring back into clinic at the next appointment if you want to resume cares at the pain center.     Would consider the use of the belbuca and short acting opioids to cover the pain, the belbuca will be ordered once the UDT results are back and normal.    The plan is to obtain the UDT and results as well as the CICI from the other clinic.      Postural restoration for scoliosis    Orthotics to  for the right hip that is lower then the left. This was placed previously.     Will obtain the CICI- for the [facility] clinic- to resume cares here at the pain center.     Will take a UDT today.       Orders placed today  Medications that were ordered today   Requested Prescriptions      No prescriptions requested or ordered in this encounter     Orders Placed This Encounter   Procedures     Pain Management Drug Panel, Urine       UDT/SWAB:  Patient required a random Urine Drug Testing, due to the need to comply with [place] Model Policy Guidelines and CDC Guideline for the use of any controlled substances. This is to ensure that patient is compliant with treatment, and monitor for risks such as diversion, abuse, or any other aberrant behaviors. Patient is either being considered for or taking a controlled substance. Unexpected findings will be discussed and treatment decision may be adjusted. Testing is being implemented across the board randomly w/o bias related to age, race, gender, socioeconomic status or Faith affiliation.    The patient understand todays plan and has their questions answered in regards to expectations and current treatment plan.     SAFETY REMINDERS  No alcohol while taking controlled substances. Alcohol is not an illegal substance, it is unsafe to use in combination. It is a build up of substances in the body that can be extremely hazardous and may cause respirations to slow to a dangerous rate resulting in hospitalization, brain damage, or death.    Opioid medications have been associated with sharp rise in unintentional overdose and death.  Overdose is a condition characterized by the consumption in excess of a particular drug causing adverse effects. This can happen b/c you are sick, accidentally or intentionally took an extra dose, are on multiple medication that can interact. Someone took your medication and  they are not use to the medication.  Symptoms of overdose include:   !breathing slow and shallow, erratic or not at all  !pinpoint pupils, hallucinations  !confusion  !muscle jerks, slack muscles   !extreme sleepiness or loss of alertness   !awake but not able to talk   !face pale or clammy, vomiting, for lighter skinned people, the skin tone turns bluish purple, for darker skinned people, it turns grayish or ashen   If in a situation where overdose is a concern engage the emergency response system (dial 911).    In one study it was noted that 80% of unintentional overdoses occurred in people who were taking a combination of opioids and benzodiazepines.    Do not sell, loan, borrow or share your opioid medication with anyone. Deaths have occurred as a result of this practice. It is illegal and patients are being prosecuted.     Prevent unexpected access/loss of medication: Keep medication locked. Only carry what you need with you.    Leonor Ramirez, [facility]  [address]  [place] [address]  Ph: [phone]  Fax: [phone]

## 2021-06-06 NOTE — PATIENT INSTRUCTIONS - HE
Plan:   Interventions-    Follow up in 4 weeks to evaluate the effectiveness of the treatment interventions ordered today.     20 minute visit ok     Pain journaling is every effective.     Will start the belbuca 450 mcg two times a day     Will start the percocet 5/325 mg up to 3 per day as needed.     Please get an EKG from your PCP for ongoing use of opioids     Orders placed today  Medications that were ordered today   Requested Prescriptions     Signed Prescriptions Disp Refills     buprenorphine HCL (BELBUCA) 450 mcg Film buccal film 60 Film 0     Sig: Apply 1 Film to cheek every 12 (twelve) hours.     oxyCODONE-acetaminophen (PERCOCET/ENDOCET) 5-325 mg per tablet 84 tablet 0     Sig: Take 1-2 tablets by mouth every 6 (six) hours as needed for pain (up to 3 per day).     Orders Placed This Encounter   Procedures     ECG 12 lead with MUSE     Standing Status:   Future     Standing Expiration Date:   2/27/2021       UDT/SWAB:  Patient required a random Urine Drug Testing, due to the need to comply with [facility] Model Policy Guidelines and CDC Guideline for the use of any controlled substances. This is to ensure that patient is compliant with treatment, and monitor for risks such as diversion, abuse, or any other aberrant behaviors. Patient is either being considered for or taking a controlled substance. Unexpected findings will be discussed and treatment decision may be adjusted. Testing is being implemented across the board randomly w/o bias related to age, race, gender, socioeconomic status or Confucianism affiliation.    The patient understand todays plan and has their questions answered in regards to expectations and current treatment plan.     SAFETY REMINDERS  No alcohol while taking controlled substances. Alcohol is not an illegal substance, it is unsafe to use in combination. It is a build up of substances in the body that can be extremely hazardous and may cause respirations to slow to a dangerous rate  resulting in hospitalization, brain damage, or death.    Opioid medications have been associated with sharp rise in unintentional overdose and death.  Overdose is a condition characterized by the consumption in excess of a particular drug causing adverse effects. This can happen b/c you are sick, accidentally or intentionally took an extra dose, are on multiple medication that can interact. Someone took your medication and they are not use to the medication.  Symptoms of overdose include:   !breathing slow and shallow, erratic or not at all  !pinpoint pupils, hallucinations  !confusion  !muscle jerks, slack muscles   !extreme sleepiness or loss of alertness   !awake but not able to talk   !face pale or clammy, vomiting, for lighter skinned people, the skin tone turns bluish purple, for darker skinned people, it turns grayish or ashen   If in a situation where overdose is a concern engage the emergency response system (dial 911).    In one study it was noted that 80% of unintentional overdoses occurred in people who were taking a combination of opioids and benzodiazepines.    Do not sell, loan, borrow or share your opioid medication with anyone. Deaths have occurred as a result of this practice. It is illegal and patients are being prosecuted.     Prevent unexpected access/loss of medication: Keep medication locked. Only carry what you need with you.    Leonor Ramirez, [facility]  [address]  [place] [address]  Ph: [phone]  Fax: [phone]

## 2021-06-06 NOTE — PROGRESS NOTES
Patient presents to the clinic today for a follow up with Leonor Ramirez CNP regarding neck and back pain. Patient describes their pain as aching and throbbing. Her/His function score is 8.

## 2021-06-06 NOTE — PATIENT INSTRUCTIONS - HE
Plan:   Interventions-    Follow up in 1-2 weeks to evaluate the effectiveness of the treatment interventions ordered today.     20 minute visit ok     Patient provided a copy of the agreement for review. Ok to resign and bring back into clinic at the next appointment if you want to resume cares at the pain center.     Would consider the use of the belbuca and short acting opioids to cover the pain, the belbuca will be ordered once the UDT results are back and normal.    The plan is to obtain the UDT and results as well as the CICI from the other clinic.     Postural restoration for scoliosis    Orthotics to  for the right hip that is lower then the left. This was placed previously.     Will obtain the CICI- for the [facility] clinic- to resume cares here at the pain center.     Will take a UDT today.       Orders placed today  Medications that were ordered today   Requested Prescriptions      No prescriptions requested or ordered in this encounter     Orders Placed This Encounter   Procedures     Pain Management Drug Panel, Urine       UDT/SWAB:  Patient required a random Urine Drug Testing, due to the need to comply with [place] Model Policy Guidelines and CDC Guideline for the use of any controlled substances. This is to ensure that patient is compliant with treatment, and monitor for risks such as diversion, abuse, or any other aberrant behaviors. Patient is either being considered for or taking a controlled substance. Unexpected findings will be discussed and treatment decision may be adjusted. Testing is being implemented across the board randomly w/o bias related to age, race, gender, socioeconomic status or Orthodox affiliation.    The patient understand todays plan and has their questions answered in regards to expectations and current treatment plan.     SAFETY REMINDERS  No alcohol while taking controlled substances. Alcohol is not an illegal substance, it is unsafe to use in  combination. It is a build up of substances in the body that can be extremely hazardous and may cause respirations to slow to a dangerous rate resulting in hospitalization, brain damage, or death.    Opioid medications have been associated with sharp rise in unintentional overdose and death.  Overdose is a condition characterized by the consumption in excess of a particular drug causing adverse effects. This can happen b/c you are sick, accidentally or intentionally took an extra dose, are on multiple medication that can interact. Someone took your medication and they are not use to the medication.  Symptoms of overdose include:   !breathing slow and shallow, erratic or not at all  !pinpoint pupils, hallucinations  !confusion  !muscle jerks, slack muscles   !extreme sleepiness or loss of alertness   !awake but not able to talk   !face pale or clammy, vomiting, for lighter skinned people, the skin tone turns bluish purple, for darker skinned people, it turns grayish or ashen   If in a situation where overdose is a concern engage the emergency response system (dial 911).    In one study it was noted that 80% of unintentional overdoses occurred in people who were taking a combination of opioids and benzodiazepines.    Do not sell, loan, borrow or share your opioid medication with anyone. Deaths have occurred as a result of this practice. It is illegal and patients are being prosecuted.     Prevent unexpected access/loss of medication: Keep medication locked. Only carry what you need with you.    Leonor Ramirez, [facility]  [address]  [place] [address]  Ph: [phone]  Fax: [phone]

## 2021-06-07 NOTE — PATIENT INSTRUCTIONS - HE
Plan:   Interventions-    Follow up in 8 weeks     Will continue the use of the belbuca 450 mcg two times a day   Will continue the use of the oxycodone 5/325 mg three times a day prn ok to fill on 4/22, 5/21 this will last until 6/18    This limited telehealth encounter is due to the Covid 19,  as required by the governmental agencies at this time due to risk of transmission of the pandemic, therefore testing availability is limited as well as physical exams.      Prescription Drug Management will be continued by the [facility] center    Orders placed today  Medications that were ordered today   Requested Prescriptions     Signed Prescriptions Disp Refills     buprenorphine HCL (BELBUCA) 450 mcg Film buccal film 60 Film 1     Sig: Apply 1 Film to cheek every 12 (twelve) hours.     oxyCODONE-acetaminophen (PERCOCET/ENDOCET) 5-325 mg per tablet 84 tablet 0     Sig: Take 1-2 tablets by mouth every 6 (six) hours as needed for pain (up to 3 per day).     oxyCODONE-acetaminophen (PERCOCET/ENDOCET) 5-325 mg per tablet 84 tablet 0     Sig: Take 1-2 tablets by mouth every 6 (six) hours as needed for pain (up to 3 per day).     No orders of the defined types were placed in this encounter.        The patient understand todays plan and has their questions answered in regards to expectations and current treatment plan.     Prevent unexpected access/loss of medication: Keep medication locked. Only carry what you need with you    The plan of care will be adjusted to accommodate the issues discussed, discussing management of their care and follow up that is recommended. 4/22/2020         Leonor Ramirez CNP  [facility]  [address]  [place] [address]  Ph: [phone]  Fax: [phone]

## 2021-06-08 NOTE — TELEPHONE ENCOUNTER
Med Rec:                                                    Chart audited for medication reconciliation regarding the following medications:   tretinoin (RETIN-A) 0.05 % cream [[id number]]      Pharmacy contacted: No  Patient contacted: No  Discrepancies found: No - verified prescribed at March visit with Dr. Melton.   Action taken: None    Alondra Moreno, PharmD, ARINA, BCACP  [facility]     Time spent: 2 minutes

## 2021-06-08 NOTE — PATIENT INSTRUCTIONS - HE
Plan:   Interventions-    Follow up in 8 weeks     belbuca 450 mcg two times a day to fill on 6/23/2020  oxycodone 10 mg two times a day to be filled on 7/9/2020 , when the 5 mg tabs run out.     This limited telehealth encounter is due to the Covid 19,  as required by the governmental agencies at this time due to risk of transmission of the pandemic, therefore testing availability is limited as well as physical exams.      Prescription Drug Management will be continued by the [facility]    Orders placed today  Medications that were ordered today   Requested Prescriptions     Signed Prescriptions Disp Refills     oxyCODONE (ROXICODONE) 10 mg immediate release tablet 56 tablet 0     Sig: Take 1 tablet (10 mg total) by mouth 2 (two) times a day as needed for pain.     No orders of the defined types were placed in this encounter.        The patient understand todays plan and has their questions answered in regards to expectations and current treatment plan.     Prevent unexpected access/loss of medication: Keep medication locked. Only carry what you need with you    The plan of care will be adjusted to accommodate the issues discussed, discussing management of their care and follow up that is recommended. 6/17/2020         Leonor Ramirez CNP  [facility]  [address]  [place] [address]  Ph: [phone]  Fax: [phone]

## 2021-06-08 NOTE — TELEPHONE ENCOUNTER
Patient wants Oxycodone at [facility], I had cancelled in error need to resend.

## 2021-06-10 ENCOUNTER — RECORDS - HEALTHEAST (OUTPATIENT)
Dept: PALLIATIVE MEDICINE | Facility: OTHER | Age: 51
End: 2021-06-10

## 2021-06-10 NOTE — PATIENT INSTRUCTIONS - HE
"  Plan:   Interventions-    Follow up in 8 weeks     Ok to increase the short actingto three times a day- ok to refill  On 8/5-9/3, 9/3-10/1     Start magnesium\" life extension\" is helpful for this 400 mg in a poat bedtime + vitamin D if taking, at bedtime     Continue the use of the belbuca at this time two times a day refills sent      This limited telehealth/televideo encounter is due to the Covid 19,  as required by the governmental agencies at this time due to risk of transmission of the pandemic, therefore testing availability is limited as well as physical exams.      Prescription Drug Management will be continued by the [facility]    Orders placed today  Medications that were ordered today   Requested Prescriptions     Signed Prescriptions Disp Refills     oxyCODONE (ROXICODONE) 10 mg immediate release tablet 86 tablet 0     Sig: Take 1 tablet (10 mg total) by mouth 3 (three) times a day as needed for pain (up to 3 per day).     buprenorphine HCL (BELBUCA) 450 mcg Film buccal film 60 Film 1     Sig: Apply 1 Film to cheek every 12 (twelve) hours.     oxyCODONE (ROXICODONE) 10 mg immediate release tablet 86 tablet 0     Sig: Take 1 tablet (10 mg total) by mouth 3 (three) times a day as needed for pain (up to 3 per day).     No orders of the defined types were placed in this encounter.        The patient understand todays plan and has their questions answered in regards to expectations and current treatment plan.     Prevent unexpected access/loss of medication: Keep medication locked. Only carry what you need with you    The plan of care will be adjusted to accommodate the issues discussed, discussing management of their care and follow up that is recommended. 8/5/2020         Leonor Ramirez CNP  [facility]  [address]  [place] [address]  Ph: [phone]  Fax: [phone]          "

## 2021-06-11 NOTE — PROGRESS NOTES
"Cynthia Lyons is a 50 y.o. female who is being evaluated via video with [employer] or [employer] services unless otherwise noted    \"This telephone/video visit will be conducted via a call between you and your physician/provider. We have found that certain health care needs can be provided without the need for a physical exam.  This service lets us provide the care you need with a short phone conversation.  If a prescription is necessary we can send it directly to your pharmacy.  If lab work is needed we can place an order for that and you can then stop by our lab to have the test done at a later time.    If during the course of the call the physician/provider feels a telephone/video visit is not appropriate, you will not be charged for this service.\"     Start time- 1:18 pm   End time- 1:34 pm     Cynthia Lyons complains of    Chief Complaint   Patient presents with     Follow-up     back and neck pain       I have reviewed and updated the patient's Past Medical History, Social History, Family History and Medication List as well as the Precharting workup by the [facility].       PAIN CLINIC FOLLOW UP PROGRESS NOTE    CC:  Chief Complaint   Patient presents with     Follow-up     back and neck pain       HPI  Cynthia Lyons is a 50 y.o. female who presents for evaluation   Chief Complaint   Patient presents with     Follow-up     back and neck pain    that is causing continued pain. Specific questions indicated the patient wanted addressed today include: pain management for ongoing chronic pain       Major issues:  1. Chronic pain syndrome    2. Bilateral low back pain with sciatica, sciatica laterality unspecified, unspecified chronicity    3. Chronic upset stomach        Pain score 7  Constant   What does your pain like feel during a flare? Dull, achy, sharp  Does the pain interfere with:  Work ----- NA  Walking ------ yes  Sleep ------- yes  Daily activities ------yes  Relationships -------yes  F=7     UDT - " 02/21/2020, CSA - 10/07/2019    ALLERGIES  Patient has no known allergies.    Previous Medical History  Social History     Substance and Sexual Activity   Alcohol Use Not Currently     Social History     Substance and Sexual Activity   Drug Use Yes     Types: Oxycodone     Social History     Tobacco Use   Smoking Status Current Every Day Smoker     Packs/day: 0.00   Smokeless Tobacco Never Used       Pertinent Pain Medications/interventions:    6/17/2020- belbuca 450 mcg two times a day, oxycodone 10 mg two times a day      Today after reviewing her UDT- will stop the subutex and start her on belbuca 450 mcg two times a day and percocet 5/325 mg up to 3 per day- agreement signed.       Patient reports that she was sick of the opioids and wanted off of them however she had been on it for > 20 years. She was told she had to go to an addiction treatment facility- they currently have her on subutex 12 mg per day. She reports that it is not helpful for her pain and she has to go daily, she also reports that she wants off of them and they keep increasing her dose. 2/21/2020     Since her last visit her PCP has put her back on her regimen of OxyContin 10 mg er two times a day and Percocet 7.5 mg  Two times a day prn -1/28/2020    She  currently takes Cymbalta, Wellbutrin, gabapentin, robaxin and percocet three times a day prn   previously had tried ms contin, effexor, fentanyl patches and tramadol      Patient notes that she has been on percocet for sometime and would like to try it without tylenol.    Review of Systems:  12 point systems were reviewed with pt as documented on on previous exams. Any new diagnosis or new medication is reviewed today.     Medications    Current Outpatient Medications:      atorvastatin (LIPITOR) 20 MG tablet, Take 20 mg by mouth daily., Disp: , Rfl: 1     [START ON 10/17/2020] buprenorphine HCL (BELBUCA) 450 mcg Film buccal film, Apply 1 Film to cheek every 12 (twelve) hours., Disp: 60 Film,  Rfl: 1     cetirizine (ZYRTEC) 10 MG tablet, Take 10 mg by mouth 2 (two) times a day., Disp: , Rfl:      gabapentin (NEURONTIN) 600 MG tablet, Take 600 mg by mouth 4 (four) times a day., Disp: , Rfl:      lactulose (GENERLAC) 10 gram/15 mL solution, SEE NOTES, Disp: , Rfl:      lisinopriL-hydrochlorothiazide (PRINZIDE,ZESTORETIC) 20-12.5 mg per tablet, TAKE 1 TABLET BY MOUTH DAILY., Disp: , Rfl:      naloxone (NARCAN) 4 mg/actuation nasal spray, 4 mg into each nostril., Disp: , Rfl:      oxyCODONE (ROXICODONE) 10 mg immediate release tablet, Take 1 tablet (10 mg total) by mouth 3 (three) times a day as needed for pain (up to 3 per day)., Disp: 86 tablet, Rfl: 0     [START ON 10/29/2020] oxyCODONE (ROXICODONE) 10 mg immediate release tablet, Take 1 tablet (10 mg total) by mouth 3 (three) times a day as needed for pain (up to 3 per day)., Disp: 86 tablet, Rfl: 0     oxyCODONE (ROXICODONE) 10 mg immediate release tablet, Take 1 tablet (10 mg total) by mouth 3 (three) times a day as needed for pain (up to 3 per day)., Disp: 86 tablet, Rfl: 0     tretinoin (RETIN-A) 0.05 % cream, APPLY TOPICALLY AT BEDTIME, Disp: , Rfl:       Physical Exam- limited exam   General- patient is alert and oriented, in NAD, well-groomed, well-nourished  Psych- Judgment and insight normal, AOx4, recent and remote memory normal, mood and affect normal  Eyes- pupils are symmetrical, conjunctiva is clear bilaterally, no ptosis is noted.   Respiratory- Breathing appears non-labored  Integumentary- coloring of skin appears normal.   Musculoskeletal- patient is moving all extremities that are visible.     Lab:  Last UDS on 9/13/2019 had expected results. Last UDT on file was reviewed.    Imaging:  No new imaging reviewed with patient over the phone, no new orders placed       Recent   Dated 9/30/2020 was reviewed with the patient today.       Assessment:     Cynthia Lyons is a 50 y.o. female seen in clinic today for   Chief Complaint  "  Patient presents with     Follow-up     back and neck pain       They are here for follow up and continued medical management of their pain. Today we reviewed the plan of care for their chronic pain and determined that the patient will need a refill of the current prescription.      Due to the Covid 19 precautions all visits are converted to telephone encounters until the government restrictions are lifted and the precautions have been lifted.     New concerns today- GI upset, recently had a scope and biopsies  Patient denies side effects from the current regimen  Plan of care going forward for ongoing pain control- no changes in her pain plan patient is doing ok at this time, she has stopped use of the tylenol and IBU due to the ongoing upset it gives her. Discussed supportive measures including ways to wellness.   Patients current MME is 72    Plan:   Interventions-    Follow up in 8 weeks      Ok to increase the short actingto three times a day- ok to refill  On 8/5-9/3, 9/3-10/1     Try aloe vera daily to reduce the GI upset or lozenges called slippery elm.     Try \"livewell\" collegan use daily in the am on an empty stomach     Ways to wellness referral for food elimination and triggers      Continue the use of the belbuca at this time two times a day refills sent      This limited telehealth/televideo encounter is due to the Covid 19,  as required by the governmental agencies at this time due to risk of transmission of the pandemic, therefore testing availability is limited as well as physical exams.      Prescription Drug Management will be continued by the [facility]    Orders placed today  Medications that were ordered today   Requested Prescriptions     Signed Prescriptions Disp Refills     oxyCODONE (ROXICODONE) 10 mg immediate release tablet 86 tablet 0     Sig: Take 1 tablet (10 mg total) by mouth 3 (three) times a day as needed for pain (up to 3 per day).     buprenorphine HCL (BELBUCA) " 450 mcg Film buccal film 60 Film 1     Sig: Apply 1 Film to cheek every 12 (twelve) hours.     oxyCODONE (ROXICODONE) 10 mg immediate release tablet 86 tablet 0     Sig: Take 1 tablet (10 mg total) by mouth 3 (three) times a day as needed for pain (up to 3 per day).     Orders Placed This Encounter   Procedures     Ambulatory referral to Ways to Wellness     Referral Priority:   Routine     Referral Type:   Health Education     Referral Reason:   Evaluation and Treatment     Number of Visits Requested:   1         The patient understand todays plan and has their questions answered in regards to expectations and current treatment plan.     Prevent unexpected access/loss of medication: Keep medication locked. Only carry what you need with you    The plan of care will be adjusted to accommodate the issues discussed, discussing management of their care and follow up that is recommended. 9/30/2020         Leonor Ramirez [facility]  [address]  [place] [address]  Ph: [phone]  Fax: [phone]

## 2021-06-11 NOTE — PROGRESS NOTES
"Pt is being treated today via video visit with Leonor Ramirez CNP, for refills and f/u of neck and back pain.    Cynthia Lyons is a 50 y.o. female who is being evaluated via a billable video visit.      The patient has been notified of following:     \"This video visit will be conducted via a call between you and your physician/provider. We have found that certain health care needs can be provided without the need for an in-person physical exam.  This service lets us provide the care you need with a video conversation.  If a prescription is necessary we can send it directly to your pharmacy.  If lab work is needed we can place an order for that and you can then stop by our lab to have the test done at a later time.    Video visits are billed at different rates depending on your insurance coverage. Please reach out to your insurance provider with any questions.    If during the course of the call the physician/provider feels a video visit is not appropriate, you will not be charged for this service.\"    Patient has given verbal consent to a Video visit? Yes  How would you like to obtain your AVS? AVS Preference: MyChart.  If dropped by the video visit, the video invitation should be sent to: Text to cell phone: [phone]  Will anyone else be joining your video visit? No    Pain score 7  Constant   What does your pain like feel during a flare? Dull, achy, sharp  Does the pain interfere with:  Work ----- NA  Walking ------ yes  Sleep ------- yes  Daily activities ------yes  Relationships -------yes  F=7    UDT - 02/21/2020, CSA - 10/07/2019      Platform used for Video Visit: Doximity          "

## 2021-06-11 NOTE — PATIENT INSTRUCTIONS - HE
"  Plan:   Interventions-    Follow up in 8 weeks      Ok to increase the short actingto three times a day- ok to refill  On 8/5-9/3, 9/3-10/1     Try aloe vera daily to reduce the GI upset or lozenges called slippery elm.     Try \"livewell\" collegan use daily in the am on an empty stomach     Ways to wellness referral for food elimination and triggers      Continue the use of the belbuca at this time two times a day refills sent      This limited telehealth/televideo encounter is due to the Covid 19,  as required by the governmental agencies at this time due to risk of transmission of the pandemic, therefore testing availability is limited as well as physical exams.      Prescription Drug Management will be continued by the [facility]    Orders placed today  Medications that were ordered today   Requested Prescriptions     Signed Prescriptions Disp Refills     oxyCODONE (ROXICODONE) 10 mg immediate release tablet 86 tablet 0     Sig: Take 1 tablet (10 mg total) by mouth 3 (three) times a day as needed for pain (up to 3 per day).     buprenorphine HCL (BELBUCA) 450 mcg Film buccal film 60 Film 1     Sig: Apply 1 Film to cheek every 12 (twelve) hours.     oxyCODONE (ROXICODONE) 10 mg immediate release tablet 86 tablet 0     Sig: Take 1 tablet (10 mg total) by mouth 3 (three) times a day as needed for pain (up to 3 per day).     Orders Placed This Encounter   Procedures     Ambulatory referral to Ways to Wellness     Referral Priority:   Routine     Referral Type:   Health Education     Referral Reason:   Evaluation and Treatment     Number of Visits Requested:   1         The patient understand todays plan and has their questions answered in regards to expectations and current treatment plan.     Prevent unexpected access/loss of medication: Keep medication locked. Only carry what you need with you    The plan of care will be adjusted to accommodate the issues discussed, discussing management of their " care and follow up that is recommended. 9/30/2020         Leonor Ramirez CNP  [facility]  [address]  [place] [address]  Ph: [phone]  Fax: [phone]

## 2021-06-12 NOTE — TELEPHONE ENCOUNTER
RN spoke to [facility] in [place] to approve for patient to  Oxycodone tomorrow 10/28/20 instead of Thursday 10/29/20 with a reminder that the start date is not changing.

## 2021-06-13 NOTE — PATIENT INSTRUCTIONS - HE
Plan:   Interventions-    Follow up in 8 weeks     Increase the belbuca to 600 mcg two times a day next refill 12/9  Continue the use of the oxycodone at this time- 11/23 for use on 11/25, 1223-1/20    Get tested for covid and influenza       This limited telehealth/televideo encounter is due to the Covid 19,  as required by the governmental agencies at this time due to risk of transmission of the pandemic, therefore testing availability is limited as well as physical exams.      Prescription Drug Management will be continued by the [facility]    Orders placed today  Medications that were ordered today   Requested Prescriptions     Pending Prescriptions Disp Refills     buprenorphine HCL (BELBUCA) 450 mcg Film buccal film 60 Film 1     Sig: Apply 1 Film to cheek every 12 (twelve) hours.     oxyCODONE (ROXICODONE) 10 mg immediate release tablet 86 tablet 0     Sig: Take 1 tablet (10 mg total) by mouth 3 (three) times a day as needed for pain (up to 3 per day).     No orders of the defined types were placed in this encounter.        The patient understand todays plan and has their questions answered in regards to expectations and current treatment plan.     Prevent unexpected access/loss of medication: Keep medication locked. Only carry what you need with you    The plan of care will be adjusted to accommodate the issues discussed, discussing management of their care and follow up that is recommended. 11/23/2020         Leonor Ramirez CNP  [facility]  [address]  [place] [address]  Ph: [phone]  Fax: [phone]

## 2021-06-13 NOTE — TELEPHONE ENCOUNTER
RN spoke to pharmacist at [facility] to approve patient to  Oxycodone 10mg on 12/20/20 due to traveling for the holidays with a reminder that the use dates do not change.

## 2021-06-13 NOTE — TELEPHONE ENCOUNTER
Per the providers request, details are below of the medication trends for the patient.    Patient has an Oxycodone queued and sent to the pharmacy for a start date of 12/23/20.     Reviewed:  11/23/2020 Oxycodone Hcl 10 Mg Tablet Qty: 86 for 29 days (picked up as expected due to the holiday)  10/28/2020 Oxycodone Hcl 10 Mg Tablet Qty: 86 for 29 days (approved to  a day early due to picking up mother in [place])  09/30/2020 Oxycodone Hcl 10 Mg Tablet Qty: 86 for 29 days (day expected)  09/03/2020 Oxycodone Hcl 10 Mg Tablet Qty: 86 for 29 days (day expected)    No significant events or NO SHOWS in this time I have reviewed.    Leonor Ramirez CNP please advise if you would be OK for patient to  medication early to travel.  Patient stated date to leave is all dependant on when she can  her medication and will be gone until somewhere between Christmas and New Years.

## 2021-06-13 NOTE — TELEPHONE ENCOUNTER
2020 Prior Authorization Request  Who's requesting PA: Pharmacy  Provider: James  Pharmacy Name, Location & Phone # : [facility]/[phone]  Medication Name: belbuca 600mcg film, 2/day  Quantity: 60  Refills: ongoing  Days Supply: 30  Medication Instructions:  Apply 1 Film (600 mcg total) to cheek 2 (two) times a day  Ins[facility]: [facility]  Insurance Member ID & GRP # : [id number]  CoverMyMeds Key:  Informed patient that prior authorizations can take up to 10 business days for response: YES  Okay to leave a detailed message: NO    Route to: [facility] ([id number])

## 2021-06-13 NOTE — PROGRESS NOTES
"Cynthia Lyons is a 50 y.o. female who is being evaluated via a billable video visit.       The patient has been notified of following:      \"This video visit will be conducted via a call between you and your physician/provider. We have found that certain health care needs can be provided without the need for an in-person physical exam.  This service lets us provide the care you need with a video conversation.  If a prescription is necessary we can send it directly to your pharmacy.  If lab work is needed we can place an order for that and you can then stop by our lab to have the test done at a later time.     Video visits are billed at different rates depending on your insurance coverage. Please reach out to your insurance provider with any questions.     If during the course of the call the physician/provider feels a video visit is not appropriate, you will not be charged for this service.\"     Patient has given verbal consent to a Video visit? Yes  How would you like to obtain your AVS? MyChart.  If dropped by the video visit, the video invitation should be sent to: Text to cell phone: [phone] WELL  Will anyone else be joining your video visit? No     Pain score: 7  Constant   What does your pain feel like: Dull, achy, sharp  Does the pain interfere with:  Work: NA  Walking: yes  Sleep: yes  Daily activities: yes  Relationships: yes  F=7  "

## 2021-06-13 NOTE — PROGRESS NOTES
"Cynthia Lyons is a 50 y.o. female who is being evaluated with [facility] or [employer] services- via video     \"This telephone/video visit will be conducted via a call between you and your physician/provider. We have found that certain health care needs can be provided without the need for a physical exam.  This service lets us provide the care you need with a short phone conversation.  If a prescription is necessary we can send it directly to your pharmacy.  If lab work is needed we can place an order for that and you can then stop by our lab to have the test done at a later time.    If during the course of the call the physician/provider feels a telephone/video visit is not appropriate, you will not be charged for this service.\"     Start time- 8:22 am   End time- 8:38 am     Cynthia Lyons complains of    Chief Complaint   Patient presents with     Back Pain     Neck Pain       I have reviewed and updated the patient's Past Medical History, Social History, Family History and Medication List as well as the Precharting workup by the [facility].       PAIN CLINIC FOLLOW UP PROGRESS NOTE    CC:  Chief Complaint   Patient presents with     Back Pain     Neck Pain       HPI  Cynthia Lyons is a 50 y.o. female who presents for evaluation   Chief Complaint   Patient presents with     Back Pain     Neck Pain    that is causing continued pain. Specific questions indicated the patient wanted addressed today include: ongoing medication management for ongoing chronic pain       Major issues:  1. Bilateral low back pain with sciatica, sciatica laterality unspecified, unspecified chronicity    2. Chronic pain syndrome        Pain score: 7  Constant   What does your pain feel like: Dull, achy, sharp  Does the pain interfere with:  Work: NA  Walking: yes  Sleep: yes  Daily activities: yes  Relationships: yes  F=7    ALLERGIES  Patient has no known allergies.    Previous Medical History  Social History     Substance and Sexual " Activity   Alcohol Use Not Currently     Social History     Substance and Sexual Activity   Drug Use Yes     Types: Oxycodone     Social History     Tobacco Use   Smoking Status Current Every Day Smoker     Packs/day: 0.00   Smokeless Tobacco Never Used       Pertinent Pain Medications/interventions:    6/17/2020- belbuca 450 mcg two times a day, oxycodone 10 mg two times a day      Today after reviewing her UDT- will stop the subutex and start her on belbuca 450 mcg two times a day and percocet 5/325 mg up to 3 per day- agreement signed.       Patient reports that she was sick of the opioids and wanted off of them however she had been on it for > 20 years. She was told she had to go to an addiction treatment facility- they currently have her on subutex 12 mg per day. She reports that it is not helpful for her pain and she has to go daily, she also reports that she wants off of them and they keep increasing her dose. 2/21/2020     Since her last visit her PCP has put her back on her regimen of OxyContin 10 mg er two times a day and Percocet 7.5 mg  Two times a day prn -1/28/2020    She  currently takes Cymbalta, Wellbutrin, gabapentin, robaxin and percocet three times a day prn   previously had tried ms contin, effexor, fentanyl patches and tramadol      Patient notes that she has been on percocet for sometime and would like to try it without tylenol.    Review of Systems:  12 point systems were reviewed with pt as documented on on previous exams. Any new diagnosis or new medication is reviewed today.     Medications    Current Outpatient Medications:      atorvastatin (LIPITOR) 20 MG tablet, Take 20 mg by mouth daily., Disp: , Rfl: 1     cetirizine (ZYRTEC) 10 MG tablet, Take 10 mg by mouth 2 (two) times a day., Disp: , Rfl:      gabapentin (NEURONTIN) 600 MG tablet, Take 600 mg by mouth 4 (four) times a day., Disp: , Rfl:      lactulose (GENERLAC) 10 gram/15 mL solution, SEE NOTES, Disp: , Rfl:       lisinopriL-hydrochlorothiazide (PRINZIDE,ZESTORETIC) 20-12.5 mg per tablet, TAKE 1 TABLET BY MOUTH DAILY., Disp: , Rfl:      naloxone (NARCAN) 4 mg/actuation nasal spray, 4 mg into each nostril., Disp: , Rfl:      oxyCODONE (ROXICODONE) 10 mg immediate release tablet, Take 1 tablet (10 mg total) by mouth 3 (three) times a day as needed for pain (up to 3 per day)., Disp: 86 tablet, Rfl: 0     buprenorphine HCL (BELBUCA) 450 mcg Film buccal film, Apply 1 Film to cheek every 12 (twelve) hours., Disp: 60 Film, Rfl: 1      Physical Exam- limited exam   General- patient is alert and oriented, in NAD, well-groomed, well-nourished  Psych- Judgment and insight normal, AOx4, recent and remote memory normal, mood and affect normal  Eyes- pupils are symmetrical, conjunctiva is clear bilaterally, no ptosis is noted.   Respiratory- Breathing appears non-labored  Integumentary- coloring of skin appears normal.   Musculoskeletal- patient is moving all extremities that are visible.     Lab:  Last UDS on 2/21/2020 had expected results. Last UDT on file was reviewed.    Imaging:  No new imaging reviewed with patient over the phone, no new orders placed       Recent   Dated 11/23/2020 was reviewed with the patient today.       Assessment:     Cynthia Lyons is a 50 y.o. female seen in clinic today for   Chief Complaint   Patient presents with     Back Pain     Neck Pain       They are here for follow up and continued medical management of their pain. Today we reviewed the plan of care for their chronic pain and determined that the patient will need a refill of the current prescription.      Due to the Covid 19 precautions all visits are converted to telephone encounters until the government restrictions are lifted and the precautions have been lifted.     New concerns today- patient notes that she woke up with a 103.9 fever. She plans on following up today and getting tested for Covid and the flu. Patient otherwise would like to  increase the use of her belbuca for better baseline control for her back pain. Paitent     Patient denies side effects from the current regimen  Plan of care going forward for ongoing pain control patient appears to be doing ok, she has asked previously about increases in the use of the belbuca for better pain control, will increase at this time to 600 mcg will continue the use of the oxycodone at this time. Education provided to the patient in regards to the use of oxycodone if + for covid- patient will reduce use of the oxycodone if + due to upper respiratory symptoms.   Patients current MME is 44    Plan:   Interventions-    Follow up in 8 weeks     Increase the belbuca to 600 mcg two times a day next refill 12/9  Continue the use of the oxycodone at this time- 11/23 for use on 11/25, 1223-1/20    Get tested for covid and influenza       This limited telehealth/televideo encounter is due to the Covid 19,  as required by the governmental agencies at this time due to risk of transmission of the pandemic, therefore testing availability is limited as well as physical exams.      Prescription Drug Management will be continued by the [facility]    Orders placed today  Medications that were ordered today   Requested Prescriptions     Pending Prescriptions Disp Refills     buprenorphine HCL (BELBUCA) 450 mcg Film buccal film 60 Film 1     Sig: Apply 1 Film to cheek every 12 (twelve) hours.     oxyCODONE (ROXICODONE) 10 mg immediate release tablet 86 tablet 0     Sig: Take 1 tablet (10 mg total) by mouth 3 (three) times a day as needed for pain (up to 3 per day).     No orders of the defined types were placed in this encounter.        The patient understand todays plan and has their questions answered in regards to expectations and current treatment plan.     Prevent unexpected access/loss of medication: Keep medication locked. Only carry what you need with you    The plan of care will be adjusted to  accommodate the issues discussed, discussing management of their care and follow up that is recommended. 11/23/2020         Leonor Ramirez CNP  [facility]  [address]  [place] [address]  Ph: [phone]  Fax: [phone]

## 2021-06-14 ENCOUNTER — RECORDS - HEALTHEAST (OUTPATIENT)
Dept: ADMINISTRATIVE | Facility: OTHER | Age: 51
End: 2021-06-14

## 2021-06-14 NOTE — PATIENT INSTRUCTIONS - HE
Plan:   Interventions-    Follow up in 8 weeks      Increase the belbuca to 600 mcg two times a day next refill 2/9    Continue the use of the oxycodone at this time - 1/20    Try Frankincense oil on your temples and powder magnesium 400 mg.     Try collagen daily for joint support.     Reduce and eliminate carbs and sugar that can flare up inflammation.     This limited telehealth/televideo encounter is due to the Covid 19,  as required by the governmental agencies at this time due to risk of transmission of the pandemic, therefore testing availability is limited as well as physical exams.      Prescription Drug Management will be continued by the [facility]    Orders placed today  Medications that were ordered today   Requested Prescriptions     Signed Prescriptions Disp Refills     oxyCODONE (ROXICODONE) 10 mg immediate release tablet 86 tablet 0     Sig: Take 1 tablet (10 mg total) by mouth 3 (three) times a day as needed for pain (up to 3 per day).     buprenorphine HCL (BELBUCA) 600 mcg Film 60 Film 1     Sig: Apply 1 Film (600 mcg total) to cheek 2 (two) times a day.     No orders of the defined types were placed in this encounter.        The patient understand todays plan and has their questions answered in regards to expectations and current treatment plan.     Prevent unexpected access/loss of medication: Keep medication locked. Only carry what you need with you    The plan of care will be adjusted to accommodate the issues discussed, discussing management of their care and follow up that is recommended. 1/11/2021         Leonor Ramirez CNP  [facility]  [address]  [place] [address]  Ph: [phone]  Fax: [phone]

## 2021-06-14 NOTE — PROGRESS NOTES
"Cynthia Lyons is a 50 y.o. female who is being evaluated with [facility] or [employer] services- via video     \"This telephone/video visit will be conducted via a call between you and your physician/provider. We have found that certain health care needs can be provided without the need for a physical exam.  This service lets us provide the care you need with a short phone conversation.  If a prescription is necessary we can send it directly to your pharmacy.  If lab work is needed we can place an order for that and you can then stop by our lab to have the test done at a later time.    If during the course of the call the physician/provider feels a telephone/video visit is not appropriate, you will not be charged for this service.\"     Start time- 8:44 am   End time- 9:06 am     Cynthia Lyons complains of    Chief Complaint   Patient presents with     Follow-up     neck and back pain       I have reviewed and updated the patient's Past Medical History, Social History, Family History and Medication List as well as the Precharting workup by the [facility].       PAIN CLINIC FOLLOW UP PROGRESS NOTE    CC:  Chief Complaint   Patient presents with     Follow-up     neck and back pain       HPI  Cynthia yLons is a 50 y.o. female who presents for evaluation   Chief Complaint   Patient presents with     Follow-up     neck and back pain    that is causing continued pain. Specific questions indicated the patient wanted addressed today include: to treat her ongoing chronic pain       Major issues:  1. Chronic pain syndrome    2. Bilateral low back pain with sciatica, sciatica laterality unspecified, unspecified chronicity        Pain score 6  Constant   What does your pain like feel during a flare? Dull, achy, stabbing  Does the pain interfere with:  Work ----- yes  Walking ------ yes  Sleep ------- yes  Daily activities ------yes  Relationships -------yes  F=8     UDT - 02/21/2020, CSA - 10/07/2019    ALLERGIES  Patient has " no known allergies.    Previous Medical History  Social History     Substance and Sexual Activity   Alcohol Use Not Currently     Social History     Substance and Sexual Activity   Drug Use Yes     Types: Oxycodone     Social History     Tobacco Use   Smoking Status Current Every Day Smoker     Packs/day: 0.00   Smokeless Tobacco Never Used       Pertinent Pain Medications/interventions:    6/17/2020- belbuca 450 mcg two times a day, oxycodone 10 mg two times a day      Today after reviewing her UDT- will stop the subutex and start her on belbuca 450 mcg two times a day and percocet 5/325 mg up to 3 per day- agreement signed.       Patient reports that she was sick of the opioids and wanted off of them however she had been on it for > 20 years. She was told she had to go to an addiction treatment facility- they currently have her on subutex 12 mg per day. She reports that it is not helpful for her pain and she has to go daily, she also reports that she wants off of them and they keep increasing her dose. 2/21/2020     Since her last visit her PCP has put her back on her regimen of OxyContin 10 mg er two times a day and Percocet 7.5 mg  Two times a day prn -1/28/2020    She  currently takes Cymbalta, Wellbutrin, gabapentin, robaxin and percocet three times a day prn   previously had tried ms contin, effexor, fentanyl patches and tramadol      Patient notes that she has been on percocet for sometime and would like to try it without tylenol.  Review of Systems:  12 point systems were reviewed with pt as documented on on previous exams. Any new diagnosis or new medication is reviewed today.     Medications    Current Outpatient Medications:      atorvastatin (LIPITOR) 20 MG tablet, Take 20 mg by mouth daily., Disp: , Rfl: 1     buprenorphine HCL (BELBUCA) 600 mcg Film, Apply 1 Film (600 mcg total) to cheek 2 (two) times a day., Disp: 60 Film, Rfl: 1     cetirizine (ZYRTEC) 10 MG tablet, Take 10 mg by mouth 2 (two) times  a day., Disp: , Rfl:      FLUoxetine (PROZAC) 20 MG capsule, , Disp: , Rfl:      gabapentin (NEURONTIN) 600 MG tablet, Take 600 mg by mouth 4 (four) times a day., Disp: , Rfl:      lactulose (GENERLAC) 10 gram/15 mL solution, SEE NOTES, Disp: , Rfl:      naloxone (NARCAN) 4 mg/actuation nasal spray, 4 mg into each nostril., Disp: , Rfl:      [START ON 1/15/2021] oxyCODONE (ROXICODONE) 10 mg immediate release tablet, Take 1 tablet (10 mg total) by mouth 3 (three) times a day as needed for pain (up to 3 per day)., Disp: 86 tablet, Rfl: 0     buprenorphine HCL (BELBUCA) 450 mcg Film buccal film, Apply 1 Film to cheek every 12 (twelve) hours., Disp: 60 Film, Rfl: 1     oxyCODONE (ROXICODONE) 10 mg immediate release tablet, Take 1 tablet (10 mg total) by mouth 3 (three) times a day as needed for pain (up to 3 per day)., Disp: 86 tablet, Rfl: 0      Physical Exam- limited exam   General- patient is alert and oriented, in NAD, well-groomed, well-nourished  Psych- Judgment and insight normal, AOx4, recent and remote memory normal, mood and affect normal  Eyes- pupils are symmetrical, conjunctiva is clear bilaterally, no ptosis is noted.   Respiratory- Breathing appears non-labored  Integumentary- coloring of skin appears normal.   Musculoskeletal- patient is moving all extremities that are visible.     Lab:  Last UDS on 2/21/2020 had expected results. Last UDT on file was reviewed.    Imaging:  No new imaging reviewed with patient over the phone, no new orders placed       Recent   Dated 1/11/2021 was reviewed with the patient today.       Assessment:     Cynthia Lyons is a 50 y.o. female seen in clinic today for   Chief Complaint   Patient presents with     Follow-up     neck and back pain       They are here for follow up and continued medical management of their pain. Today we reviewed the plan of care for their chronic pain and determined that the patient will need a refill of the current prescription.      Due  to the Covid 19 precautions all visits are converted to telephone encounters until the government restrictions are lifted and the precautions have been lifted.       New concerns today- patient notes that she is turning 51 and feels that her pain is controlled.     Patient denies side effects from the current regimen    Plan of care going forward for ongoing pain control- Patient is noting that she is traveling down to [place] for the weekend for her birthday. Education is discussed today with the dysmotility issues in her gut. Discussed gut issues today and headache relief. Continue the use opioids as you are.     Patients current MME is 44    Plan:   Interventions-    Follow up in 8 weeks      Increase the belbuca to 600 mcg two times a day next refill 2/9    Continue the use of the oxycodone at this time - 1/20    Try Frankincense oil on your temples and powder magnesium 400 mg.     Try collagen daily for joint support.     Reduce and eliminate carbs and sugar that can flare up inflammation.     This limited telehealth/televideo encounter is due to the Covid 19,  as required by the governmental agencies at this time due to risk of transmission of the pandemic, therefore testing availability is limited as well as physical exams.      Prescription Drug Management will be continued by the [facility]    Orders placed today  Medications that were ordered today   Requested Prescriptions     Signed Prescriptions Disp Refills     oxyCODONE (ROXICODONE) 10 mg immediate release tablet 86 tablet 0     Sig: Take 1 tablet (10 mg total) by mouth 3 (three) times a day as needed for pain (up to 3 per day).     buprenorphine HCL (BELBUCA) 600 mcg Film 60 Film 1     Sig: Apply 1 Film (600 mcg total) to cheek 2 (two) times a day.     No orders of the defined types were placed in this encounter.        The patient understand todays plan and has their questions answered in regards to expectations and current treatment  plan.     Prevent unexpected access/loss of medication: Keep medication locked. Only carry what you need with you    The plan of care will be adjusted to accommodate the issues discussed, discussing management of their care and follow up that is recommended. 1/11/2021         Leonor Ramirez [facility]  [address]  [place] [address]  Ph: [phone]  Fax: [phone]

## 2021-06-14 NOTE — PROGRESS NOTES
Cynthia Lyons is a 50 y.o. female who is being evaluated via a billable video visit.      How would you like to obtain your AVS? MyChart.  If dropped from the video visit, the video invitation should be resent by: Text to cell phone: [phone]  Will anyone else be joining your video visit? No    Platform used for Video Visit: [facility]     Pain score 6  Constant   What does your pain like feel during a flare? Dull, achy, stabbing  Does the pain interfere with:  Work ----- yes  Walking ------ yes  Sleep ------- yes  Daily activities ------yes  Relationships -------yes  F=8    UDT - 02/21/2020, CSA - 10/07/2019

## 2021-06-14 NOTE — TELEPHONE ENCOUNTER
After review of  this request is too early. This medication is not due until 2/17/21.    01/15/2021 Oxycodone Hcl 10 Mg Tablet 86 for 29 days Cr Mon   Ok to fill on 1/15 for use on 1/20-2/17 due to travel

## 2021-06-15 DIAGNOSIS — K22.4 ESOPHAGEAL DYSMOTILITY: ICD-10-CM

## 2021-06-15 DIAGNOSIS — K22.70 BARRETT'S ESOPHAGUS WITHOUT DYSPLASIA: ICD-10-CM

## 2021-06-15 NOTE — TELEPHONE ENCOUNTER
Prescription approved per [facility] Refill Protocol.  Chapis ARMANDO RN, BSN

## 2021-06-15 NOTE — PROGRESS NOTES
Cynthia Lyons is a 51 y.o. female who is being evaluated with [facility] or [facility] services- via video       Start time- 1:40 pm   End time- 2:03 pm     Subjective-    I have reviewed and updated the patient's Past Medical History, Social History, Family History and Medication List as well as the Precharting workup by the [facility].     PAIN CLINIC FOLLOW UP PROGRESS NOTE    CC:  Chief Complaint   Patient presents with     Follow-up     back and neck pain       HPI  Cynthia Lyons is a 51 y.o. female who presents for evaluation   Chief Complaint   Patient presents with     Follow-up     back and neck pain    that is causing continued pain. Specific questions indicated the patient wanted addressed today include: to address her ongoing chronic pain related to her back and neck pain         Objective-  Major issues:  1. Chronic pain syndrome    2. Bilateral low back pain with sciatica, sciatica laterality unspecified, unspecified chronicity        Pain score 8  Constant   What does your pain like feel during a flare? Achy, sharp, stabbing, stiff  Does the pain interfere with:  Work ----- NA  Walking ------ yes  Sleep ------- yes  Daily activities ------yes  Relationships -------yes  F=7     UDT/CSA 2/2020    ALLERGIES  Patient has no known allergies.    Previous Medical History  Social History     Substance and Sexual Activity   Alcohol Use Not Currently     Social History     Substance and Sexual Activity   Drug Use Yes     Types: Oxycodone     Social History     Tobacco Use   Smoking Status Current Every Day Smoker     Packs/day: 0.00   Smokeless Tobacco Never Used       Pertinent Pain Medications/interventions:    6/17/2020- belbuca 450 mcg two times a day, oxycodone 10 mg two times a day      Today after reviewing her UDT- will stop the subutex and start her on belbuca 450 mcg two times a day and percocet 5/325 mg up to 3 per day- agreement signed.       Patient reports that she was sick of the opioids and wanted  off of them however she had been on it for > 20 years. She was told she had to go to an addiction treatment facility- they currently have her on subutex 12 mg per day. She reports that it is not helpful for her pain and she has to go daily, she also reports that she wants off of them and they keep increasing her dose. 2/21/2020     Since her last visit her PCP has put her back on her regimen of OxyContin 10 mg er two times a day and Percocet 7.5 mg  Two times a day prn -1/28/2020    She  currently takes Cymbalta, Wellbutrin, gabapentin, robaxin and percocet three times a day prn   previously had tried ms contin, effexor, fentanyl patches and tramadol      Patient notes that she has been on percocet for sometime and would like to try it without tylenol.      Medications    Current Outpatient Medications:      atorvastatin (LIPITOR) 20 MG tablet, Take 20 mg by mouth daily., Disp: , Rfl: 1     [START ON 4/10/2021] buprenorphine HCL (BELBUCA) 600 mcg Film, Apply 1 Film (600 mcg total) to cheek 2 (two) times a day., Disp: 60 Film, Rfl: 1     cetirizine (ZYRTEC) 10 MG tablet, Take 10 mg by mouth 2 (two) times a day., Disp: , Rfl:      FLUoxetine (PROZAC) 20 MG capsule, Take 60 mg by mouth., Disp: , Rfl:      gabapentin (NEURONTIN) 600 MG tablet, Take 600 mg by mouth 4 (four) times a day., Disp: , Rfl:      lactulose (GENERLAC) 10 gram/15 mL solution, SEE NOTES, Disp: , Rfl:      naloxone (NARCAN) 4 mg/actuation nasal spray, 4 mg into each nostril., Disp: , Rfl:      omeprazole (PRILOSEC) 20 MG capsule, Take 20 mg by mouth., Disp: , Rfl:      [START ON 3/15/2021] oxyCODONE (ROXICODONE) 10 mg immediate release tablet, Take 1 tablet (10 mg total) by mouth 3 (three) times a day as needed for pain (up to 3 per day)., Disp: 84 tablet, Rfl: 0     [START ON 4/14/2021] oxyCODONE (ROXICODONE) 10 mg immediate release tablet, Take 1 tablet (10 mg total) by mouth 3 (three) times a day as needed for pain (up to 3 per day)., Disp: 84  tablet, Rfl: 0     valACYclovir (VALTREX) 500 MG tablet, , Disp: , Rfl:      buprenorphine HCL (BELBUCA) 450 mcg Film buccal film, Apply 1 Film to cheek every 12 (twelve) hours., Disp: 60 Film, Rfl: 1     celecoxib (CELEBREX) 100 MG capsule, Take 200 mg by mouth., Disp: , Rfl:      oxyCODONE (ROXICODONE) 10 mg immediate release tablet, Take 1 tablet (10 mg total) by mouth 3 (three) times a day as needed for pain (up to 3 per day)., Disp: 86 tablet, Rfl: 0      Physical Exam- limited exam   General- patient is alert and oriented, in NAD, well-groomed, well-nourished  Psych- Judgment and insight normal, AOx4, recent and remote memory normal, mood and affect normal  Eyes- pupils are symmetrical, conjunctiva is clear bilaterally, no ptosis is noted.   Respiratory- Breathing appears non-labored  Integumentary- coloring of skin appears normal.   Musculoskeletal- patient is moving all extremities that are visible.     Lab:  UDT/CSA 2/2020  had expected results. Last UDT on file was reviewed.    Imaging:  No new imaging reviewed with patient over the phone, no new orders placed       Recent   Dated 3/11/2021 was reviewed.       Assessment:     Cynthia Lyons is a 51 y.o. female seen in clinic today for   Chief Complaint   Patient presents with     Follow-up     back and neck pain       New concerns today- she is working with her PCP for her GERD    Plan of care going forward for ongoing pain control- patient is doing well with her back pain, she reports that since the last increase her spouse tells her she does not complain as much, at this time will keep the treatment the same. Discussed the use of the Celebrex from her PCP- patient is concerned after goggling it. Recommend that she try to go gluten and sugar free as this is known to cause inflammation. Patient reports that she will try. No other changes today. She may benefit from the use of magnesium at bedtime for her Gerd symptoms.     Patients current MME is  45    Plan:   Interventions-    Follow up in 8 weeks     Try to go gluten free and sugar free. This should decrease your joint pain.     Encourage the use of the monk fruit for a sugar substitute.     Try the use of magnesium 400 mg per day generally at night time, this helps with Gerd.     Unlimited fruit and protein as well as vegetables. Carbs keep to 1 serving per day generally at lunch time. No carbs at dinner.     Continue the use of the oxycodone refills for 3/15 to start using on 3/17. Due to travel.   Continue the use of the belbuca - refills sent.       Orders placed today  Medications that were ordered today   Requested Prescriptions     Signed Prescriptions Disp Refills     oxyCODONE (ROXICODONE) 10 mg immediate release tablet 84 tablet 0     Sig: Take 1 tablet (10 mg total) by mouth 3 (three) times a day as needed for pain (up to 3 per day).     buprenorphine HCL (BELBUCA) 600 mcg Film 60 Film 1     Sig: Apply 1 Film (600 mcg total) to cheek 2 (two) times a day.     oxyCODONE (ROXICODONE) 10 mg immediate release tablet 84 tablet 0     Sig: Take 1 tablet (10 mg total) by mouth 3 (three) times a day as needed for pain (up to 3 per day).     No orders of the defined types were placed in this encounter.        The patient understand todays plan and has their questions answered in regards to expectations and current treatment plan.     Prevent unexpected access/loss of medication: Keep medication locked. Only carry what you need with you    The plan of care will be adjusted to accommodate the issues discussed, discussing management of their care and follow up that is recommended. 3/11/2021         Leonor Ramirez [facility]  [address]  [place] [address]  Ph: [phone]  Fax: [phone]

## 2021-06-15 NOTE — PATIENT INSTRUCTIONS - HE
Plan:   Interventions-    Follow up in 8 weeks     Try to go gluten free and sugar free. This should decrease your joint pain.     Encourage the use of the monk fruit for a sugar substitute.     Try the use of magnesium 400 mg per day generally at night time, this helps with Gerd.     Unlimited fruit and protein as well as vegetables. Carbs keep to 1 serving per day generally at lunch time. No carbs at dinner.     Continue the use of the oxycodone refills for 3/15 to start using on 3/17. Due to travel.   Continue the use of the belbuca - refills sent.       Orders placed today  Medications that were ordered today   Requested Prescriptions     Signed Prescriptions Disp Refills     oxyCODONE (ROXICODONE) 10 mg immediate release tablet 84 tablet 0     Sig: Take 1 tablet (10 mg total) by mouth 3 (three) times a day as needed for pain (up to 3 per day).     buprenorphine HCL (BELBUCA) 600 mcg Film 60 Film 1     Sig: Apply 1 Film (600 mcg total) to cheek 2 (two) times a day.     oxyCODONE (ROXICODONE) 10 mg immediate release tablet 84 tablet 0     Sig: Take 1 tablet (10 mg total) by mouth 3 (three) times a day as needed for pain (up to 3 per day).     No orders of the defined types were placed in this encounter.        The patient understand todays plan and has their questions answered in regards to expectations and current treatment plan.     Prevent unexpected access/loss of medication: Keep medication locked. Only carry what you need with you    The plan of care will be adjusted to accommodate the issues discussed, discussing management of their care and follow up that is recommended. 3/11/2021         Leonor Ramirez [facility]  [address]  [place] [address]  Ph: [phone]  Fax: [phone]

## 2021-06-15 NOTE — TELEPHONE ENCOUNTER
Medication being requested: Oxycodone  Last visit date: 1/11/21.  Provider: AUNDREA  Next visit date: 3/11/21.  Provider: AUNDREA  Expected follow up: 8 weeks  MTM visit (Pain Center) date: No  UDT date: 2/2020  Agreement date: 10/2019   (Last fill date; name; strength; provider; MME; quantity):  01/15/2021 Oxycodone Hcl 10 Mg Tablet Qty: 86 for 29 days Cr Mon   Pertinent between visit information about requested medication (telephone, mychart, prior authorization, concerns, comments): No  Script being sent to provider by nurse- dates and quantity:   2/17/21-3/17/21  Requested Prescriptions     Pending Prescriptions Disp Refills     oxyCODONE (ROXICODONE) 10 mg immediate release tablet 86 tablet 0     Sig: Take 1 tablet (10 mg total) by mouth 3 (three) times a day as needed for pain (up to 3 per day).     Pharmacy cued: [facility]  Standing orders for withdrawal protocol implemented: BRENT

## 2021-06-16 NOTE — TELEPHONE ENCOUNTER
Telephone Encounter by Loren Vázquez RN at 11/27/2019  9:54 AM     Author: Loren Vázquez RN Service: -- Author Type: Registered Nurse    Filed: 11/27/2019 10:45 AM Encounter Date: 11/27/2019 Status: Addendum    : Loren Vázquez RN (Registered Nurse)    Related Notes: Original Note by Loren Vázquez RN (Registered Nurse) filed at 11/27/2019  9:58 AM       My chart messages from patient regarding refills of gabapentin.  Review of last chart note:  Per provider, Continue the use of the cymbalta and gabapentin from your PCP.   My chart message sent to patient to notify of this      Second my chart message:  Request for oxycontin.  Patient now aware that gabapentin is to come from a different provider    Medication being requested: percocet 7.5/325 mg and oxycontin 10 mg 12 hr  Last visit date: 11/04  Provider: AUNDREA  Apt cancelled on 12/02 with CM  Next visit date: 01/03  Provider: AUNDREA  Expected follow up: 8 weeks  MTM visit (Pain Center) date: no  UDT - 09/13/2019  CSA - 10/07/2019   snipped in:    Pertinent between visit information about requested medication (telephone, mychart, prior authorization, concerns, comments):   Patient has reported to provider that she plans to see provider at different pain clinic(not able to see this in chart review)  Script being sent to provider by nurse- dates and quantity:   Requested Prescriptions     Pending Prescriptions Disp Refills   ? oxyCODONE (OXYCONTIN) 10 mg 12 hr tablet 28 tablet 0     Sig: Take 1 tablet (10 mg total) by mouth daily as needed for pain.   ? oxyCODONE-acetaminophen (PERCOCET/ENDOCET) 7.5-325 mg per tablet 56 tablet 0     Sig: Take 1 tablet by mouth 2 (two) times a day as needed for pain (up to 2 per day).     Pharmacy cued: [facility]  Standing orders for withdrawal protocol implemented: BRENT

## 2021-06-16 NOTE — TELEPHONE ENCOUNTER
Telephone Encounter by Marion Addison at 11/8/2019  9:36 AM     Author: Marion Addison Service: -- Author Type: --    Filed: 11/8/2019  9:36 AM Encounter Date: 11/6/2019 Status: Signed    : Marion Addison APPROVED:    Approval start date:11/719  Approval end date:2/7/20    Pharmacy has been notified of approval and will contact patient when medication is ready for pickup.

## 2021-06-16 NOTE — TELEPHONE ENCOUNTER
Telephone Encounter by Loren Vázquez RN at 3/18/2020  9:04 AM     Author: Loren Vázquez RN Service: -- Author Type: Registered Nurse    Filed: 3/18/2020  9:18 AM Encounter Date: 3/18/2020 Status: Signed    : Loren Vázquez RN (Registered Nurse)       Medication being requested: percocet 5/325 mg  Last visit date: 2/28  Provider: CM  Will start the belbuca 450 mcg two times a day   Will start the percocet 5/325 mg up to 3 per day as needed.   Next visit date:  3/30 Provider: AUNDREA  Expected follow up: 4 weeks  MTM visit (Pain Center) date: no  UDT - 02/21/2020, CSA - 10/07/2019   snipped in:    Pertinent between visit information about requested medication (telephone, mychart, prior authorization, concerns, comments):   Potential upcoming surgery-my chart encounter   Script being sent to provider by nurse- dates and quantity:   Requested Prescriptions     Pending Prescriptions Disp Refills   ? buprenorphine HCL (BELBUCA) 450 mcg Film buccal film 60 Film 0     Sig: Apply 1 Film to cheek every 12 (twelve) hours.   ? oxyCODONE-acetaminophen (PERCOCET/ENDOCET) 5-325 mg per tablet 84 tablet 0     Sig: Take 1-2 tablets by mouth every 6 (six) hours as needed for pain (up to 3 per day).     Pharmacy cued: [facility]  Standing orders for withdrawal protocol implemented: BRENT

## 2021-06-16 NOTE — TELEPHONE ENCOUNTER
RN spoke to patient and patient stated she needs us to call the pharmacy.  Patient states Leonor is aware of her wrist surgery tomorrow and she stated Leonor said to stop chronic pain medication (Belbuca and Oxycodone) and patient will only take what the surgeon prescribes.   There is an encounter dated 3/15/21 discussing when to resume her pain medication prescribed by the pain clinic. Patient states pharmacy wants the clinic to call and confirm we are aware of this arrangement.  RN spoke to the pharmacist to confirm we are aware and authorize for the patient to receive post-op medications.  Pharmacist states she will be getting the authorization from the surgeon as well.

## 2021-06-16 NOTE — TELEPHONE ENCOUNTER
Telephone Encounter by Marion Addison at 11/5/2019  4:13 PM     Author: Marion Addison Service: -- Author Type: --    Filed: 11/5/2019  4:14 PM Encounter Date: 11/5/2019 Status: Signed    : Marion Addison APPROVED:    Approval start date:11/5/19  Approval end date:11/4/2020    Pharmacy has been notified of approval and will contact patient when medication is ready for pickup.

## 2021-06-16 NOTE — TELEPHONE ENCOUNTER
Telephone Encounter by Leonor Ramirez CNP at 11/6/2019  8:46 AM     Author: Leonor Ramirez CNP Service: -- Author Type: Nurse Practitioner    Filed: 11/6/2019  9:03 AM Encounter Date: 11/5/2019 Status: Signed    : Leonor Ramirez CNP (Nurse Practitioner)             Discussed plan of care with PCP- Rosaura Flores NP. Noted that the patient has requested via [employer] the desire to resume the use of OxyContin. Patient is currently using short acting and notes that she would like to utilize long acting. Currently there are no concerns presented by her PCP other then early refill requests for travel.     Patient is reporting that insurance is not covering the use of butrans or belbuca for chronic pain dosing. Patient had also requested percocet without the use of tylenol at 7.5 mg- oxydo, patient reports her insurance will not cover this as well. Tried to reach patient via telephone to discuss. No answer.     Current use is percocet 7.5 mg three times a day prn. If converted to long acting the MME 33-45 would remain the same with a reduction of her short acting, due to some small scripts of 5 mg tabs recently. Would recommend oxycontin 10-15 mg daily with the use of percocet 7.5 two times a day prn     Leonor Ramirez CNP, [facility].

## 2021-06-17 NOTE — TELEPHONE ENCOUNTER
Telephone Encounter by Adalberto Clinton at 12/10/2020 11:26 AM     Author: Adalberto Clinton Service: -- Author Type: Patient Access    Filed: 12/10/2020 11:29 AM Encounter Date: 12/9/2020 Status: Signed    : Adalberto Clinton (Patient Access)       [place] APPROVED:    Approval start date: 12/10/20  Approval end date:  06/08/21    Pharmacy has been notified of approval and will contact patient when medication is ready for pickup.

## 2021-06-17 NOTE — PROGRESS NOTES
Cynthia Lyons is a 51 y.o. female who is being evaluated via a billable video visit.      How would you like to obtain your AVS? [employer].  If dropped from the video visit, the video invitation should be resent by: Text to cell phone: [phone]  Will anyone else be joining your video visit? No      Platform used for Video Visit: [facility]     Pain score 7  Constant  What does your pain like feel during a flare? Sharp, dull, achy  Does the pain interfere with:  Work ----- NA  Walking ------ yes  Sleep ------- yes  Daily activities ------yes  Relationships -------yes  F=7    UDT/CSA 2/2020

## 2021-06-17 NOTE — TELEPHONE ENCOUNTER
Telephone Encounter by Loren Vázquez RN at 6/15/2020  3:22 PM     Author: Loren Vázquez RN Service: -- Author Type: Registered Nurse    Filed: 6/15/2020  4:07 PM Encounter Date: 6/15/2020 Status: Signed    : Loren Vázquez RN (Registered Nurse)       Patient is asking for any early refill as she needs to leave town to help her mother who was in a car accident in [place].  Refill request: belbuca 450 mcg  My chart message sent to patient requesting further clarification, patient is now stating that she is ok on belbuca but is wanting to fill the oxycodone prior to going out of town.  Last ov: 4/22 Cm  Next ov: 6/17 Cm  Multiple my chart messages since last office visit      Script is cued for a start date of 6/18 however allowing her to fill on 6/15 due to an out of town emergency with her mother.  Requested Prescriptions     Pending Prescriptions Disp Refills   ? oxyCODONE-acetaminophen (PERCOCET/ENDOCET) 5-325 mg per tablet 84 tablet 0     Sig: Take 1-2 tablets by mouth every 6 (six) hours as needed for pain (up to 3 per day).     Refused Prescriptions Disp Refills   ? BELBUCA 450 mcg Film buccal film [Pharmacy Med Name: BELBUCA 450MCG BUCCAL FILM] 60 Film 0     Sig: APPLY ONE FILM TO CHEEK EVERY 12 HOURS. FILL ON OR AFTER 4/23/20.     Kaila    Request has been made to provide proof of out of town travel

## 2021-06-17 NOTE — PROGRESS NOTES
Cynthia Lyons is a 51 y.o. female who is being evaluated with [facility] or [facility] services- via video       Start time- 1:36 pm   End time- 1:45 pm   Patient location- of site- home  Provider location- off site- virtual     Subjective-    I have reviewed and updated the patient's Past Medical History, Social History, Family History and Medication List as well as the Precharting workup by the [facility].     PAIN CLINIC FOLLOW UP PROGRESS NOTE    CC:  Chief Complaint   Patient presents with     Follow-up     back and neck pain       HPI  Cynthia Lyons is a 51 y.o. female who presents for evaluation   Chief Complaint   Patient presents with     Follow-up     back and neck pain    that is causing continued pain. Specific questions indicated the patient wanted addressed today include: to address her ongoing chronic pain related in her low back and neck         Objective-  Major issues:  1. Chronic pain syndrome    2. Bilateral low back pain with sciatica, sciatica laterality unspecified, unspecified chronicity        Pain score 7  Constant  What does your pain like feel during a flare? Sharp, dull, achy  Does the pain interfere with:  Work ----- NA  Walking ------ yes  Sleep ------- yes  Daily activities ------yes  Relationships -------yes  F=7     UDT/CSA 2/2020    ALLERGIES  Patient has no known allergies.    Previous Medical History  Social History     Substance and Sexual Activity   Alcohol Use Not Currently     Social History     Substance and Sexual Activity   Drug Use Yes     Types: Oxycodone     Social History     Tobacco Use   Smoking Status Current Every Day Smoker     Packs/day: 0.00   Smokeless Tobacco Never Used       Pertinent Pain Medications/interventions:    6/17/2020- belbuca 450 mcg two times a day, oxycodone 10 mg two times a day      Today after reviewing her UDT- will stop the subutex and start her on belbuca 450 mcg two times a day and percocet 5/325 mg up to 3 per day- agreement  signed.       Patient reports that she was sick of the opioids and wanted off of them however she had been on it for > 20 years. She was told she had to go to an addiction treatment facility- they currently have her on subutex 12 mg per day. She reports that it is not helpful for her pain and she has to go daily, she also reports that she wants off of them and they keep increasing her dose. 2/21/2020     Since her last visit her PCP has put her back on her regimen of OxyContin 10 mg er two times a day and Percocet 7.5 mg  Two times a day prn -1/28/2020    She  currently takes Cymbalta, Wellbutrin, gabapentin, robaxin and percocet three times a day prn   previously had tried ms contin, effexor, fentanyl patches and tramadol      Patient notes that she has been on percocet for sometime and would like to try it without tylenol.      Medications    Current Outpatient Medications:      atorvastatin (LIPITOR) 20 MG tablet, Take 20 mg by mouth daily., Disp: , Rfl: 1     buprenorphine HCL (BELBUCA) 600 mcg Film, Apply 1 Film (600 mcg total) to cheek 2 (two) times a day., Disp: 60 Film, Rfl: 1     cetirizine (ZYRTEC) 10 MG tablet, Take 10 mg by mouth 2 (two) times a day., Disp: , Rfl:      FLUoxetine (PROZAC) 20 MG capsule, TAKE THREE CAPSULES BY MOUTH ONCE DAILY, Disp: , Rfl:      gabapentin (NEURONTIN) 600 MG tablet, Take 600 mg by mouth 4 (four) times a day., Disp: , Rfl:      lactulose (ENULOSE) 10 gram/15 mL solution, TAKE 30 MLS BY MOUTH UP TO 3 TIMES DAILY AS NEEDED, Disp: , Rfl:      naloxone (NARCAN) 4 mg/actuation nasal spray, 4 mg into each nostril., Disp: , Rfl:      omeprazole (PRILOSEC) 20 MG capsule, TAKE ONE CAPSULE BY MOUTH ONCE DAILY, Disp: , Rfl:      oxyCODONE (ROXICODONE) 10 mg immediate release tablet, Take 1 tablet (10 mg total) by mouth 3 (three) times a day as needed for pain (up to 3 per day)., Disp: 84 tablet, Rfl: 0     valACYclovir (VALTREX) 500 MG tablet, , Disp: , Rfl:      buprenorphine HCL  (BELBUCA) 450 mcg Film buccal film, Apply 1 Film to cheek every 12 (twelve) hours., Disp: 60 Film, Rfl: 1     oxyCODONE (ROXICODONE) 10 mg immediate release tablet, Take 1 tablet (10 mg total) by mouth 3 (three) times a day as needed for pain (up to 3 per day)., Disp: 86 tablet, Rfl: 0     oxyCODONE (ROXICODONE) 10 mg immediate release tablet, Take 1 tablet (10 mg total) by mouth 3 (three) times a day as needed for pain (up to 3 per day)., Disp: 84 tablet, Rfl: 0      Physical Exam- limited exam   General- patient is alert and oriented, in NAD, well-groomed, well-nourished  Psych- Judgment and insight normal, AOx4, recent and remote memory normal, mood and affect normal  Eyes- pupils are symmetrical, conjunctiva is clear bilaterally, no ptosis is noted.   Respiratory- Breathing appears non-labored  Integumentary- coloring of skin appears normal.   Musculoskeletal- patient is moving all extremities that are visible.     Lab:  Last UDS on  2/2020 had expected results. Last UDT on file was reviewed.    Imaging:  No new imaging reviewed with patient over the phone, no new orders placed       Recent   Dated 5/6/2021 was reviewed.       Assessment:     Cynthia Lyons is a 51 y.o. female seen in clinic today for   Chief Complaint   Patient presents with     Follow-up     back and neck pain       New concerns today-no changes currently, patient feels like the pain plan is working well.     Plan of care going forward for ongoing pain control- patient is trying to stay active and feels that her pain plan is working well for her. Patient reports that she needs refills today. She is due for a UDT- will have staff follow up and schedule prior to the next appointment.     Patients current MME is 47    Plan:   Interventions-    Follow up in 7-8 weeks     Support staff will call and scheduled a UDT for the patient        Continue the use of the oxycodone refills- 5/12-6/9, 6/9-7/7   Continue the use of the belbuca -   5/10-6/9 with 1 refill.       Orders placed today  Medications that were ordered today   Requested Prescriptions     Pending Prescriptions Disp Refills     oxyCODONE (ROXICODONE) 10 mg immediate release tablet 84 tablet 0     Sig: Take 1 tablet (10 mg total) by mouth 3 (three) times a day as needed for pain (up to 3 per day).     buprenorphine HCL (BELBUCA) 600 mcg Film 60 Film 1     Sig: Apply 1 Film (600 mcg total) to cheek 2 (two) times a day.     No orders of the defined types were placed in this encounter.        The patient understand todays plan and has their questions answered in regards to expectations and current treatment plan.     Prevent unexpected access/loss of medication: Keep medication locked. Only carry what you need with you    The plan of care will be adjusted to accommodate the issues discussed, discussing management of their care and follow up that is recommended. 5/6/2021         Leonor Ramirez [facility]  [address]  [place] [address]  Ph: [phone]  Fax: [phone]

## 2021-06-17 NOTE — PATIENT INSTRUCTIONS - HE
Plan:   Interventions-    Follow up in 7-8 weeks     Support staff will call and scheduled a UDT for the patient        Continue the use of the oxycodone refills- 5/12-6/9, 6/9-7/7   Continue the use of the belbuca -  5/10-6/9 with 1 refill.       Orders placed today  Medications that were ordered today   Requested Prescriptions     Pending Prescriptions Disp Refills     oxyCODONE (ROXICODONE) 10 mg immediate release tablet 84 tablet 0     Sig: Take 1 tablet (10 mg total) by mouth 3 (three) times a day as needed for pain (up to 3 per day).     buprenorphine HCL (BELBUCA) 600 mcg Film 60 Film 1     Sig: Apply 1 Film (600 mcg total) to cheek 2 (two) times a day.     No orders of the defined types were placed in this encounter.        The patient understand todays plan and has their questions answered in regards to expectations and current treatment plan.     Prevent unexpected access/loss of medication: Keep medication locked. Only carry what you need with you    The plan of care will be adjusted to accommodate the issues discussed, discussing management of their care and follow up that is recommended. 5/6/2021         Leonor Ramirez [facility]  [address]  [place] [address]  Ph: [phone]  Fax: [phone]

## 2021-06-17 NOTE — PROGRESS NOTES
Urine Drug Test: 5/13/2021     Medication:   Last dose of scheduled / tracked / medical cannabis / CBD:   Oxycodone last taken: 5/13@845am  Belbuca last taken: 5/12@830am    Witnessed Patch Location: n/a    Medication Current List    Current Outpatient Medications:      atorvastatin (LIPITOR) 20 MG tablet, Take 20 mg by mouth daily., Disp: , Rfl: 1     buprenorphine HCL (BELBUCA) 450 mcg Film buccal film, Apply 1 Film to cheek every 12 (twelve) hours., Disp: 60 Film, Rfl: 1     buprenorphine HCL (BELBUCA) 600 mcg Film, Apply 1 Film (600 mcg total) to cheek 2 (two) times a day., Disp: 60 Film, Rfl: 1     cetirizine (ZYRTEC) 10 MG tablet, Take 10 mg by mouth 2 (two) times a day., Disp: , Rfl:      FLUoxetine (PROZAC) 20 MG capsule, TAKE THREE CAPSULES BY MOUTH ONCE DAILY, Disp: , Rfl:      gabapentin (NEURONTIN) 600 MG tablet, Take 600 mg by mouth 4 (four) times a day., Disp: , Rfl:      lactulose (ENULOSE) 10 gram/15 mL solution, TAKE 30 MLS BY MOUTH UP TO 3 TIMES DAILY AS NEEDED, Disp: , Rfl:      naloxone (NARCAN) 4 mg/actuation nasal spray, 4 mg into each nostril., Disp: , Rfl:      omeprazole (PRILOSEC) 20 MG capsule, TAKE ONE CAPSULE BY MOUTH ONCE DAILY, Disp: , Rfl:      oxyCODONE (ROXICODONE) 10 mg immediate release tablet, Take 1 tablet (10 mg total) by mouth 3 (three) times a day as needed for pain (up to 3 per day)., Disp: 86 tablet, Rfl: 0     oxyCODONE (ROXICODONE) 10 mg immediate release tablet, Take 1 tablet (10 mg total) by mouth 3 (three) times a day as needed for pain (up to 3 per day)., Disp: 84 tablet, Rfl: 0     oxyCODONE (ROXICODONE) 10 mg immediate release tablet, Take 1 tablet (10 mg total) by mouth 3 (three) times a day as needed for pain (up to 3 per day)., Disp: 84 tablet, Rfl: 0     [START ON 6/9/2021] oxyCODONE (ROXICODONE) 10 mg immediate release tablet, Take 1 tablet (10 mg total) by mouth 3 (three) times a day as needed for pain (up to 3 per day)., Disp: 84 tablet, Rfl: 0      valACYclovir (VALTREX) 500 MG tablet, , Disp: , Rfl:     Personal belongings: Left outside the bathroom.    Comments: no

## 2021-06-25 NOTE — TELEPHONE ENCOUNTER
Central PA team  [phone]  Pool: [facility] ([id number])          PA has been initiated.       PA form completed and faxed insurance via Cover My Meds     Key:  buprenorphine HCL (BELBUCA) 600 mcg Film     Medication:  buprenorphine HCL (BELBUCA) 600 mcg Film    Insurance:  [employer]        Response will be received via fax and may take up to 5-10 business days depending on plan

## 2021-06-25 NOTE — TELEPHONE ENCOUNTER
Prior Authorization Request  Who's requesting PA: Pharmacy  Provider: James  Pharmacy Name, Location & Phone # : [facility]/[phone]  Medication Name: belbuca 600mcg film, 2/day  Quantity: 60  Refills: ongoing  Days Supply: 30  Medication Instructions:  Apply 1 Film (600 mcg total) to cheek 2 (two) times a day  [facility]  Insurance Member ID & GRP # : [id number]  CoverMyMeds Key:  Informed patient that prior authorizations can take up to 10 business days for response: YES  Okay to leave a detailed message: NO     21     Route to: [facility] [employer] ([id number])

## 2021-06-29 ENCOUNTER — ANCILLARY PROCEDURE (OUTPATIENT)
Dept: MAMMOGRAPHY | Facility: CLINIC | Age: 51
End: 2021-06-29
Attending: PHYSICIAN ASSISTANT
Payer: COMMERCIAL

## 2021-06-29 DIAGNOSIS — Z12.31 VISIT FOR SCREENING MAMMOGRAM: ICD-10-CM

## 2021-06-29 PROCEDURE — 77063 BREAST TOMOSYNTHESIS BI: CPT | Mod: TC | Performed by: RADIOLOGY

## 2021-06-29 PROCEDURE — 77067 SCR MAMMO BI INCL CAD: CPT | Mod: TC | Performed by: RADIOLOGY

## 2021-06-29 NOTE — PROGRESS NOTES
"Progress Notes by Anna Leos CMA at 4/22/2020  8:00 AM     Author: Anna Leos CMA Service: -- Author Type: Certified Medical Assistant    Filed: 4/22/2020  8:39 AM Date of Service: 4/22/2020  8:00 AM Status: Signed    : Anna Leos CMA (Certified Medical Assistant)       Cynthia Lyons is a 50 y.o. female who is being evaluated via a billable telephone visit.      The patient has been notified of following:     \"This telephone visit will be conducted via a call between you and your physician/provider. We have found that certain health care needs can be provided without the need for a physical exam.  This service lets us provide the care you need with a short phone conversation.  If a prescription is necessary we can send it directly to your pharmacy.  If lab work is needed we can place an order for that and you can then stop by our lab to have the test done at a later time.    Telephone visits are billed at different rates depending on your insurance coverage. During this emergency period, for some insurers they may be billed the same as an in-person visit.  Please reach out to your insurance provider with any questions.    If during the course of the call the physician/provider feels a telephone visit is not appropriate, you will not be charged for this service.\"    Patient has given verbal consent to a Telephone visit? Yes    Patient would like to receive their AVS by AVS Preference: Karri.    Additional provider notes: insert own note template here     Pain score---7  Constant or intermittent---constant  What does your pain feel like during a flare (description)---dull persistent throbbing ache  Does the pain interfere with:  Work ----- yes  Walking ------ yes long distance  Sleep ------- yes  Daily activities ------ yes does what she can  Relationships -------yes  F= 8    UDT---2/21/20  CSA---10/7/19        Patient presents to the clinic today for a follow up with Leonor" TOBIAS Ramirez regarding back and neck . Patient describes their pain as dull, throbbing, aching. Her/His function score is 8.      Anna Leos CMA

## 2021-06-29 NOTE — PROGRESS NOTES
"Progress Notes by Leonor Ramirez CNP at 6/17/2020  8:00 AM     Author: Leonor Ramirez CNP Service: -- Author Type: Nurse Practitioner    Filed: 6/17/2020  8:44 AM Date of Service: 6/17/2020  8:00 AM Status: Signed    : Leonor Ramirez CNP (Nurse Practitioner)       Cynthia Lyons is a 50 y.o. female who is being evaluated via a billable telephone visit.      Start time- 8:24 am   End time- 8:41 am 17:38    The patient has been notified of following:     \"This telephone visit will be conducted via a call between you and your physician/provider. We have found that certain health care needs can be provided without the need for a physical exam.  This service lets us provide the care you need with a short phone conversation.  If a prescription is necessary we can send it directly to your pharmacy.  If lab work is needed we can place an order for that and you can then stop by our lab to have the test done at a later time.    If during the course of the call the physician/provider feels a telephone visit is not appropriate, you will not be charged for this service.\"     Cynthia Lyons complains of    Chief Complaint   Patient presents with   ? Follow-up     back and neck pain       I have reviewed and updated the patient's Past Medical History, Social History, Family History and Medication List as well as the Precharting workup by the [facility].       PAIN CLINIC FOLLOW UP PROGRESS NOTE    CC:  Chief Complaint   Patient presents with   ? Follow-up     back and neck pain       HPI  Cynthia Lyons is a 50 y.o. female who presents for evaluation   Chief Complaint   Patient presents with   ? Follow-up     back and neck pain    that is causing continued pain. Specific questions indicated the patient wanted addressed today include: medication management       Major issues:  1. Chronic pain syndrome    2. Bilateral low back pain with sciatica, sciatica laterality unspecified, unspecified " chronicity        Pain score 7  Constant   What does your pain like feel during a flare? Dull ache  Does the pain interfere with:  Work ----- NA  Walking ------ yes  Sleep ------- yes  Daily activities ------yes  Relationships -------yes  F=7               ALLERGIES  Patient has no known allergies.    Previous Medical History  Social History     Substance and Sexual Activity   Alcohol Use Not Currently     Social History     Substance and Sexual Activity   Drug Use Yes   ? Types: Oxycodone     Social History     Tobacco Use   Smoking Status Current Every Day Smoker   ? Packs/day: 0.00   Smokeless Tobacco Never Used       Pertinent Pain Medications/interventions:    6/17/2020- belbuca 450 mcg two times a day, oxycodone 10 mg two times a day     Today after reviewing her UDT- will stop the subutex and start her on belbuca 450 mcg two times a day and percocet 5/325 mg up to 3 per day- agreement signed.       Patient reports that she was sick of the opioids and wanted off of them however she had been on it for > 20 years. She was told she had to go to an addiction treatment facility- they currently have her on subutex 12 mg per day. She reports that it is not helpful for her pain and she has to go daily, she also reports that she wants off of them and they keep increasing her dose. 2/21/2020     Since her last visit her PCP has put her back on her regimen of OxyContin 10 mg er two times a day and Percocet 7.5 mg  Two times a day prn -1/28/2020    She  currently takes Cymbalta, Wellbutrin, gabapentin, robaxin and percocet three times a day prn   previously had tried ms contin, effexor, fentanyl patches and tramadol      Patient notes that she has been on percocet for sometime and would like to try it without tylenol.    Review of Systems:  12 point systems were reviewed with pt as documented on on previous exams. Any new diagnosis or new medication is reviewed today.     Medications    Current Outpatient Medications:   ?   atorvastatin (LIPITOR) 20 MG tablet, Take 20 mg by mouth daily., Disp: , Rfl: 1  ?  buprenorphine HCL (BELBUCA) 450 mcg Film buccal film, Apply 1 Film to cheek every 12 (twelve) hours., Disp: 60 Film, Rfl: 1  ?  cetirizine (ZYRTEC) 10 MG tablet, Take 10 mg by mouth 2 (two) times a day., Disp: , Rfl:   ?  diphenhydramine HCl (BENADRYL ALLERGY ORAL), Take by mouth at bedtime., Disp: , Rfl:   ?  gabapentin (NEURONTIN) 600 MG tablet, Take 600 mg by mouth 4 (four) times a day., Disp: , Rfl:   ?  lactulose (GENERLAC) 10 gram/15 mL solution, SEE NOTES, Disp: , Rfl:   ?  lisinopriL-hydrochlorothiazide (PRINZIDE,ZESTORETIC) 20-12.5 mg per tablet, TAKE 1 TABLET BY MOUTH DAILY., Disp: , Rfl:   ?  naloxone (NARCAN) 4 mg/actuation nasal spray, 4 mg into each nostril., Disp: , Rfl:   ?  [START ON 7/9/2020] oxyCODONE (ROXICODONE) 10 mg immediate release tablet, Take 1 tablet (10 mg total) by mouth 2 (two) times a day as needed for pain., Disp: 56 tablet, Rfl: 0  ?  tretinoin (RETIN-A) 0.05 % cream, APPLY TOPICALLY AT BEDTIME, Disp: , Rfl:     Lab:  Last UDS on 9/13/2019 had expected results. Last UDT on file was reviewed.    Imaging:  No new imaging reviewed with patient over the phone, no new orders placed       Recent   Dated 6/17/2020 was reviewed with the patient today.   Paper copy reviewed     Assessment:     Cynthia Lyons is a 50 y.o. female seen in clinic today for   Chief Complaint   Patient presents with   ? Follow-up     back and neck pain       They are here for follow up and continued medical management of their pain. Today we reviewed the plan of care for their chronic pain and determined that the patient will need a refill of the current prescription.      Due to the Covid 19 precautions all visits are converted to telephone encounters until the government restrictions are lifted and the precautions have been lifted.     New concerns today- timing of the prescriptions and an old prescription was filled  verses the new one.     Any new diagnosis since the last visit- sinusitis     Any new prescriptions since the last visit- treatment for sinusitis  Patient denies side effects from the current regimen  Plan of care going forward for ongoing pain control- continue the belbuca 50 mcg two times a day and percocet oxycodone 10 mg two times a day. This was increased but ordered incorrectly between visits. Will adjust prescriptions and reorder.   Patients current MME is 23.0    Plan:   Interventions-    Follow up in 8 weeks     belbuca 450 mcg two times a day to fill on 6/23/2020  oxycodone 10 mg two times a day to be filled on 7/9/2020 , when the 5 mg tabs run out.     This limited telehealth encounter is due to the Covid 19,  as required by the governmental agencies at this time due to risk of transmission of the pandemic, therefore testing availability is limited as well as physical exams.      Prescription Drug Management will be continued by the [facility]    Orders placed today  Medications that were ordered today   Requested Prescriptions     Signed Prescriptions Disp Refills   ? oxyCODONE (ROXICODONE) 10 mg immediate release tablet 56 tablet 0     Sig: Take 1 tablet (10 mg total) by mouth 2 (two) times a day as needed for pain.     No orders of the defined types were placed in this encounter.        The patient understand todays plan and has their questions answered in regards to expectations and current treatment plan.     Prevent unexpected access/loss of medication: Keep medication locked. Only carry what you need with you    The plan of care will be adjusted to accommodate the issues discussed, discussing management of their care and follow up that is recommended. 6/17/2020         Leonor Ramirez CNP  [facility]  [address]  [place] [address]  Ph: [phone]  Fax: [phone]

## 2021-06-29 NOTE — PROGRESS NOTES
"Progress Notes by Leonor Ramirez CNP at 8/5/2020  8:00 AM     Author: Leonor Ramirez CNP Service: -- Author Type: Nurse Practitioner    Filed: 8/5/2020  8:43 AM Date of Service: 8/5/2020  8:00 AM Status: Signed    : Leonor Ramirez CNP (Nurse Practitioner)       Cynthia Lyons is a 50 y.o. female who is being evaluated via a billable telephone visit.      Start time- 8:19 am   End time- 8:41 am     This entire visit was completed via video with [employer] or [employer]    The patient has been notified of following:     \"This telephone visit will be conducted via a call between you and your physician/provider. We have found that certain health care needs can be provided without the need for a physical exam.  This service lets us provide the care you need with a short phone conversation.  If a prescription is necessary we can send it directly to your pharmacy.  If lab work is needed we can place an order for that and you can then stop by our lab to have the test done at a later time.    If during the course of the call the physician/provider feels a telephone visit is not appropriate, you will not be charged for this service.\"     Cynthia Lyons complains of    Chief Complaint   Patient presents with   ? Follow-up     neck and back pain       I have reviewed and updated the patient's Past Medical History, Social History, Family History and Medication List as well as the Precharting workup by the [facility].       PAIN CLINIC FOLLOW UP PROGRESS NOTE    CC:  Chief Complaint   Patient presents with   ? Follow-up     neck and back pain       HPI  Cynthia Lyons is a 50 y.o. female who presents for evaluation   Chief Complaint   Patient presents with   ? Follow-up     neck and back pain    that is causing continued pain. Specific questions indicated the patient wanted addressed today include: chronic pain management as well as refill.       Major issues:  1. Chronic pain syndrome    2. " Bilateral low back pain with sciatica, sciatica laterality unspecified, unspecified chronicity        Pain score 7  Constant  What does your pain like feel during a flare? Dull ache  Does the pain interfere with:  Work ----- NA  Walking ------ yes  Sleep ------- yes  Daily activities ------yes  Relationships -------yes  F=7     UDT - 02/21/2020, CSA - 10/07/2019                          ALLERGIES  Patient has no known allergies.    Previous Medical History  Social History     Substance and Sexual Activity   Alcohol Use Not Currently     Social History     Substance and Sexual Activity   Drug Use Yes   ? Types: Oxycodone     Social History     Tobacco Use   Smoking Status Current Every Day Smoker   ? Packs/day: 0.00   Smokeless Tobacco Never Used       Pertinent Pain Medications/interventions:    6/17/2020- belbuca 450 mcg two times a day, oxycodone 10 mg two times a day      Today after reviewing her UDT- will stop the subutex and start her on belbuca 450 mcg two times a day and percocet 5/325 mg up to 3 per day- agreement signed.       Patient reports that she was sick of the opioids and wanted off of them however she had been on it for > 20 years. She was told she had to go to an addiction treatment facility- they currently have her on subutex 12 mg per day. She reports that it is not helpful for her pain and she has to go daily, she also reports that she wants off of them and they keep increasing her dose. 2/21/2020     Since her last visit her PCP has put her back on her regimen of OxyContin 10 mg er two times a day and Percocet 7.5 mg  Two times a day prn -1/28/2020    She  currently takes Cymbalta, Wellbutrin, gabapentin, robaxin and percocet three times a day prn   previously had tried ms contin, effexor, fentanyl patches and tramadol      Patient notes that she has been on percocet for sometime and would like to try it without tylenol.       Review of Systems:  12 point systems were reviewed with pt as  documented on on previous exams. Any new diagnosis or new medication is reviewed today.     Medications    Current Outpatient Medications:   ?  atorvastatin (LIPITOR) 20 MG tablet, Take 20 mg by mouth daily., Disp: , Rfl: 1  ?  [START ON 8/20/2020] buprenorphine HCL (BELBUCA) 450 mcg Film buccal film, Apply 1 Film to cheek every 12 (twelve) hours., Disp: 60 Film, Rfl: 1  ?  cetirizine (ZYRTEC) 10 MG tablet, Take 10 mg by mouth 2 (two) times a day., Disp: , Rfl:   ?  gabapentin (NEURONTIN) 600 MG tablet, Take 600 mg by mouth 4 (four) times a day., Disp: , Rfl:   ?  lactulose (GENERLAC) 10 gram/15 mL solution, SEE NOTES, Disp: , Rfl:   ?  lisinopriL-hydrochlorothiazide (PRINZIDE,ZESTORETIC) 20-12.5 mg per tablet, TAKE 1 TABLET BY MOUTH DAILY., Disp: , Rfl:   ?  naloxone (NARCAN) 4 mg/actuation nasal spray, 4 mg into each nostril., Disp: , Rfl:   ?  oxyCODONE (ROXICODONE) 10 mg immediate release tablet, Take 1 tablet (10 mg total) by mouth 3 (three) times a day as needed for pain (up to 3 per day)., Disp: 86 tablet, Rfl: 0  ?  [START ON 9/3/2020] oxyCODONE (ROXICODONE) 10 mg immediate release tablet, Take 1 tablet (10 mg total) by mouth 3 (three) times a day as needed for pain (up to 3 per day)., Disp: 86 tablet, Rfl: 0  ?  tretinoin (RETIN-A) 0.05 % cream, APPLY TOPICALLY AT BEDTIME, Disp: , Rfl:       Physical Exam- limited exam   General- patient is alert and oriented, in NAD, well-groomed, well-nourished  Psych- Judgment and insight normal, AOx4, recent and remote memory normal, mood and affect normal  Eyes- pupils are symmetrical, conjunctiva is clear bilaterally, no ptosis is noted.   Respiratory- Breathing appears non-labored  Integumentary- coloring of skin appears normal.   Musculoskeletal- patient is moving all extremities that are visible.     Lab:  Last UDS on 9/13/2019 had expected results. Last UDT on file was reviewed.    Imaging:  No new imaging reviewed with patient over the phone, no new orders placed  "      Recent   Dated 8/5/2020 was reviewed with the patient today.   Paper copy reviewed     Assessment:     Cynthia Lyons is a 50 y.o. female seen in clinic today for   Chief Complaint   Patient presents with   ? Follow-up     neck and back pain       They are here for follow up and continued medical management of their pain. Today we reviewed the plan of care for their chronic pain and determined that the patient will need a refill of the current prescription.      Due to the Covid 19 precautions all visits are converted to telephone encounters until the government restrictions are lifted and the precautions have been lifted.     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 She notes the belbuca helps in the am and she feels that the oxycodone helps as well but she notes with only two times a day and she has trouble sleeping. Will increased to three times a day. She will request labs from her dieticians office for bariatrics, if she is unable to get them will order.   Patients current MME is 30    Plan:   Interventions-    Follow up in 8 weeks     Ok to increase the short actingto three times a day- ok to refill  On 8/5-9/3, 9/3-10/1     Start magnesium\" life extension\" is helpful for this 400 mg in a poat bedtime + vitamin D if taking, at bedtime     Continue the use of the belbuca at this time two times a day refills sent      This limited telehealth/televideo encounter is due to the Covid 19,  as " required by the governmental agencies at this time due to risk of transmission of the pandemic, therefore testing availability is limited as well as physical exams.      Prescription Drug Management will be continued by the [facility]    Orders placed today  Medications that were ordered today   Requested Prescriptions     Signed Prescriptions Disp Refills   ? oxyCODONE (ROXICODONE) 10 mg immediate release tablet 86 tablet 0     Sig: Take 1 tablet (10 mg total) by mouth 3 (three) times a day as needed for pain (up to 3 per day).   ? buprenorphine HCL (BELBUCA) 450 mcg Film buccal film 60 Film 1     Sig: Apply 1 Film to cheek every 12 (twelve) hours.   ? oxyCODONE (ROXICODONE) 10 mg immediate release tablet 86 tablet 0     Sig: Take 1 tablet (10 mg total) by mouth 3 (three) times a day as needed for pain (up to 3 per day).     No orders of the defined types were placed in this encounter.        The patient understand todays plan and has their questions answered in regards to expectations and current treatment plan.     Prevent unexpected access/loss of medication: Keep medication locked. Only carry what you need with you    The plan of care will be adjusted to accommodate the issues discussed, discussing management of their care and follow up that is recommended. 8/5/2020         TOBIAS [facility]  [address]  [place] [address]  Ph: [phone]  Fax: [phone]

## 2021-06-29 NOTE — PROGRESS NOTES
"Progress Notes by Leonor Ramirez CNP at 4/22/2020  8:00 AM     Author: Leonor Ramirez CNP Service: -- Author Type: Nurse Practitioner    Filed: 4/22/2020  8:39 AM Date of Service: 4/22/2020  8:00 AM Status: Signed    : Leonor Ramirez CNP (Nurse Practitioner)       Cynthia Lyons is a 50 y.o. female who is being evaluated via a billable telephone visit.      Start time- 8:21 am  End time- 8:36 am [id number]      The patient has been notified of following:     \"This telephone visit will be conducted via a call between you and your physician/provider. We have found that certain health care needs can be provided without the need for a physical exam.  This service lets us provide the care you need with a short phone conversation.  If a prescription is necessary we can send it directly to your pharmacy.  If lab work is needed we can place an order for that and you can then stop by our lab to have the test done at a later time.    If during the course of the call the physician/provider feels a telephone visit is not appropriate, you will not be charged for this service.\"     Cynthia Lyons complains of    Chief Complaint   Patient presents with   ? Back Pain   ? Neck Pain       I have reviewed and updated the patient's Past Medical History, Social History, Family History and Medication List as well as the Precharting workup by the [facility].       PAIN CLINIC FOLLOW UP PROGRESS NOTE    CC:  Chief Complaint   Patient presents with   ? Back Pain   ? Neck Pain       HPI  Cynthia Lyons is a 50 y.o. female who presents for evaluation   Chief Complaint   Patient presents with   ? Back Pain   ? Neck Pain    that is causing continued pain. Specific questions indicated the patient wanted addressed today include: to follow up on her ongoing chronic pain as well as discuss medication management.       Major issues:  1. Bilateral low back pain with sciatica, sciatica laterality unspecified, unspecified " chronicity    2. Chronic pain syndrome        Pain score---7  Constant or intermittent---constant  What does your pain feel like during a flare (description)---dull persistent throbbing ache  Does the pain interfere with:  Work ----- yes  Walking ------ yes long distance  Sleep ------- yes  Daily activities ------ yes does what she can  Relationships -------yes  F= 8     UDT---2/21/20  CSA---10/7/19          Patient presents to the clinic today for a follow up with Leonor Ramirez CNP regarding back and neck . Patient describes their pain as dull, throbbing, aching. Her/His function score is 8.     ALLERGIES  Patient has no known allergies.    Previous Medical History  Social History     Substance and Sexual Activity   Alcohol Use Not Currently     Social History     Substance and Sexual Activity   Drug Use Yes   ? Types: Oxycodone     Social History     Tobacco Use   Smoking Status Current Every Day Smoker   ? Packs/day: 0.00   Smokeless Tobacco Never Used       Pertinent Pain Medications/interventions:    Today after reviewing her UDT- will stop the subutex and start her on belbuca 450 mcg two times a day and percocet 5/325 mg up to 3 per day- agreement signed.       Patient reports that she was sick of the opioids and wanted off of them however she had been on it for > 20 years. She was told she had to go to an addiction treatment facility- they currently have her on subutex 12 mg per day. She reports that it is not helpful for her pain and she has to go daily, she also reports that she wants off of them and they keep increasing her dose. 2/21/2020     Since her last visit her PCP has put her back on her regimen of OxyContin 10 mg er two times a day and Percocet 7.5 mg  Two times a day prn -1/28/2020    She  currently takes Cymbalta, Wellbutrin, gabapentin, robaxin and percocet three times a day prn   previously had tried ms contin, effexor, fentanyl patches and tramadol      Patient notes that she has been  on percocet for sometime and would like to try it without tylenol.    Review of Systems:  12 point systems were reviewed with pt as documented on on previous exams. Any new diagnosis or new medication is reviewed today. But did get screened for Covid -19 and feels that it is positive.     Medications    Current Outpatient Medications:   ?  atorvastatin (LIPITOR) 20 MG tablet, Take 20 mg by mouth daily., Disp: , Rfl: 1  ?  cetirizine (ZYRTEC) 10 MG tablet, Take 10 mg by mouth 2 (two) times a day., Disp: , Rfl:   ?  diphenhydramine HCl (BENADRYL ALLERGY ORAL), Take by mouth at bedtime., Disp: , Rfl:   ?  DULoxetine (CYMBALTA) 60 MG capsule, Take 60 mg by mouth., Disp: , Rfl:   ?  gabapentin (NEURONTIN) 600 MG tablet, Take 600 mg by mouth 4 (four) times a day., Disp: , Rfl:   ?  lactulose (GENERLAC) 10 gram/15 mL solution, SEE NOTES, Disp: , Rfl:   ?  lisinopriL-hydrochlorothiazide (PRINZIDE,ZESTORETIC) 20-12.5 mg per tablet, TAKE 1 TABLET BY MOUTH DAILY., Disp: , Rfl:   ?  methocarbamol (ROBAXIN) 750 MG tablet, Take 750 mg by mouth 3 (three) times a day., Disp: , Rfl:   ?  naloxone (NARCAN) 4 mg/actuation nasal spray, 4 mg into each nostril., Disp: , Rfl:   ?  tretinoin (RETIN-A) 0.05 % cream, APPLY TOPICALLY AT BEDTIME, Disp: , Rfl:   ?  [START ON 4/23/2020] buprenorphine HCL (BELBUCA) 450 mcg Film buccal film, Apply 1 Film to cheek every 12 (twelve) hours., Disp: 60 Film, Rfl: 1  ?  buPROPion (WELLBUTRIN SR) 200 MG 12 hr tablet, Take 200 mg by mouth daily., Disp: , Rfl:   ?  oxyCODONE-acetaminophen (PERCOCET/ENDOCET) 5-325 mg per tablet, Take 1-2 tablets by mouth every 6 (six) hours as needed for pain (up to 3 per day)., Disp: 84 tablet, Rfl: 0  ?  [START ON 5/21/2020] oxyCODONE-acetaminophen (PERCOCET/ENDOCET) 5-325 mg per tablet, Take 1-2 tablets by mouth every 6 (six) hours as needed for pain (up to 3 per day)., Disp: 84 tablet, Rfl: 0    Lab:  Last UDS on 9/13/2019 had expected results. Last UDT on file was  reviewed.    Imaging:  No new imaging reviewed with patient over the phone, no new orders placed       Recent   Dated 4/22/2020 was reviewed with the patient today.   Paper copy reviewed     Assessment:     Cynthia Lyons is a 50 y.o. female seen in clinic today for   Chief Complaint   Patient presents with   ? Back Pain   ? Neck Pain       They are here for follow up and continued medical management of their pain. Today we reviewed the plan of care for their chronic pain and determined that the patient will need a refill of the current prescription.      Due to the Covid 19 precautions all visits are converted to telephone encounters until the government restrictions are lifted and the precautions have been lifted.     New concerns today- recently has been struggling upper respiratory.     Any new diagnosis since the last visit- none at this time    Any new prescriptions since the last visit- supportive medications for her upper respiratory issues recently. She wants to know if she can take 2 in the am due to her am stiffness that is the worse after sleeping really well lately. She does recognize that her anxiety and stress do increase her pain related to her low back. She notes that she really has not gone over her 3 per day.   Patient denies side effects from the current regimen- denies  Plan of care going forward for ongoing pain control- will stay the same, she would like to continue the use of the belbuca two times a day and the percocet 5/325 mg three times a day prn.   Patients current MME is 23.00    Plan:   Interventions-    Follow up in 8 weeks     Will continue the use of the belbuca 450 mcg two times a day   Will continue the use of the oxycodone 5/325 mg three times a day prn ok to fill on 4/22, 5/21 this will last until 6/18    This limited telehealth encounter is due to the Covid 19,  as required by the governmental agencies at this time due to risk of transmission of the pandemic, therefore  testing availability is limited as well as physical exams.      Prescription Drug Management will be continued by the [facility]    Orders placed today  Medications that were ordered today   Requested Prescriptions     Signed Prescriptions Disp Refills   ? buprenorphine HCL (BELBUCA) 450 mcg Film buccal film 60 Film 1     Sig: Apply 1 Film to cheek every 12 (twelve) hours.   ? oxyCODONE-acetaminophen (PERCOCET/ENDOCET) 5-325 mg per tablet 84 tablet 0     Sig: Take 1-2 tablets by mouth every 6 (six) hours as needed for pain (up to 3 per day).   ? oxyCODONE-acetaminophen (PERCOCET/ENDOCET) 5-325 mg per tablet 84 tablet 0     Sig: Take 1-2 tablets by mouth every 6 (six) hours as needed for pain (up to 3 per day).     No orders of the defined types were placed in this encounter.        The patient understand todays plan and has their questions answered in regards to expectations and current treatment plan.     Prevent unexpected access/loss of medication: Keep medication locked. Only carry what you need with you    The plan of care will be adjusted to accommodate the issues discussed, discussing management of their care and follow up that is recommended. 4/22/2020         Leonor Ramirez CNP  [facility]  [address]  [place] [address]  Ph: [phone]  Fax: [phone]

## 2021-06-29 NOTE — PROGRESS NOTES
"Progress Notes by Betty Carreno CMA at 8/5/2020  8:00 AM     Author: Betty Carreno CMA Service: -- Author Type: Certified Medical Assistant    Filed: 8/5/2020  8:43 AM Date of Service: 8/5/2020  8:00 AM Status: Signed    : Betty Carreno CMA (Certified Medical Assistant)       Pt is being treated today via video visit with Leonor Ramirez CNP, for refill and f/u of back and neck pain.    Cynthia Lyons is a 50 y.o. female who is being evaluated via a billable video visit.      The patient has been notified of following:     \"This video visit will be conducted via a call between you and your physician/provider. We have found that certain health care needs can be provided without the need for an in-person physical exam.  This service lets us provide the care you need with a video conversation.  If a prescription is necessary we can send it directly to your pharmacy.  If lab work is needed we can place an order for that and you can then stop by our lab to have the test done at a later time.    Video visits are billed at different rates depending on your insurance coverage. Please reach out to your insurance provider with any questions.    If during the course of the call the physician/provider feels a video visit is not appropriate, you will not be charged for this service.\"    Patient has given verbal consent to a Video visit? Yes  How would you like to obtain your AVS? AVS Preference: MyChart.  If dropped by the video visit, the video invitation should be sent to: Text to cell phone: [phone]  Will anyone else be joining your video visit? No    Platform used for Video Visit: Well     Pain score 7  Constant  What does your pain like feel during a flare? Dull ache  Does the pain interfere with:  Work ----- NA  Walking ------ yes  Sleep ------- yes  Daily activities ------yes  Relationships -------yes  F=7    UDT - 02/21/2020, CSA - 10/07/2019             "

## 2021-07-01 ENCOUNTER — HOSPITAL ENCOUNTER (OUTPATIENT)
Dept: PALLIATIVE MEDICINE | Facility: OTHER | Age: 51
Discharge: HOME | End: 2021-07-01
Attending: NURSE PRACTITIONER
Payer: COMMERCIAL

## 2021-07-01 DIAGNOSIS — G89.4 CHRONIC PAIN SYNDROME: ICD-10-CM

## 2021-07-01 DIAGNOSIS — M54.40 BILATERAL LOW BACK PAIN WITH SCIATICA, SCIATICA LATERALITY UNSPECIFIED, UNSPECIFIED CHRONICITY: ICD-10-CM

## 2021-07-03 NOTE — ADDENDUM NOTE
Addendum Note by Anahi Woo at 11/23/2020  8:00 AM     Author: Anahi Woo Service: -- Author Type: --    Filed: 11/25/2020  5:34 AM Date of Service: 11/23/2020  8:00 AM Status: Signed    : Anahi Woo    Encounter addended by: Anahi Woo on: 11/25/2020  5:34 AM      Actions taken: Charge Capture section accepted

## 2021-07-03 NOTE — ADDENDUM NOTE
Addendum Note by Anahi Woo at 8/5/2020  8:00 AM     Author: Anahi Woo Service: -- Author Type: --    Filed: 8/7/2020  8:29 AM Date of Service: 8/5/2020  8:00 AM Status: Signed    : Anahi Woo    Encounter addended by: Anahi Woo on: 8/7/2020  8:29 AM      Actions taken: Charge Capture section accepted

## 2021-07-03 NOTE — ADDENDUM NOTE
Addendum Note by Leonor Ramirez CNP at 11/6/2019 10:06 AM     Author: Leonor Ramirez CNP Service: -- Author Type: Nurse Practitioner    Filed: 11/6/2019 10:06 AM Encounter Date: 11/5/2019 Status: Signed    : Leonor Ramirez CNP (Nurse Practitioner)    Addended by: LEONOR RAMIREZ on: 11/6/2019 10:06 AM        Modules accepted: Orders

## 2021-07-03 NOTE — ADDENDUM NOTE
Addendum Note by Anahi Woo at 9/30/2020  1:00 PM     Author: Anahi Woo Service: -- Author Type: --    Filed: 10/2/2020  9:03 AM Date of Service: 9/30/2020  1:00 PM Status: Signed    : Anahi Woo    Encounter addended by: Anahi Woo on: 10/2/2020  9:03 AM      Actions taken: Charge Capture section accepted

## 2021-07-03 NOTE — ADDENDUM NOTE
Addendum Note by Anna Leos CMA at 9/13/2019 10:36 AM     Author: Anna Leos CMA Service: -- Author Type: Certified Medical Assistant    Filed: 9/13/2019  1:52 PM Date of Service: 9/13/2019 10:36 AM Status: Signed    : Anna Leos CMA (Certified Medical Assistant)    Encounter addended by: Anna Leos CMA on: 9/13/2019  1:52 PM      Actions taken: Problem List modified, Visit diagnoses modified, Order   list changed, Diagnosis association updated

## 2021-07-03 NOTE — ADDENDUM NOTE
Addendum Note by Paradise Madrid RN at 6/17/2020  2:37 PM     Author: Paradise Madrid RN Service: -- Author Type: Registered Nurse    Filed: 6/17/2020  2:37 PM Encounter Date: 6/17/2020 Status: Signed    : Paradise Madrid RN (Registered Nurse)    Addended by: PARADISE MDARID on: 6/17/2020 02:37 PM        Modules accepted: Orders

## 2021-07-03 NOTE — ADDENDUM NOTE
Addendum Note by Anahi Woo at 1/11/2021  8:20 AM     Author: Anahi Woo Service: -- Author Type: --    Filed: 1/13/2021  5:35 AM Date of Service: 1/11/2021  8:20 AM Status: Signed    : Anahi Woo    Encounter addended by: Anahi Woo on: 1/13/2021  5:35 AM      Actions taken: Charge Capture section accepted

## 2021-07-03 NOTE — ADDENDUM NOTE
Addendum Note by Paradise Madrid RN at 6/17/2020  2:25 PM     Author: Paradise Madrid RN Service: -- Author Type: Registered Nurse    Filed: 6/17/2020  2:25 PM Encounter Date: 6/17/2020 Status: Signed    : Paradise Madrid RN (Registered Nurse)    Addended by: PARADISE MADRID on: 6/17/2020 02:25 PM        Modules accepted: Orders

## 2021-07-03 NOTE — ADDENDUM NOTE
Addendum Note by Anahi Woo at 6/17/2020  8:00 AM     Author: Anahi Woo Service: -- Author Type: --    Filed: 6/30/2020 11:48 AM Date of Service: 6/17/2020  8:00 AM Status: Signed    : Anahi Woo    Encounter addended by: Anahi Woo on: 6/30/2020 11:48 AM      Actions taken: Charge Capture section accepted

## 2021-07-03 NOTE — ADDENDUM NOTE
Addendum Note by Loren Scott RN at 11/27/2019 10:45 AM     Author: Loren Scott RN Service: -- Author Type: Registered Nurse    Filed: 11/27/2019 10:45 AM Encounter Date: 11/27/2019 Status: Signed    : Loren Scott RN (Registered Nurse)    Addended by: LOREN SCOTT on: 11/27/2019 10:45 AM        Modules accepted: Orders

## 2021-07-04 NOTE — ADDENDUM NOTE
Addendum Note by Anahi Woo at 5/6/2021  1:20 PM     Author: Anahi Woo Service: -- Author Type: --    Filed: 5/10/2021  6:41 AM Date of Service: 5/6/2021  1:20 PM Status: Signed    : Anahi Woo    Encounter addended by: Anahi Woo on: 5/10/2021  6:41 AM      Actions taken: Charge Capture section accepted

## 2021-07-04 NOTE — PATIENT INSTRUCTIONS - HE
Patient Instructions by Leonor Ramirez CNP at 2021  1:40 PM     Author: Leonor Ramirez CNP Service: -- Author Type: Nurse Practitioner    Filed: 2021  2:27 PM Date of Service: 2021  1:40 PM Status: Signed    : Leonor Ramirez CNP (Nurse Practitioner)         Plan:   Interventions-    Follow up in 4 weeks to discuss medical cannabis     Continue the use of the oxycodone refills- -, - - this may need to be reduced over time due to GI motility issues.   Continue the use of the belbuca - 7/15-  with 1 refill.    GI support and motility issues. She would benefit from aloe vera and slippary elm for her GI tract as well as digestive bitters. This can all be found on line. And should be taken daily     Trial CBD Minnegrown oil and blue bird botanicals- Any type you chose should be verified by an independent lab.  20-30 mg three times a day to be fully effective.     You are also a medical cannabis candidate. Let me know if you have any questions.       Orders placed today  Medications that were ordered today   Requested Prescriptions     Signed Prescriptions Disp Refills   ? buprenorphine HCL (BELBUCA) 600 mcg Film 60 Film 1     Sig: Apply 1 Film (600 mcg total) to cheek 2 (two) times a day.   ? oxyCODONE (ROXICODONE) 10 mg immediate release tablet 84 tablet 0     Sig: Take 1 tablet (10 mg total) by mouth 3 (three) times a day as needed for pain (up to 3 per day).   ? oxyCODONE (ROXICODONE) 10 mg immediate release tablet 84 tablet 0     Sig: Take 1 tablet (10 mg total) by mouth 3 (three) times a day as needed for pain (up to 3 per day).   ? naloxone (NARCAN) 4 mg/actuation nasal spray 1 Box 0     Si spray (4 mg total) into each nostril once for 1 dose.     No orders of the defined types were placed in this encounter.        The patient understand todays plan and has their questions answered in regards to expectations and current treatment plan.     Prevent  unexpected access/loss of medication: Keep medication locked. Only carry what you need with you    The plan of care will be adjusted to accommodate the issues discussed, discussing management of their care and follow up that is recommended. 7/1/2021         Leonor Ramirez [facility]  [address]  [place] [address]  Ph: [phone]  Fax: [phone]

## 2021-07-04 NOTE — TELEPHONE ENCOUNTER
Telephone Encounter by Adalberto Clinton at 6/11/2021  1:46 PM     Author: Adalberto Clinton Service: -- Author Type: Patient Access    Filed: 6/11/2021  1:48 PM Encounter Date: 6/10/2021 Status: Signed    : Adalberto Clinton (Patient Access)       [place] APPROVED: buprenorphine HCL (BELBUCA) 600 mcg Film    Approval start date: 06/10/21  Approval end date:  12/07/21    Pharmacy has been notified of approval and will contact patient when medication is ready for pickup.

## 2021-07-04 NOTE — PROGRESS NOTES
"Progress Notes by Leonor Ramirez CNP at 7/1/2021  1:40 PM     Author: Leonor Ramirez CNP Service: -- Author Type: Nurse Practitioner    Filed: 7/1/2021  2:27 PM Date of Service: 7/1/2021  1:40 PM Status: Signed    : Leonor Ramirez CNP (Nurse Practitioner)       Cynthia Lyons is a 51 y.o. female who is being [facility]- via video       Start time- 1:57 pm   End time- 2:26 pm   Patient location- of site- home  Provider location- off site- virtual     Subjective-    I have reviewed and updated the patient's Past Medical History, Social History, Family History and Medication List as well as the Precharting workup by the [facility].     PAIN CLINIC FOLLOW UP PROGRESS NOTE    CC:  Chief Complaint   Patient presents with   ? Neck Pain   ? Back Pain     lower back       HPI  Cynthia Lyons is a 51 y.o. female who presents for evaluation   Chief Complaint   Patient presents with   ? Neck Pain   ? Back Pain     lower back    that is causing continued pain. Specific questions indicated the patient wanted addressed today include: to follow up with her ongoing chronic pain as well as medication management         Objective-  Major issues:  1. Chronic pain syndrome    2. Bilateral low back pain with sciatica, sciatica laterality unspecified, unspecified chronicity        Pain score: 8  Constant  What does your pain feel like:varies, always achy  Does the pain interfere with:  Work: yes  Walking/distance: yes  Sleep: yes  Daily activities: yes  Relationships/social life: yes  Mood: yes  F= 8  Pt requests that she be allowed to  her Percocet script today for use 7/7 \"[place]      ALLERGIES  Patient has no known allergies.    Previous Medical History  Social History     Substance and Sexual Activity   Alcohol Use Not Currently     Social History     Substance and Sexual Activity   Drug Use Yes   ? Types: Oxycodone     Social History "     Tobacco Use   Smoking Status Current Every Day Smoker   ? Packs/day: 0.00   Smokeless Tobacco Never Used       Pertinent Pain Medications/interventions:    6/17/2020- belbuca 450 mcg two times a day, oxycodone 10 mg two times a day      Today after reviewing her UDT- will stop the subutex and start her on belbuca 450 mcg two times a day and percocet 5/325 mg up to 3 per day- agreement signed.       Patient reports that she was sick of the opioids and wanted off of them however she had been on it for > 20 years. She was told she had to go to an addiction treatment facility- they currently have her on subutex 12 mg per day. She reports that it is not helpful for her pain and she has to go daily, she also reports that she wants off of them and they keep increasing her dose. 2/21/2020     Since her last visit her PCP has put her back on her regimen of OxyContin 10 mg er two times a day and Percocet 7.5 mg  Two times a day prn -1/28/2020    She  currently takes Cymbalta, Wellbutrin, gabapentin, robaxin and percocet three times a day prn   previously had tried ms contin, effexor, fentanyl patches and tramadol      Patient notes that she has been on percocet for sometime and would like to try it without tylenol.      Medications    Current Outpatient Medications:   ?  atorvastatin (LIPITOR) 20 MG tablet, Take 20 mg by mouth daily., Disp: , Rfl: 1  ?  FLUoxetine (PROZAC) 20 MG capsule, TAKE THREE CAPSULES BY MOUTH ONCE DAILY, Disp: , Rfl:   ?  gabapentin (NEURONTIN) 600 MG tablet, Take 600 mg by mouth 4 (four) times a day., Disp: , Rfl:   ?  lactulose (ENULOSE) 10 gram/15 mL solution, TAKE 30 MLS BY MOUTH UP TO 3 TIMES DAILY AS NEEDED, Disp: , Rfl:   ?  omeprazole (PRILOSEC) 20 MG capsule, TAKE ONE CAPSULE BY MOUTH ONCE DAILY, Disp: , Rfl:   ?  ondansetron (ZOFRAN) 8 MG tablet, Take 8 mg by mouth every 8 (eight) hours as needed for nausea., Disp: , Rfl:   ?  valACYclovir (VALTREX) 500 MG tablet, , Disp: , Rfl:   ?   buprenorphine HCL (BELBUCA) 450 mcg Film buccal film, Apply 1 Film to cheek every 12 (twelve) hours., Disp: 60 Film, Rfl: 1  ?  [START ON 7/15/2021] buprenorphine HCL (BELBUCA) 600 mcg Film, Apply 1 Film (600 mcg total) to cheek 2 (two) times a day., Disp: 60 Film, Rfl: 1  ?  cetirizine (ZYRTEC) 10 MG tablet, Take 10 mg by mouth 2 (two) times a day., Disp: , Rfl:   ?  naloxone (NARCAN) 4 mg/actuation nasal spray, 1 spray (4 mg total) into each nostril once for 1 dose., Disp: 1 Box, Rfl: 0  ?  oxyCODONE (ROXICODONE) 10 mg immediate release tablet, Take 1 tablet (10 mg total) by mouth 3 (three) times a day as needed for pain (up to 3 per day)., Disp: 86 tablet, Rfl: 0  ?  oxyCODONE (ROXICODONE) 10 mg immediate release tablet, Take 1 tablet (10 mg total) by mouth 3 (three) times a day as needed for pain (up to 3 per day)., Disp: 84 tablet, Rfl: 0  ?  [START ON 8/4/2021] oxyCODONE (ROXICODONE) 10 mg immediate release tablet, Take 1 tablet (10 mg total) by mouth 3 (three) times a day as needed for pain (up to 3 per day)., Disp: 84 tablet, Rfl: 0  ?  [START ON 7/7/2021] oxyCODONE (ROXICODONE) 10 mg immediate release tablet, Take 1 tablet (10 mg total) by mouth 3 (three) times a day as needed for pain (up to 3 per day)., Disp: 84 tablet, Rfl: 0      Physical Exam- limited exam   General- patient is alert and oriented, in NAD, well-groomed, well-nourished  Psych- Judgment and insight normal, AOx4, recent and remote memory normal, mood and affect normal  Eyes- pupils are symmetrical, conjunctiva is clear bilaterally, no ptosis is noted.   Respiratory- Breathing appears non-labored  Integumentary- coloring of skin appears normal.   Musculoskeletal- patient is moving all extremities that are visible.    Lab:  UDT/CSA 5/2021   had expected results. Last UDT on file was reviewed.    Imaging:  No new imaging reviewed with patient over the phone, no new orders placed       Recent   Dated 7/1/2021 was reviewed.       Assessment:      Cynthia Lyons is a 51 y.o. female seen in clinic today for   Chief Complaint   Patient presents with   ? Neck Pain   ? Back Pain     lower back       New concerns today-to follow up with her ongoing chronic pain, she also has questions about her stomach.     Plan of care going forward for ongoing pain control- she has barrets esophagus already and is having issues with GI and they are thinking about feeding tubes. Education about GI support given to the patient.    Low back pain continues to respond to her use of belbuca and oxycodone- will continue the use at this time, discussed de-stressing and reduction of opioids due to motility. Education provided as well as alternative plans. Patient has no further questions.   Patients current MME is  46    Plan:   Interventions-    Follow up in 4 weeks to discuss medical cannabis     Continue the use of the oxycodone refills- 7/7-8/4, 8/4-9/1 - this may need to be reduced over time due to GI motility issues.   Continue the use of the belbuca - 7/15-8/14  with 1 refill.    GI support and motility issues. She would benefit from aloe vera and slippary elm for her GI tract as well as digestive bitters. This can all be found on line. And should be taken daily     Trial CBD Minnegrown oil and blue bird botanicals- Any type you chose should be verified by an independent lab.  20-30 mg three times a day to be fully effective.     You are also a medical cannabis candidate. Let me know if you have any questions.       Orders placed today  Medications that were ordered today   Requested Prescriptions     Signed Prescriptions Disp Refills   ? buprenorphine HCL (BELBUCA) 600 mcg Film 60 Film 1     Sig: Apply 1 Film (600 mcg total) to cheek 2 (two) times a day.   ? oxyCODONE (ROXICODONE) 10 mg immediate release tablet 84 tablet 0     Sig: Take 1 tablet (10 mg total) by mouth 3 (three) times a day as needed for pain (up to 3 per day).   ? oxyCODONE (ROXICODONE) 10 mg immediate  release tablet 84 tablet 0     Sig: Take 1 tablet (10 mg total) by mouth 3 (three) times a day as needed for pain (up to 3 per day).   ? naloxone (NARCAN) 4 mg/actuation nasal spray 1 Box 0     Si spray (4 mg total) into each nostril once for 1 dose.     No orders of the defined types were placed in this encounter.        The patient understand todays plan and has their questions answered in regards to expectations and current treatment plan.     Prevent unexpected access/loss of medication: Keep medication locked. Only carry what you need with you    The plan of care will be adjusted to accommodate the issues discussed, discussing management of their care and follow up that is recommended. 2021         Leonor Ramirez [facility]  [address]  [place] [address]  Ph: [phone]  Fax: [phone]

## 2021-07-04 NOTE — ADDENDUM NOTE
Addendum Note by Anahi Woo at 3/11/2021  1:20 PM     Author: Anahi Woo Service: -- Author Type: --    Filed: 3/15/2021 10:12 AM Date of Service: 3/11/2021  1:20 PM Status: Signed    : Anahi Woo    Encounter addended by: Anahi Woo on: 3/15/2021 10:12 AM      Actions taken: Charge Capture section accepted

## 2021-07-04 NOTE — PROGRESS NOTES
"Progress Notes by Joi Somers RN at 7/1/2021  1:40 PM     Author: Joi Somers RN Service: -- Author Type: Registered Nurse    Filed: 7/1/2021  2:27 PM Date of Service: 7/1/2021  1:40 PM Status: Signed    : Joi Somers RN (Registered Nurse)       Cynthia Lyons is a 51 y.o. female who is being evaluated via a billable video visit.      How would you like to obtain your AVS? mychart  If dropped from the video visit, the video invitation should be resent by: call [phone]  Will anyone else be joining your video visit? No  Pain score: 8  Constant  What does your pain feel like:varies, always achy  Does the pain interfere with:  Work: yes  Walking/distance: yes  Sleep: yes  Daily activities: yes  Relationships/social life: yes  Mood: yes  F= 8  Pt requests that she be allowed to  her Percocet script today for use 7/7 \"[place]\"    UDT/CSA 5/2021         "

## 2021-07-05 NOTE — ADDENDUM NOTE
Addendum Note by Anahi Woo at 7/1/2021  1:40 PM     Author: Anahi Woo Service: -- Author Type: --    Filed: 7/5/2021  6:13 AM Date of Service: 7/1/2021  1:40 PM Status: Signed    : Anahi Woo    Encounter addended by: Anahi Woo on: 7/5/2021  6:13 AM      Actions taken: Charge Capture section accepted

## 2021-07-21 RX ORDER — BUPRENORPHINE HYDROCHLORIDE 600 UG/1
FILM, SOLUBLE BUCCAL
Qty: 60 EACH | Refills: 1 | OUTPATIENT
Start: 2021-07-21

## 2021-07-21 NOTE — TELEPHONE ENCOUNTER
Last ov with CM: 7/1/21  Follow up in 8 weeks  UDT/CSA 5/2021   Needs to schedule a follow up apt with CM  Appears that this was sent to pharmacy on 7/19/21, refused this RX

## 2021-08-12 ENCOUNTER — VIRTUAL VISIT (OUTPATIENT)
Dept: PALLIATIVE MEDICINE | Facility: OTHER | Age: 51
End: 2021-08-12
Payer: COMMERCIAL

## 2021-08-12 DIAGNOSIS — G89.29 CHRONIC MIDLINE LOW BACK PAIN WITHOUT SCIATICA: Primary | ICD-10-CM

## 2021-08-12 DIAGNOSIS — M54.50 CHRONIC MIDLINE LOW BACK PAIN WITHOUT SCIATICA: Primary | ICD-10-CM

## 2021-08-12 DIAGNOSIS — G89.4 CHRONIC PAIN SYNDROME: ICD-10-CM

## 2021-08-12 PROCEDURE — 999N001193 HC VIDEO/TELEPHONE VISIT; NO CHARGE: Performed by: NURSE PRACTITIONER

## 2021-08-12 PROCEDURE — 99213 OFFICE O/P EST LOW 20 MIN: CPT | Mod: 95 | Performed by: NURSE PRACTITIONER

## 2021-08-12 RX ORDER — OXYCODONE HYDROCHLORIDE 10 MG/1
10 TABLET ORAL
COMMUNITY
Start: 2021-07-07 | End: 2021-08-12

## 2021-08-12 RX ORDER — OXYCODONE HYDROCHLORIDE 10 MG/1
10 TABLET ORAL EVERY 8 HOURS PRN
Qty: 84 TABLET | Refills: 0 | Status: SHIPPED | OUTPATIENT
Start: 2021-09-01 | End: 2021-09-23

## 2021-08-12 RX ORDER — OXYCODONE HYDROCHLORIDE 10 MG/1
10 TABLET ORAL
COMMUNITY
Start: 2021-08-04 | End: 2021-08-12

## 2021-08-12 ASSESSMENT — PAIN SCALES - GENERAL: PAINLEVEL: SEVERE PAIN (7)

## 2021-08-12 NOTE — PROGRESS NOTES
Karla is a 51 year old who is being evaluated via a billable video visit.      How would you like to obtain your AVS? MyChart  If the video visit is dropped, the invitation should be resent by: Text to cell phone: [phone]  Will anyone else be joining your video visit? No      Platform used for Video Visit: [facility]     Pain score 7  Constant   What does your pain like feel during a flare? Dull, achy, sharp  Does the pain interfere with:  Work ----- NA  Walking ------ yes  Sleep ------- yes  Daily activities ------yes  Relationships -------yes  F=7    UDT/CSA 5/2021     oxycodone at 0700 this am  belbuca at 0700 this am

## 2021-08-12 NOTE — LETTER
8/12/2021         RE: Cynthia Lyons  [address] [address]        Dear Colleague,    Thank you for referring your patient, Cynthia Lyons, to the [facility]. Please see a copy of my visit note below.    Karla is a 51 year old who is being evaluated via a billable video visit.      How would you like to obtain your AVS? MyChart  If the video visit is dropped, the invitation should be resent by: Text to cell phone: [phone]  Will anyone else be joining your video visit? No      Platform used for Video Visit: [facility]     Pain score 7  Constant   What does your pain like feel during a flare? Dull, achy, sharp  Does the pain interfere with:  Work ----- NA  Walking ------ yes  Sleep ------- yes  Daily activities ------yes  Relationships -------yes  F=7    UDT/CSA 5/2021     oxycodone at 0700 this am  belbuca at 0700 this am      Cynthia Lyons is a 51 year old female who is being evaluated with [employer] or [employer] services- via video       Start time- 9:07 am   End time- 9:21 am   Patient location- of site- home  Provider location- off site- virtual     Subjective-    I have reviewed and updated the patient's Past Medical History, Social History, Family History and Medication List as well as the Precharting workup by the [facility].     PAIN CLINIC FOLLOW UP PROGRESS NOTE    CC:chief complaint    HPI  Cynthia Lyons is a 51 year old female who presents for evaluation chief complaint that is causing continued pain. Specific questions indicated the patient wanted addressed today include: to follow up her ongoing chronic low back pain as well as medication refill        Objective-  Major issues:  1. Chronic midline low back pain without sciatica    2. Chronic pain syndrome        Pain score 7  Constant   What does your pain like feel during a flare? Dull, achy, sharp  Does the pain interfere with:  Work ----- NA  Walking ------ yes  Sleep ------- yes  Daily activities  ------yes  Relationships -------yes  F=7       ALLERGIES  Patient has no known allergies.    Previous Medical History  Social History    Substance and Sexual Activity      Alcohol use: Not Currently        Alcohol/week: 0.0 standard drinks    History   Drug Use     Types: Oxycodone     History   Smoking Status     Current Every Day Smoker     Packs/day: 0.00     Years: 23.00     Types: Cigarettes   Smokeless Tobacco     Never Used     Comment: started smoking age 22       Pertinent Pain Medications/interventions:    6/17/2020- belbuca 450 mcg two times a day, oxycodone 10 mg two times a day      Today after reviewing her UDT- will stop the subutex and start her on belbuca 450 mcg two times a day and percocet 5/325 mg up to 3 per day- agreement signed.       Patient reports that she was sick of the opioids and wanted off of them however she had been on it for > 20 years. She was told she had to go to an addiction treatment facility- they currently have her on subutex 12 mg per day. She reports that it is not helpful for her pain and she has to go daily, she also reports that she wants off of them and they keep increasing her dose. 2/21/2020     Since her last visit her PCP has put her back on her regimen of OxyContin 10 mg er two times a day and Percocet 7.5 mg  Two times a day prn -1/28/2020     She  currently takes Cymbalta, Wellbutrin, gabapentin, robaxin and percocet three times a day prn   previously had tried ms contin, effexor, fentanyl patches and tramadol      Patient notes that she has been on percocet for sometime and would like to try it without tylenol.      Medications    Current Outpatient Medications:      albuterol (PROAIR HFA/PROVENTIL HFA/VENTOLIN HFA) 108 (90 Base) MCG/ACT Inhaler, Inhale 2 puffs into the lungs, Disp: , Rfl:      atorvastatin (LIPITOR) 20 MG tablet, Take 1 tablet (20 mg) by mouth daily, Disp: 90 tablet, Rfl: 1     Buprenorphine HCl (BELBUCA) 600 MCG FILM buccal film, Place 600 mcg  inside cheek, Disp: , Rfl:      cetirizine (ZYRTEC) 10 MG tablet, Take 1 tablet (10 mg) by mouth 2 times daily, Disp: 180 tablet, Rfl: 1     FLUoxetine (PROZAC) 20 MG capsule, TAKE THREE CAPSULES BY MOUTH ONCE DAILY, Disp: 90 capsule, Rfl: 5     fluticasone (FLONASE) 50 MCG/ACT spray, Spray 1 spray in nostril 2 times daily, Disp: 1 Bottle, Rfl: 11     gabapentin (NEURONTIN) 600 MG tablet, TAKE ONE TABLET BY MOUTH EVERY MORNING, TAKE ONE TABLET BY MOUTH ONCE DAILY WITH LUNCH AND TAKE  TWO TABLETS BY  MOUTH  EVERY EVENING, Disp: 120 tablet, Rfl: 5     lactulose encephalopathy (CHRONULAC) 10 GM/15ML SOLUTION, TAKE 30 MLS BY MOUTH UP TO 3 TIMES DAILY AS NEEDED, Disp: 473 mL, Rfl: 2     naloxone (NARCAN) 4 MG/0.1ML nasal spray, Spray 1 spray (4 mg) into one nostril alternating nostrils as needed for opioid reversal every 2-3 minutes until assistance arrives, Disp: 0.2 mL, Rfl: 1     omeprazole (PRILOSEC) 20 MG DR capsule, TAKE ONE CAPSULE BY MOUTH ONCE DAILY, Disp: 30 capsule, Rfl: 0     oxyCODONE IR (ROXICODONE) 10 MG tablet, Take 10 mg by mouth, Disp: , Rfl:      order for DME, Equipment being ordered: Digital home blood pressure monitor kit (Patient not taking: Reported on 3/4/2021), Disp: 1 kit, Rfl: 0      Lab:  Last UDS onUDT/CSA 5/2021      oxycodone at 0700 this am  belbuca at 0700 this am  had expected results. Last UDT on file was reviewed.    Imaging:  No new imaging reviewed with patient over the phone, no new orders placed       Recent   Dated 8/12/2021 was reviewed.       Assessment:     Cynthia Lyons is a 51 year old female seen in clinic today for chief complaint    New concerns today- she needs to follow up with her ongoing chronic pain plan. She is working with GI and a nutrition to reports that she is working on weight loss and is down 14 pounds. She is also incorporating integrative health techniques, she also notes that her stress is better and she feels better.     Plan of care going forward  for ongoing pain control- she is using belbuca as well as the oxycodone as well as lifestyle changes. She will continue the use of the belbuca at this time and oxycodone and hold off of the  Medical cannabis. Refills will be sent.     Patients current MME is 46    Plan:   Interventions-    Follow up in 4-6  weeks      Continue the use of the oxycodone refills- -9/1- 9/29 - this may need to be reduced over time due to GI motility issues.   Continue the use of the belbuca -8/18-9/17  with 1 refill.     GI support and motility issues. She would benefit from aloe vera and slippary elm for her GI tract as well as digestive bitters. This can all be found on line. And should be taken daily- continue this as you are.      Orders placed today  Medications that were ordered today   Pending Prescriptions:                       Disp   Refills    oxyCODONE IR (ROXICODONE) 10 MG tablet    84 tab*0            Sig: Take 1 tablet (10 mg) by mouth every 8 hours as           needed for severe pain    Buprenorphine HCl (BELBUCA) 600 MCG FILM *60 Film1            Sig: Place 1 Film (600 mcg) inside cheek every 12           hours    Signed Prescriptions:                        Disp   Refills    oxyCODONE IR (ROXICODONE) 10 MG tablet                     Sig: Take 10 mg by mouth  Authorizing Provider: PATIENT REPORTED    Buprenorphine HCl (BELBUCA) 600 MCG FILM b*                Sig: Place 600 mcg inside cheek  Authorizing Provider: PATIENT REPORTED    No orders of the defined types were placed in this encounter.        The patient understand todays plan and has their questions answered in regards to expectations and current treatment plan.     Prevent unexpected access/loss of medication: Keep medication locked. Only carry what you need with you    The plan of care will be adjusted to accommodate the issues discussed, discussing management of their care and follow up that is recommended. 8/12/2021         ALAN Wolff  [place]  [facility]  [address]  [place] [address]  Ph: [phone]  Fax: [phone]            Again, thank you for allowing me to participate in the care of your patient.        Sincerely,        ALAN Wolff CNP

## 2021-08-12 NOTE — PROGRESS NOTES
Cynthia Lyons is a 51 year old female who is being [facility] with [facility] or [facility]- via video       Start time- 9:07 am   End time- 9:21 am   Patient location- of site- home  Provider location- off site- virtual     Subjective-    I have reviewed and updated the patient's Past Medical History, Social History, Family History and Medication List as well as the Precharting workup by the [facility].     PAIN CLINIC FOLLOW UP PROGRESS NOTE    CC:chief complaint    HPI  Cynthia Lyons is a 51 year old female who presents for evaluation chief complaint that is causing continued pain. Specific questions indicated the patient wanted addressed today include: to follow up her ongoing chronic low back pain as well as medication refill        Objective-  Major issues:  1. Chronic midline low back pain without sciatica    2. Chronic pain syndrome        Pain score 7  Constant   What does your pain like feel during a flare? Dull, achy, sharp  Does the pain interfere with:  Work ----- NA  Walking ------ yes  Sleep ------- yes  Daily activities ------yes  Relationships -------yes  F=7       ALLERGIES  Patient has no known allergies.    Previous Medical History  Social History    Substance and Sexual Activity      Alcohol use: Not Currently        Alcohol/week: 0.0 standard drinks    History   Drug Use     Types: Oxycodone     History   Smoking Status     Current Every Day Smoker     Packs/day: 0.00     Years: 23.00     Types: Cigarettes   Smokeless Tobacco     Never Used     Comment: started smoking age 22       Pertinent Pain Medications/interventions:    6/17/2020- belbuca 450 mcg two times a day, oxycodone 10 mg two times a day      Today after reviewing her UDT- will stop the subutex and start her on belbuca 450 mcg two times a day and percocet 5/325 mg up to 3 per day- agreement signed.       Patient reports that she was sick of the opioids and wanted off of them however she had been on it for > 20 years. She was  told she had to go to an addiction treatment facility- they currently have her on subutex 12 mg per day. She reports that it is not helpful for her pain and she has to go daily, she also reports that she wants off of them and they keep increasing her dose. 2/21/2020     Since her last visit her PCP has put her back on her regimen of OxyContin 10 mg er two times a day and Percocet 7.5 mg  Two times a day prn -1/28/2020     She  currently takes Cymbalta, Wellbutrin, gabapentin, robaxin and percocet three times a day prn   previously had tried ms contin, effexor, fentanyl patches and tramadol      Patient notes that she has been on percocet for sometime and would like to try it without tylenol.      Medications    Current Outpatient Medications:      albuterol (PROAIR HFA/PROVENTIL HFA/VENTOLIN HFA) 108 (90 Base) MCG/ACT Inhaler, Inhale 2 puffs into the lungs, Disp: , Rfl:      atorvastatin (LIPITOR) 20 MG tablet, Take 1 tablet (20 mg) by mouth daily, Disp: 90 tablet, Rfl: 1     Buprenorphine HCl (BELBUCA) 600 MCG FILM buccal film, Place 600 mcg inside cheek, Disp: , Rfl:      cetirizine (ZYRTEC) 10 MG tablet, Take 1 tablet (10 mg) by mouth 2 times daily, Disp: 180 tablet, Rfl: 1     FLUoxetine (PROZAC) 20 MG capsule, TAKE THREE CAPSULES BY MOUTH ONCE DAILY, Disp: 90 capsule, Rfl: 5     fluticasone (FLONASE) 50 MCG/ACT spray, Spray 1 spray in nostril 2 times daily, Disp: 1 Bottle, Rfl: 11     gabapentin (NEURONTIN) 600 MG tablet, TAKE ONE TABLET BY MOUTH EVERY MORNING, TAKE ONE TABLET BY MOUTH ONCE DAILY WITH LUNCH AND TAKE  TWO TABLETS BY  MOUTH  EVERY EVENING, Disp: 120 tablet, Rfl: 5     lactulose encephalopathy (CHRONULAC) 10 GM/15ML SOLUTION, TAKE 30 MLS BY MOUTH UP TO 3 TIMES DAILY AS NEEDED, Disp: 473 mL, Rfl: 2     naloxone (NARCAN) 4 MG/0.1ML nasal spray, Spray 1 spray (4 mg) into one nostril alternating nostrils as needed for opioid reversal every 2-3 minutes until assistance arrives, Disp: 0.2 mL, Rfl: 1      omeprazole (PRILOSEC) 20 MG DR capsule, TAKE ONE CAPSULE BY MOUTH ONCE DAILY, Disp: 30 capsule, Rfl: 0     oxyCODONE IR (ROXICODONE) 10 MG tablet, Take 10 mg by mouth, Disp: , Rfl:      order for DME, Equipment being ordered: Digital home blood pressure monitor kit (Patient not taking: Reported on 3/4/2021), Disp: 1 kit, Rfl: 0      Lab:  Last UDS onUDT/CSA 5/2021      oxycodone at 0700 this am  belbuca at 0700 this am  had expected results. Last UDT on file was reviewed.    Imaging:  No new imaging reviewed with patient over the phone, no new orders placed       Recent   Dated 8/12/2021 was reviewed.       Assessment:     Cynthia Lyons is a 51 year old female seen in clinic today for chief complaint    New concerns today- she needs to follow up with her ongoing chronic pain plan. She is working with GI and a nutrition to reports that she is working on weight loss and is down 14 pounds. She is also incorporating integrative health techniques, she also notes that her stress is better and she feels better.     Plan of care going forward for ongoing pain control- she is using belbuca as well as the oxycodone as well as lifestyle changes. She will continue the use of the belbuca at this time and oxycodone and hold off of the  Medical cannabis. Refills will be sent.     Patients current MME is 45    Plan:   Interventions-    Follow up in 4-6  weeks      Continue the use of the oxycodone refills- -9/1- 9/29 - this may need to be reduced over time due to GI motility issues.   Continue the use of the belbuca -8/18-9/17  with 1 refill.     GI support and motility issues. She would benefit from aloe vera and slippary elm for her GI tract as well as digestive bitters. This can all be found on line. And should be taken daily- continue this as you are.      Orders placed today  Medications that were ordered today   Pending Prescriptions:                       Disp   Refills    oxyCODONE IR (ROXICODONE) 10 MG tablet    84  tab*0            Sig: Take 1 tablet (10 mg) by mouth every 8 hours as           needed for severe pain    Buprenorphine HCl (BELBUCA) 600 MCG FILM *60 Film1            Sig: Place 1 Film (600 mcg) inside cheek every 12           hours    Signed Prescriptions:                        Disp   Refills    oxyCODONE IR (ROXICODONE) 10 MG tablet                     Sig: Take 10 mg by mouth  Authorizing Provider: PATIENT REPORTED    Buprenorphine HCl (BELBUCA) 600 MCG FILM b*                Sig: Place 600 mcg inside cheek  Authorizing Provider: PATIENT REPORTED    No orders of the defined types were placed in this encounter.        The patient understand todays plan and has their questions answered in regards to expectations and current treatment plan.     Prevent unexpected access/loss of medication: Keep medication locked. Only carry what you need with you    The plan of care will be adjusted to accommodate the issues discussed, discussing management of their care and follow up that is recommended. 8/12/2021         ALAN Wolff [facility]  [address]  [place] [address]  Ph: [phone]  Fax: [phone]

## 2021-08-12 NOTE — PATIENT INSTRUCTIONS
Plan:   Interventions-    Follow up in 4-6 weeks      Continue the use of the oxycodone refills- -9/1- 9/29 - this may need to be reduced over time due to GI motility issues.   Continue the use of the belbuca -8/18-9/17  with 1 refill.     GI support and motility issues. She would benefit from aloe vera and slippary elm for her GI tract as well as digestive bitters. This can all be found on line. And should be taken daily- continue this as you are.      Orders placed today  Medications that were ordered today   Pending Prescriptions:                       Disp   Refills    oxyCODONE IR (ROXICODONE) 10 MG tablet    84 tab*0            Sig: Take 1 tablet (10 mg) by mouth every 8 hours as           needed for severe pain    Buprenorphine HCl (BELBUCA) 600 MCG FILM *60 Film1            Sig: Place 1 Film (600 mcg) inside cheek every 12           hours    Signed Prescriptions:                        Disp   Refills    oxyCODONE IR (ROXICODONE) 10 MG tablet                     Sig: Take 10 mg by mouth  Authorizing Provider: PATIENT REPORTED    Buprenorphine HCl (BELBUCA) 600 MCG FILM b*                Sig: Place 600 mcg inside cheek  Authorizing Provider: PATIENT REPORTED    No orders of the defined types were placed in this encounter.        The patient understand todays plan and has their questions answered in regards to expectations and current treatment plan.     Prevent unexpected access/loss of medication: Keep medication locked. Only carry what you need with you    The plan of care will be adjusted to accommodate the issues discussed, discussing management of their care and follow up that is recommended. 8/12/2021         ALAN Wolff [facility]  [address]  [place] [address]  Ph: [phone]  Fax: [phone]

## 2021-08-19 DIAGNOSIS — K22.70 BARRETT'S ESOPHAGUS WITHOUT DYSPLASIA: ICD-10-CM

## 2021-08-19 DIAGNOSIS — K22.4 ESOPHAGEAL DYSMOTILITY: ICD-10-CM

## 2021-08-23 ENCOUNTER — E-VISIT (OUTPATIENT)
Dept: FAMILY MEDICINE | Facility: CLINIC | Age: 51
End: 2021-08-23
Payer: COMMERCIAL

## 2021-08-23 DIAGNOSIS — N39.0 ACUTE UTI (URINARY TRACT INFECTION): Primary | ICD-10-CM

## 2021-08-23 PROCEDURE — 99421 OL DIG E/M SVC 5-10 MIN: CPT | Performed by: PHYSICIAN ASSISTANT

## 2021-08-23 RX ORDER — NITROFURANTOIN 25; 75 MG/1; MG/1
100 CAPSULE ORAL 2 TIMES DAILY
Qty: 10 CAPSULE | Refills: 0 | Status: SHIPPED | OUTPATIENT
Start: 2021-08-23 | End: 2021-08-28

## 2021-08-23 RX ORDER — PHENAZOPYRIDINE HYDROCHLORIDE 100 MG/1
100 TABLET, FILM COATED ORAL 3 TIMES DAILY PRN
Qty: 6 TABLET | Refills: 0 | Status: SHIPPED | OUTPATIENT
Start: 2021-08-23 | End: 2021-10-01

## 2021-08-23 NOTE — PATIENT INSTRUCTIONS
Dear Cynthia Lyons    After reviewing your responses, I've been able to diagnose you with a urinary tract infection, which is a common infection of the bladder with bacteria.  This is not a sexually transmitted infection, though urinating immediately after intercourse can help prevent infections.  Drinking lots of fluids is also helpful to clear your current infection and prevent the next one.      I have sent a prescription for antibiotics to your pharmacy to treat this infection.    It is important that you take all of your prescribed medication even if your symptoms are improving after a few doses.  Taking all of your medicine helps prevent the symptoms from returning.     If your symptoms worsen, you develop pain in your back or stomach, develop fevers, or are not improving in 5 days, please contact your primary care provider for an appointment or visit any of our convenient Walk-in or Urgent Care Centers to be seen, which can be found on our website here.    Thanks again for choosing us as your health care partner,    Byron Root PA-C

## 2021-09-23 DIAGNOSIS — M54.50 CHRONIC MIDLINE LOW BACK PAIN WITHOUT SCIATICA: ICD-10-CM

## 2021-09-23 DIAGNOSIS — G89.4 CHRONIC PAIN SYNDROME: ICD-10-CM

## 2021-09-23 DIAGNOSIS — G89.29 CHRONIC MIDLINE LOW BACK PAIN WITHOUT SCIATICA: ICD-10-CM

## 2021-09-23 RX ORDER — OXYCODONE HYDROCHLORIDE 10 MG/1
10 TABLET ORAL EVERY 8 HOURS PRN
Qty: 84 TABLET | Refills: 0 | Status: SHIPPED | OUTPATIENT
Start: 2021-09-29 | End: 2021-10-01

## 2021-09-23 NOTE — TELEPHONE ENCOUNTER
Received call from patient requesting refill(s) of Oxycodone    Last dispensed from pharmacy on 9/1/21    Patient's last office/virtual visit by prescribing provider on 8/12/21  Next office/virtual appointment scheduled for 10/1/21    Last urine drug screen date 5/2021  Current opioid agreement on file (completed within the last year) Yes Date of opioid agreement: 5/2021    E-prescribe to [facility]  Pending Prescriptions:                       Disp   Refills    oxyCODONE IR (ROXICODONE) 10 MG tablet    84 tab*0            Sig: Take 1 tablet (10 mg) by mouth every 8 hours as           needed for severe pain

## 2021-09-23 NOTE — TELEPHONE ENCOUNTER
:   9/23/2021 Reviewed to aid with decision regarding medication management  oxyCODONE IR (ROXICODONE) 10 MG tablet 84 tablet 0 9/1/2021 9/29/2021 No   Sig - Route: Take 1 tablet (10 mg) by mouth every 8 hours as needed for severe pain - Oral   Sent to pharmacy as: oxyCODONE HCl 10 MG Oral Tablet (ROXICODONE)       Last script:  Date: 9/1  Medication: oxycodone  Dose: 10mg   Dispensed: 84   Prescriber: AUNDREA    Expected dispensing: ok    Completed Covering provider 9/29-10/27  Signed Prescriptions:                        Disp   Refills    oxyCODONE IR (ROXICODONE) 10 MG tablet     84 tab*0        Sig: Take 1 tablet (10 mg) by mouth every 8 hours as           needed for severe pain (chronic pain)  Authorizing Provider: LATOYA JONES

## 2021-09-29 ENCOUNTER — MYC MEDICAL ADVICE (OUTPATIENT)
Dept: FAMILY MEDICINE | Facility: CLINIC | Age: 51
End: 2021-09-29

## 2021-10-01 ENCOUNTER — VIRTUAL VISIT (OUTPATIENT)
Dept: PALLIATIVE MEDICINE | Facility: OTHER | Age: 51
End: 2021-10-01
Payer: COMMERCIAL

## 2021-10-01 DIAGNOSIS — M54.50 CHRONIC MIDLINE LOW BACK PAIN WITHOUT SCIATICA: ICD-10-CM

## 2021-10-01 DIAGNOSIS — G89.29 CHRONIC MIDLINE LOW BACK PAIN WITHOUT SCIATICA: ICD-10-CM

## 2021-10-01 DIAGNOSIS — G89.4 CHRONIC PAIN SYNDROME: Primary | ICD-10-CM

## 2021-10-01 PROCEDURE — 99213 OFFICE O/P EST LOW 20 MIN: CPT | Mod: 95 | Performed by: NURSE PRACTITIONER

## 2021-10-01 RX ORDER — OXYCODONE HYDROCHLORIDE 10 MG/1
10 TABLET ORAL EVERY 8 HOURS PRN
Qty: 84 TABLET | Refills: 0 | Status: SHIPPED | OUTPATIENT
Start: 2021-11-23 | End: 2021-12-20

## 2021-10-01 RX ORDER — BUPRENORPHINE HYDROCHLORIDE 600 UG/1
FILM, SOLUBLE BUCCAL
COMMUNITY
Start: 2021-09-17 | End: 2021-10-01

## 2021-10-01 RX ORDER — OXYCODONE HYDROCHLORIDE 10 MG/1
10 TABLET ORAL EVERY 8 HOURS PRN
Qty: 84 TABLET | Refills: 0 | Status: SHIPPED | OUTPATIENT
Start: 2021-10-26 | End: 2022-01-18

## 2021-10-01 RX ORDER — BUPRENORPHINE HYDROCHLORIDE 600 UG/1
600 FILM, SOLUBLE BUCCAL EVERY 12 HOURS
Qty: 60 FILM | Refills: 1 | Status: SHIPPED | OUTPATIENT
Start: 2021-10-15 | End: 2021-12-06

## 2021-10-01 ASSESSMENT — PAIN SCALES - GENERAL: PAINLEVEL: SEVERE PAIN (6)

## 2021-10-01 NOTE — PROGRESS NOTES
Pt is scheduled for a video visit to address her chronic low back and neck pain. States she currently has a temp of 103 F.    If video is dropped, please contact her at [phone]    AVS: Rajiv    Will anyone else be on the visit? No    Pain score: 6  Constant   What does your pain feel like: Depends; can be dull and achy or sharp   Does the pain interfere with:  Work: Yes  Walking/distance: yes  Sleep: yes  Daily activities: yes  Relationships/social life: yes  Mood: yes  F= 8   UDT/CSA: 5/2021

## 2021-10-01 NOTE — PROGRESS NOTES
Cynthia Lyons is a 51 year old female who [facility]- via [facility]       Start time- 1:43 pm  End time- 2:08 pm   Patient location- of site- home  Provider location- off site- virtual     Subjective-    I have reviewed and updated the patient's Past Medical History, Social History, Family History and Medication List as well as the Precharting workup by the [facility].     PAIN CLINIC FOLLOW UP PROGRESS NOTE    CC:chief complaint    HPI  Cynthia Lyons is a 51 year old female who presents for evaluation chief complaint that is causing continued pain. Specific questions indicated the patient wanted addressed today include: to follow up with her low back pain and medication refills.         Objective-  Major issues:  1. Chronic pain syndrome    2. Chronic midline low back pain without sciatica        Pain score: 6  Constant   What does your pain feel like: Depends; can be dull and achy or sharp   Does the pain interfere with:  Work: Yes  Walking/distance: yes  Sleep: yes  Daily activities: yes  Relationships/social life: yes  Mood: yes  F= 8   UDT/CSA: 5/2021    ALLERGIES  Patient has no known allergies.    Previous Medical History  Social History    Substance and Sexual Activity      Alcohol use: Not Currently        Alcohol/week: 0.0 standard drinks    History   Drug Use     Types: Oxycodone     History   Smoking Status     Current Every Day Smoker     Packs/day: 0.00     Years: 23.00     Types: Cigarettes   Smokeless Tobacco     Never Used     Comment: started smoking age 22       Pertinent Pain Medications/interventions:    10/1/2021- continue the use of 450 mcg bid and oxycodone 10 mg bid, medical cannabis registered today    6/17/2020- belbuca 450 mcg two times a day, oxycodone 10 mg two times a day      Today after reviewing her UDT- will stop the subutex and start her on belbuca 450 mcg two times a day and percocet 5/325 mg up to 3 per day- agreement signed.       Patient  reports that she was sick of the opioids and wanted off of them however she had been on it for > 20 years. She was told she had to go to an addiction treatment facility- they currently have her on subutex 12 mg per day. She reports that it is not helpful for her pain and she has to go daily, she also reports that she wants off of them and they keep increasing her dose. 2/21/2020     Since her last visit her PCP has put her back on her regimen of OxyContin 10 mg er two times a day and Percocet 7.5 mg  Two times a day prn -1/28/2020     She  currently takes Cymbalta, Wellbutrin, gabapentin, robaxin and percocet three times a day prn   previously had tried ms contin, effexor, fentanyl patches and tramadol      Patient notes that she has been on percocet for sometime and would like to try it without tylenol.      Medications    Current Outpatient Medications:      albuterol (PROAIR HFA/PROVENTIL HFA/VENTOLIN HFA) 108 (90 Base) MCG/ACT Inhaler, Inhale 2 puffs into the lungs, Disp: , Rfl:      atorvastatin (LIPITOR) 20 MG tablet, Take 1 tablet (20 mg) by mouth daily, Disp: 90 tablet, Rfl: 1     [START ON 10/15/2021] BELBUCA 600 MCG FILM buccal film, Place 1 Film (600 mcg) inside cheek every 12 hours for 28 days, Disp: 60 Film, Rfl: 1     cetirizine (ZYRTEC) 10 MG tablet, Take 1 tablet (10 mg) by mouth 2 times daily, Disp: 180 tablet, Rfl: 1     FLUoxetine (PROZAC) 20 MG capsule, TAKE THREE CAPSULES BY MOUTH ONCE DAILY, Disp: 90 capsule, Rfl: 5     fluticasone (FLONASE) 50 MCG/ACT spray, Spray 1 spray in nostril 2 times daily, Disp: 1 Bottle, Rfl: 11     gabapentin (NEURONTIN) 600 MG tablet, TAKE ONE TABLET BY MOUTH EVERY MORNING, TAKE ONE TABLET BY MOUTH ONCE DAILY WITH LUNCH AND TAKE  TWO TABLETS BY  MOUTH  EVERY EVENING, Disp: 120 tablet, Rfl: 5     lactulose encephalopathy (CHRONULAC) 10 GM/15ML SOLUTION, TAKE 30 MLS BY MOUTH UP TO 3 TIMES DAILY AS NEEDED, Disp: 473 mL, Rfl: 2     naloxone (NARCAN) 4 MG/0.1ML nasal  spray, Spray 1 spray (4 mg) into one nostril alternating nostrils as needed for opioid reversal every 2-3 minutes until assistance arrives, Disp: 0.2 mL, Rfl: 1     omeprazole (PRILOSEC) 20 MG DR capsule, TAKE ONE CAPSULE BY MOUTH ONCE DAILY, Disp: 30 capsule, Rfl: 4     order for DME, Equipment being ordered: Digital home blood pressure monitor kit, Disp: 1 kit, Rfl: 0     [START ON 10/26/2021] oxyCODONE IR (ROXICODONE) 10 MG tablet, Take 1 tablet (10 mg) by mouth every 8 hours as needed for severe pain (chronic pain), Disp: 84 tablet, Rfl: 0     [START ON 11/23/2021] oxyCODONE IR (ROXICODONE) 10 MG tablet, Take 1 tablet (10 mg) by mouth every 8 hours as needed for severe pain (chronic pain), Disp: 84 tablet, Rfl: 0        Lab:  Last UDS on 5/2021 had expected results. Last UDT on file was reviewed.    Imaging:  No new imaging reviewed with patient over the phone, no new orders placed       Recent   Dated 10/1/2021 was reviewed.       Assessment:     Cynthia Lyons is a 51 year old female seen in clinic today for chief complaint    New concerns today- patient is sick with a fever of 103. Urged her to visit urgent care today for evaluation.     Plan of care going forward for ongoing pain control- discussed pain medications- at this time she is doing well on her belbuca and oxycodone 10 mg bid, she notes that she would like something for inflammation and took some IBU this week- exacerbating her GI symptoms. She does have some GI issues that she is being followed for. Discussed medical cannabis and registered her today. discussed outcomes as sometimes this enables the patient to decreased opioids- which is the patients goal. Patient will also be following another provider going forward. Refills sent- no other med changes today.     Patients current MME is 45    Plan:   Interventions-    Follow up in 8 weeks with Sarah    Continue the use of the oxycodone refills- -10/26-11/23, 11/23-12/21  Continue the use of  the belbuca -10/15-11/12    [place] for medical cannabis today  [email or web address]     GI support and motility issues. She would benefit from aloe vera and slippary elm for her GI tract as well as digestive bitters. This can all be found on line. And should be taken daily- continue this as you are.       Orders placed today  Medications that were ordered today   Signed Prescriptions:                        Disp   Refills    BELBUCA 600 MCG FILM buccal film           60 Film1        Sig: Place 1 Film (600 mcg) inside cheek every 12 hours           for 28 days  Authorizing Provider: BRENNAN COBOS    oxyCODONE IR (ROXICODONE) 10 MG tablet     84 tab*0        Sig: Take 1 tablet (10 mg) by mouth every 8 hours as           needed for severe pain (chronic pain)  Authorizing Provider: BRENNAN COBOS    oxyCODONE IR (ROXICODONE) 10 MG tablet     84 tab*0        Sig: Take 1 tablet (10 mg) by mouth every 8 hours as           needed for severe pain (chronic pain)  Authorizing Provider: BRENNAN COBOS    No orders of the defined types were placed in this encounter.        The patient understand todays plan and has their questions answered in regards to expectations and current treatment plan.     Prevent unexpected access/loss of medication: Keep medication locked. Only carry what you need with you    The plan of care will be adjusted to accommodate the issues discussed, discussing management of their care and follow up that is recommended. 10/1/2021         ALAN Wolff [facility]  [address]  [place] [address]  Ph: [phone]  Fax: [phone]

## 2021-10-01 NOTE — PATIENT INSTRUCTIONS
Plan:   Interventions-    Follow up in 8 weeks    Continue the use of the oxycodone refills- -10/26-11/23, 11/23-12/21  Continue the use of the belbuca -10/15-11/12    [place] for medical cannabis today  [email or web address]     GI support and motility issues. She would benefit from aloe vera and slippary elm for her GI tract as well as digestive bitters. This can all be found on line. And should be taken daily- continue this as you are.       Orders placed today  Medications that were ordered today   Signed Prescriptions:                        Disp   Refills    BELBUCA 600 MCG FILM buccal film           60 Film1        Sig: Place 1 Film (600 mcg) inside cheek every 12 hours           for 28 days  Authorizing Provider: BRENNAN COBOS    oxyCODONE IR (ROXICODONE) 10 MG tablet     84 tab*0        Sig: Take 1 tablet (10 mg) by mouth every 8 hours as           needed for severe pain (chronic pain)  Authorizing Provider: BRENNAN COBOS    oxyCODONE IR (ROXICODONE) 10 MG tablet     84 tab*0        Sig: Take 1 tablet (10 mg) by mouth every 8 hours as           needed for severe pain (chronic pain)  Authorizing Provider: BRENNAN COBOS    No orders of the defined types were placed in this encounter.        The patient understand todays plan and has their questions answered in regards to expectations and current treatment plan.     Prevent unexpected access/loss of medication: Keep medication locked. Only carry what you need with you    The plan of care will be adjusted to accommodate the issues discussed, discussing management of their care and follow up that is recommended. 10/1/2021         ALAN Wolff [facility]  [address]  [place] [address]  Ph: [phone]  Fax: [phone]

## 2021-10-01 NOTE — LETTER
10/1/2021         RE: Cynthia Lyons  [address] [facility] [address]        Dear Colleague,    Thank you for referring your patient, Cynthia Lyons, to the [facility]. Please see a copy of my visit note below.    Pt is scheduled for a video visit to address her chronic low back and neck pain. States she currently has a temp of 103 F.    If video is dropped, please contact her at [phone]    AVS: Mychart    Will anyone else be on the visit? No    Pain score: 6  Constant   What does your pain feel like: Depends; can be dull and achy or sharp   Does the pain interfere with:  Work: Yes  Walking/distance: yes  Sleep: yes  Daily activities: yes  Relationships/social life: yes  Mood: yes  F= 8   UDT/CSA: 5/2021      Cynthia Lyons is a 51 year old female who is being evaluated with [facility] or [employer] services- via video       Start time- 1:43 pm  End time- 2:08 pm   Patient location- of site- home  Provider location- off site- virtual     Subjective-    I have reviewed and updated the patient's Past Medical History, Social History, Family History and Medication List as well as the Precharting workup by the [facility].     PAIN CLINIC FOLLOW UP PROGRESS NOTE    CC:chief complaint    HPI  Cynthia Lyons is a 51 year old female who presents for evaluation chief complaint that is causing continued pain. Specific questions indicated the patient wanted addressed today include: to follow up with her low back pain and medication refills.         Objective-  Major issues:  1. Chronic pain syndrome    2. Chronic midline low back pain without sciatica        Pain score: 6  Constant   What does your pain feel like: Depends; can be dull and achy or sharp   Does the pain interfere with:  Work: Yes  Walking/distance: yes  Sleep: yes  Daily activities: yes  Relationships/social life: yes  Mood: yes  F= 8   UDT/CSA: 5/2021    ALLERGIES  Patient has no known allergies.    Previous Medical  History  Social History    Substance and Sexual Activity      Alcohol use: Not Currently        Alcohol/week: 0.0 standard drinks    History   Drug Use     Types: Oxycodone     History   Smoking Status     Current Every Day Smoker     Packs/day: 0.00     Years: 23.00     Types: Cigarettes   Smokeless Tobacco     Never Used     Comment: started smoking age 22       Pertinent Pain Medications/interventions:    10/1/2021- continue the use of 450 mcg bid and oxycodone 10 mg bid, medical cannabis registered today    6/17/2020- belbuca 450 mcg two times a day, oxycodone 10 mg two times a day      Today after reviewing her UDT- will stop the subutex and start her on belbuca 450 mcg two times a day and percocet 5/325 mg up to 3 per day- agreement signed.       Patient reports that she was sick of the opioids and wanted off of them however she had been on it for > 20 years. She was told she had to go to an addiction treatment facility- they currently have her on subutex 12 mg per day. She reports that it is not helpful for her pain and she has to go daily, she also reports that she wants off of them and they keep increasing her dose. 2/21/2020     Since her last visit her PCP has put her back on her regimen of OxyContin 10 mg er two times a day and Percocet 7.5 mg  Two times a day prn -1/28/2020     She  currently takes Cymbalta, Wellbutrin, gabapentin, robaxin and percocet three times a day prn   previously had tried ms contin, effexor, fentanyl patches and tramadol      Patient notes that she has been on percocet for sometime and would like to try it without tylenol.      Medications    Current Outpatient Medications:      albuterol (PROAIR HFA/PROVENTIL HFA/VENTOLIN HFA) 108 (90 Base) MCG/ACT Inhaler, Inhale 2 puffs into the lungs, Disp: , Rfl:      atorvastatin (LIPITOR) 20 MG tablet, Take 1 tablet (20 mg) by mouth daily, Disp: 90 tablet, Rfl: 1     [START ON 10/15/2021] BELBUCA 600 MCG FILM buccal film, Place 1 Film  (600 mcg) inside cheek every 12 hours for 28 days, Disp: 60 Film, Rfl: 1     cetirizine (ZYRTEC) 10 MG tablet, Take 1 tablet (10 mg) by mouth 2 times daily, Disp: 180 tablet, Rfl: 1     FLUoxetine (PROZAC) 20 MG capsule, TAKE THREE CAPSULES BY MOUTH ONCE DAILY, Disp: 90 capsule, Rfl: 5     fluticasone (FLONASE) 50 MCG/ACT spray, Spray 1 spray in nostril 2 times daily, Disp: 1 Bottle, Rfl: 11     gabapentin (NEURONTIN) 600 MG tablet, TAKE ONE TABLET BY MOUTH EVERY MORNING, TAKE ONE TABLET BY MOUTH ONCE DAILY WITH LUNCH AND TAKE  TWO TABLETS BY  MOUTH  EVERY EVENING, Disp: 120 tablet, Rfl: 5     lactulose encephalopathy (CHRONULAC) 10 GM/15ML SOLUTION, TAKE 30 MLS BY MOUTH UP TO 3 TIMES DAILY AS NEEDED, Disp: 473 mL, Rfl: 2     naloxone (NARCAN) 4 MG/0.1ML nasal spray, Spray 1 spray (4 mg) into one nostril alternating nostrils as needed for opioid reversal every 2-3 minutes until assistance arrives, Disp: 0.2 mL, Rfl: 1     omeprazole (PRILOSEC) 20 MG DR capsule, TAKE ONE CAPSULE BY MOUTH ONCE DAILY, Disp: 30 capsule, Rfl: 4     order for DME, Equipment being ordered: Digital home blood pressure monitor kit, Disp: 1 kit, Rfl: 0     [START ON 10/26/2021] oxyCODONE IR (ROXICODONE) 10 MG tablet, Take 1 tablet (10 mg) by mouth every 8 hours as needed for severe pain (chronic pain), Disp: 84 tablet, Rfl: 0     [START ON 11/23/2021] oxyCODONE IR (ROXICODONE) 10 MG tablet, Take 1 tablet (10 mg) by mouth every 8 hours as needed for severe pain (chronic pain), Disp: 84 tablet, Rfl: 0        Lab:  Last UDS on 5/2021 had expected results. Last UDT on file was reviewed.    Imaging:  No new imaging reviewed with patient over the phone, no new orders placed       Recent   Dated 10/1/2021 was reviewed.       Assessment:     Cynthia Lyons is a 51 year old female seen in clinic today for chief complaint    New concerns today- patient is sick with a fever of 103. Urged her to visit urgent care today for evaluation.     Plan of  care going forward for ongoing pain control- discussed pain medications- at this time she is doing well on her belbuca and oxycodone 10 mg bid, she notes that she would like something for inflammation and took some IBU this week- exacerbating her GI symptoms. She does have some GI issues that she is being followed for. Discussed medical cannabis and registered her today. discussed outcomes as sometimes this enables the patient to decreased opioids- which is the patients goal. Patient will also be following another provider going forward. Refills sent- no other med changes today.     Patients current MME is 45    Plan:   Interventions-    Follow up in 8 weeks with Sarah    Continue the use of the oxycodone refills- -10/26-11/23, 11/23-12/21  Continue the use of the belbuca -10/15-11/12    [place] for medical cannabis today  [email or web address]     GI support and motility issues. She would benefit from aloe vera and slippary elm for her GI tract as well as digestive bitters. This can all be found on line. And should be taken daily- continue this as you are.       Orders placed today  Medications that were ordered today   Signed Prescriptions:                        Disp   Refills    BELBUCA 600 MCG FILM buccal film           60 Film1        Sig: Place 1 Film (600 mcg) inside cheek every 12 hours           for 28 days  Authorizing Provider: BRENNAN COBOS    oxyCODONE IR (ROXICODONE) 10 MG tablet     84 tab*0        Sig: Take 1 tablet (10 mg) by mouth every 8 hours as           needed for severe pain (chronic pain)  Authorizing Provider: BRENNAN COBOS    oxyCODONE IR (ROXICODONE) 10 MG tablet     84 tab*0        Sig: Take 1 tablet (10 mg) by mouth every 8 hours as           needed for severe pain (chronic pain)  Authorizing Provider: BRENNAN COBOS    No orders of the defined types were placed in this encounter.        The patient understand todays plan and has  their questions answered in regards to expectations and current treatment plan.     Prevent unexpected access/loss of medication: Keep medication locked. Only carry what you need with you    The plan of care will be adjusted to accommodate the issues discussed, discussing management of their care and follow up that is recommended. 10/1/2021         ALAN Wolff CNP  [facility]  [address]  [place] [address]  Ph: [phone]  Fax: [phone]            Again, thank you for allowing me to participate in the care of your patient.        Sincerely,        ALAN Wolff CNP

## 2021-10-11 ENCOUNTER — OFFICE VISIT (OUTPATIENT)
Dept: URGENT CARE | Facility: URGENT CARE | Age: 51
End: 2021-10-11
Payer: COMMERCIAL

## 2021-10-11 VITALS
WEIGHT: 221.6 LBS | BODY MASS INDEX: 36.88 KG/M2 | TEMPERATURE: 97.2 F | DIASTOLIC BLOOD PRESSURE: 84 MMHG | SYSTOLIC BLOOD PRESSURE: 126 MMHG | OXYGEN SATURATION: 98 % | RESPIRATION RATE: 18 BRPM | HEART RATE: 70 BPM

## 2021-10-11 DIAGNOSIS — J01.90 ACUTE SINUSITIS WITH SYMPTOMS > 10 DAYS: Primary | ICD-10-CM

## 2021-10-11 PROCEDURE — 99213 OFFICE O/P EST LOW 20 MIN: CPT | Performed by: PHYSICIAN ASSISTANT

## 2021-10-11 NOTE — PROGRESS NOTES
Assessment & Plan     Acute sinusitis with symptoms > 10 days  Will treat with amoxicillin-clavulanate (AUGMENTIN) 875-125 MG tablet; Take 1 tablet by mouth 2 times daily for 10 days. Continue with supportive care. Return to clinic if symptoms worsen or do not improve; otherwise follow up as needed               Tobacco Cessation:   reports that she has been smoking cigarettes. She has been smoking about 0.00 packs per day for the past 23.00 years. She has never used smokeless tobacco.          Return in about 1 week (around 10/18/2021), or if symptoms worsen or fail to improve.    RAQUEL Ramsey [facility] [place]          Subjective   Chief Complaint   Patient presents with     Sinus Problem     2 weeks sinus issues, now moving down into chest, coughing.         HPI     URI Adult    Onset of symptoms was 2 week(s) ago.  Course of illness is same.    Severity moderate  Current and Associated symptoms: sinus pressure, cough, facial pain  Treatment measures tried include Tylenol/Ibuprofen, mucinex, zyrtec, flonase  Predisposing factors include seasonal allergies.                Review of Systems   Constitutional, HEENT, cardiovascular, pulmonary, gi and gu systems are negative, except as otherwise noted.      Objective    /84 (BP Location: Right arm, Patient Position: Sitting, Cuff Size: Adult Regular)   Pulse 70   Temp 97.2  F (36.2  C) (Tympanic)   Resp 18   Wt 100.5 kg (221 lb 9.6 oz)   LMP  (LMP Unknown)   SpO2 98%   BMI 36.88 kg/m    Body mass index is 36.88 kg/m .  Physical Exam  Constitutional:       Appearance: She is well-developed.   HENT:      Head: Normocephalic.      Right Ear: Tympanic membrane and ear canal normal.      Left Ear: Tympanic membrane and ear canal normal.   Eyes:      Conjunctiva/sclera: Conjunctivae normal.   Cardiovascular:      Rate and Rhythm: Normal rate.      Heart sounds: Normal heart sounds.   Pulmonary:      Effort: Pulmonary  effort is normal.      Breath sounds: Normal breath sounds.   Skin:     General: Skin is warm and dry.      Findings: No rash.   Psychiatric:         Behavior: Behavior normal.

## 2021-10-23 ENCOUNTER — HOSPITAL ENCOUNTER (EMERGENCY)
Facility: CLINIC | Age: 51
Discharge: HOME | End: 2021-10-23
Attending: NURSE PRACTITIONER | Admitting: NURSE PRACTITIONER
Payer: COMMERCIAL

## 2021-10-23 ENCOUNTER — HEALTH MAINTENANCE LETTER (OUTPATIENT)
Age: 51
End: 2021-10-23

## 2021-10-23 VITALS
OXYGEN SATURATION: 96 % | DIASTOLIC BLOOD PRESSURE: 103 MMHG | HEART RATE: 79 BPM | TEMPERATURE: 99.1 F | RESPIRATION RATE: 18 BRPM | SYSTOLIC BLOOD PRESSURE: 136 MMHG

## 2021-10-23 DIAGNOSIS — B34.9 VIRAL SYNDROME: ICD-10-CM

## 2021-10-23 LAB
FLUAV RNA SPEC QL NAA+PROBE: NEGATIVE
FLUBV RNA RESP QL NAA+PROBE: NEGATIVE
RSV RNA SPEC NAA+PROBE: NEGATIVE
SARS-COV-2 RNA RESP QL NAA+PROBE: NEGATIVE

## 2021-10-23 PROCEDURE — 99283 EMERGENCY DEPT VISIT LOW MDM: CPT

## 2021-10-23 PROCEDURE — 99282 EMERGENCY DEPT VISIT SF MDM: CPT | Performed by: NURSE PRACTITIONER

## 2021-10-23 PROCEDURE — C9803 HOPD COVID-19 SPEC COLLECT: HCPCS

## 2021-10-23 PROCEDURE — 87637 SARSCOV2&INF A&B&RSV AMP PRB: CPT | Performed by: NURSE PRACTITIONER

## 2021-10-23 RX ORDER — ONDANSETRON 4 MG/1
4 TABLET, ORALLY DISINTEGRATING ORAL EVERY 6 HOURS PRN
Qty: 10 TABLET | Refills: 0 | Status: SHIPPED | OUTPATIENT
Start: 2021-10-23 | End: 2021-10-26

## 2021-10-23 ASSESSMENT — ENCOUNTER SYMPTOMS
RHINORRHEA: 0
EYE DISCHARGE: 0
HEADACHES: 1
FEVER: 0
NUMBNESS: 0
EYE REDNESS: 0
SORE THROAT: 0
DIZZINESS: 0
CHILLS: 0
SINUS PAIN: 0
VOMITING: 0
FATIGUE: 1
MYALGIAS: 0
CHEST TIGHTNESS: 0
NAUSEA: 0
ARTHRALGIAS: 0
TROUBLE SWALLOWING: 0
JOINT SWELLING: 0
WEAKNESS: 0
LIGHT-HEADEDNESS: 0
ABDOMINAL PAIN: 0
SINUS PRESSURE: 1
COUGH: 1
SHORTNESS OF BREATH: 0
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
## 2021-10-25 DIAGNOSIS — F32.1 MODERATE MAJOR DEPRESSION (H): ICD-10-CM

## 2021-10-30 ENCOUNTER — OFFICE VISIT (OUTPATIENT)
Dept: URGENT CARE | Facility: URGENT CARE | Age: 51
End: 2021-10-30
Payer: COMMERCIAL

## 2021-10-30 VITALS
SYSTOLIC BLOOD PRESSURE: 134 MMHG | HEART RATE: 98 BPM | DIASTOLIC BLOOD PRESSURE: 90 MMHG | TEMPERATURE: 98 F | RESPIRATION RATE: 18 BRPM | OXYGEN SATURATION: 100 %

## 2021-10-30 DIAGNOSIS — R11.0 NAUSEA: ICD-10-CM

## 2021-10-30 DIAGNOSIS — J02.9 SORE THROAT: Primary | ICD-10-CM

## 2021-10-30 LAB — DEPRECATED S PYO AG THROAT QL EIA: NEGATIVE

## 2021-10-30 PROCEDURE — 99213 OFFICE O/P EST LOW 20 MIN: CPT | Performed by: EMERGENCY MEDICINE

## 2021-10-30 PROCEDURE — 87651 STREP A DNA AMP PROBE: CPT | Performed by: EMERGENCY MEDICINE

## 2021-10-30 RX ORDER — ONDANSETRON 4 MG/1
4 TABLET, ORALLY DISINTEGRATING ORAL EVERY 8 HOURS PRN
Qty: 10 TABLET | Refills: 0 | Status: SHIPPED | OUTPATIENT
Start: 2021-10-30 | End: 2021-11-23

## 2021-10-30 NOTE — PROGRESS NOTES
CHIEF COMPLAINT: Sore throat      HPI: Patient is a 51-year-old female who for several days has had a sore throat.  She has had some mild associated URI symptoms.  No obvious strep or Covid exposure.  Patient tested Covid negative about 10 days ago.  Granddaughter has been ill with upper respiratory infections but she also was tested negative for Covid.  She has been having some intermittent nausea.  She was seen in the ED mid last week at which time her Covid test was negative and given Zofran which has helped.    ROS: See HPI otherwise normal.    No Known Allergies   Current Outpatient Medications   Medication Sig Dispense Refill     albuterol (PROAIR HFA/PROVENTIL HFA/VENTOLIN HFA) 108 (90 Base) MCG/ACT Inhaler Inhale 2 puffs into the lungs        atorvastatin (LIPITOR) 20 MG tablet Take 1 tablet (20 mg) by mouth daily 90 tablet 1     BELBUCA 600 MCG FILM buccal film Place 1 Film (600 mcg) inside cheek every 12 hours for 28 days 60 Film 1     cetirizine (ZYRTEC) 10 MG tablet Take 1 tablet (10 mg) by mouth 2 times daily 180 tablet 1     FLUoxetine (PROZAC) 20 MG capsule TAKE THREE CAPSULES BY MOUTH ONCE DAILY 90 capsule 5     fluticasone (FLONASE) 50 MCG/ACT spray Spray 1 spray in nostril 2 times daily 1 Bottle 11     gabapentin (NEURONTIN) 600 MG tablet TAKE ONE TABLET BY MOUTH EVERY MORNING, TAKE ONE TABLET BY MOUTH ONCE DAILY WITH LUNCH AND TAKE  TWO TABLETS BY  MOUTH  EVERY EVENING 120 tablet 5     guaiFENesin (MUCINEX PO)        lactulose encephalopathy (CHRONULAC) 10 GM/15ML SOLUTION TAKE 30 MLS BY MOUTH UP TO 3 TIMES DAILY AS NEEDED 473 mL 2     omeprazole (PRILOSEC) 20 MG DR capsule TAKE ONE CAPSULE BY MOUTH ONCE DAILY 30 capsule 4     ondansetron (ZOFRAN-ODT) 4 MG ODT tab Take 1 tablet (4 mg) by mouth every 8 hours as needed for nausea 10 tablet 0     oxyCODONE IR (ROXICODONE) 10 MG tablet Take 1 tablet (10 mg) by mouth every 8 hours as needed for severe pain (chronic pain) 84 tablet 0     naloxone  (NARCAN) 4 MG/0.1ML nasal spray Spray 1 spray (4 mg) into one nostril alternating nostrils as needed for opioid reversal every 2-3 minutes until assistance arrives (Patient not taking: Reported on 10/30/2021) 0.2 mL 1     order for DME Equipment being ordered: Digital home blood pressure monitor kit (Patient not taking: Reported on 10/11/2021) 1 kit 0     [START ON 11/23/2021] oxyCODONE IR (ROXICODONE) 10 MG tablet Take 1 tablet (10 mg) by mouth every 8 hours as needed for severe pain (chronic pain) 84 tablet 0         PE: Physical exam reveals a 51-year-old female to be in no acute distress.  She is afebrile remainder of vital signs are acceptable-see chart voice pattern is normal with no dysphonia.  HEENT moist mucous members are noted.        TREATMENT: Rapid strep negative      ASSESSMENT: Viral URI with associated pharyngitis.  No complications      DIAGNOSIS: Viral pharyngitis.      PLAN: Additional Zofran for symptomatic improvement.  Monitor symptoms and recheck if still ill in 4 to 5 days.

## 2021-10-30 NOTE — PATIENT INSTRUCTIONS
Plenty of fluids    Zofran for nausea    Recheck 4 to 5 days if still ill  Patient Education     Self-Care for Sore Throats     Sore throats happen for many reasons, such as colds, allergies, cigarette smoke, air pollution, and infections caused by viruses or bacteria. In any case, your throat becomes red and sore. Your goal for self-care is to reduce your discomfort while giving your throat a chance to heal.  Moisten and soothe your throat  Tips include the following:  Try a sip of water first thing after waking up.  Keep your throat moist by drinking 6 or more glasses of clear liquids every day.  Run a cool-air humidifier in your room overnight.  Stay away from cigarette smoke.   Check the air quality index,if air pollution gives you a sore throat. On high pollution days, try to limit outdoor time.  Suck on throat lozenges, cough drops, hard candy, ice chips, or frozen fruit-juice bars. Use the sugar-free versions if your diet or medical condition requires them.  Gargle to ease irritation  Gargling every hour or 2 can ease irritation. Try gargling with 1 of these solutions:  1/4 teaspoon of salt in 1/2 cup of warm water  An over-the-counter anesthetic gargle  Use medicine for more relief  Over-the-counter medicine can reduce sore throat symptoms. Ask your pharmacist if you have questions about which medicine to use. To prevent possible medicine interactions, let the pharmacist know what medicines you take. To decrease symptoms:  Ease pain with anesthetic sprays. Aspirin or an aspirin substitute also helps. Remember, never give aspirin to anyone 18 or younger. Don't take aspirin if you are already taking blood thinners.   For sore throats caused by allergies, try antihistamines to block the allergic reaction.  Unless a sore throat is caused by a bacterial infection, antibiotics won t help you.  Prevent future sore throats  Prevention tips include:  Stop smoking or reduce contact with secondhand smoke. Smoke  irritates the tender throat lining.  Limit contact with pets and with allergy-causing substances, such as pollen and mold.  Wash your hands often when you re around someone with a sore throat or cold. This will keep viruses or bacteria from spreading.  Limit outdoor time when air pollution is bad.  Don t strain your vocal cords.  When to call your healthcare provider  Contact your healthcare provider if you have:  Fever of 100.4 F (38.0 C) or higher, or as directed by your healthcare provider  White spots on the throat  Great Trouble swallowing  A skin rash  Recent exposure to someone else with strep bacteria  Severe hoarseness and swollen glands in the neck or jaw  Call 911  Call 911 if any of the following occur:  Trouble breathing or catching your breath  Drooling and problems swallowing  Wheezing  Unable to talk  Feeling dizzy or faint  Feeling of doom  Sp last reviewed this educational content on 9/1/2019 2000-2021 [employer]. All rights reserved. This information is not intended as a substitute for professional medical care. Always follow your healthcare professional's instructions.

## 2021-10-31 LAB — GROUP A STREP BY PCR: NOT DETECTED

## 2021-11-15 DIAGNOSIS — Z98.1 STATUS POST LAMINECTOMY WITH SPINAL FUSION: ICD-10-CM

## 2021-11-15 NOTE — TELEPHONE ENCOUNTER
Routing refill request to provider for review/approval because:  Drug not on the [facility] refill protocol   Last Written Prescription Date:  5/11/21  Last Fill Quantity: 120,  # refills: 5   Last office visit: 3/4/2021 with prescribing provider:  Rosaura Flores PA-C     Future Office Visit:  None  Mariaa Sierra RN

## 2021-11-17 RX ORDER — GABAPENTIN 600 MG/1
TABLET ORAL
Qty: 120 TABLET | Refills: 1 | Status: SHIPPED | OUTPATIENT
Start: 2021-11-17 | End: 2022-01-26

## 2021-11-23 DIAGNOSIS — R11.0 NAUSEA: ICD-10-CM

## 2021-11-23 RX ORDER — ONDANSETRON 4 MG/1
4 TABLET, ORALLY DISINTEGRATING ORAL EVERY 8 HOURS PRN
Qty: 10 TABLET | Refills: 0 | Status: SHIPPED | OUTPATIENT
Start: 2021-11-23 | End: 2022-07-11

## 2021-12-06 ENCOUNTER — TELEPHONE (OUTPATIENT)
Dept: PALLIATIVE MEDICINE | Facility: OTHER | Age: 51
End: 2021-12-06
Payer: COMMERCIAL

## 2021-12-06 DIAGNOSIS — G89.4 CHRONIC PAIN SYNDROME: ICD-10-CM

## 2021-12-06 RX ORDER — BUPRENORPHINE HYDROCHLORIDE 600 UG/1
600 FILM, SOLUBLE BUCCAL EVERY 12 HOURS
Qty: 60 FILM | Refills: 0 | Status: SHIPPED | OUTPATIENT
Start: 2021-12-14 | End: 2022-01-13

## 2021-12-06 NOTE — TELEPHONE ENCOUNTER
Received call from patient requesting refill(s) of Belbuca  600 mcg film     Last dispensed from pharmacy on 11/14/21    Patient's last office/virtual visit by prescribing provider on 8/12/21  Next office/virtual appointment scheduled for : none, pt states she does not know who her new provider will be yet.    Last urine drug screen date 5/13/21  Current opioid agreement on file (completed within the last year) Yes Date of opioid agreement: 5/13/21    E-prescribe to [facility].    Will route to nursing [employer] for review and preparation of prescription(s).

## 2021-12-06 NOTE — TELEPHONE ENCOUNTER
Medication refill information reviewed.     Patient is scheduled with [facility] for 12/21/21  Significant Events:   10/23/21 ED for viral syndrome    Prescriptions prepped for review.   Pending Prescriptions:                       Disp   Refills    BELBUCA 600 MCG FILM buccal film          60 Film0            Sig: Place 1 Film (600 mcg) inside cheek every 12           hours Use dates: 12/14/21-1/13/22      Will route to provider.

## 2021-12-20 ENCOUNTER — MYC REFILL (OUTPATIENT)
Dept: PALLIATIVE MEDICINE | Facility: OTHER | Age: 51
End: 2021-12-20
Payer: COMMERCIAL

## 2021-12-20 ENCOUNTER — TELEPHONE (OUTPATIENT)
Dept: PALLIATIVE MEDICINE | Facility: OTHER | Age: 51
End: 2021-12-20
Payer: COMMERCIAL

## 2021-12-20 DIAGNOSIS — G89.29 CHRONIC MIDLINE LOW BACK PAIN WITHOUT SCIATICA: ICD-10-CM

## 2021-12-20 DIAGNOSIS — M54.50 CHRONIC MIDLINE LOW BACK PAIN WITHOUT SCIATICA: ICD-10-CM

## 2021-12-20 DIAGNOSIS — G89.4 CHRONIC PAIN SYNDROME: ICD-10-CM

## 2021-12-20 RX ORDER — OXYCODONE HYDROCHLORIDE 10 MG/1
10 TABLET ORAL EVERY 8 HOURS PRN
Qty: 84 TABLET | Refills: 0 | Status: CANCELLED | OUTPATIENT
Start: 2021-12-20

## 2021-12-20 NOTE — TELEPHONE ENCOUNTER
Medication refill information reviewed.   Queued bridge to appt  Pending Prescriptions:                       Disp   Refills    oxyCODONE IR (ROXICODONE) 10 MG tablet    48 tab*0            Sig: Take 1 tablet (10 mg) by mouth every 8 hours as           needed for severe pain (chronic pain)            Prescriptions prepped for review.     Will route to provider.

## 2021-12-20 NOTE — TELEPHONE ENCOUNTER
Received call from patient requesting refill(s) of oxyCODONE IR (ROXICODONE) 10 MG tablet     Last dispensed from pharmacy on 11/23/21    Patient's last office/virtual visit by prescribing provider on 10/1/21  Next office/virtual appointment scheduled for 1/5/22    Last urine drug screen date 5/13/21  Current opioid agreement on file (completed within the last year) Yes Date of opioid agreement: 5/13/21    E-prescribe to     [facility] [id number] [place] [id number]  Phone: [phone] Fax: [phone]    Pt said she was supposed to have an appt today but that had to be rescheduled because she is sick.    Will route to nursing pool for review and preparation of prescription(s).

## 2021-12-21 RX ORDER — OXYCODONE HYDROCHLORIDE 10 MG/1
10 TABLET ORAL EVERY 8 HOURS PRN
Qty: 48 TABLET | Refills: 0 | Status: SHIPPED | OUTPATIENT
Start: 2021-12-21 | End: 2022-01-03

## 2021-12-21 NOTE — TELEPHONE ENCOUNTER
Pt is checking the status, pt family is leaving out of town for the holidays and she is staying home and will not have car to be able to  medications.

## 2021-12-21 NOTE — TELEPHONE ENCOUNTER
Received call from patient requesting refill   Oxycodone 10 mg  Last dispensed from pharmacy on 11/23/21    Patient's last office/virtual visit by prescribing provider on 10/01/21  Next office/virtual appointment scheduled for 1/5/22    UDT/CSA 5/2021  oxyCODONE IR (ROXICODONE) 10 MG tablet 48 tablet 0 12/21/2021 1/6/2022 No   Sig - Route: Take 1 tablet (10 mg) by mouth every 8 hours as needed for severe pain (chronic pain) - Oral   Sent to pharmacy as: oxyCODONE HCl 10 MG Oral Tablet (ROXICODONE)   Class: E-Prescribe   Earliest Fill Date: 12/21/2021   Order: [id number]   E-Prescribing Status: Receipt confirmed by pharmacy (12/21/2021 11:47 AM CST)

## 2022-01-03 ENCOUNTER — TELEPHONE (OUTPATIENT)
Dept: PALLIATIVE MEDICINE | Facility: OTHER | Age: 52
End: 2022-01-03
Payer: COMMERCIAL

## 2022-01-03 DIAGNOSIS — G89.29 CHRONIC MIDLINE LOW BACK PAIN WITHOUT SCIATICA: ICD-10-CM

## 2022-01-03 DIAGNOSIS — M54.50 CHRONIC MIDLINE LOW BACK PAIN WITHOUT SCIATICA: ICD-10-CM

## 2022-01-03 DIAGNOSIS — G89.4 CHRONIC PAIN SYNDROME: ICD-10-CM

## 2022-01-03 NOTE — TELEPHONE ENCOUNTER
Received call from patient requesting a bridge of a medication she did not specify.     I'm going to process this encounter for what I believe to be oxyCODONE IR (ROXICODONE) 10 MG tablet    Last dispensed from pharmacy on 12/21/21 (16 day supply)    Patient's last office/virtual visit by prescribing provider on 10/01/21    Patient stated she had to cancel previous appointments because she tested positive for COVID-19.     Next office/virtual appointment scheduled for 01/18/22    Last urine drug screen date 05/13/21    Current opioid agreement on file (completed within the last year) Yes Date of opioid agreement: 05/13/21    Last dispensed from (patient did not specify):  [facility]  [address] [place] [id number]  Phone: [phone] Fax: [phone]    Will route to nursing pool for review and preparation of prescription(s).     Amira Moran, [facility]

## 2022-01-03 NOTE — TELEPHONE ENCOUNTER
Medication refill information reviewed.     Prescriptions prepped for review.   Pending Prescriptions:                       Disp   Refills    oxyCODONE IR (ROXICODONE) 10 MG tablet    36 tab*0            Sig: Take 1 tablet (10 mg) by mouth every 8 hours as           needed for severe pain (chronic pain) Use dates:           1/6/22-1/18/22 bridge to appointment    Will route to provider.

## 2022-01-04 RX ORDER — OXYCODONE HYDROCHLORIDE 10 MG/1
10 TABLET ORAL EVERY 8 HOURS PRN
Qty: 36 TABLET | Refills: 0 | Status: SHIPPED | OUTPATIENT
Start: 2022-01-06 | End: 2022-01-18

## 2022-01-13 ENCOUNTER — E-VISIT (OUTPATIENT)
Dept: FAMILY MEDICINE | Facility: CLINIC | Age: 52
End: 2022-01-13
Payer: COMMERCIAL

## 2022-01-13 ENCOUNTER — TELEPHONE (OUTPATIENT)
Dept: FAMILY MEDICINE | Facility: CLINIC | Age: 52
End: 2022-01-13
Payer: COMMERCIAL

## 2022-01-13 ENCOUNTER — MYC REFILL (OUTPATIENT)
Dept: PALLIATIVE MEDICINE | Facility: OTHER | Age: 52
End: 2022-01-13

## 2022-01-13 DIAGNOSIS — G89.4 CHRONIC PAIN SYNDROME: ICD-10-CM

## 2022-01-13 DIAGNOSIS — Z86.19 HISTORY OF COLD SORES: Primary | ICD-10-CM

## 2022-01-13 PROCEDURE — 99421 OL DIG E/M SVC 5-10 MIN: CPT | Performed by: PHYSICIAN ASSISTANT

## 2022-01-13 RX ORDER — VALACYCLOVIR HYDROCHLORIDE 500 MG/1
500 TABLET, FILM COATED ORAL DAILY
Qty: 30 TABLET | Refills: 0 | Status: SHIPPED | OUTPATIENT
Start: 2022-01-13 | End: 2022-01-18

## 2022-01-13 RX ORDER — BUPRENORPHINE HYDROCHLORIDE 600 UG/1
600 FILM, SOLUBLE BUCCAL EVERY 12 HOURS
Qty: 60 FILM | Refills: 0 | Status: SHIPPED | OUTPATIENT
Start: 2022-01-13 | End: 2022-01-21

## 2022-01-13 RX ORDER — VALACYCLOVIR HYDROCHLORIDE 500 MG/1
TABLET, FILM COATED ORAL
Qty: 30 TABLET | Refills: 5 | OUTPATIENT
Start: 2022-01-13

## 2022-01-13 NOTE — TELEPHONE ENCOUNTER
Patient said after having Covid she has been getting multiple cold sores. She used to to Valtrex but has not in quite some time.She is requesting a new script.     Thanks,   Scarlett

## 2022-01-13 NOTE — TELEPHONE ENCOUNTER
Pt says she stopped taking the Valcyclovir for cold sores. She got Covid a couple weeks ago and broke out with a lot of cold sores. She got them to go away but they are coming back again. She asked if she could get this refilled.       is

## 2022-01-13 NOTE — TELEPHONE ENCOUNTER
Call placed to patient. A visit is needed for evaluation prior to any prescription. Recommend a e visit which she will do.

## 2022-01-13 NOTE — PATIENT INSTRUCTIONS
Dear Cynthia Lyons    I've placed an order for a 30 day supply of Valtrex. We do not complete long term refills of medications from E-Visits, so you will need to follow up with your primary care provider for additional refills. This should bridge you until you are able to get in contact with your PCP.     Thanks for choosing us as your health care partner,    Evy Hand PA-C

## 2022-01-18 ENCOUNTER — TELEPHONE (OUTPATIENT)
Dept: PALLIATIVE MEDICINE | Facility: OTHER | Age: 52
End: 2022-01-18

## 2022-01-18 ENCOUNTER — OFFICE VISIT (OUTPATIENT)
Dept: PALLIATIVE MEDICINE | Facility: OTHER | Age: 52
End: 2022-01-18
Payer: COMMERCIAL

## 2022-01-18 VITALS — OXYGEN SATURATION: 96 % | SYSTOLIC BLOOD PRESSURE: 164 MMHG | HEART RATE: 72 BPM | DIASTOLIC BLOOD PRESSURE: 96 MMHG

## 2022-01-18 DIAGNOSIS — G89.4 CHRONIC PAIN SYNDROME: ICD-10-CM

## 2022-01-18 DIAGNOSIS — M54.50 CHRONIC MIDLINE LOW BACK PAIN WITHOUT SCIATICA: ICD-10-CM

## 2022-01-18 DIAGNOSIS — G89.29 CHRONIC MIDLINE LOW BACK PAIN WITHOUT SCIATICA: ICD-10-CM

## 2022-01-18 DIAGNOSIS — M96.1 FAILED BACK SURGICAL SYNDROME: Primary | ICD-10-CM

## 2022-01-18 PROCEDURE — 99212 OFFICE O/P EST SF 10 MIN: CPT | Performed by: PAIN MEDICINE

## 2022-01-18 PROCEDURE — G0463 HOSPITAL OUTPT CLINIC VISIT: HCPCS

## 2022-01-18 RX ORDER — OXYCODONE HYDROCHLORIDE 10 MG/1
10 TABLET ORAL EVERY 8 HOURS PRN
Qty: 90 TABLET | Refills: 0 | Status: SHIPPED | OUTPATIENT
Start: 2022-02-15 | End: 2022-01-19

## 2022-01-18 RX ORDER — OXYCODONE HYDROCHLORIDE 10 MG/1
10 TABLET ORAL EVERY 8 HOURS PRN
Qty: 90 TABLET | Refills: 0 | Status: SHIPPED | OUTPATIENT
Start: 2022-01-18 | End: 2022-01-18

## 2022-01-18 RX ORDER — OXYCODONE HYDROCHLORIDE 10 MG/1
10 TABLET ORAL EVERY 8 HOURS PRN
Qty: 84 TABLET | Refills: 0 | Status: SHIPPED | OUTPATIENT
Start: 2022-01-18 | End: 2022-01-19

## 2022-01-18 ASSESSMENT — PAIN SCALES - GENERAL: PAINLEVEL: SEVERE PAIN (6)
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
## 2022-01-18 NOTE — PATIENT INSTRUCTIONS
[facility] Number:  [phone]    Call with any questions about your care and for scheduling assistance.     Calls are returned Monday through Friday between 8 AM and 4:30 PM. We usually get back to you within 2 business days depending on the issue/request.    If we are prescribing your medications:    For opioid medication refills, call the clinic or send a [employer] message 7 days in advance.  Please include:    Name of requested medication    Name of the pharmacy.    For non-opioid medications, call your pharmacy directly to request a refill. Please allow 3-4 days to be processed.     Per [place] State Law:    All controlled substance prescriptions must be filled within 30 days of being written.      For those controlled substances allowing refills, pickup must occur within 30 days of last fill.      We believe regular attendance is key to your success in our program!      Any time you are unable to keep your appointment we ask that you call us at least 24 hours in advance to cancel.This will allow us to offer the appointment time to another patient.     Multiple missed appointments may lead to dismissal from the clinic.

## 2022-01-18 NOTE — PROGRESS NOTES
Patient presents to the clinic today for a follow up with Gómez FORMAN MD  regarding Pain Management.       PEG Score 1/18/2022   PEG Total Score 5.67            UDT/CSA 5/2021    QUESTIONS:   Medication refills  New insurance may not cover or giving her an $800 co-pay for the Belbuca.       Chelsie Matos MA  [facility] [place]
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
## 2022-01-19 RX ORDER — OXYCODONE HYDROCHLORIDE 10 MG/1
10 TABLET ORAL EVERY 8 HOURS PRN
Qty: 84 TABLET | Refills: 0 | Status: SHIPPED | OUTPATIENT
Start: 2022-01-25 | End: 2022-02-14

## 2022-01-19 NOTE — TELEPHONE ENCOUNTER
Will will need to write a new script for the med as only 1 week given (see previous note).    Not clear if there is a script already on file for Feb.  Start date is on there for 2/15 but notes don't apply.     So we can either do a fill until 2/15, or cancel that script    Please route plan/script to me and I can sign.    Rosalina Joy MD  [facility]

## 2022-01-19 NOTE — TELEPHONE ENCOUNTER
No pa needed- Since the pt has already picked up an initial 7 day supply of this medication, a pa is no longer required.  Please send a new rx for the remaining quantity to the pharmacy. Pharmacy Filled medication on 1/18 for a 7 day supply.

## 2022-01-19 NOTE — TELEPHONE ENCOUNTER
RN spoke with pharmacy, Demetra,  and cancelled all Oxycodone Rx on file.  Re-queued for a full 28 day supply as PA team states a PA is not needed.    Pending Prescriptions:                       Disp   Refills    oxyCODONE IR (ROXICODONE) 10 MG tablet    84 tab*0            Sig: Take 1 tablet (10 mg) by mouth every 8 hours as           needed for severe pain (chronic pain) May fill           1/23/22 for Use dates: 1/25/22-2/22/22    [place]

## 2022-01-19 NOTE — TELEPHONE ENCOUNTER
Signed Prescriptions:                        Disp   Refills    oxyCODONE IR (ROXICODONE) 10 MG tablet     84 tab*0        Sig: Take 1 tablet (10 mg) by mouth every 8 hours as           needed for severe pain (chronic pain) May fill           1/23/22 for Use dates: 1/25/22-2/22/22  Authorizing Provider: LES MONREAL MD  [facility]

## 2022-01-21 ENCOUNTER — E-VISIT (OUTPATIENT)
Dept: URGENT CARE | Facility: CLINIC | Age: 52
End: 2022-01-21
Payer: COMMERCIAL

## 2022-01-21 DIAGNOSIS — J30.9 ALLERGIC RHINITIS, UNSPECIFIED SEASONALITY, UNSPECIFIED TRIGGER: ICD-10-CM

## 2022-01-21 DIAGNOSIS — J01.90 ACUTE BACTERIAL SINUSITIS: Primary | ICD-10-CM

## 2022-01-21 DIAGNOSIS — B96.89 ACUTE BACTERIAL SINUSITIS: Primary | ICD-10-CM

## 2022-01-21 PROCEDURE — 99421 OL DIG E/M SVC 5-10 MIN: CPT | Performed by: NURSE PRACTITIONER

## 2022-01-21 RX ORDER — BUPRENORPHINE HYDROCHLORIDE 600 UG/1
600 FILM, SOLUBLE BUCCAL EVERY 12 HOURS
Qty: 60 FILM | Refills: 0 | Status: SHIPPED | OUTPATIENT
Start: 2022-01-21 | End: 2022-02-14

## 2022-01-21 RX ORDER — FLUTICASONE PROPIONATE 50 MCG
1 SPRAY, SUSPENSION (ML) NASAL 2 TIMES DAILY
Qty: 11 ML | Refills: 0 | Status: SHIPPED | OUTPATIENT
Start: 2022-01-21 | End: 2022-09-29

## 2022-01-21 NOTE — PATIENT INSTRUCTIONS
You may want to try warm salt water gargles or rinses to feel better or help prevent another bout in the future. Mix 1 teaspoon of salt in 8 ounces of water, gargle, and spit. Do this several times a day for several days. Do not swallow the mixture.    You may want to try a nasal lavage (also known as nasal irrigation). You can find over-the-counter products, such as Neti-Pot, at retail locations or make your own at home. Instructions for homemade nasal lavage and more information on the process are available online at http://www.aafp.org/afp/2009/1115/p1121.html.      Sinusitis (Antibiotic Treatment)    The sinuses are air-filled spaces within the bones of the face. They connect to the inside of the nose. Sinusitis is an inflammation of the tissue that lines the sinuses. Sinusitis can occur during a cold. It can also happen due to allergies to pollens and other particles in the air. Sinusitis can cause symptoms of sinus congestion and a feeling of fullness. A sinus infection causes fever, headache, and facial pain. There is often green or yellow fluid draining from the nose or into the back of the throat (post-nasal drip). You have been given antibiotics to treat this condition.   Home care    Take the full course of antibiotics as instructed. Don't stop taking them, even when you feel better.    Drink plenty of water, hot tea, and other liquids as directed by the healthcare provider. This may help thin nasal mucus. It also may help your sinuses drain fluids.    Heat may help soothe painful areas of your face. Use a towel soaked in hot water. Or,  the shower and direct the warm spray onto your face. Using a vaporizer along with a menthol rub at night may also help soothe symptoms.     An expectorant with guaifenesin may help thin nasal mucus and help your sinuses drain fluids. Talk with your provider or pharmacists before taking an over-the-counter (OTC) medicine if you have any questions about it or its  side effects..    You can use an OTC decongestant, unless a similar medicine was prescribed to you. Nasal sprays work the fastest. Use one that contains phenylephrine or oxymetazoline. First blow your nose gently. Then use the spray. Don't use these medicines more often than directed on the label. If you do, your symptoms may get worse. You may also take pills that contain pseudoephedrine. Don t use products that combine multiple medicines. This is because side effects may be increased. Read labels. You can also ask the pharmacist for help. (People with high blood pressure should not use decongestants. They can raise blood pressure.) Talk with your provider or pharmacist if you have any questions about the medicine..    OTC antihistamines may help if allergies contributed to your sinusitis. Talk with your provider or pharmacist if you have any questions about the medicine..    Don't use nasal rinses or irrigation during an acute sinus infection, unless your healthcare provider tells you to. Rinsing may spread the infection to other areas in your sinuses.    Use acetaminophen or ibuprofen to control pain, unless another pain medicine was prescribed to you. If you have chronic liver or kidney disease or ever had a stomach ulcer, talk with your healthcare provider before using these medicines. Never give aspirin to anyone under age 18 who is ill with a fever. It may cause severe liver damage.    Don't smoke. This can make symptoms worse.    Follow-up care  Follow up with your healthcare provider, or as advised.   When to seek medical advice  Call your healthcare provider if any of these occur:     Facial pain or headache that gets worse    Stiff neck    Unusual drowsiness or confusion    Swelling of your forehead or eyelids    Symptoms don't go away in 10 days    Vision problems, such as blurred or double vision    Fever of 100.4 F (38 C) or higher, or as directed by your healthcare provider  Call 911  Call 911 if any  of these occur:     Seizure    Trouble breathing    Feeling dizzy or faint    Fingernails, skin or lips look blue, purple , or gray  Prevention  Here are steps you can take to help prevent an infection:     Keep good hand washing habits.    Don t have close contact with people who have sore throats, colds, or other upper respiratory infections.    Don t smoke, and stay away from secondhand smoke.    Stay up to date with of your vaccines.  [employer] last reviewed this educational content on 12/1/2019 2000-2021 [employer]. All rights reserved. This information is not intended as a substitute for professional medical care. Always follow your healthcare professional's instructions.        Dear Cynthia Lyons    After reviewing your responses, I've been able to diagnose you with?a sinus infection caused by bacteria.?     Based on your responses and diagnosis, I have prescribed augmentin to treat your symptoms. I have sent this to your pharmacy.?     It is also important to stay well hydrated, get lots of rest and take over-the-counter decongestants,?tylenol?or ibuprofen if you?are able to?take those medications per your primary care provider to help relieve discomfort.?     It is important that you take?all of?your prescribed medication even if your symptoms are improving after a few doses.? Taking?all of?your medicine helps prevent the symptoms from returning.?     If your symptoms worsen, you develop severe headache, vomiting, high fever (>102), or are not improving in 7 days, please contact your primary care provider for an appointment or visit any of our convenient Walk-in Care or Urgent Care Centers to be seen which can be found on our website?here.?     Thanks again for choosing?us?as your health care partner,?   ?  Cherie White, ALAN CNP?

## 2022-01-21 NOTE — TELEPHONE ENCOUNTER
Chart reflects concerns about prior authorizations for oxycodone Belbuca.  Belbuca mid December.    Patient has appointment with our MTM pharmacist in February.  We will fill the Belbuca and that will be assessed at that appointment

## 2022-01-21 NOTE — TELEPHONE ENCOUNTER
Patient calls and requests status of PA's for both oxycodone and belbuca  Oxycodone PA was processed and was not needed however a new script was sent as patient did only receive a 7 day supply at that time.  Please review if a PA is needed for belbuca    Last apt with JA: 1/18/22  UDT/CSA 5/2021  Next apt with DV: 6/10/22    Pending Prescriptions:                       Disp   Refills    BELBUCA 600 MCG FILM buccal film          60 Film0            Sig: Place 1 Film (600 mcg) inside cheek every 12           hours    Signed Prescriptions:                        Disp   Refills    oxyCODONE IR (ROXICODONE) 10 MG tablet     84 tab*0        Sig: Take 1 tablet (10 mg) by mouth every 8 hours as           needed for severe pain (chronic pain) May fill           1/23/22 for Use dates: 1/25/22-2/22/22  Authorizing Provider: LES MONREAL    Please review and cover for JA    Will also include the PA team in the even the belbuca requires a PA

## 2022-01-22 NOTE — RESULT ENCOUNTER NOTE
CMA - future fasting glucose please.      Cynthia,    A1c looks at sugars over last 3 months.  It is in the prediabetes range.  Your fasting sugar was in diabetes range.  There can be some discrepancies in test results.  We could repeat fasting test to confirm result.  Otherwise you need to quit smoking and really double down on weight loss efforts, exercise AND eating a balanced diet.  I'll place future fasting glucose test in case you opt to retest now, otherwise we need to retest at least yearly.    Please contact me if you have questions.    Rosaura    no

## 2022-01-24 ENCOUNTER — TELEPHONE (OUTPATIENT)
Dept: FAMILY MEDICINE | Facility: CLINIC | Age: 52
End: 2022-01-24
Payer: COMMERCIAL

## 2022-01-24 NOTE — TELEPHONE ENCOUNTER
Prior Authorization Retail Medication Request    Medication/Dose: Belbuca  ICD code (if different than what is on RX):  na  Previously Tried and Failed:  na  Rationale:  na    Ins[facility] Name[facility]  Insurance ID:  [id number]       Pharmacy Information (if different than what is on RX)  Name:  Crystal Degroot  Phone:  [phone]

## 2022-01-25 NOTE — TELEPHONE ENCOUNTER
Prior Authorization Approval    Authorization Effective Date: 1/1/2022  Authorization Expiration Date: 1/25/2023  Medication: Belbuca- APPROVED  Approved Dose/Quantity:   Reference #:     Insurance Company: [employer] - Phone [phone] Fax [phone]   Expected CoPay:       CoPay Card Available:      Foundation Assistance Needed:    Which Pharmacy is filling the prescription (Not needed for infusion/clinic administered): [facility] [address]  Pharmacy Notified: Yes  Patient Notified:  **Instructed pharmacy to notify patient when script is ready to /ship.**

## 2022-01-25 NOTE — TELEPHONE ENCOUNTER
Central Prior Authorization Team  Phone: [phone]    PA Initiation    Medication: Belbuca  Insurance Company: [employer] - Phone [phone] Fax [phone]  Pharmacy Filling the Rx: [place], [place] - [facility]  Filling Pharmacy Phone: [phone]  Filling Pharmacy Fax:    Start Date: 1/25/2022

## 2022-01-25 NOTE — TELEPHONE ENCOUNTER
Pt calling to check on the status of her [facility] PA. Requesting a return call.

## 2022-01-26 DIAGNOSIS — Z98.1 STATUS POST LAMINECTOMY WITH SPINAL FUSION: ICD-10-CM

## 2022-01-26 RX ORDER — GABAPENTIN 600 MG/1
TABLET ORAL
Qty: 120 TABLET | Refills: 1 | Status: SHIPPED | OUTPATIENT
Start: 2022-01-26 | End: 2022-03-08

## 2022-02-04 NOTE — PROGRESS NOTES
Medication Therapy Management (MTM) Encounter    ASSESSMENT:                            Medication Adherence/Access: Pt would like to work on long-term reduction of pill burden. Although may be slightly less motivated now that nausea has improved. Will continue to identify opportunities.       Chronic pain: Pain recently worsened as pt just had period without belbuca due to insurance and refill troubles. Likely take a few weeks to get back to baseline. Reviewed different pathways involved in pain transmission and how combinations of medications may be more effective than higher doses of one type of medication. As pt notes combination of Belbuca and Gabapentin in the evening to be effective, and GI symptoms have improved with medical cannabis, recommend pt restart Gabapentin as previously prescribed.     Reviewed that with GI symptoms, NSAIDs would not be recommended but Acetaminophen should be safe to use.     Previously on Duloxetine - unclear why it was stopped. Could reconsider, especially to help with arthritis related pains, but would need to stop Prozac. Pt to consider this.     Discussed role of Vitamin D in chronic pain and mood. Last levels in 2009. Will plan for level and supplement as needed for target range of 45-60.     Depression: Improved with improvement in GI symptoms.       Dyslipidemia: Last LDL above goal <100. With LDL >190, pt is indicated for high-intensity statin, but will plan for repeat levels before increasing.       Constipation: Regular BM. Okay to use Lactulose as needed.     Guzman's esophagus/Esophageal Dysmotility/Gastroparesis: Improved with medical cannabis. Long-term may benefit from reducing opioid doses to reduce GI effects.       Acute sinusitis: Symptoms improving. Encouraged to complete the antibiotic course as prescribed.     Allergic rhinitis: Reviewed that pt can safely use Flonase daily (was likely thinking of overuse concerns with oxymetazoline). May be helpful for  preventing sinus infections.       PLAN:                            1. Use Gabapentin 600 mg 1 tab twice daily + 2 tabs at bedtime   2. Okay to use Acetaminophen 500 mg 1-2 tabs every 6 hours as needed   3. Future Order Vitamin D level, Lipids   4. Okay to use Flonase daily, as needed     Follow-up: Return in about 4 weeks (around 3/7/2022) for Medication Management Pharmacist, by phone.    SUBJECTIVE/OBJECTIVE:                          Cynthia Lyons is a 52 year old female called for an initial visit. She was referred to me from Richard Gabriel MD.      Reason for visit: Pain Management. No questions from pt.     Allergies/ADRs: Reviewed in chart  Past Medical History: Reviewed in chart  Tobacco: She reports that she has been smoking cigarettes. She has been smoking about 0.00 packs per day for the past 23.00 years. She has never used smokeless tobacco.Tobacco Cessation Action Plan:   working on reducing. Has patches.   Alcohol:   reports previous alcohol use. Not currently.         Medication Adherence/Access: Sets up in 2 week med box. Goal would be to reduce the amount of medicine she's on overall.       Chronic pain: Prescribed Belbuca 600 mcg every 12 hours, gabapentin 600 mg 1/1/2 tabs a.m./noon/evening. Oxycodone 10 mg IR.     Does have Narcan - family knows where it is and how to use it.     Medical cannabis - getting mix THC and CBD products. Cannabis causes drowsiness. Still adjusting doses.     Due to refills, insurance concerns, provider changes - was out of Belbuca for a while.     Was trying to wean off of Gabapentin due to stomach issues. Unclear how much benefit this providing. Currently using just 2 caps at bedtime, sometimes taking 1 cap in the morning if she wakes up with a headache.     Was previously throwing up a lot of her doses because of her GI concerns. But that has gotten better    Just got restarted on Belbuca on 1/25.     Having pain from T12 down. Under the fusion. Has curvature.  "Lots of pain with sitting, walking, going to the bathroom. Sleeping - goes back and forth between recliner and bed.     Describes throbbing, aching pain. Sometimes feels like somebody is \"sticking a knife in my spinal cord\" Can't  line for more than a few minutes at the store.        PEG Score 1/18/2022 2/7/2022   PEG Total Score 5.67 7.33     Headaches - depends on neck pain. Also has sinus issues. Normally  Cannot use Acetaminophen or NSAIDs. When she had COVID was taking Acetaminophen and NSAIDs.     Also notes significant osteoarthritis. In the last two years has been more pain in the knees.     Was previously on Cymbalta - not sure why it was stopped.     Depression: Prescribed fluoxetine 20 mg 3 caps daily.     Before cannabis- lots of depression because she was throwing up so much. Since starting cannabis, mood has been significantly better.     Does like Prozac -     Last levels in 2009.     Vitamin D Deficiency Screening Results:  No results found for: VITDT       Dyslipidemia: Prescribed atorvastatin 20 mg daily. Restarted in March after the lipids were drawn.       Recent Labs   Lab Test 03/04/21  1741 01/18/19  1159 06/09/17  1032 09/10/14  0919   CHOL 280* 171   < > 210*   HDL 50 52   < > 51   * 106*   < > 132*   TRIG 148 66   < > 135   CHOLHDLRATIO  --   --   --  4.1    < > = values in this interval not displayed.        Constipation: Prescribed lactulose 10 g per 15 mL 30 mL 3 times daily as needed. Rarely needing. Constipation has gotten better since stopping MS Contin.       Guzman's esophagus/Esophageal Dysmotility/Gastroparesis: Prescribed omeprazole 20 mg daily.     Seen by [facility].     Had Zofran for nausea, but since starting with cannabis hasn't needed.       Acute sinusitis: Prescribed amoxicillin-clavulanate 875-125 mg twice daily x10 days  Symptoms are improving.       Allergic rhinitis: Prescribed cetirizine 10 mg twice daily Flonase 50 mcg 1 spray each nostril twice " daily,    Just taking Flonase as needed.   Typically needs in the spring.         Today's Vitals: LMP  (LMP Unknown)   ----------------      I spent 60 minutes with this patient today. All changes were made via collaborative practice agreement with Richard Gabriel MD. A copy of the visit note was provided to the patient's provider(s).    The patient was sent via [employer] a summary of these recommendations.       Solange TovarD  Medication Therapy Management (MTM) Pharmacist  [facility]      Telemedicine Visit Details  Type of service:  Telephone visit  Start Time: 1:03 PM  End Time: 1:57 PM  Originating Location (patient location): [place]  Distant Location (provider location):  [facility]     Medication Therapy Recommendations  Allergic rhinitis    Current Medication: fluticasone (FLONASE) 50 MCG/ACT nasal spray   Rationale: Does not understand instructions - Adherence - Adherence   Recommendation: Provide Education   Status: Patient Agreed - Adherence/Education         Chronic pain disorder    Current Medication: gabapentin (NEURONTIN) 600 MG tablet   Rationale: Patient prefers not to take - Adherence - Adherence   Recommendation: Provide Education   Status: Patient Agreed - Adherence/Education          Current Medication: gabapentin (NEURONTIN) 600 MG tablet   Rationale: Synergistic therapy - Needs additional medication therapy - Indication   Recommendation: Start Medication - acetaminophen 500 MG tablet   Status: Accepted - no CPA Needed

## 2022-02-07 ENCOUNTER — VIRTUAL VISIT (OUTPATIENT)
Dept: PHARMACY | Facility: OTHER | Age: 52
End: 2022-02-07
Payer: COMMERCIAL

## 2022-02-07 DIAGNOSIS — F33.9 RECURRENT MAJOR DEPRESSIVE DISORDER, REMISSION STATUS UNSPECIFIED (H): ICD-10-CM

## 2022-02-07 DIAGNOSIS — K22.70 BARRETT'S ESOPHAGUS WITHOUT DYSPLASIA: ICD-10-CM

## 2022-02-07 DIAGNOSIS — G89.4 CHRONIC PAIN DISORDER: Primary | ICD-10-CM

## 2022-02-07 DIAGNOSIS — J30.9 ALLERGIC RHINITIS, UNSPECIFIED SEASONALITY, UNSPECIFIED TRIGGER: ICD-10-CM

## 2022-02-07 DIAGNOSIS — E78.5 HYPERLIPIDEMIA LDL GOAL <130: ICD-10-CM

## 2022-02-07 DIAGNOSIS — F32.9 MAJOR DEPRESSION: ICD-10-CM

## 2022-02-07 PROCEDURE — 99605 MTMS BY PHARM NP 15 MIN: CPT | Performed by: PHARMACIST

## 2022-02-07 PROCEDURE — 99607 MTMS BY PHARM ADDL 15 MIN: CPT | Performed by: PHARMACIST

## 2022-02-07 NOTE — PATIENT INSTRUCTIONS
Recommendations from today's MTM visit:                                                    MTM (medication therapy management) is a service provided by a clinical pharmacist designed to help you get the most of out of your medicines.   Today we reviewed what your medicines are for, how to know if they are working, that your medicines are safe and how to make your medicine regimen as easy as possible.      1. Use Gabapentin 600 mg 1 tab twice daily + 2 tabs at bedtime   2. Okay to use Acetaminophen 500 mg 1-2 tabs every 6 hours as needed   3. Future Order Vitamin D level, Lipids   4. Okay to use Flonase daily, as needed     Follow-up: Return in about 4 weeks (around 3/7/2022) for Medication Management Pharmacist, by phone.    It was great to speak with you today.  I value your experience and would be very thankful for your time with providing feedback on our clinic survey. You may receive a survey via email or text message in the next few days.     To schedule another MTM appointment, please call the clinic directly or you may call the MTM scheduling line at [phone] or toll-free at [phone].     My Clinical Pharmacist's contact information:                                                      Please feel free to contact me with any questions or concerns you have.      Leinn Ferrell, PharmD  Medication Therapy Management (MTM) Pharmacist  [facility]

## 2022-02-12 ENCOUNTER — HEALTH MAINTENANCE LETTER (OUTPATIENT)
Age: 52
End: 2022-02-12

## 2022-02-14 DIAGNOSIS — G89.29 CHRONIC MIDLINE LOW BACK PAIN WITHOUT SCIATICA: ICD-10-CM

## 2022-02-14 DIAGNOSIS — G89.4 CHRONIC PAIN SYNDROME: ICD-10-CM

## 2022-02-14 DIAGNOSIS — M54.50 CHRONIC MIDLINE LOW BACK PAIN WITHOUT SCIATICA: ICD-10-CM

## 2022-02-14 RX ORDER — OXYCODONE HYDROCHLORIDE 10 MG/1
10 TABLET ORAL EVERY 8 HOURS PRN
Qty: 90 TABLET | Refills: 0 | Status: SHIPPED | OUTPATIENT
Start: 2022-02-22 | End: 2022-03-15

## 2022-02-14 RX ORDER — BUPRENORPHINE HYDROCHLORIDE 600 UG/1
600 FILM, SOLUBLE BUCCAL EVERY 12 HOURS
Qty: 60 FILM | Refills: 0 | Status: SHIPPED | OUTPATIENT
Start: 2022-02-24 | End: 2022-03-15

## 2022-02-14 NOTE — TELEPHONE ENCOUNTER
To correct below  Script for oxycodone written for 1/25-2/22.  This is showing up in the [place] Prescription Monitoring Program as well.    Script Eprescribed to pharmacy  [place] Prescription Monitoring Program checked    Will send this to MA team to notify patient.    Signed Prescriptions:                        Disp   Refills    BELBUCA 600 MCG FILM buccal film           60 Film0        Sig: Place 1 Film (600 mcg) inside cheek every 12 hours           Dispense: 2/22/22, start date 2/24/22  Authorizing Provider: LES MONREAL    oxyCODONE IR (ROXICODONE) 10 MG tablet     90 tab*0        Sig: Take 1 tablet (10 mg) by mouth every 8 hours as           needed for severe pain (chronic pain) Dispense on           2/20/22, start date 2/22/22  Authorizing Provider: LES MONREAL MD  [facility]

## 2022-02-14 NOTE — TELEPHONE ENCOUNTER
Refill request:   belbuca last dispense 1/25/22-30 days  Oxycodone last dispense 1/18/22-7 days    Last apt with [facility]: 1/18/22  UDT/CSA 5/2021  mtm apt: 2/7/22  Next apt with MTM: 3/8/22  Next apt with DV: 6/10/22    No follow up plan indicated for last apt with JA  belbuca and oxycodone are not part of the follow up plan with Lenin.    Pending Prescriptions:                       Disp   Refills    BELBUCA 600 MCG FILM buccal film          60 Film0            Sig: Place 1 Film (600 mcg) inside cheek every 12           hours Dispense: 2/22/22, start date 2/24/22    oxyCODONE IR (ROXICODONE) 10 MG tablet    90 tab*0            Sig: Take 1 tablet (10 mg) by mouth every 8 hours as           needed for severe pain (chronic pain) Dispense on           2/20/22, start date 2/22/22    CATALINO vazquez

## 2022-02-27 DIAGNOSIS — K22.70 BARRETT'S ESOPHAGUS WITHOUT DYSPLASIA: ICD-10-CM

## 2022-02-27 DIAGNOSIS — K22.4 ESOPHAGEAL DYSMOTILITY: ICD-10-CM

## 2022-03-07 NOTE — PROGRESS NOTES
Medication Therapy Management (MTM) Encounter    ASSESSMENT:                            Medication Adherence/Access: No concerns noted.     Chronic pain: PEG back to previous baseline now that she has been consistent on her medications (previous insurance/access difficulties). Continues to have neck pain and along the spinal cord. May benefit from slow titration of Gabapentin. For continued morning pain/stiffness, discussed a trial of CBD only product to help with reducing inflammation, possibly with less sedation compared to mix THC/CBD product.     Also reviewed with her extensive GI history, would prefer to continue with Buprenorphine products vs traditional opioids with more favorable side effect profile. Long-term, may benefit from continued titration of Belbuca with goal of reducing oxycodone doses (not discussed today).     Reviewed role of Vitamin D in chronic pain and mood. Last levels in 2009. Will plan for level and supplement as needed for target range of 45-60.     Depression: Stable. Improved with improvement in GI symptoms.     Dyslipidemia: Last LDL above goal <100. With LDL >190, pt is indicated for high-intensity statin, but will plan for repeat levels before increasing.     Constipation: Regular BM. Okay to use Lactulose as needed.     Guzman's esophagus/Esophageal Dysmotility/Gastroparesis: Improved with medical cannabis. Long-term may benefit from reducing opioid doses to reduce GI effects.      Allergic rhinitis: Has PCP visit to address sinus infection concerns. Reviewed that daily use of Flonase to prevent allergy symptoms may also help to reduce frequency of sinus infections.     PLAN:                            1. Increase Gabapentin 600 mg to 2 tabs AM, 1 tab evening, 2 tabs bedtime x 1 week, then 2 tabs twice daily.    2. Pt to discuss CBD only product with dispensary.   3. Pt to use Flonase daily    Follow-up: PCP on 3/8/22    Return in about 4 weeks (around 4/5/2022) for Medication  "Management Pharmacist, in person.    SUBJECTIVE/OBJECTIVE:                          Cynthia Lyons is a 52 year old female called for a follow-up visit. She was referred to me from Richard Gabriel MD. Today's visit is a follow-up MTM visit from 2/7/2022.     Reason for visit: Pain Management. Has an appointment with PCP tomorrow - will be talking to PCP to see if she would be willing to take over pain medicine.     Allergies/ADRs: Reviewed in chart  Past Medical History: Reviewed in chart  Tobacco: She reports that she has been smoking cigarettes. She has been smoking about 0.00 packs per day for the past 23.00 years. She has never used smokeless tobacco.Tobacco Cessation Action Plan:   working on reducing. Has patches.   Alcohol:   reports previous alcohol use. Not currently.         Medication Adherence/Access: Sets up in 2 week med box. Goal would be to reduce the amount of medicine she's on overall.       Chronic pain: Prescribed Belbuca 600 mcg every 12 hours, gabapentin 600 mg 1/1/2 tabs a.m./noon/evening. Oxycodone 10 mg IR every 8 hours as needed.     Does have Narcan - family knows where it is and how to use it.     Medical cannabis - getting mix THC and CBD products. Cannabis causes drowsiness. Still adjusting doses. Cannot use cannabis around the clock because it causes her to be drowsy.     Plans to ask PCP about transition back to primary care for pain management.     Pain tends to be the worst in the morning when she first wakes up, and again when she lays becca.     Having pain from T12 down. Under the fusion. Has curvature. Lots of pain with sitting, walking, going to the bathroom. Sleeping - goes back and forth between recliner and bed.     Describes throbbing, aching pain. Sometimes feels like somebody is \"sticking a knife in my spinal cord\" Can't  line for more than a few minutes at the store.     Feels like knee pain has gotten better with gabapentin.      Once she gets moving in the " "morning, it's better.     Headaches - depends on neck pain. Also has sinus issues. Has been worse recently due to sinus issues. Neck has been pretty good.     PEG Score 1/18/2022 2/7/2022 3/8/2022   PEG Total Score 5.67 7.33 5.33         Depression: Prescribed fluoxetine 20 mg 3 caps daily.     Mood has been \"fine\"   Doesn't feel well because of sinus issues.      Didn't get Vit D level drawn because she has an upcoming PCP visit scheduled.     Vitamin D Deficiency Screening Results:  No results found for: VITDT       Dyslipidemia: Prescribed atorvastatin 20 mg daily. Restarted in March after the lipids were drawn.       Recent Labs   Lab Test 03/04/21  1741 01/18/19  1159 06/09/17  1032 09/10/14  0919   CHOL 280* 171   < > 210*   HDL 50 52   < > 51   * 106*   < > 132*   TRIG 148 66   < > 135   CHOLHDLRATIO  --   --   --  4.1    < > = values in this interval not displayed.        Constipation: Prescribed lactulose 10 g per 15 mL 30 mL 3 times daily as needed. Rarely needing. Constipation has gotten better since stopping MS Contin.       Guzman's esophagus/Esophageal Dysmotility/Gastroparesis: Prescribed omeprazole 20 mg daily.     Seen by [facility].     Had Zofran for nausea, but since starting with cannabis hasn't needed.       Allergic rhinitis: Prescribed cetirizine 10 mg twice daily Flonase 50 mcg 1 spray each nostril twice daily,    Feeling very congested for the last week. Has been having more headaches.   Describes thick mucous, cloudy and bloody. Has been using Mucinex, Flonase, and Cetirizine. Just started doing the Flonase twice daily.           Today's Vitals: LMP  (LMP Unknown)   ----------------      I spent 27 minutes with this patient today. All changes were made via collaborative practice agreement with Richard Gabriel MD. A copy of the visit note was provided to the patient's provider(s).    The patient was sent via [employer] a summary of these recommendations.       Lenin Ferrell, Bethanie  Medication " Therapy Management (MTM) Pharmacist  [facility]      Telemedicine Visit Details  Type of service:  Telephone visit  Start Time: 10:04 AM   End Time: 10:31 AM   Originating Location (patient location): Home  Distant Location (provider location):  [facility]     Medication Therapy Recommendations  Chronic pain disorder    Current Medication: gabapentin (NEURONTIN) 600 MG tablet (Discontinued)   Rationale: Dose too low - Dosage too low - Effectiveness   Recommendation: Increase Dose   Status: Accepted per CPA          Current Medication: medical cannabis (Patient's own supply)   Rationale: Synergistic therapy - Needs additional medication therapy - Indication   Recommendation: Start Medication - CBD only product   Status: Accepted per CPA

## 2022-03-08 ENCOUNTER — VIRTUAL VISIT (OUTPATIENT)
Dept: PHARMACY | Facility: CLINIC | Age: 52
End: 2022-03-08
Payer: COMMERCIAL

## 2022-03-08 DIAGNOSIS — G89.4 CHRONIC PAIN DISORDER: Primary | ICD-10-CM

## 2022-03-08 DIAGNOSIS — J30.9 ALLERGIC RHINITIS, UNSPECIFIED SEASONALITY, UNSPECIFIED TRIGGER: ICD-10-CM

## 2022-03-08 DIAGNOSIS — E78.5 HYPERLIPIDEMIA LDL GOAL <130: ICD-10-CM

## 2022-03-08 DIAGNOSIS — Z98.1 STATUS POST LAMINECTOMY WITH SPINAL FUSION: ICD-10-CM

## 2022-03-08 DIAGNOSIS — K22.70 BARRETT'S ESOPHAGUS WITHOUT DYSPLASIA: ICD-10-CM

## 2022-03-08 DIAGNOSIS — F33.9 RECURRENT MAJOR DEPRESSIVE DISORDER, REMISSION STATUS UNSPECIFIED (H): ICD-10-CM

## 2022-03-08 PROCEDURE — 99606 MTMS BY PHARM EST 15 MIN: CPT | Performed by: PHARMACIST

## 2022-03-08 PROCEDURE — 99607 MTMS BY PHARM ADDL 15 MIN: CPT | Performed by: PHARMACIST

## 2022-03-08 RX ORDER — GABAPENTIN 600 MG/1
TABLET ORAL
Qty: 180 TABLET | Refills: 1 | Status: SHIPPED | OUTPATIENT
Start: 2022-03-08 | End: 2022-07-11

## 2022-03-08 NOTE — PATIENT INSTRUCTIONS
Recommendations from today's MTM visit:                                                       1. Increase Gabapentin 600 mg to 2 tabs AM, 1 tab evening, 2 tabs bedtime x 1 week, then 2 tabs twice daily.    2. Discuss CBD only product with dispensary. This may be helpful to have in the morning for pain without causing drowsiness.   3. Pt to use Flonase daily    Follow-up: Return in about 4 weeks (around 4/5/2022) for Medication Management Pharmacist, in person.    It was great to speak with you today.  I value your experience and would be very thankful for your time with providing feedback on our clinic survey. You may receive a survey via email or text message in the next few days.     To schedule another MTM appointment, please call the clinic directly or you may call the MTM scheduling line at [phone] or toll-free at [phone].     My Clinical Pharmacist's contact information:                                                      Please feel free to contact me with any questions or concerns you have.      Lenin Ferrell, PharmD  Medication Therapy Management (MTM) Pharmacist  [facility]

## 2022-03-09 ENCOUNTER — E-VISIT (OUTPATIENT)
Dept: FAMILY MEDICINE | Facility: CLINIC | Age: 52
End: 2022-03-09

## 2022-03-09 DIAGNOSIS — J31.0 RHINITIS, UNSPECIFIED TYPE: ICD-10-CM

## 2022-03-09 DIAGNOSIS — Z72.0 TOBACCO ABUSE: ICD-10-CM

## 2022-03-09 DIAGNOSIS — I10 BENIGN ESSENTIAL HYPERTENSION: ICD-10-CM

## 2022-03-09 DIAGNOSIS — J01.01 ACUTE RECURRENT MAXILLARY SINUSITIS: Primary | ICD-10-CM

## 2022-03-09 PROCEDURE — 99422 OL DIG E/M SVC 11-20 MIN: CPT | Performed by: PHYSICIAN ASSISTANT

## 2022-03-09 RX ORDER — AZELASTINE 1 MG/ML
1-2 SPRAY, METERED NASAL 2 TIMES DAILY
Qty: 30 ML | Refills: 4 | Status: ON HOLD | OUTPATIENT
Start: 2022-03-09 | End: 2023-07-03

## 2022-03-14 ENCOUNTER — MYC REFILL (OUTPATIENT)
Dept: PALLIATIVE MEDICINE | Facility: OTHER | Age: 52
End: 2022-03-14
Payer: COMMERCIAL

## 2022-03-14 ENCOUNTER — MYC REFILL (OUTPATIENT)
Dept: PALLIATIVE MEDICINE | Facility: OTHER | Age: 52
End: 2022-03-14

## 2022-03-14 DIAGNOSIS — M54.50 CHRONIC MIDLINE LOW BACK PAIN WITHOUT SCIATICA: ICD-10-CM

## 2022-03-14 DIAGNOSIS — G89.4 CHRONIC PAIN SYNDROME: ICD-10-CM

## 2022-03-14 DIAGNOSIS — G89.29 CHRONIC MIDLINE LOW BACK PAIN WITHOUT SCIATICA: ICD-10-CM

## 2022-03-14 RX ORDER — BUPRENORPHINE HYDROCHLORIDE 600 UG/1
600 FILM, SOLUBLE BUCCAL EVERY 12 HOURS
Qty: 60 FILM | Refills: 0 | Status: CANCELLED | OUTPATIENT
Start: 2022-03-14

## 2022-03-14 RX ORDER — OXYCODONE HYDROCHLORIDE 10 MG/1
10 TABLET ORAL EVERY 8 HOURS PRN
Qty: 90 TABLET | Refills: 0 | Status: CANCELLED | OUTPATIENT
Start: 2022-03-14

## 2022-03-15 RX ORDER — OXYCODONE HYDROCHLORIDE 10 MG/1
10 TABLET ORAL EVERY 8 HOURS PRN
Qty: 90 TABLET | Refills: 0 | Status: SHIPPED | OUTPATIENT
Start: 2022-03-24 | End: 2022-04-10

## 2022-03-15 RX ORDER — BUPRENORPHINE HYDROCHLORIDE 600 UG/1
600 FILM, SOLUBLE BUCCAL EVERY 12 HOURS
Qty: 60 FILM | Refills: 0 | Status: SHIPPED | OUTPATIENT
Start: 2022-03-26 | End: 2022-04-10

## 2022-03-15 NOTE — TELEPHONE ENCOUNTER
Refills have been requested for the following medications:         BELBUCA 600 MCG FILM buccal film [Hermila Joy MD]     Preferred pharmacy: [facility] [address] [place] [address]

## 2022-03-15 NOTE — TELEPHONE ENCOUNTER
Received call from patient requesting refill(s) of Belbuca and Oxycodone     Last dispensed from pharmacy on   Belbuca 2/24/22  Oxycodone 2/20/22    Patient's last office/virtual visit by prescribing provider on 1/18/22 JA  Next office/virtual appointment scheduled for 6/10/22 DV  2/7/22 and 3/8/22 Appointment with Lenin HERRING    Last urine drug screen date 5/2021  Current opioid agreement on file (completed within the last year) Yes Date of opioid agreement: 5/2021    E-prescribe to [place] pharmacy  Pending Prescriptions:                       Disp   Refills    oxyCODONE IR (ROXICODONE) 10 MG tablet    90 tab*0            Sig: Take 1 tablet (10 mg) by mouth every 8 hours as           needed for severe pain (chronic pain) Dispense on           3/22/22, start date 3/24/22    BELBUCA 600 MCG FILM buccal film          60 Film0            Sig: Place 1 Film (600 mcg) inside cheek every 12           hours Dispense: 3/24/22, start date 3/26/22

## 2022-03-15 NOTE — TELEPHONE ENCOUNTER
My Chart message sent . Rx was E-Prepcribed to:     [facility], [place] - [address] [place] [address]       Orders Placed This Encounter   Medications     oxyCODONE IR (ROXICODONE) 10 MG tablet     Sig: Take 1 tablet (10 mg) by mouth every 8 hours as needed for severe pain (chronic pain) Dispense on 3/22/22, start date 3/24/22     Dispense:  90 tablet     Refill:  0     BELBUCA 600 MCG FILM buccal film     Sig: Place 1 Film (600 mcg) inside cheek every 12 hours Dispense: 3/24/22, start date 3/26/22     Dispense:  60 Film     Refill:  0         Patient notified of dispense and start date.

## 2022-03-15 NOTE — TELEPHONE ENCOUNTER
Refills have been requested for the following medications:         oxyCODONE IR (ROXICODONE) 10 MG tablet [Hermila Joy MD]     Preferred pharmacy: [facility] [address] [place] [address]

## 2022-03-15 NOTE — TELEPHONE ENCOUNTER
Signed Prescriptions:                        Disp   Refills    oxyCODONE IR (ROXICODONE) 10 MG tablet     90 tab*0        Sig: Take 1 tablet (10 mg) by mouth every 8 hours as           needed for severe pain (chronic pain) Dispense on           3/22/22, start date 3/24/22  Authorizing Provider: JOSE R COPELAND 600 MCG FILM buccal film           60 Film0        Sig: Place 1 Film (600 mcg) inside cheek every 12 hours           Dispense: 3/24/22, start date 3/26/22  Authorizing Provider: JOSE R COPELAND MD  [facility]

## 2022-03-22 ENCOUNTER — TELEPHONE (OUTPATIENT)
Dept: FAMILY MEDICINE | Facility: CLINIC | Age: 52
End: 2022-03-22
Payer: COMMERCIAL

## 2022-03-22 ENCOUNTER — MYC MEDICAL ADVICE (OUTPATIENT)
Dept: FAMILY MEDICINE | Facility: CLINIC | Age: 52
End: 2022-03-22

## 2022-03-22 NOTE — TELEPHONE ENCOUNTER
Patient Quality Outreach    Patient is due for the following:   Colon Cancer Screening -  FIT, Colonoscopy and Cologuard  Depression  -  PHQ-9 Needed    NEXT STEPS:   Schedule a office visit for annual wellness visit     Type of outreach:    Sent [employer] message.      Questions for provider review:    None     Maira Jurado, [facility]

## 2022-03-29 ENCOUNTER — OFFICE VISIT (OUTPATIENT)
Dept: URGENT CARE | Facility: URGENT CARE | Age: 52
End: 2022-03-29
Payer: COMMERCIAL

## 2022-03-29 VITALS
WEIGHT: 214 LBS | OXYGEN SATURATION: 97 % | HEART RATE: 84 BPM | SYSTOLIC BLOOD PRESSURE: 122 MMHG | DIASTOLIC BLOOD PRESSURE: 84 MMHG | BODY MASS INDEX: 35.61 KG/M2 | TEMPERATURE: 98.4 F

## 2022-03-29 DIAGNOSIS — I10 BENIGN ESSENTIAL HYPERTENSION: ICD-10-CM

## 2022-03-29 DIAGNOSIS — S49.91XA SHOULDER INJURY, RIGHT, INITIAL ENCOUNTER: Primary | ICD-10-CM

## 2022-03-29 PROCEDURE — 99214 OFFICE O/P EST MOD 30 MIN: CPT | Performed by: PHYSICIAN ASSISTANT

## 2022-03-29 RX ORDER — KETOROLAC TROMETHAMINE 10 MG/1
10 TABLET, FILM COATED ORAL EVERY 6 HOURS PRN
Qty: 20 TABLET | Refills: 0 | Status: SHIPPED | OUTPATIENT
Start: 2022-03-29 | End: 2022-06-17

## 2022-03-29 NOTE — PROGRESS NOTES
Assessment & Plan     Shoulder injury, right, initial encounter  Patient has pain contract. Will add toradol as needed. Can also add over the counter voltaren gel if needed. Avoid aggravating factors but encouraged gentle stretching/ROM. Return to clinic if symptoms worsen or do not improve; otherwise follow up as needed.       - ketorolac (TORADOL) 10 MG tablet; Take 1 tablet (10 mg) by mouth every 6 hours as needed for moderate pain    Benign essential hypertension  Blood pressure improved with second reading. Monitor blood pressure at home and if average pressure is greater than or equal to 140/90 follow up with primary care provider                     Return in about 1 week (around 4/5/2022), or if symptoms worsen or fail to improve.    Anahi Sharp PA-C  [facility] [place]            Subjective   Chief Complaint   Patient presents with     Shoulder Pain     Right shoulder pain. This was a prior injury that healed. She lifted a suitcase and felt something in her shoulder. Later was moving a backpack and twisted and her shoulder felt like when she injured arm. Says feels best when she lifts her arm up to the side. Sometimes pain shoots up her arm almost vomit.          HPI     MS Injury/Pain    Onset of symptoms was 1 day(s) ago.  Location: right shoulder  Context:       The injury happened while in car reaching bag to the back seat       Mechanism: lifted backpack       Patient experienced immediate pain  Course of symptoms is same.    Severity moderate  Current and Associated symptoms: Pain  Aggravating Factors: movement, lifting   Therapies to improve symptoms include: ibuprofen, ice                 Review of Systems   Constitutional, HEENT, cardiovascular, pulmonary, gi and gu systems are negative, except as otherwise noted.      Objective    /84   Pulse 84   Temp 98.4  F (36.9  C) (Tympanic)   Wt 97.1 kg (214 lb)   LMP  (LMP Unknown)   SpO2 97%   BMI 35.61 kg/m     Body mass index is 35.61 kg/m .  Physical Exam  Constitutional:       General: She is not in acute distress.  Musculoskeletal:      Comments: Right shoulder with no obvious deformity, erythema, edema, or ecchymosis. Diffuse tenderness with palpation. Painful active ROM with abduction and reaching above head. Non-tender internal /external rotation of the glenohumeral joint. CMS intact.     Neurological:      Mental Status: She is alert.

## 2022-05-11 ENCOUNTER — MYC REFILL (OUTPATIENT)
Dept: PALLIATIVE MEDICINE | Facility: OTHER | Age: 52
End: 2022-05-11

## 2022-05-11 DIAGNOSIS — G89.4 CHRONIC PAIN SYNDROME: ICD-10-CM

## 2022-05-11 DIAGNOSIS — G89.29 CHRONIC MIDLINE LOW BACK PAIN WITHOUT SCIATICA: ICD-10-CM

## 2022-05-11 DIAGNOSIS — M54.50 CHRONIC MIDLINE LOW BACK PAIN WITHOUT SCIATICA: ICD-10-CM

## 2022-05-11 RX ORDER — BUPRENORPHINE HYDROCHLORIDE 600 UG/1
600 FILM, SOLUBLE BUCCAL EVERY 12 HOURS
Qty: 60 FILM | Refills: 0 | Status: SHIPPED | OUTPATIENT
Start: 2022-05-26 | End: 2022-06-10

## 2022-05-11 RX ORDER — OXYCODONE HYDROCHLORIDE 10 MG/1
10 TABLET ORAL EVERY 8 HOURS PRN
Qty: 90 TABLET | Refills: 0 | Status: SHIPPED | OUTPATIENT
Start: 2022-05-23 | End: 2022-06-10

## 2022-05-11 RX ORDER — BUPRENORPHINE HYDROCHLORIDE 600 UG/1
600 FILM, SOLUBLE BUCCAL EVERY 12 HOURS
Qty: 60 FILM | Refills: 0 | Status: CANCELLED | OUTPATIENT
Start: 2022-05-11

## 2022-05-11 NOTE — TELEPHONE ENCOUNTER
Refills have been requested for the following medications:         BELBUCA 600 MCG FILM buccal film [Christina DILLON]     Preferred pharmacy: [facility] [address] [place] [address]

## 2022-05-11 NOTE — TELEPHONE ENCOUNTER
Received call from patient requesting refill(s) of Belbuca and Oxycodone      Last dispensed from pharmacy on   Belbuca 4/26/22  Oxycodone 4/21/22     Patient's last office/virtual visit by prescribing provider on 1/18/22 JA  Next office/virtual appointment scheduled for 6/10/22 DV  2/7/22, 3/8/22 and 4/7/22 Appointment with Lenin HERRING     Last urine drug screen date 5/2021  Current opioid agreement on file (completed within the last year) Yes Date of opioid agreement: 5/2021     E-prescribe to [place] pharmacy  Pending Prescriptions:                       Disp   Refills    oxyCODONE IR (ROXICODONE) 10 MG tablet    90 tab*0            Sig: Take 1 tablet (10 mg) by mouth every 8 hours as           needed for severe pain (chronic pain) Dispense on           5/21/22 for use 5/23/22    BELBUCA 600 MCG FILM buccal film          60 Film0            Sig: Place 1 Film (600 mcg) inside cheek every 12           hours Dispense: 5/24/22, start date 5/26/22

## 2022-05-11 NOTE — TELEPHONE ENCOUNTER
Refills have been requested for the following medications:         oxyCODONE IR (ROXICODONE) 10 MG tablet [Christina DILLON]     Preferred pharmacy: [facility] [address] [place] [address]

## 2022-05-25 ENCOUNTER — TELEPHONE (OUTPATIENT)
Dept: PALLIATIVE MEDICINE | Facility: OTHER | Age: 52
End: 2022-05-25
Payer: COMMERCIAL

## 2022-06-06 DIAGNOSIS — E78.5 DYSLIPIDEMIA: ICD-10-CM

## 2022-06-07 NOTE — TELEPHONE ENCOUNTER
Last Written Prescription Date:  03/08/21  Last Fill Quantity: 90,  # refills: 1  Last office visit: 3/4/2021 with prescribing provider:  1   Future Office Visit: called patient to schedule  Next 5 appointments (look out 90 days)      Jul 11, 2022  8:30 AM  (Arrive by 8:10 AM)  Provider Visit with RAQUEL Dong [facility] ([facility] ) [address] [place] [id number]  [phone]     Aug 01, 2022  9:00 AM  Return Visit with ALAN Peña CNP  [facility] ([facility] ) [address]  [facility] [address] [phone]        Routing refill request to provider for review/approval because:  Labs not current:    LDL Cholesterol Calculated   Date Value Ref Range Status   03/04/2021 200 (H) <100 mg/dL Final     Comment:     Above desirable:  100-129 mg/dl  Borderline High:  130-159 mg/dL  High:             160-189 mg/dL  Very high:       >189 mg/dl       Patient out of medication for 1 week  Michelle ARMANDO RN

## 2022-06-09 RX ORDER — ATORVASTATIN CALCIUM 20 MG/1
20 TABLET, FILM COATED ORAL DAILY
Qty: 90 TABLET | Refills: 0 | Status: SHIPPED | OUTPATIENT
Start: 2022-06-09 | End: 2022-07-11 | Stop reason: ALTCHOICE

## 2022-06-10 ENCOUNTER — VIRTUAL VISIT (OUTPATIENT)
Dept: PALLIATIVE MEDICINE | Facility: OTHER | Age: 52
End: 2022-06-10
Payer: COMMERCIAL

## 2022-06-10 ENCOUNTER — MYC REFILL (OUTPATIENT)
Dept: PALLIATIVE MEDICINE | Facility: OTHER | Age: 52
End: 2022-06-10
Payer: COMMERCIAL

## 2022-06-10 DIAGNOSIS — Z79.891 LONG TERM (CURRENT) USE OF OPIATE ANALGESIC: ICD-10-CM

## 2022-06-10 DIAGNOSIS — F43.21 GRIEF: ICD-10-CM

## 2022-06-10 DIAGNOSIS — G89.29 CHRONIC MIDLINE LOW BACK PAIN WITHOUT SCIATICA: ICD-10-CM

## 2022-06-10 DIAGNOSIS — M43.23 FUSION OF SPINE, CERVICOTHORACIC REGION: ICD-10-CM

## 2022-06-10 DIAGNOSIS — F41.0 PANIC DISORDER WITHOUT AGORAPHOBIA: ICD-10-CM

## 2022-06-10 DIAGNOSIS — F32.4 MAJOR DEPRESSIVE DISORDER IN PARTIAL REMISSION, UNSPECIFIED WHETHER RECURRENT (H): ICD-10-CM

## 2022-06-10 DIAGNOSIS — A69.23 LYME ARTHRITIS (H): ICD-10-CM

## 2022-06-10 DIAGNOSIS — M54.50 CHRONIC MIDLINE LOW BACK PAIN WITHOUT SCIATICA: ICD-10-CM

## 2022-06-10 DIAGNOSIS — M54.12 CERVICAL RADICULOPATHY: ICD-10-CM

## 2022-06-10 DIAGNOSIS — F43.9 SITUATIONAL STRESS: ICD-10-CM

## 2022-06-10 DIAGNOSIS — G89.4 CHRONIC PAIN SYNDROME: ICD-10-CM

## 2022-06-10 DIAGNOSIS — G89.29 CHRONIC INTRACTABLE PAIN: Primary | ICD-10-CM

## 2022-06-10 DIAGNOSIS — M96.1 FAILED BACK SURGICAL SYNDROME: ICD-10-CM

## 2022-06-10 PROCEDURE — 99215 OFFICE O/P EST HI 40 MIN: CPT | Mod: 95 | Performed by: NURSE PRACTITIONER

## 2022-06-10 PROCEDURE — G0463 HOSPITAL OUTPT CLINIC VISIT: HCPCS | Mod: PN,RTG | Performed by: NURSE PRACTITIONER

## 2022-06-10 ASSESSMENT — PAIN SCALES - GENERAL: PAINLEVEL: SEVERE PAIN (7)
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
## 2022-06-13 ENCOUNTER — TELEPHONE (OUTPATIENT)
Dept: FAMILY MEDICINE | Facility: CLINIC | Age: 52
End: 2022-06-13
Payer: COMMERCIAL

## 2022-06-13 ENCOUNTER — MYC MEDICAL ADVICE (OUTPATIENT)
Dept: FAMILY MEDICINE | Facility: CLINIC | Age: 52
End: 2022-06-13

## 2022-06-13 RX ORDER — OXYCODONE HYDROCHLORIDE 10 MG/1
10 TABLET ORAL EVERY 8 HOURS PRN
Qty: 90 TABLET | Refills: 0 | Status: SHIPPED | OUTPATIENT
Start: 2022-06-13 | End: 2022-06-17

## 2022-06-13 RX ORDER — BUPRENORPHINE HYDROCHLORIDE 600 UG/1
600 FILM, SOLUBLE BUCCAL EVERY 12 HOURS
Qty: 60 FILM | Refills: 0 | Status: SHIPPED | OUTPATIENT
Start: 2022-06-13 | End: 2022-06-17

## 2022-06-13 NOTE — TELEPHONE ENCOUNTER
Patient Quality Outreach    Patient is due for the following:   Colon Cancer Screening -  FIT and Colonoscopy  Breast Cancer Screening - Mammogram  Cervical Cancer Screening - PAP Needed  Depression  -  PHQ-9 Needed  Physical  - Due after 08/09/2022    NEXT STEPS:   Schedule a yearly physical    Type of outreach:    Sent [employer] message.      Questions for provider review:    None     Maira Jurado, [facility]

## 2022-06-13 NOTE — TELEPHONE ENCOUNTER
Received call from patient requesting refill  belbuca 600 mc22-30 days  Oxycodone 10 m22-30 days    Patient's last office/virtual visit by prescribing provider on 6/10/22  Next office/virtual appointment scheduled for 22    UDT/CSA 2021    Pending Prescriptions:                       Disp   Refills    oxyCODONE IR (ROXICODONE) 10 MG tablet    90 tab*0            Sig: Take 1 tablet (10 mg) by mouth every 8 hours as           needed for severe pain (chronic pain) Dispense on           22 for use 22    BELBUCA 600 MCG FILM buccal film          60 Film0            Sig: Place 1 Film (600 mcg) inside cheek every 12           hours Dispense: 22, start date 22    [facility]

## 2022-06-13 NOTE — TELEPHONE ENCOUNTER
Script Eprescribed to pharmacy    Signed Prescriptions:                        Disp   Refills    oxyCODONE IR (ROXICODONE) 10 MG tablet     90 tab*0        Sig: Take 1 tablet (10 mg) by mouth every 8 hours as           needed for severe pain (chronic pain) Dispense on           6/20/22 for use 6/22/22  Authorizing Provider: EDA  MCG FILM buccal film           60 Film0        Sig: Place 1 Film (600 mcg) inside cheek every 12 hours           Dispense: 6/23/22, start date 6/25/22  Authorizing Provider: EDA LAO APRN, NP-C  [facility] [place]

## 2022-06-15 ENCOUNTER — TELEPHONE (OUTPATIENT)
Dept: PALLIATIVE MEDICINE | Facility: OTHER | Age: 52
End: 2022-06-15
Payer: COMMERCIAL

## 2022-06-15 DIAGNOSIS — G89.4 CHRONIC PAIN SYNDROME: Primary | ICD-10-CM

## 2022-06-15 NOTE — TELEPHONE ENCOUNTER
Patient calling, was trying to schedule her UDS at her PCC as discussed at last appt on 06-10-22.  Please enter orders so patient can get scheduled.

## 2022-06-15 NOTE — TELEPHONE ENCOUNTER
Order placed for routine UDT: [id number]  Provider: please order any additional lab orders if needed otherwise please sign routine lab order so patient can have this done at the PCP clinic.

## 2022-06-17 ENCOUNTER — MYC MEDICAL ADVICE (OUTPATIENT)
Dept: PALLIATIVE MEDICINE | Facility: CLINIC | Age: 52
End: 2022-06-17

## 2022-06-17 RX ORDER — BUPRENORPHINE HYDROCHLORIDE 600 UG/1
600 FILM, SOLUBLE BUCCAL EVERY 12 HOURS
Qty: 60 FILM | Refills: 0 | Status: SHIPPED | OUTPATIENT
Start: 2022-06-17 | End: 2022-07-15 | Stop reason: ALTCHOICE

## 2022-06-17 RX ORDER — OXYCODONE HYDROCHLORIDE 10 MG/1
10 TABLET ORAL EVERY 8 HOURS PRN
Qty: 90 TABLET | Refills: 0 | Status: SHIPPED | OUTPATIENT
Start: 2022-06-17 | End: 2022-07-29

## 2022-06-17 NOTE — PATIENT INSTRUCTIONS
After Visit Instructions:     Thank you for coming to [facility] for your care. It is my goal to partner with you to help you reach your optimal state of health.   Continue daily self-care, identifying contributing factors, and monitoring variations in pain level. Continue to integrate self-care into your life.      Health Psychology: YES Order placed. We will call you to schedule. It may take a few months to get an appointment. We can provide a referral to any mental health clinic that you wish if you would like to try to be seen sooner.   Physical therapy: YES  Copy of the order and therapy pool list will be mailed   60 min Clinic follow-up with ALAN Bassett NP-C in 2 months or sooner as needed.   Labs: Annual urine drug screen  Other: Order for TENS unit provided.   Medication Management :   Continue current medications.      ALAN Kim NP-C  [facility]  [facility] - Monday and Friday  [facility] - Tuesday  [place] - Thursday    Be sure to request ALL medication refills 5 days prior to the due date unless you will see your medical provider in an appointment before the due date.  This is YOUR responsibility. Do not expect same day refills. If you do not plan ahead you may run out of medications.     NO early refills are allowed. It is your responsibility to manage your medications responsibility and keep them safely stored. Lost or destroyed medications WILL NOT be replaced    [facility] Scheduling/Clinic Telephone Number:  [phone]    [place] Scheduling/Clinic Telephone Number: [phone]  After Hours On-Call Service:  [phone]    Call with any questions about your care and for scheduling assistance.   Calls are returned Monday through Friday between 8 AM and 4:00 PM. We usually get back to you within 2 business days depending on the issue/request.    If we are prescribing your medications:  For opioid medication  refills, call the clinic or send a Stayzillat message 7 days in advance.  Please include:  Your name and date of birth.   Name of requested medication  Name of the pharmacy.  For non-opioid medications, call your pharmacy directly to request a refill. Please allow 3-4 days to be processed.   Per [place] State Law:  All controlled substance prescriptions must be filled within 30 days of being written.    For those controlled substances allowing refills, pickup must occur within 30 days of last fill.      We believe regular attendance is key to your success in our program!    Any time you are unable to keep your appointment we ask that you call us at least 24 hours in advance to cancel.This will allow us to offer the appointment time to another patient.   Multiple missed appointments may lead to dismissal from the clinic.

## 2022-06-20 ENCOUNTER — E-VISIT (OUTPATIENT)
Dept: FAMILY MEDICINE | Facility: CLINIC | Age: 52
End: 2022-06-20
Payer: COMMERCIAL

## 2022-06-20 DIAGNOSIS — H60.399: Primary | ICD-10-CM

## 2022-06-20 PROCEDURE — 99421 OL DIG E/M SVC 5-10 MIN: CPT | Performed by: PHYSICIAN ASSISTANT

## 2022-06-21 RX ORDER — CEPHALEXIN 500 MG/1
500 CAPSULE ORAL 2 TIMES DAILY
Qty: 10 CAPSULE | Refills: 0 | Status: SHIPPED | OUTPATIENT
Start: 2022-06-21 | End: 2022-07-11

## 2022-06-23 ENCOUNTER — MYC MEDICAL ADVICE (OUTPATIENT)
Dept: FAMILY MEDICINE | Facility: CLINIC | Age: 52
End: 2022-06-23

## 2022-06-24 ENCOUNTER — E-VISIT (OUTPATIENT)
Dept: FAMILY MEDICINE | Facility: CLINIC | Age: 52
End: 2022-06-24
Payer: COMMERCIAL

## 2022-06-24 ENCOUNTER — MYC MEDICAL ADVICE (OUTPATIENT)
Dept: FAMILY MEDICINE | Facility: CLINIC | Age: 52
End: 2022-06-24

## 2022-06-24 DIAGNOSIS — A69.20 ERYTHEMA MIGRANS (LYME DISEASE): Primary | ICD-10-CM

## 2022-06-24 PROCEDURE — 99422 OL DIG E/M SVC 11-20 MIN: CPT | Performed by: PHYSICIAN ASSISTANT

## 2022-06-24 RX ORDER — DOXYCYCLINE 100 MG/1
100 CAPSULE ORAL 2 TIMES DAILY
Qty: 20 CAPSULE | Refills: 0 | Status: SHIPPED | OUTPATIENT
Start: 2022-06-24 | End: 2022-07-06

## 2022-06-25 DIAGNOSIS — F32.1 MODERATE MAJOR DEPRESSION (H): ICD-10-CM

## 2022-06-26 ENCOUNTER — LAB (OUTPATIENT)
Dept: LAB | Facility: CLINIC | Age: 52
End: 2022-06-26
Payer: COMMERCIAL

## 2022-06-26 DIAGNOSIS — G89.4 CHRONIC PAIN SYNDROME: ICD-10-CM

## 2022-06-26 LAB
CANNABINOIDS UR QL SCN: NORMAL
CREAT UR-MCNC: 130 MG/DL

## 2022-06-26 PROCEDURE — 80307 DRUG TEST PRSMV CHEM ANLYZR: CPT

## 2022-06-26 PROCEDURE — 80307 DRUG TEST PRSMV CHEM ANLYZR: CPT | Mod: 59

## 2022-06-27 ENCOUNTER — MYC MEDICAL ADVICE (OUTPATIENT)
Dept: FAMILY MEDICINE | Facility: CLINIC | Age: 52
End: 2022-06-27

## 2022-06-27 NOTE — LETTER
June 28, 2022      Cynthia Lyons  [address] [address] [address]        To Whom It May Concern:    Cynthia Lyons was unable to work 6/14/22, 6/15/22 and 6/24/22 due to illness. She may return to work without restrictions.      Sincerely,        Rosaura Flores PA-C

## 2022-06-27 NOTE — TELEPHONE ENCOUNTER
Routing refill request to provider for review/approval because:  PHQ9 needs updating, writer did send new PHQ9 to patient via [employer]

## 2022-06-28 LAB
BUPRENORPHINE UR CFM-MCNC: 150 NG/ML
BUPRENORPHINE/CREAT UR: 115 NG/MG {CREAT}
GABAPENTIN UR QL CFM: PRESENT
NORBUPRENORPHINE UR CFM-MCNC: ABNORMAL NG/ML
OXYCODONE UR CFM-MCNC: 729 NG/ML
OXYCODONE/CREAT UR: 561 NG/MG {CREAT}
OXYMORPHONE UR CFM-MCNC: 1280 NG/ML
OXYMORPHONE/CREAT UR: 985 NG/MG {CREAT}

## 2022-06-29 ENCOUNTER — MYC MEDICAL ADVICE (OUTPATIENT)
Dept: FAMILY MEDICINE | Facility: CLINIC | Age: 52
End: 2022-06-29

## 2022-06-29 DIAGNOSIS — T88.7XXA SIDE EFFECT OF MEDICATION: Primary | ICD-10-CM

## 2022-06-29 RX ORDER — ONDANSETRON 4 MG/1
4-8 TABLET, FILM COATED ORAL EVERY 8 HOURS PRN
Qty: 20 TABLET | Refills: 1 | Status: SHIPPED | OUTPATIENT
Start: 2022-06-29 | End: 2022-07-20

## 2022-06-29 NOTE — LETTER
[facility]  [address] [place] [address]  Phone: [phone]  Fax: [phone]    06/29/22    Cynthia Lyons  [address] [address] [facility] [address]      To whom it may concern:     Karla will be off work possibly the remainder of this week due to her medical condition.  She can return when she feels able.    Sincerely,      Rosaura Flores PA-C

## 2022-07-02 ENCOUNTER — MYC MEDICAL ADVICE (OUTPATIENT)
Dept: FAMILY MEDICINE | Facility: CLINIC | Age: 52
End: 2022-07-02

## 2022-07-05 ENCOUNTER — TELEPHONE (OUTPATIENT)
Dept: FAMILY MEDICINE | Facility: CLINIC | Age: 52
End: 2022-07-05

## 2022-07-05 DIAGNOSIS — A69.20 ERYTHEMA MIGRANS (LYME DISEASE): ICD-10-CM

## 2022-07-05 NOTE — TELEPHONE ENCOUNTER
Reason for Call:  Lymes and Symptoms     Detailed comments: Pt continues to be Achy, Low temp, fatigued. Pt was diagnosed with Lymes. Pt said she completed her cycle of Antibiotic. Please Advise next step.     Phone Number Patient can be reached at: Home number on file [phone] (home)    Best Time: Any Time      Can we leave a detailed message on this number? YES    Call taken on 7/5/2022 at 10:19 AM by Loren Santos

## 2022-07-06 ENCOUNTER — HOSPITAL ENCOUNTER (EMERGENCY)
Facility: CLINIC | Age: 52
Discharge: HOME | End: 2022-07-06
Payer: COMMERCIAL

## 2022-07-06 VITALS
DIASTOLIC BLOOD PRESSURE: 91 MMHG | BODY MASS INDEX: 33.32 KG/M2 | TEMPERATURE: 98.3 F | OXYGEN SATURATION: 96 % | WEIGHT: 200 LBS | HEART RATE: 69 BPM | SYSTOLIC BLOOD PRESSURE: 140 MMHG | HEIGHT: 65 IN

## 2022-07-06 RX ORDER — DOXYCYCLINE 100 MG/1
100 CAPSULE ORAL 2 TIMES DAILY
Qty: 14 CAPSULE | Refills: 0 | Status: SHIPPED | OUTPATIENT
Start: 2022-07-06 | End: 2022-07-29

## 2022-07-06 NOTE — ED TRIAGE NOTES
Pt states had 6 deer ticks on her developed a rash, finished doxycycline course, had abd pain and diarrhea with doxy, has finished and cont to have fever, abd cramping and loose stools. Discussed ED vs UC pt prefers UC.       Triage Assessment     Row Name 07/06/22 0465       Triage Assessment (Adult)    Airway WDL WDL       Respiratory WDL    Respiratory WDL WDL       Skin Circulation/Temperature WDL    Skin Circulation/Temperature WDL WDL       Cardiac WDL    Cardiac WDL WDL       Peripheral/Neurovascular WDL    Peripheral Neurovascular WDL WDL       Cognitive/Neuro/Behavioral WDL    Cognitive/Neuro/Behavioral WDL WDL

## 2022-07-11 ENCOUNTER — TELEPHONE (OUTPATIENT)
Dept: FAMILY MEDICINE | Facility: CLINIC | Age: 52
End: 2022-07-11

## 2022-07-11 ENCOUNTER — MYC MEDICAL ADVICE (OUTPATIENT)
Dept: PALLIATIVE MEDICINE | Facility: OTHER | Age: 52
End: 2022-07-11

## 2022-07-11 ENCOUNTER — OFFICE VISIT (OUTPATIENT)
Dept: FAMILY MEDICINE | Facility: CLINIC | Age: 52
End: 2022-07-11
Payer: COMMERCIAL

## 2022-07-11 VITALS
BODY MASS INDEX: 33.04 KG/M2 | HEART RATE: 86 BPM | WEIGHT: 205.6 LBS | OXYGEN SATURATION: 98 % | TEMPERATURE: 98.9 F | DIASTOLIC BLOOD PRESSURE: 90 MMHG | RESPIRATION RATE: 18 BRPM | HEIGHT: 66 IN | SYSTOLIC BLOOD PRESSURE: 138 MMHG

## 2022-07-11 DIAGNOSIS — R11.2 NAUSEA AND VOMITING, INTRACTABILITY OF VOMITING NOT SPECIFIED, UNSPECIFIED VOMITING TYPE: ICD-10-CM

## 2022-07-11 DIAGNOSIS — F33.1 MODERATE RECURRENT MAJOR DEPRESSION (H): ICD-10-CM

## 2022-07-11 DIAGNOSIS — A69.23 LYME ARTHRITIS (H): Primary | ICD-10-CM

## 2022-07-11 DIAGNOSIS — Z98.1 STATUS POST LAMINECTOMY WITH SPINAL FUSION: ICD-10-CM

## 2022-07-11 DIAGNOSIS — K31.84 GASTROPARESIS: ICD-10-CM

## 2022-07-11 DIAGNOSIS — K22.70 BARRETT'S ESOPHAGUS WITHOUT DYSPLASIA: ICD-10-CM

## 2022-07-11 DIAGNOSIS — E78.5 DYSLIPIDEMIA: ICD-10-CM

## 2022-07-11 DIAGNOSIS — K22.4 ESOPHAGEAL DYSMOTILITY: ICD-10-CM

## 2022-07-11 DIAGNOSIS — F11.20 NARCOTIC DEPENDENCE (H): ICD-10-CM

## 2022-07-11 PROCEDURE — 99215 OFFICE O/P EST HI 40 MIN: CPT | Performed by: PHYSICIAN ASSISTANT

## 2022-07-11 RX ORDER — FLUOXETINE 20 MG/5ML
60 SOLUTION ORAL DAILY
Qty: 450 ML | Refills: 0 | Status: SHIPPED | OUTPATIENT
Start: 2022-07-11 | End: 2022-08-12

## 2022-07-11 RX ORDER — METOCLOPRAMIDE HYDROCHLORIDE 5 MG/5ML
5-10 SOLUTION ORAL 3 TIMES DAILY PRN
Qty: 900 ML | Refills: 0 | Status: SHIPPED | OUTPATIENT
Start: 2022-07-11 | End: 2022-09-29

## 2022-07-11 RX ORDER — OXYCODONE HCL 5 MG/5 ML
10 SOLUTION, ORAL ORAL EVERY 8 HOURS
Qty: 900 ML | Refills: 0 | Status: SHIPPED | OUTPATIENT
Start: 2022-07-11 | End: 2022-07-29

## 2022-07-11 RX ORDER — GABAPENTIN 600 MG/1
1200 TABLET ORAL DAILY
Refills: 0 | COMMUNITY
Start: 2022-07-11 | End: 2022-07-11

## 2022-07-11 RX ORDER — OMEPRAZOLE 40 MG/1
CAPSULE, DELAYED RELEASE ORAL
Qty: 30 CAPSULE | Refills: 0 | Status: SHIPPED | OUTPATIENT
Start: 2022-07-11 | End: 2022-07-29

## 2022-07-11 ASSESSMENT — ANXIETY QUESTIONNAIRES
5. BEING SO RESTLESS THAT IT IS HARD TO SIT STILL: NOT AT ALL
GAD7 TOTAL SCORE: 1
6. BECOMING EASILY ANNOYED OR IRRITABLE: NOT AT ALL
7. FEELING AFRAID AS IF SOMETHING AWFUL MIGHT HAPPEN: NOT AT ALL
GAD7 TOTAL SCORE: 1
1. FEELING NERVOUS, ANXIOUS, OR ON EDGE: SEVERAL DAYS
3. WORRYING TOO MUCH ABOUT DIFFERENT THINGS: NOT AT ALL
2. NOT BEING ABLE TO STOP OR CONTROL WORRYING: NOT AT ALL
IF YOU CHECKED OFF ANY PROBLEMS ON THIS QUESTIONNAIRE, HOW DIFFICULT HAVE THESE PROBLEMS MADE IT FOR YOU TO DO YOUR WORK, TAKE CARE OF THINGS AT HOME, OR GET ALONG WITH OTHER PEOPLE: NOT DIFFICULT AT ALL

## 2022-07-11 ASSESSMENT — PATIENT HEALTH QUESTIONNAIRE - PHQ9
10. IF YOU CHECKED OFF ANY PROBLEMS, HOW DIFFICULT HAVE THESE PROBLEMS MADE IT FOR YOU TO DO YOUR WORK, TAKE CARE OF THINGS AT HOME, OR GET ALONG WITH OTHER PEOPLE: SOMEWHAT DIFFICULT
5. POOR APPETITE OR OVEREATING: NOT AT ALL
SUM OF ALL RESPONSES TO PHQ QUESTIONS 1-9: 12
SUM OF ALL RESPONSES TO PHQ QUESTIONS 1-9: 12

## 2022-07-11 ASSESSMENT — PAIN SCALES - GENERAL: PAINLEVEL: EXTREME PAIN (8)

## 2022-07-11 NOTE — TELEPHONE ENCOUNTER
The omeprazole compound for Karla is not covered over age of 13 and is over $600. Can you send rx for capsules again so she can just open them up into yogurt or applesauce?      Thank You,  Jyoti Abdalla [facility]

## 2022-07-11 NOTE — NURSING NOTE
"No chief complaint on file.      Initial Temp 98.9  F (37.2  C) (Tympanic)   Resp 18   Ht 1.665 m (5' 5.55\")   Wt 93.3 kg (205 lb 9.6 oz)   LMP  (LMP Unknown)   BMI 33.64 kg/m   Estimated body mass index is 33.64 kg/m  as calculated from the following:    Height as of this encounter: 1.665 m (5' 5.55\").    Weight as of this encounter: 93.3 kg (205 lb 9.6 oz).    Patient presents to the clinic using No DME    Health Maintenance that is potentially due pending provider review:  NONE    n/a    Is there anyone who you would like to be able to receive your results? No  If yes have patient fill out CICI      "

## 2022-07-11 NOTE — PROGRESS NOTES
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  Unclear how many doses may have been vomited up.  Plan: continue doxycycline, monitor sx    (F33.1) Moderate recurrent major depression (H)  Comment: uncontrolled, mostly situational, change to liquid version but no dose change today, monitor further next visit  Plan: FLUoxetine (PROZAC) 20 MG/5ML solution    (F11.20) Narcotic dependence (H)  Comment: contributory to nausea and dental issues  Plan: transition from buprenorphine films to dermal patches, await guidance from pain clinic.    (R11.2) Nausea and vomiting, intractability of vomiting not specified, unspecified vomiting type  (K22.4) Esophageal dysmotility  (K22.70) Guzman's esophagus without dysplasia  Comment: change medication to liquid version, however too expensive so will break open her capsules.  Dose increase.  Plan: DISCONTINUED: omeprazole (PRILOSEC) 2 mg/mL         suspension    (K31.84) Gastroparesis  Comment: large contributor to nausea and vomiting, start new medication prn  Plan: metoclopramide (REGLAN) 5 MG/5ML solution    (E78.5) Dyslipidemia  Comment: stable change to powder version of medication  Plan: Atorvastatin Calcium POWD    (Z98.1) Status post laminectomy with spinal fusion  Comment: stop gabapentin.  Switch oxycodone to liquid form.  Plan: oxyCODONE (ROXICODONE) 5 MG/5ML solution,         DISCONTINUED: gabapentin (NEURONTIN) 600 MG         Tablet    Recheck BP further next visit.  Elev x 2 today but attributes to \"feeling crappy\".  Form completed for work to request flexible attendance.    I spent a total of 50 minutes on the day of the visit.   Time spent doing chart review, history and exam, documentation and further activities per the note     BMI:   Estimated body mass index is 33.64 kg/m  as calculated from the following:    Height as of this encounter: 1.665 m (5' 5.55\").    Weight as of " this encounter: 93.3 kg (205 lb 9.6 oz).   Weight management plan: Discussed healthy diet and exercise guidelines    Patient Instructions   Stopping gabapentin entirely - may make less drowsy  try switching to liquid meds where able  Increase omeprazole to see if helps nausea and swallowing issues  If still nausea/vomiting add metoclopramide (moves stuff forward in stomach to intestines)    Try to have a regular sleep/wake schedule     I will get back to you on transition from belbuca oral film to skin patch     Set up follow up with me and [facility]     Recheck with me in 2-4 weeks, can be virtual  Reschedule physical       No follow-ups on file.    Rosaura Flores PA-C  [facility]    Kaushik Acosta is a 52 year old presenting for the following health issues:  Recheck Medication and Nausea       Nausea  Associated symptoms include nausea.   History of Present Illness     Reason for visit:  Follow up on evisit  Symptom onset:  More than a month  Low grade fevers until highest yesterday just over 100 with extensive diaphoresis.  Thinks fever broke, has been normal today.  Finishes her doxycycline extension tomorrow.  Lots of trouble with getting pills down.  But also lots of days with nausea, throws up meds if doesn't wait until nausea has passed.    Saw [facility] during pandemic.  Pos barium test.  Unable to do gastroparesis motility test - threw up the eggs.  Hasn't been seen since. Dietician recommended liquid diet and liquid medications.  Thickest she had been getting down was yogurt, but now having trouble getting anything down.  Lap band is fully loosened.  All issues were attributed to meds.    Not really using medical cannabis - does help her stomach but makes her tired.      4 teeth pulled since getting buccal buprenorphine - at locations in [place] with buccal films.  Dry mouth with gabapentin.  No history of cavities prior.  Wants to be done with pain clinic - due to teeth,  provider turnover and had tried increased gabapentin for awhile.  Feels gabapentin not helpful - reduced dose, takes 1200 mg every morning.    Best friend got struck and killed by semi truck a month ago.  Sleeping a lot.  9.5 hrs sleep last night, but sometimes 15-18 hours if hasn't slept well for a few nights.  Sometimes wants to sleep just because she feels too crappy - nausea, tired, achy.      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   Can't attend     She eats 0-1 servings of fruits and vegetables daily.She consumes 2 sweetened beverage(s) daily.She exercises with enough effort to increase her heart rate 9 or less minutes per day.  She exercises with enough effort to increase her heart rate 3 or less days per week. She is missing 3 dose(s) of medications per week.    Today's PHQ-9         PHQ-9 Total Score: 12    PHQ-9 Q9 Thoughts of better off dead/self-harm past 2 weeks :   Not at all    How difficult have these problems made it for you to do your work, take care of things at home, or get along with other people: Somewhat difficult  Answers for HPI/ROS submitted by the patient on 7/11/2022  If you checked off any problems, how difficult have these problems made it for you to do your work, take care of things at home, or get along with other people?: Somewhat difficult  PHQ9 TOTAL SCORE: 12  How many servings of fruits and vegetables do you eat daily?: 0-1  On average, how many sweetened beverages do you drink each day (Examples: soda, juice, sweet tea, etc.  Do NOT count diet or artificially sweetened beverages)?: 2  How many minutes a day do you exercise enough to make your heart beat faster?: 9 or less  How many days a week do you exercise enough to make your heart beat faster?: 3 or less  How many days per week do you miss taking your medication?: 3  What is the reason for your visit today?: Follow up on evisit  When did your symptoms begin?: More than a month        Objective    BP  "(!) 138/90 (BP Location: Right arm, Patient Position: Sitting, Cuff Size: Adult Large)   Pulse 86   Temp 98.9  F (37.2  C) (Tympanic)   Resp 18   Ht 1.665 m (5' 5.55\")   Wt 93.3 kg (205 lb 9.6 oz)   LMP  (LMP Unknown)   SpO2 98%   BMI 33.64 kg/m    Body mass index is 33.64 kg/m .          .  ..  "

## 2022-07-11 NOTE — PATIENT INSTRUCTIONS
Stopping gabapentin entirely - may make less drowsy  try switching to liquid meds where able  Increase omeprazole to see if helps nausea and swallowing issues  If still nausea/vomiting add metoclopramide (moves stuff forward in stomach to intestines)    Try to have a regular sleep/wake schedule     I will get back to you on transition from belbuca oral film to skin patch     Set up follow up with me and Kelby     Recheck with me in 2-4 weeks, can be virtual  Reschedule physical

## 2022-07-11 NOTE — TELEPHONE ENCOUNTER
Reasonable Accommodation Request Form   Form received back signed  7/11/22  Faxed Back & Sent to be scanned to this encounter  Loren Orn Station Sec

## 2022-07-12 NOTE — TELEPHONE ENCOUNTER
Patient is transferring care and prescribing of all pain related medications to PCP.     ALAN Kim, NP-C  [facility]

## 2022-07-15 ENCOUNTER — MYC MEDICAL ADVICE (OUTPATIENT)
Dept: FAMILY MEDICINE | Facility: CLINIC | Age: 52
End: 2022-07-15

## 2022-07-15 DIAGNOSIS — G89.4 CHRONIC PAIN DISORDER: Primary | ICD-10-CM

## 2022-07-15 DIAGNOSIS — F43.21 GRIEF: ICD-10-CM

## 2022-07-15 RX ORDER — BUPRENORPHINE 20 UG/H
1 PATCH TRANSDERMAL WEEKLY
Qty: 4 PATCH | Refills: 0 | Status: SHIPPED | OUTPATIENT
Start: 2022-07-15 | End: 2022-07-29

## 2022-07-20 DIAGNOSIS — T88.7XXA SIDE EFFECT OF MEDICATION: ICD-10-CM

## 2022-07-20 RX ORDER — ONDANSETRON 4 MG/1
4-8 TABLET, FILM COATED ORAL EVERY 8 HOURS PRN
Qty: 20 TABLET | Refills: 1 | Status: SHIPPED | OUTPATIENT
Start: 2022-07-20 | End: 2022-09-29

## 2022-07-21 DIAGNOSIS — K59.00 CONSTIPATION, UNSPECIFIED CONSTIPATION TYPE: Primary | ICD-10-CM

## 2022-07-21 RX ORDER — LACTULOSE 10 G/15ML
SOLUTION ORAL; RECTAL
Qty: 473 ML | Refills: 2 | Status: SHIPPED | OUTPATIENT
Start: 2022-07-21 | End: 2022-12-08

## 2022-07-26 ENCOUNTER — TELEPHONE (OUTPATIENT)
Dept: PALLIATIVE MEDICINE | Facility: OTHER | Age: 52
End: 2022-07-26

## 2022-07-26 NOTE — TELEPHONE ENCOUNTER
Per :  Last filled on 6/6/22-43 day supply    As of 7/11/22 this medication was discontinued by the primary care provider

## 2022-07-26 NOTE — TELEPHONE ENCOUNTER
Received fax from pharmacy requesting refill(s) for     gabapentin (NEURONTIN) 600 MG tablet     Date last filled 06.06.2022    Pharmacy:     [facility], [place] - [address] [place] [address],    Christina Pierson  [facility] Visit Facilitator

## 2022-07-29 ENCOUNTER — VIRTUAL VISIT (OUTPATIENT)
Dept: FAMILY MEDICINE | Facility: CLINIC | Age: 52
End: 2022-07-29
Payer: COMMERCIAL

## 2022-07-29 DIAGNOSIS — K22.70 BARRETT'S ESOPHAGUS WITHOUT DYSPLASIA: ICD-10-CM

## 2022-07-29 DIAGNOSIS — R11.2 NAUSEA AND VOMITING, INTRACTABILITY OF VOMITING NOT SPECIFIED, UNSPECIFIED VOMITING TYPE: ICD-10-CM

## 2022-07-29 DIAGNOSIS — G89.4 CHRONIC PAIN DISORDER: ICD-10-CM

## 2022-07-29 DIAGNOSIS — K22.4 ESOPHAGEAL DYSMOTILITY: Primary | ICD-10-CM

## 2022-07-29 DIAGNOSIS — Z98.1 STATUS POST LAMINECTOMY WITH SPINAL FUSION: ICD-10-CM

## 2022-07-29 PROCEDURE — 99214 OFFICE O/P EST MOD 30 MIN: CPT | Mod: 95 | Performed by: PHYSICIAN ASSISTANT

## 2022-07-29 RX ORDER — OXYCODONE HCL 5 MG/5 ML
10 SOLUTION, ORAL ORAL EVERY 8 HOURS
Qty: 900 ML | Refills: 0 | Status: SHIPPED | OUTPATIENT
Start: 2022-08-09 | End: 2022-08-01

## 2022-07-29 RX ORDER — BUPRENORPHINE 20 UG/H
1 PATCH TRANSDERMAL WEEKLY
Qty: 4 PATCH | Refills: 0 | Status: SHIPPED | OUTPATIENT
Start: 2022-08-09 | End: 2022-08-01

## 2022-07-29 RX ORDER — OMEPRAZOLE 40 MG/1
CAPSULE, DELAYED RELEASE ORAL
Qty: 30 CAPSULE | Refills: 0 | Status: SHIPPED | OUTPATIENT
Start: 2022-07-29 | End: 2022-09-29 | Stop reason: DRUGHIGH

## 2022-07-29 NOTE — PROGRESS NOTES
Karla is a 52 year old who is being evaluated via a billable video visit.      How would you like to obtain your AVS? MyChart  If the video visit is dropped, the invitation should be resent by: Text to cell phone: [phone]  Will anyone else be joining your video visit? No    Assessment & Plan     Esophageal dysmotility  Guzman's esophagus without dysplasia  Nausea and vomiting, intractability of vomiting not specified, unspecified vomiting type  Increased omeprazole last visit, unclear how much improvement is from dose increase v adding reglan v switch to liquid form of medications.  Continue higher dose of omeprazole x 1 more month.  Discussed minimizing reglan as much as possible due to risk of abnormal muscle movement disorders.  - omeprazole (PRILOSEC) 40 MG DR capsule; Dump contents of 1 capsule into liquid/applesauce/yogurt and consume once daily.    Chronic pain disorder  Status post laminectomy with spinal fusion  Still learning if dermal patch will adhere well enough to work well.  Taking over from pain clinic.    - buprenorphine (BUTRANS) 20 MCG/HR WK patch; Place 1 patch onto the skin once a week  - oxyCODONE (ROXICODONE) 5 MG/5ML solution; Take 10 mLs (10 mg) by mouth every 8 hours    Patient Instructions   Continue meds without change for now.  After August let's try decreasing omeprazole  Use reglan sparingly (lowest dose and fewest times needed) to reduce risk of serious side effects:    Drug-induced extrapyramidal reaction may occur with metoclopramide.   Dystonic reactions - involuntary limb movements, facial grimacing, neck twisting spasm, eye rolling, rhythmic tongue protrusion, choppy speech, jaw clenching.  These generally reversible with drug discontinuation.    Akathisia (motor restlessness) reactions - feelings of anxiety, agitation, poor sleep, inability to sit still, pacing, and foot tapping has occurred. These are generally reversible with drug discontinuation or dose reduction.  "    Parkinsonism - slowed movements, hand or neck tremor, rigid posture of body arms or legs that cannot be easily worked out by self or with someone trying to move your body, and blank emotionless facial expression.  These tend to occur after being on the meds for some time.  These tend to improve a few months after the drug is stopped.     Tardive dyskinesia (TD) - involuntary movements of the face, tongue, trunk, and/or extremities. These may get better with time off medication or they may be permanent.    For video examples see Five Common Side Effects of Antipsychotic Medication by A Psych Nurse.  Fast forward to 6:55 into video.  [email or web address]        No follow-ups on file.    Rosaura Flores PA-C  [facility]    Kaushik Acosta is a 52 year old, presenting for the following health issues:  Gastric Problem      History of Present Illness       Reason for visit:  Followup    She eats 0-1 servings of fruits and vegetables daily.She consumes 1 sweetened beverage(s) daily.She exercises with enough effort to increase her heart rate 10 to 19 minutes per day.  She exercises with enough effort to increase her heart rate 3 or less days per week.   She is taking medications regularly.     Reglan is working really well with using twice daily - morning and dinner.    Has been able to eat, not throwing up.    Not having to use any more zofran.  Eating more.  Losing wt.  \"I haven't felt this good in a year and a half.\"  Has been taking omeprazole and atorvastatin as pills still.  Liquid prozac seems more helpful.    \"I'm excited that I feel better and now I actually want to go\" [on vacation 8/12-8/22].  Lyme seems better.  Knees did swell yesterday but seems to be getting less frequent.        Objective    Vitals - Patient Reported  Weight (Patient Reported): 90.6 kg (199 lb 12.8 oz)      Vitals:  No vitals were obtained today due to virtual " visit.        Video-Visit Details    Video Start Time: 11:48 AM    Type of service:  Video Visit    Video End Time:12:20 PM    Originating Location (pt. Location): Home    Distant Location (provider location):  [facility]     Platform used for Video Visit: Alesha Bacon

## 2022-07-29 NOTE — PATIENT INSTRUCTIONS
Set up physical/recheck in Sept  Continue meds without change for now.  After August let's try decreasing omeprazole  Use reglan sparingly (lowest dose and fewest times needed) to reduce risk of serious side effects:    Drug-induced extrapyramidal reaction may occur with metoclopramide.   Dystonic reactions - involuntary limb movements, facial grimacing, neck twisting spasm, eye rolling, rhythmic tongue protrusion, choppy speech, jaw clenching.  These generally reversible with drug discontinuation.    Akathisia (motor restlessness) reactions - feelings of anxiety, agitation, poor sleep, inability to sit still, pacing, and foot tapping has occurred. These are generally reversible with drug discontinuation or dose reduction.     Parkinsonism - slowed movements, hand or neck tremor, rigid posture of body arms or legs that cannot be easily worked out by self or with someone trying to move your body, and blank emotionless facial expression.  These tend to occur after being on the meds for some time.  These tend to improve a few months after the drug is stopped.     Tardive dyskinesia (TD) - involuntary movements of the face, tongue, trunk, and/or extremities. These may get better with time off medication or they may be permanent.    For video examples see Five Common Side Effects of Antipsychotic Medication by A Psych Nurse.  Fast forward to 6:55 into video.  [email or web address].com/[email or web address]

## 2022-08-01 ENCOUNTER — MYC MEDICAL ADVICE (OUTPATIENT)
Dept: FAMILY MEDICINE | Facility: CLINIC | Age: 52
End: 2022-08-01

## 2022-08-01 DIAGNOSIS — Z98.1 STATUS POST LAMINECTOMY WITH SPINAL FUSION: ICD-10-CM

## 2022-08-01 DIAGNOSIS — G89.4 CHRONIC PAIN DISORDER: ICD-10-CM

## 2022-08-01 RX ORDER — BUPRENORPHINE 20 UG/H
1 PATCH TRANSDERMAL WEEKLY
Qty: 4 PATCH | Refills: 0 | Status: SHIPPED | OUTPATIENT
Start: 2022-08-05 | End: 2022-08-29

## 2022-08-01 RX ORDER — OXYCODONE HCL 5 MG/5 ML
10 SOLUTION, ORAL ORAL EVERY 8 HOURS
Qty: 900 ML | Refills: 0 | Status: SHIPPED | OUTPATIENT
Start: 2022-08-09 | End: 2022-09-05

## 2022-08-04 ENCOUNTER — TELEPHONE (OUTPATIENT)
Dept: PALLIATIVE MEDICINE | Facility: OTHER | Age: 52
End: 2022-08-04

## 2022-08-09 ENCOUNTER — TELEPHONE (OUTPATIENT)
Dept: FAMILY MEDICINE | Facility: CLINIC | Age: 52
End: 2022-08-09

## 2022-08-09 ENCOUNTER — MYC MEDICAL ADVICE (OUTPATIENT)
Dept: FAMILY MEDICINE | Facility: CLINIC | Age: 52
End: 2022-08-09

## 2022-08-09 NOTE — TELEPHONE ENCOUNTER
Patient Quality Outreach    Patient is due for the following:   Colon Cancer Screening  Physical Preventive Adult Physical    Next Steps:   Schedule a Annual Wellness Visit    Type of outreach:    Sent hive01 message.      Questions for provider review:    None     Maira Jurado CMA

## 2022-08-10 DIAGNOSIS — F33.1 MODERATE RECURRENT MAJOR DEPRESSION (H): ICD-10-CM

## 2022-08-11 ENCOUNTER — MYC REFILL (OUTPATIENT)
Dept: FAMILY MEDICINE | Facility: CLINIC | Age: 52
End: 2022-08-11

## 2022-08-11 DIAGNOSIS — F33.1 MODERATE RECURRENT MAJOR DEPRESSION (H): ICD-10-CM

## 2022-08-12 RX ORDER — FLUOXETINE 20 MG/5ML
60 SOLUTION ORAL DAILY
Qty: 450 ML | Refills: 0 | OUTPATIENT
Start: 2022-08-12

## 2022-08-12 RX ORDER — FLUOXETINE 20 MG/5ML
60 SOLUTION ORAL DAILY
Qty: 450 ML | Refills: 3 | Status: SHIPPED | OUTPATIENT
Start: 2022-08-12 | End: 2022-08-12

## 2022-08-12 RX ORDER — FLUOXETINE 20 MG/5ML
60 SOLUTION ORAL DAILY
Qty: 450 ML | Refills: 0 | Status: SHIPPED | OUTPATIENT
Start: 2022-08-12 | End: 2022-09-13

## 2022-08-12 NOTE — TELEPHONE ENCOUNTER
"Requested Prescriptions   Pending Prescriptions Disp Refills     FLUoxetine (PROZAC) 20 MG/5ML solution 450 mL 0     Sig: Take 15 mLs (60 mg) by mouth daily       SSRIs Protocol Failed - 8/11/2022  3:28 PM        Failed - PHQ-9 score less than 5 in past 6 months     Please review last PHQ-9 score.           Passed - Medication is active on med list        Passed - Patient is age 18 or older        Passed - No active pregnancy on record        Passed - No positive pregnancy test in last 12 months        Passed - Recent (6 mo) or future (30 days) visit within the authorizing provider's specialty     Patient had office visit in the last 6 months or has a visit in the next 30 days with authorizing provider or within the authorizing provider's specialty.  See \"Patient Info\" tab in inbasket, or \"Choose Columns\" in Meds & Orders section of the refill encounter.               Last Written Prescription Date:  1/21/22  Last Fill Quantity: 11mL,  # refills: 0   Last office visit: 7/11/2022 with prescribing provider:     Future Office Visit:            "

## 2022-08-29 ENCOUNTER — MYC REFILL (OUTPATIENT)
Dept: FAMILY MEDICINE | Facility: CLINIC | Age: 52
End: 2022-08-29

## 2022-08-29 DIAGNOSIS — G89.4 CHRONIC PAIN DISORDER: ICD-10-CM

## 2022-08-29 RX ORDER — BUPRENORPHINE 20 UG/H
1 PATCH TRANSDERMAL WEEKLY
Qty: 4 PATCH | Refills: 0 | Status: SHIPPED | OUTPATIENT
Start: 2022-08-29 | End: 2022-09-29

## 2022-08-29 NOTE — TELEPHONE ENCOUNTER
Routing to ordering provider for consideration, not on refill protocol.           Charisse Orantes     RN MSN

## 2022-09-05 ENCOUNTER — MYC REFILL (OUTPATIENT)
Dept: FAMILY MEDICINE | Facility: CLINIC | Age: 52
End: 2022-09-05

## 2022-09-05 DIAGNOSIS — Z98.1 STATUS POST LAMINECTOMY WITH SPINAL FUSION: ICD-10-CM

## 2022-09-07 RX ORDER — OXYCODONE HCL 5 MG/5 ML
10 SOLUTION, ORAL ORAL EVERY 8 HOURS
Qty: 900 ML | Refills: 0 | Status: SHIPPED | OUTPATIENT
Start: 2022-09-15 | End: 2022-09-13

## 2022-09-13 ENCOUNTER — TELEPHONE (OUTPATIENT)
Dept: FAMILY MEDICINE | Facility: CLINIC | Age: 52
End: 2022-09-13

## 2022-09-13 ENCOUNTER — MYC MEDICAL ADVICE (OUTPATIENT)
Dept: FAMILY MEDICINE | Facility: CLINIC | Age: 52
End: 2022-09-13

## 2022-09-13 DIAGNOSIS — Z98.1 STATUS POST LAMINECTOMY WITH SPINAL FUSION: ICD-10-CM

## 2022-09-13 DIAGNOSIS — F33.1 MODERATE RECURRENT MAJOR DEPRESSION (H): ICD-10-CM

## 2022-09-13 RX ORDER — FLUOXETINE 20 MG/5ML
60 SOLUTION ORAL DAILY
Qty: 450 ML | Refills: 0 | Status: SHIPPED | OUTPATIENT
Start: 2022-09-13 | End: 2022-09-29

## 2022-09-13 RX ORDER — OXYCODONE HCL 5 MG/5 ML
10 SOLUTION, ORAL ORAL EVERY 8 HOURS
Qty: 900 ML | Refills: 0 | Status: SHIPPED | OUTPATIENT
Start: 2022-09-15 | End: 2022-09-29

## 2022-09-13 RX ORDER — OXYCODONE HCL 5 MG/5 ML
10 SOLUTION, ORAL ORAL EVERY 8 HOURS
Qty: 900 ML | Refills: 0 | Status: SHIPPED | OUTPATIENT
Start: 2022-09-15 | End: 2022-09-13

## 2022-09-13 RX ORDER — FLUOXETINE 20 MG/5ML
60 SOLUTION ORAL DAILY
Qty: 450 ML | Refills: 0 | Status: SHIPPED | OUTPATIENT
Start: 2022-09-13 | End: 2022-09-13

## 2022-09-13 NOTE — TELEPHONE ENCOUNTER
PC from [place] pharmacy, unable to get oxycodone and fluoxetine solution prescribed by Rosaura today.   Spoke with pt who states needs oral solution due to Guzman's esoph, gastroparesis. Pt states OK to send to [facility] pharm and will  there.  States this OK to wait for Rosaura tomorrow.  Rx's pended.  BENTLEY Lyons RN

## 2022-09-14 NOTE — TELEPHONE ENCOUNTER
Discussed oxycodone refill with Rosaura Flores PA-C and pharmacist at [facility] pharmacy.    Patient states may have lost or misplaced last RX of oxycodone.  [place] cabin.  [place] going to cabin today and will check.    I told Cynthia that if last RX is lost or misplaced or does not find it at the cabin, there will be no early refill or new RX before 9/29/22  According to our records Cynthia should have enough oxycodone to last until 9/29/22 when next refill is due.    Mariaa Sierra RN

## 2022-09-14 NOTE — TELEPHONE ENCOUNTER
Per patient request please call patient.  Figure what she was wondering but background info and questions from me:    She was wanting oxy refill prior to leaving [facility].  Pharmacy noting 8/9 15 day refill then 8/12 month refill (to start 8/17 I believe).  I recall refilling on 8/12 early for her last trip, but unsure why the 8/9 refill?  Smaller amount I can't recall?    With pharmacy feeling she has enough to get her through end of month, I'm reluctant to ok without understand the above better.

## 2022-09-20 ENCOUNTER — TELEPHONE (OUTPATIENT)
Dept: FAMILY MEDICINE | Facility: CLINIC | Age: 52
End: 2022-09-20

## 2022-09-20 NOTE — TELEPHONE ENCOUNTER
Patient Quality Outreach    Patient is due for the following:   Colon Cancer Screening  Breast Cancer Screening - Mammogram  Physical Preventive Adult Physical    Next Steps:   Patient has upcoming appointment, these items will be addressed at that time.    Type of outreach:    Chart review performed, no outreach needed.      Questions for provider review:    None     Maira Jurado CMA

## 2022-09-28 ENCOUNTER — MYC MEDICAL ADVICE (OUTPATIENT)
Dept: FAMILY MEDICINE | Facility: CLINIC | Age: 52
End: 2022-09-28

## 2022-09-28 NOTE — TELEPHONE ENCOUNTER
Domonique due to patient illness please change her appt tomorrow from physical to depression, pain, swallowing virtual recheck.

## 2022-09-29 ENCOUNTER — VIRTUAL VISIT (OUTPATIENT)
Dept: FAMILY MEDICINE | Facility: CLINIC | Age: 52
End: 2022-09-29
Payer: COMMERCIAL

## 2022-09-29 VITALS — WEIGHT: 192 LBS | BODY MASS INDEX: 31.42 KG/M2

## 2022-09-29 DIAGNOSIS — F11.20 NARCOTIC DEPENDENCE (H): ICD-10-CM

## 2022-09-29 DIAGNOSIS — K31.84 GASTROPARESIS: ICD-10-CM

## 2022-09-29 DIAGNOSIS — K22.4 ESOPHAGEAL DYSMOTILITY: ICD-10-CM

## 2022-09-29 DIAGNOSIS — G89.4 CHRONIC PAIN DISORDER: ICD-10-CM

## 2022-09-29 DIAGNOSIS — F33.1 MODERATE RECURRENT MAJOR DEPRESSION (H): Primary | ICD-10-CM

## 2022-09-29 DIAGNOSIS — E78.5 DYSLIPIDEMIA: ICD-10-CM

## 2022-09-29 DIAGNOSIS — K22.70 BARRETT'S ESOPHAGUS WITHOUT DYSPLASIA: ICD-10-CM

## 2022-09-29 DIAGNOSIS — E66.01 SEVERE OBESITY (BMI 35.0-39.9) WITH COMORBIDITY (H): ICD-10-CM

## 2022-09-29 DIAGNOSIS — J30.9 ALLERGIC RHINITIS, UNSPECIFIED SEASONALITY, UNSPECIFIED TRIGGER: ICD-10-CM

## 2022-09-29 DIAGNOSIS — J01.90 ACUTE SINUSITIS WITH SYMPTOMS > 10 DAYS: ICD-10-CM

## 2022-09-29 DIAGNOSIS — J98.01 BRONCHOSPASM: ICD-10-CM

## 2022-09-29 DIAGNOSIS — Z98.1 STATUS POST LAMINECTOMY WITH SPINAL FUSION: ICD-10-CM

## 2022-09-29 DIAGNOSIS — T88.7XXA SIDE EFFECT OF MEDICATION: ICD-10-CM

## 2022-09-29 PROCEDURE — 99214 OFFICE O/P EST MOD 30 MIN: CPT | Mod: 95 | Performed by: PHYSICIAN ASSISTANT

## 2022-09-29 RX ORDER — BUPRENORPHINE 20 UG/H
1 PATCH TRANSDERMAL WEEKLY
Qty: 4 PATCH | Refills: 0 | Status: SHIPPED | OUTPATIENT
Start: 2022-10-01 | End: 2022-12-08

## 2022-09-29 RX ORDER — ONDANSETRON 4 MG/1
4-8 TABLET, FILM COATED ORAL EVERY 8 HOURS PRN
Qty: 20 TABLET | Refills: 1 | Status: SHIPPED | OUTPATIENT
Start: 2022-09-29 | End: 2023-01-23

## 2022-09-29 RX ORDER — ALBUTEROL SULFATE 90 UG/1
2 AEROSOL, METERED RESPIRATORY (INHALATION) EVERY 4 HOURS PRN
Qty: 18 G | Refills: 3 | Status: SHIPPED | OUTPATIENT
Start: 2022-09-29 | End: 2024-06-13

## 2022-09-29 RX ORDER — OXYCODONE HCL 5 MG/5 ML
10 SOLUTION, ORAL ORAL EVERY 8 HOURS
Qty: 900 ML | Refills: 0 | Status: SHIPPED | OUTPATIENT
Start: 2022-09-29 | End: 2022-10-16 | Stop reason: ALTCHOICE

## 2022-09-29 RX ORDER — FLUOXETINE 20 MG/5ML
60 SOLUTION ORAL DAILY
Qty: 450 ML | Refills: 1 | Status: SHIPPED | OUTPATIENT
Start: 2022-09-29 | End: 2022-10-16 | Stop reason: ALTCHOICE

## 2022-09-29 RX ORDER — ATORVASTATIN CALCIUM 20 MG/1
20 TABLET, FILM COATED ORAL DAILY
Qty: 90 TABLET | Refills: 0 | Status: SHIPPED | OUTPATIENT
Start: 2022-09-29 | End: 2022-12-08

## 2022-09-29 RX ORDER — AMOXICILLIN AND CLAVULANATE POTASSIUM 400; 57 MG/5ML; MG/5ML
875 POWDER, FOR SUSPENSION ORAL 2 TIMES DAILY
Qty: 218 ML | Refills: 0 | Status: SHIPPED | OUTPATIENT
Start: 2022-09-29 | End: 2023-09-25

## 2022-09-29 RX ORDER — FLUTICASONE PROPIONATE 50 MCG
1-2 SPRAY, SUSPENSION (ML) NASAL 2 TIMES DAILY
Qty: 18.2 ML | Refills: 11 | Status: SHIPPED | OUTPATIENT
Start: 2022-09-29 | End: 2024-06-13

## 2022-09-29 RX ORDER — OMEPRAZOLE 40 MG/1
CAPSULE, DELAYED RELEASE ORAL
Qty: 30 CAPSULE | Refills: 0 | Status: CANCELLED | OUTPATIENT
Start: 2022-09-29

## 2022-09-29 RX ORDER — OXYMETAZOLINE HYDROCHLORIDE 0.05 G/100ML
2 SPRAY NASAL 2 TIMES DAILY PRN
Qty: 15 ML | Refills: 0 | Status: SHIPPED | OUTPATIENT
Start: 2022-09-29 | End: 2022-12-08

## 2022-09-29 RX ORDER — METOCLOPRAMIDE HYDROCHLORIDE 5 MG/5ML
5-10 SOLUTION ORAL 3 TIMES DAILY PRN
Qty: 900 ML | Refills: 1 | Status: SHIPPED | OUTPATIENT
Start: 2022-09-29 | End: 2023-07-07

## 2022-09-29 ASSESSMENT — ANXIETY QUESTIONNAIRES
2. NOT BEING ABLE TO STOP OR CONTROL WORRYING: NOT AT ALL
GAD7 TOTAL SCORE: 0
3. WORRYING TOO MUCH ABOUT DIFFERENT THINGS: NOT AT ALL
GAD7 TOTAL SCORE: 0
IF YOU CHECKED OFF ANY PROBLEMS ON THIS QUESTIONNAIRE, HOW DIFFICULT HAVE THESE PROBLEMS MADE IT FOR YOU TO DO YOUR WORK, TAKE CARE OF THINGS AT HOME, OR GET ALONG WITH OTHER PEOPLE: NOT DIFFICULT AT ALL
6. BECOMING EASILY ANNOYED OR IRRITABLE: NOT AT ALL
7. FEELING AFRAID AS IF SOMETHING AWFUL MIGHT HAPPEN: NOT AT ALL
5. BEING SO RESTLESS THAT IT IS HARD TO SIT STILL: NOT AT ALL
1. FEELING NERVOUS, ANXIOUS, OR ON EDGE: NOT AT ALL

## 2022-09-29 ASSESSMENT — PATIENT HEALTH QUESTIONNAIRE - PHQ9
SUM OF ALL RESPONSES TO PHQ QUESTIONS 1-9: 0
5. POOR APPETITE OR OVEREATING: NOT AT ALL

## 2022-09-29 ASSESSMENT — PAIN SCALES - GENERAL: PAINLEVEL: SEVERE PAIN (6)

## 2022-09-29 NOTE — PROGRESS NOTES
Karla is a 52 year old who is being evaluated via a billable video visit.      How would you like to obtain your AVS? MyChart  If the video visit is dropped, the invitation should be resent by: Text to cell phone: [phone]  Will anyone else be joining your video visit? No    Assessment & Plan     Moderate recurrent major depression (H)  Doing well  - FLUoxetine (PROZAC) 20 MG/5ML solution  Dispense: 450 mL; Refill: 1    Severe obesity (BMI 35.0-39.9) with comorbidity (H) - HTN  Improved but still uncontrolled weight  Continue her current efforts    Narcotic dependence (H)  Chronic pain disorder  Status post laminectomy with spinal fusion  stable  - buprenorphine (BUTRANS) 20 MCG/HR WK patch  Dispense: 4 patch; Refill: 0  - naloxone (NARCAN) 4 MG/0.1ML nasal spray  Dispense: 0.2 mL; Refill: 1  - oxyCODONE (ROXICODONE) 5 MG/5ML solution  Dispense: 900 mL; Refill: 0  - naloxone (NARCAN) 4 MG/0.1ML nasal spray  Dispense: 0.2 mL; Refill: 1    Guzman's esophagus without dysplasia  Gastroparesis  Esophageal dysmotility  Symptoms improved with switch to liquid meds.  Trial decrease omeprazole  -omeprazole 20 mg  - metoclopramide (REGLAN) 5 MG/5ML solution  Dispense: 900 mL; Refill: 1    Acute sinusitis with symptoms > 10 days  treat  - amoxicillin-clavulanate (AUGMENTIN) 400-57 MG/5ML suspension  Dispense: 218 mL; Refill: 0  - oxymetazoline (AFRIN NASAL SPRAY) 0.05 % nasal spray  Dispense: 15 mL; Refill: 0    Allergic rhinitis, unspecified seasonality, unspecified trigger  refill  - fluticasone (FLONASE) 50 MCG/ACT nasal spray  Dispense: 18.2 mL; Refill: 11    Dyslipidemia  refill  - atorvastatin (LIPITOR) 20 MG tablet  Dispense: 90 tablet; Refill: 0    Bronchospasm  refill  - albuterol (PROAIR HFA/PROVENTIL HFA/VENTOLIN HFA) 108 (90 Base) MCG/ACT inhaler  Dispense: 18 g; Refill: 3    Side effect of medication  Refill,uses when unable to take metoclopramide  - ondansetron (ZOFRAN) 4 MG tablet  Dispense: 20 tablet;  "Refill: 1    34 min spent on visit     Patient Instructions   Consider home covid test     Treat for sinus infection    Trial dose decrease of omeprazole    Due for physical and to renew narcotic contract    Get covid booster      Return in about 1 month (around 10/29/2022) for Routine preventive, with me.    RAQUEL Trotter [facility]    Kaushik Acosta is a 52 year old, presenting for the following health issues:  Recheck Medication and Sinus Problem      HPI     Sinus infection pt has had ear and teeth pain for the past 3 weeks.  Now also in chest as of 2 days ago.  Has been taking her flonase but also just restarted mucinex, zyrtec.    HTN - has checked a few times.  115/70.  Feels it is usually better when her wt is down some.  Currently wt is down to 192 lb, mostly from when her stomach was really acting up.    Medical cannabis doesn't help her pain but seems to help her eat.  Uses gummies.  Not having stomach issues since went to liquid meds.  \"Whatever I'm doing currently is working really well.  And I think the better sleep I'm getting is making me less cranky.\"  Reglan still very useful.  \"Would be upset if you took it away.\"      Depression and Anxiety Follow-Up    How are you doing with your depression since your last visit? Improved     How are you doing with your anxiety since your last visit?  Improved     Are you having other symptoms that might be associated with depression or anxiety? No    Have you had a significant life event? No     Do you have any concerns with your use of alcohol or other drugs? No    A lot going on.  Daughter and granddaughter live with her.  Granddaughter starting school tomorrow.  Terminally ill mother in law.  Friend's  with advanced cancer.  Mom with DWI, running her around. None of this overwhelming her.      Social History     Tobacco Use     Smoking status: Current Some Day Smoker     Packs/day: 0.50     Years: 30.00    "  Pack years: 15.00     Types: Cigarettes     Smokeless tobacco: Never Used     Tobacco comment: started smoking age 22.  never more than 0.5 ppd   Vaping Use     Vaping Use: Never used   Substance Use Topics     Alcohol use: Not Currently     Alcohol/week: 0.0 standard drinks     Drug use: Yes     Types: Oxycodone     PHQ 7/11/2022 8/29/2022 9/29/2022   PHQ-9 Total Score 12 0 0   Q9: Thoughts of better off dead/self-harm past 2 weeks Not at all Not at all Not at all     NANCI-7 SCORE 7/11/2022 8/29/2022 9/29/2022   Total Score - - -   Total Score - 0 (minimal anxiety) -   Total Score 1 0 0     Last PHQ-9 9/29/2022   1.  Little interest or pleasure in doing things 0   2.  Feeling down, depressed, or hopeless 0   3.  Trouble falling or staying asleep, or sleeping too much 0   4.  Feeling tired or having little energy 0   5.  Poor appetite or overeating 0   6.  Feeling bad about yourself 0   7.  Trouble concentrating 0   8.  Moving slowly or restless 0   Q9: Thoughts of better off dead/self-harm past 2 weeks 0   PHQ-9 Total Score 0   Difficulty at work, home, or with people Not difficult at all     NANCI-7  9/29/2022   1. Feeling nervous, anxious, or on edge 0   2. Not being able to stop or control worrying 0   3. Worrying too much about different things 0   4. Trouble relaxing 0   5. Being so restless that it is hard to sit still 0   6. Becoming easily annoyed or irritable 0   7. Feeling afraid, as if something awful might happen 0   NANCI-7 Total Score 0   If you checked any problems, how difficult have they made it for you to do your work, take care of things at home, or get along with other people? Not difficult at all       Objective    Vitals - Patient Reported  Systolic (Patient Reported): 115  Diastolic (Patient Reported): 70  Weight (Patient Reported): 87.1 kg (192 lb)  Pain Score: Severe Pain (6)      Vitals:  No vitals were obtained today due to virtual visit.          Video-Visit Details    Video Start Time: 2:28  PM    Type of service:  Video Visit    Video End Time:2:50 PM    Originating Location (pt. Location): Home    Distant Location (provider location):  [facility]     Platform used for Video Visit: [employer]

## 2022-09-29 NOTE — PATIENT INSTRUCTIONS
Consider home covid test     Treat for sinus infection    Trial dose decrease of omeprazole    Due for physical and to renew narcotic contract    Get covid booster

## 2022-10-10 ENCOUNTER — HEALTH MAINTENANCE LETTER (OUTPATIENT)
Age: 52
End: 2022-10-10

## 2022-10-12 ENCOUNTER — MYC MEDICAL ADVICE (OUTPATIENT)
Dept: FAMILY MEDICINE | Facility: CLINIC | Age: 52
End: 2022-10-12

## 2022-10-12 DIAGNOSIS — F11.20 NARCOTIC DEPENDENCE (H): ICD-10-CM

## 2022-10-12 DIAGNOSIS — F41.1 GENERALIZED ANXIETY DISORDER: ICD-10-CM

## 2022-10-12 DIAGNOSIS — K22.70 BARRETT'S ESOPHAGUS WITHOUT DYSPLASIA: ICD-10-CM

## 2022-10-12 DIAGNOSIS — G89.4 CHRONIC PAIN DISORDER: Primary | ICD-10-CM

## 2022-10-12 DIAGNOSIS — F33.9 RECURRENT MAJOR DEPRESSIVE DISORDER, REMISSION STATUS UNSPECIFIED (H): ICD-10-CM

## 2022-10-12 DIAGNOSIS — Z98.1 STATUS POST LAMINECTOMY WITH SPINAL FUSION: ICD-10-CM

## 2022-10-16 RX ORDER — OXYCODONE HYDROCHLORIDE 10 MG/1
10 TABLET ORAL 3 TIMES DAILY PRN
Qty: 90 TABLET | Refills: 0 | Status: SHIPPED | OUTPATIENT
Start: 2022-10-27 | End: 2022-10-26

## 2022-10-25 ENCOUNTER — MYC MEDICAL ADVICE (OUTPATIENT)
Dept: FAMILY MEDICINE | Facility: CLINIC | Age: 52
End: 2022-10-25

## 2022-10-25 DIAGNOSIS — Z86.19 HISTORY OF COLD SORES: ICD-10-CM

## 2022-10-25 DIAGNOSIS — G89.4 CHRONIC PAIN DISORDER: ICD-10-CM

## 2022-10-25 DIAGNOSIS — F11.20 NARCOTIC DEPENDENCE (H): ICD-10-CM

## 2022-10-26 RX ORDER — VALACYCLOVIR HYDROCHLORIDE 500 MG/1
500 TABLET, FILM COATED ORAL DAILY
Qty: 30 TABLET | Refills: 0 | Status: SHIPPED | OUTPATIENT
Start: 2022-10-26 | End: 2022-12-20

## 2022-10-26 RX ORDER — OXYCODONE HYDROCHLORIDE 10 MG/1
10 TABLET ORAL 3 TIMES DAILY PRN
Qty: 90 TABLET | Refills: 0 | Status: SHIPPED | OUTPATIENT
Start: 2022-10-27 | End: 2022-11-14

## 2022-11-11 ENCOUNTER — MYC MEDICAL ADVICE (OUTPATIENT)
Dept: FAMILY MEDICINE | Facility: CLINIC | Age: 52
End: 2022-11-11

## 2022-11-11 DIAGNOSIS — F33.9 RECURRENT MAJOR DEPRESSIVE DISORDER, REMISSION STATUS UNSPECIFIED (H): ICD-10-CM

## 2022-11-11 DIAGNOSIS — K22.70 BARRETT'S ESOPHAGUS WITHOUT DYSPLASIA: ICD-10-CM

## 2022-11-11 DIAGNOSIS — F41.1 GENERALIZED ANXIETY DISORDER: ICD-10-CM

## 2022-11-11 DIAGNOSIS — F11.20 NARCOTIC DEPENDENCE (H): ICD-10-CM

## 2022-11-14 RX ORDER — OXYCODONE HYDROCHLORIDE 10 MG/1
10 TABLET ORAL 3 TIMES DAILY PRN
Qty: 90 TABLET | Refills: 0 | Status: SHIPPED | OUTPATIENT
Start: 2022-11-24 | End: 2022-11-21

## 2022-11-20 ENCOUNTER — E-VISIT (OUTPATIENT)
Dept: URGENT CARE | Facility: CLINIC | Age: 52
End: 2022-11-20
Payer: COMMERCIAL

## 2022-11-20 ENCOUNTER — TELEPHONE (OUTPATIENT)
Dept: URGENT CARE | Facility: URGENT CARE | Age: 52
End: 2022-11-20

## 2022-11-20 DIAGNOSIS — J01.90 ACUTE SINUSITIS WITH SYMPTOMS > 10 DAYS: Primary | ICD-10-CM

## 2022-11-20 DIAGNOSIS — J01.90 ACUTE SINUSITIS WITH SYMPTOMS > 10 DAYS: ICD-10-CM

## 2022-11-20 PROCEDURE — 99421 OL DIG E/M SVC 5-10 MIN: CPT | Performed by: PHYSICIAN ASSISTANT

## 2022-11-20 RX ORDER — DOXYCYCLINE HYCLATE 100 MG
100 TABLET ORAL 2 TIMES DAILY
Qty: 14 TABLET | Refills: 0 | Status: SHIPPED | OUTPATIENT
Start: 2022-11-20 | End: 2022-11-20

## 2022-11-20 RX ORDER — DOXYCYCLINE HYCLATE 100 MG
100 TABLET ORAL 2 TIMES DAILY
Qty: 14 TABLET | Refills: 0 | Status: SHIPPED | OUTPATIENT
Start: 2022-11-20 | End: 2022-12-08

## 2022-11-20 NOTE — TELEPHONE ENCOUNTER
Please resend pt abx prescription to [facility].     BLUE Stiles, [facility] November 20, 2022 2:29 PM

## 2022-11-20 NOTE — PATIENT INSTRUCTIONS
Sinusitis (Antibiotic Treatment)    The sinuses are air-filled spaces within the bones of the face. They connect to the inside of the nose. Sinusitis is an inflammation of the tissue that lines the sinuses. Sinusitis can occur during a cold. It can also happen due to allergies to pollens and other particles in the air. Sinusitis can cause symptoms of sinus congestion and a feeling of fullness. A sinus infection causes fever, headache, and facial pain. There is often green or yellow fluid draining from the nose or into the back of the throat (post-nasal drip). You have been given antibiotics to treat this condition.   Home care    Take the full course of antibiotics as instructed. Don't stop taking them, even when you feel better.    Drink plenty of water, hot tea, and other liquids as directed by the healthcare provider. This may help thin nasal mucus. It also may help your sinuses drain fluids.    Heat may help soothe painful areas of your face. Use a towel soaked in hot water. Or,  the shower and direct the warm spray onto your face. Using a vaporizer along with a menthol rub at night may also help soothe symptoms.     An expectorant with guaifenesin may help thin nasal mucus and help your sinuses drain fluids. Talk with your provider or pharmacists before taking an over-the-counter (OTC) medicine if you have any questions about it or its side effects..    You can use an OTC decongestant, unless a similar medicine was prescribed to you. Nasal sprays work the fastest. Use one that contains phenylephrine or oxymetazoline. First blow your nose gently. Then use the spray. Don't use these medicines more often than directed on the label. If you do, your symptoms may get worse. You may also take pills that contain pseudoephedrine. Don t use products that combine multiple medicines. This is because side effects may be increased. Read labels. You can also ask the pharmacist for help. (People with high blood  pressure should not use decongestants. They can raise blood pressure.) Talk with your provider or pharmacist if you have any questions about the medicine..    OTC antihistamines may help if allergies contributed to your sinusitis. Talk with your provider or pharmacist if you have any questions about the medicine..    Don't use nasal rinses or irrigation during an acute sinus infection, unless your healthcare provider tells you to. Rinsing may spread the infection to other areas in your sinuses.    Use acetaminophen or ibuprofen to control pain, unless another pain medicine was prescribed to you. If you have chronic liver or kidney disease or ever had a stomach ulcer, talk with your healthcare provider before using these medicines. Never give aspirin to anyone under age 18 who is ill with a fever. It may cause severe liver damage.    Don't smoke. This can make symptoms worse.    Follow-up care  Follow up with your healthcare provider, or as advised.   When to seek medical advice  Call your healthcare provider if any of these occur:     Facial pain or headache that gets worse    Stiff neck    Unusual drowsiness or confusion    Swelling of your forehead or eyelids    Symptoms don't go away in 10 days    Vision problems, such as blurred or double vision    Fever of 100.4 F (38 C) or higher, or as directed by your healthcare provider  Call 911  Call 911 if any of these occur:     Seizure    Trouble breathing    Feeling dizzy or faint    Fingernails, skin or lips look blue, purple , or gray  Prevention  Here are steps you can take to help prevent an infection:     Keep good hand washing habits.    Don t have close contact with people who have sore throats, colds, or other upper respiratory infections.    Don t smoke, and stay away from secondhand smoke.    Stay up to date with of your vaccines.  [employer] last reviewed this educational content on 12/1/2019 2000-2021 [employer]. All rights reserved. This  information is not intended as a substitute for professional medical care. Always follow your healthcare professional's instructions.        Dear Cynthia Lyons    After reviewing your responses, I've been able to diagnose you with sinobronchitis.     Based on your responses and diagnosis, I have prescribed doxycycline   to treat your symptoms. I have sent this to your pharmacy.?     It is also important to stay well hydrated, get lots of rest and take over-the-counter decongestants,?tylenol?or ibuprofen if you?are able to?take those medications per your primary care provider to help relieve discomfort.?     It is important that you take?all of?your prescribed medication even if your symptoms are improving after a few doses.? Taking?all of?your medicine helps prevent the symptoms from returning.?     If your symptoms worsen, you develop severe headache, vomiting, high fever (>102), or are not improving in 7 days, please contact your primary care provider for an appointment or visit any of our convenient Walk-in Care or Urgent Care Centers to be seen which can be found on our website?here.?     Thanks again for choosing?us?as your health care partner,?   ?  Ericka Walsh PA-C?

## 2022-11-20 NOTE — TELEPHONE ENCOUNTER
Reason for Call:  Medication or medication refill:    Do you use a [facility]?  Name of the pharmacy and phone number for the current request:  [facility]     Name of the medication requested:doxycycline hyclate (VIBRA-TABS) 100 MG taablet    Other request: Need new script sent to [facility]  pharmacy. [facility] pharmacy is closed.    Can we leave a detailed message on this number? YES    Phone number patient can be reached at: Cell number on file:    Telephone Information:   Mobile [phone]       Best Time: anytime    Call taken on 11/20/2022 at 2:21 PM by Gabby Garcia

## 2022-11-21 ENCOUNTER — MYC MEDICAL ADVICE (OUTPATIENT)
Dept: FAMILY MEDICINE | Facility: CLINIC | Age: 52
End: 2022-11-21

## 2022-11-21 DIAGNOSIS — F11.20 NARCOTIC DEPENDENCE (H): ICD-10-CM

## 2022-11-21 RX ORDER — OXYCODONE HYDROCHLORIDE 10 MG/1
10 TABLET ORAL 3 TIMES DAILY PRN
Qty: 90 TABLET | Refills: 0 | Status: SHIPPED | OUTPATIENT
Start: 2022-11-23 | End: 2022-12-08

## 2022-12-05 ENCOUNTER — MYC REFILL (OUTPATIENT)
Dept: FAMILY MEDICINE | Facility: CLINIC | Age: 52
End: 2022-12-05

## 2022-12-05 DIAGNOSIS — G89.4 CHRONIC PAIN DISORDER: ICD-10-CM

## 2022-12-08 ENCOUNTER — OFFICE VISIT (OUTPATIENT)
Dept: FAMILY MEDICINE | Facility: CLINIC | Age: 52
End: 2022-12-08
Payer: COMMERCIAL

## 2022-12-08 ENCOUNTER — MYC MEDICAL ADVICE (OUTPATIENT)
Dept: FAMILY MEDICINE | Facility: CLINIC | Age: 52
End: 2022-12-08

## 2022-12-08 VITALS
OXYGEN SATURATION: 97 % | HEART RATE: 74 BPM | SYSTOLIC BLOOD PRESSURE: 130 MMHG | WEIGHT: 189.2 LBS | BODY MASS INDEX: 30.41 KG/M2 | TEMPERATURE: 98.2 F | DIASTOLIC BLOOD PRESSURE: 89 MMHG | HEIGHT: 66 IN | RESPIRATION RATE: 20 BRPM

## 2022-12-08 DIAGNOSIS — E78.5 DYSLIPIDEMIA: ICD-10-CM

## 2022-12-08 DIAGNOSIS — F41.1 GENERALIZED ANXIETY DISORDER: ICD-10-CM

## 2022-12-08 DIAGNOSIS — F11.20 NARCOTIC DEPENDENCE (H): Primary | ICD-10-CM

## 2022-12-08 DIAGNOSIS — K31.84 GASTROPARESIS: ICD-10-CM

## 2022-12-08 DIAGNOSIS — F33.9 RECURRENT MAJOR DEPRESSIVE DISORDER, REMISSION STATUS UNSPECIFIED (H): ICD-10-CM

## 2022-12-08 DIAGNOSIS — G89.4 CHRONIC PAIN DISORDER: ICD-10-CM

## 2022-12-08 PROBLEM — E66.811 CLASS 1 OBESITY DUE TO EXCESS CALORIES WITH SERIOUS COMORBIDITY IN ADULT: Status: ACTIVE | Noted: 2017-10-05

## 2022-12-08 PROBLEM — E66.09 CLASS 1 OBESITY DUE TO EXCESS CALORIES WITH SERIOUS COMORBIDITY IN ADULT: Status: ACTIVE | Noted: 2017-10-05

## 2022-12-08 PROCEDURE — 99214 OFFICE O/P EST MOD 30 MIN: CPT | Performed by: PHYSICIAN ASSISTANT

## 2022-12-08 RX ORDER — OXYCODONE HYDROCHLORIDE 10 MG/1
10 TABLET ORAL 3 TIMES DAILY PRN
Qty: 90 TABLET | Refills: 0 | Status: SHIPPED | OUTPATIENT
Start: 2022-12-21 | End: 2022-12-21

## 2022-12-08 RX ORDER — ONDANSETRON 4 MG/1
4-8 TABLET, FILM COATED ORAL EVERY 8 HOURS PRN
Qty: 20 TABLET | Refills: 1 | Status: CANCELLED | OUTPATIENT
Start: 2022-12-08

## 2022-12-08 RX ORDER — VALACYCLOVIR HYDROCHLORIDE 500 MG/1
500 TABLET, FILM COATED ORAL DAILY
Qty: 30 TABLET | Refills: 0 | Status: CANCELLED | OUTPATIENT
Start: 2022-12-08

## 2022-12-08 ASSESSMENT — PAIN SCALES - GENERAL: PAINLEVEL: SEVERE PAIN (6)

## 2022-12-08 NOTE — PROGRESS NOTES
Assessment & Plan     Narcotic dependence (H)  Chronic pain disorder  Refill stable, is back to tolerating pills rather than liquid medication  - oxyCODONE IR (ROXICODONE) 10 MG tablet; Take 1 tablet (10 mg) by mouth 3 times daily as needed for moderate to severe pain    Gastroparesis  improved    Recurrent major depressive disorder, remission status unspecified (H)  Generalized anxiety disorder  Refill stable  - FLUoxetine (PROZAC) 20 MG capsule; Take 3 capsules (60 mg) by mouth daily    Dyslipidemia  Recheck after stopping statin due to recent wt loss  - Lipid panel reflex to direct LDL Non-fasting; Future    Patient Instructions   Can try off atorvastatin due to weight loss  Lab in 1-2 months  Set up physical       No follow-ups on file.    Rosaura Flores PA-C  [facility]    Kaushik Acosta is a 52 year old, presenting for the following health issues:  Back Pain    Back Pain     History of Present Illness       Back Pain:  She presents for follow up of back pain. Patient's back pain is a chronic problem.  Location of back pain:  Right lower back, left lower back, right middle of back, left middle of back, right side of neck, left side of neck, right buttock and left buttock  Description of back pain: burning, dull ache, gnawing, sharp, shooting and stabbing  Back pain spreads: nowhere    Since patient first noticed back pain, pain is: gradually worsening  Does back pain interfere with her job:  Yes    Reason for visit:  Followup    Pain is some worse with the weather.  Tylenol seems to cause her stomach to hurt.    Early satiety.  No heartburn on her omeprazole.  Dairy and red meat are difficult.  Does not feel is related to her gastroparesis and wt loss has slowed.    She eats 0-1 servings of fruits and vegetables daily.She consumes 1 sweetened beverage(s) daily.She exercises with enough effort to increase her heart rate 9 or less minutes per day.  She exercises with  "enough effort to increase her heart rate 3 or less days per week.   She is taking medications regularly.         Objective    /89 (BP Location: Right arm, Patient Position: Sitting, Cuff Size: Adult Regular)   Pulse 74   Temp 98.2  F (36.8  C) (Tympanic)   Resp 20   Ht 1.665 m (5' 5.55\")   Wt 85.8 kg (189 lb 3.2 oz)   LMP  (LMP Unknown)   SpO2 97%   BMI 30.96 kg/m    Body mass index is 30.96 kg/m .    Wt Readings from Last 3 Encounters:   12/08/22 85.8 kg (189 lb 3.2 oz)   09/29/22 87.1 kg (192 lb)   07/11/22 93.3 kg (205 lb 9.6 oz)           "

## 2022-12-08 NOTE — NURSING NOTE
"Chief Complaint   Patient presents with     Back Pain       Initial LMP  (LMP Unknown)  Estimated body mass index is 31.42 kg/m  as calculated from the following:    Height as of 7/11/22: 1.665 m (5' 5.55\").    Weight as of 9/29/22: 87.1 kg (192 lb).    Patient presents to the clinic using No DME    Is there anyone who you would like to be able to receive your results? No  If yes have patient fill out CICI    "

## 2022-12-20 ENCOUNTER — MYC REFILL (OUTPATIENT)
Dept: FAMILY MEDICINE | Facility: CLINIC | Age: 52
End: 2022-12-20

## 2022-12-20 DIAGNOSIS — Z86.19 HISTORY OF COLD SORES: ICD-10-CM

## 2022-12-20 DIAGNOSIS — K22.70 BARRETT'S ESOPHAGUS WITHOUT DYSPLASIA: ICD-10-CM

## 2022-12-21 ENCOUNTER — MYC MEDICAL ADVICE (OUTPATIENT)
Dept: FAMILY MEDICINE | Facility: CLINIC | Age: 52
End: 2022-12-21

## 2022-12-21 DIAGNOSIS — F11.20 NARCOTIC DEPENDENCE (H): ICD-10-CM

## 2022-12-21 DIAGNOSIS — G89.4 CHRONIC PAIN DISORDER: ICD-10-CM

## 2022-12-21 RX ORDER — VALACYCLOVIR HYDROCHLORIDE 500 MG/1
500 TABLET, FILM COATED ORAL DAILY
Qty: 30 TABLET | Refills: 5 | Status: ON HOLD | OUTPATIENT
Start: 2022-12-21 | End: 2023-07-03

## 2022-12-21 RX ORDER — OXYCODONE HYDROCHLORIDE 10 MG/1
10 TABLET ORAL 3 TIMES DAILY PRN
Qty: 90 TABLET | Refills: 0 | Status: SHIPPED | OUTPATIENT
Start: 2022-12-21 | End: 2023-01-04

## 2022-12-21 NOTE — TELEPHONE ENCOUNTER
Routing to provider for consideration, protocol not met   Antivirals for Herpes Protocol Failed 12/20/2022 12:02 PM   Protocol Details  Normal serum creatinine on file in past 12 months          Charisse Orantes RN MSN

## 2023-01-02 NOTE — TELEPHONE ENCOUNTER
I just e-prescribed a Rx for the Doxycycline to the [facility] pharmacy.      Damien Gardner MD     Name of Patient : TAMI DUNHAM  MRN: [id number]  Date of visit: 01-02-23       Subjective: Patient seen and examined. No new events except as noted.   calm     REVIEW OF SYSTEMS:    CONSTITUTIONAL: No weakness, fevers or chills  EYES/ENT: No visual changes;  No vertigo or throat pain   NECK: No pain or stiffness  RESPIRATORY: No cough, wheezing, hemoptysis; No shortness of breath  CARDIOVASCULAR: No chest pain or palpitations  GASTROINTESTINAL: No abdominal or epigastric pain. No nausea, vomiting, or hematemesis; No diarrhea or constipation. No melena or hematochezia.  GENITOURINARY: No dysuria, frequency or hematuria  NEUROLOGICAL: No numbness or weakness  SKIN: No itching, burning, rashes, or lesions   All other review of systems is negative unless indicated above.    MEDICATIONS:  MEDICATIONS  (STANDING):  aspirin enteric coated 81 milliGRAM(s) Oral daily  atorvastatin 40 milliGRAM(s) Oral at bedtime  bisacodyl Suppository 10 milliGRAM(s) Rectal daily  chlorhexidine 2% Cloths 1 Application(s) Topical daily  dextrose 50% Injectable 25 Gram(s) IV Push once  dextrose 50% Injectable 12.5 Gram(s) IV Push once  dextrose 50% Injectable 25 Gram(s) IV Push once  dextrose Oral Gel 15 Gram(s) Oral once  diphtheria/tetanus/pertussis (acellular) Vaccine (Adacel) 0.5 milliLiter(s) IntraMuscular once  glucagon  Injectable 1 milliGRAM(s) IntraMuscular once  heparin  Infusion.  Unit(s)/Hr (18 mL/Hr) IV Continuous <Continuous>  lacosamide IVPB 100 milliGRAM(s) IV Intermittent every 12 hours  levETIRAcetam  IVPB 1500 milliGRAM(s) IV Intermittent every 12 hours  lidocaine   4% Patch 1 Patch Transdermal every 24 hours  multivitamin 1 Tablet(s) Oral daily      PHYSICAL EXAM:  T(C): 37.1 (01-02-23 @ 21:01), Max: 37.1 (01-02-23 @ 21:01)  HR: 84 (01-02-23 @ 21:01) (76 - 84)  BP: 115/67 (01-02-23 @ 21:01) (115/67 - 124/78)  RR: 18 (01-02-23 @ 21:01) (17 - 18)  SpO2: 98% (01-02-23 @ 21:01) (97% - 99%)  Wt(kg): --  I&O's Summary        Appearance: Normal	  HEENT:  PERRLA   Lymphatic: No lymphadenopathy   Cardiovascular: Normal S1 S2, no JVD  Respiratory: normal effort , clear  Gastrointestinal:  Soft, Non-tender  Skin: No rashes,  warm to touch  Psychiatry:  Mood & affect appropriate  Musculuskeletal: No edema      All labs, Imaging and EKGs personally reviewed                             11.2   5.84  )-----------( 220      ( 02 Jan 2023 06:30 )             34.2               01-02    138  |  104  |  12  ----------------------------<  100<H>  3.8   |  26  |  0.71    Ca    8.5      02 Jan 2023 06:30  Phos  4.0     01-02  Mg     1.90     01-02      PTT - ( 02 Jan 2023 18:57 )  PTT:53.4 sec

## 2023-01-03 ENCOUNTER — MYC REFILL (OUTPATIENT)
Dept: FAMILY MEDICINE | Facility: CLINIC | Age: 53
End: 2023-01-03

## 2023-01-03 DIAGNOSIS — F11.20 NARCOTIC DEPENDENCE (H): ICD-10-CM

## 2023-01-03 DIAGNOSIS — G89.4 CHRONIC PAIN DISORDER: ICD-10-CM

## 2023-01-03 NOTE — TELEPHONE ENCOUNTER
Requested Prescriptions   Pending Prescriptions Disp Refills    Buprenorphine HCl (BELBUCA) 600 MCG FILM buccal film 60 each 0     Sig: Place 1 Film (600 mcg) inside cheek every 12 hours       There is no refill protocol information for this order

## 2023-01-04 RX ORDER — OXYCODONE HYDROCHLORIDE 10 MG/1
10 TABLET ORAL 3 TIMES DAILY PRN
Qty: 84 TABLET | Refills: 0 | Status: SHIPPED | OUTPATIENT
Start: 2023-01-18 | End: 2023-01-13

## 2023-01-06 ENCOUNTER — E-VISIT (OUTPATIENT)
Dept: FAMILY MEDICINE | Facility: CLINIC | Age: 53
End: 2023-01-06
Payer: COMMERCIAL

## 2023-01-06 DIAGNOSIS — F43.0 ACUTE REACTION TO STRESS: ICD-10-CM

## 2023-01-06 DIAGNOSIS — G89.4 CHRONIC PAIN DISORDER: ICD-10-CM

## 2023-01-06 DIAGNOSIS — F33.1 MODERATE RECURRENT MAJOR DEPRESSION (H): Primary | ICD-10-CM

## 2023-01-06 DIAGNOSIS — F41.1 GENERALIZED ANXIETY DISORDER: ICD-10-CM

## 2023-01-06 DIAGNOSIS — F11.20 NARCOTIC DEPENDENCE (H): ICD-10-CM

## 2023-01-06 PROCEDURE — 99421 OL DIG E/M SVC 5-10 MIN: CPT | Performed by: PHYSICIAN ASSISTANT

## 2023-01-06 RX ORDER — BUPROPION HYDROCHLORIDE 150 MG/1
150 TABLET ORAL EVERY MORNING
Qty: 30 TABLET | Refills: 2 | Status: SHIPPED | OUTPATIENT
Start: 2023-01-06 | End: 2023-05-01

## 2023-01-06 ASSESSMENT — ANXIETY QUESTIONNAIRES
6. BECOMING EASILY ANNOYED OR IRRITABLE: NEARLY EVERY DAY
GAD7 TOTAL SCORE: 10
4. TROUBLE RELAXING: SEVERAL DAYS
5. BEING SO RESTLESS THAT IT IS HARD TO SIT STILL: SEVERAL DAYS
GAD7 TOTAL SCORE: 10
7. FEELING AFRAID AS IF SOMETHING AWFUL MIGHT HAPPEN: MORE THAN HALF THE DAYS
1. FEELING NERVOUS, ANXIOUS, OR ON EDGE: SEVERAL DAYS
8. IF YOU CHECKED OFF ANY PROBLEMS, HOW DIFFICULT HAVE THESE MADE IT FOR YOU TO DO YOUR WORK, TAKE CARE OF THINGS AT HOME, OR GET ALONG WITH OTHER PEOPLE?: EXTREMELY DIFFICULT
3. WORRYING TOO MUCH ABOUT DIFFERENT THINGS: SEVERAL DAYS
GAD7 TOTAL SCORE: 10
7. FEELING AFRAID AS IF SOMETHING AWFUL MIGHT HAPPEN: MORE THAN HALF THE DAYS
2. NOT BEING ABLE TO STOP OR CONTROL WORRYING: SEVERAL DAYS

## 2023-01-06 ASSESSMENT — PATIENT HEALTH QUESTIONNAIRE - PHQ9
10. IF YOU CHECKED OFF ANY PROBLEMS, HOW DIFFICULT HAVE THESE PROBLEMS MADE IT FOR YOU TO DO YOUR WORK, TAKE CARE OF THINGS AT HOME, OR GET ALONG WITH OTHER PEOPLE: EXTREMELY DIFFICULT
SUM OF ALL RESPONSES TO PHQ QUESTIONS 1-9: 18
SUM OF ALL RESPONSES TO PHQ QUESTIONS 1-9: 18

## 2023-01-07 ASSESSMENT — ANXIETY QUESTIONNAIRES: GAD7 TOTAL SCORE: 10

## 2023-01-07 ASSESSMENT — PATIENT HEALTH QUESTIONNAIRE - PHQ9: SUM OF ALL RESPONSES TO PHQ QUESTIONS 1-9: 18

## 2023-01-13 RX ORDER — OXYCODONE HYDROCHLORIDE 10 MG/1
10 TABLET ORAL 3 TIMES DAILY PRN
Qty: 54 TABLET | Refills: 0 | Status: SHIPPED | OUTPATIENT
Start: 2023-01-18 | End: 2023-01-27

## 2023-01-17 ENCOUNTER — MYC MEDICAL ADVICE (OUTPATIENT)
Dept: FAMILY MEDICINE | Facility: CLINIC | Age: 53
End: 2023-01-17
Payer: COMMERCIAL

## 2023-01-19 DIAGNOSIS — T88.7XXA SIDE EFFECT OF MEDICATION: ICD-10-CM

## 2023-01-19 DIAGNOSIS — K31.84 GASTROPARESIS: ICD-10-CM

## 2023-01-19 DIAGNOSIS — F33.9 RECURRENT MAJOR DEPRESSIVE DISORDER, REMISSION STATUS UNSPECIFIED (H): ICD-10-CM

## 2023-01-19 DIAGNOSIS — F41.1 GENERALIZED ANXIETY DISORDER: ICD-10-CM

## 2023-01-23 RX ORDER — ONDANSETRON 4 MG/1
4-8 TABLET, FILM COATED ORAL EVERY 8 HOURS PRN
Qty: 20 TABLET | Refills: 1 | Status: SHIPPED | OUTPATIENT
Start: 2023-01-23 | End: 2023-05-01

## 2023-01-26 ENCOUNTER — MYC MEDICAL ADVICE (OUTPATIENT)
Dept: FAMILY MEDICINE | Facility: CLINIC | Age: 53
End: 2023-01-26
Payer: COMMERCIAL

## 2023-01-26 DIAGNOSIS — G89.4 CHRONIC PAIN DISORDER: ICD-10-CM

## 2023-01-26 DIAGNOSIS — F11.20 NARCOTIC DEPENDENCE (H): ICD-10-CM

## 2023-01-27 RX ORDER — OXYCODONE HYDROCHLORIDE 10 MG/1
10 TABLET ORAL 3 TIMES DAILY PRN
Qty: 56 TABLET | Refills: 0 | Status: SHIPPED | OUTPATIENT
Start: 2023-02-06 | End: 2023-01-30

## 2023-01-30 ENCOUNTER — VIRTUAL VISIT (OUTPATIENT)
Dept: FAMILY MEDICINE | Facility: CLINIC | Age: 53
End: 2023-01-30
Payer: COMMERCIAL

## 2023-01-30 DIAGNOSIS — F11.20 NARCOTIC DEPENDENCE (H): Primary | ICD-10-CM

## 2023-01-30 DIAGNOSIS — Z12.11 SCREEN FOR COLON CANCER: ICD-10-CM

## 2023-01-30 DIAGNOSIS — G89.4 CHRONIC PAIN DISORDER: ICD-10-CM

## 2023-01-30 PROCEDURE — 99213 OFFICE O/P EST LOW 20 MIN: CPT | Mod: GT | Performed by: PHYSICIAN ASSISTANT

## 2023-01-30 RX ORDER — OXYCODONE HYDROCHLORIDE 10 MG/1
10 TABLET ORAL 3 TIMES DAILY PRN
Qty: 84 TABLET | Refills: 0 | Status: SHIPPED | OUTPATIENT
Start: 2023-02-06 | End: 2023-02-26

## 2023-01-30 NOTE — NURSING NOTE
"Chief Complaint   Patient presents with     Recheck Medication       Initial LMP  (LMP Unknown)  Estimated body mass index is 30.96 kg/m  as calculated from the following:    Height as of 12/8/22: 1.665 m (5' 5.55\").    Weight as of 12/8/22: 85.8 kg (189 lb 3.2 oz).    Patient presents to the clinic using No DME    Is there anyone who you would like to be able to receive your results? No  If yes have patient fill out CICI    "

## 2023-01-30 NOTE — PROGRESS NOTES
Karla is a 53 year old who is being evaluated via a billable video visit.      How would you like to obtain your AVS? MyChart  If the video visit is dropped, the invitation should be resent by: Text to cell phone: [phone]  Will anyone else be joining your video visit? No    Assessment & Plan     Narcotic dependence (H)  Chronic pain disorder  stable  - oxyCODONE IR (ROXICODONE) 10 MG tablet  Dispense: 84 tablet; Refill: 0    Screen for colon cancer  - Colonoscopy Screening  Referral      Patient Instructions   Upcoming physical      Return for Routine preventive, with me.    Rosaura Flores PA-C  [facility]    Subjective   Karla is a 53 year old, presenting for the following health issues:  Recheck Medication      History of Present Illness       Reason for visit:  Medication check    She eats 2-3 servings of fruits and vegetables daily.She consumes 0 sweetened beverage(s) daily.She exercises with enough effort to increase her heart rate 9 or less minutes per day.  She exercises with enough effort to increase her heart rate 3 or less days per week.   She is taking medications regularly.     Doing well mostly needs med refill dates aligned.    Started the med change for depression - too soon to tell yet.        Objective           Vitals:  No vitals were obtained today due to virtual visit.            Video-Visit Details    Type of service:  Video Visit   Video not working today, completed as telephone visit  Tele time 13 min

## 2023-02-01 ENCOUNTER — E-VISIT (OUTPATIENT)
Dept: FAMILY MEDICINE | Facility: CLINIC | Age: 53
End: 2023-02-01
Payer: COMMERCIAL

## 2023-02-01 DIAGNOSIS — R51.9 NEW ONSET OF HEADACHES AFTER AGE 50: Primary | ICD-10-CM

## 2023-02-01 PROCEDURE — 99422 OL DIG E/M SVC 11-20 MIN: CPT | Performed by: PHYSICIAN ASSISTANT

## 2023-02-02 ENCOUNTER — HOSPITAL ENCOUNTER (OUTPATIENT)
Dept: MRI IMAGING | Facility: CLINIC | Age: 53
Discharge: HOME | End: 2023-02-02
Attending: PHYSICIAN ASSISTANT | Admitting: PHYSICIAN ASSISTANT
Payer: COMMERCIAL

## 2023-02-02 DIAGNOSIS — R51.9 NEW ONSET OF HEADACHES AFTER AGE 50: ICD-10-CM

## 2023-02-02 PROCEDURE — 255N000002 HC RX 255 OP 636: Performed by: PHYSICIAN ASSISTANT

## 2023-02-02 PROCEDURE — A9585 GADOBUTROL INJECTION: HCPCS | Performed by: PHYSICIAN ASSISTANT

## 2023-02-02 PROCEDURE — 70553 MRI BRAIN STEM W/O & W/DYE: CPT

## 2023-02-02 RX ORDER — GADOBUTROL 604.72 MG/ML
8 INJECTION INTRAVENOUS ONCE
Status: COMPLETED | OUTPATIENT
Start: 2023-02-02 | End: 2023-02-02

## 2023-02-02 RX ADMIN — GADOBUTROL 8 ML: 604.72 INJECTION INTRAVENOUS at 19:13

## 2023-02-03 ENCOUNTER — MYC MEDICAL ADVICE (OUTPATIENT)
Dept: FAMILY MEDICINE | Facility: CLINIC | Age: 53
End: 2023-02-03

## 2023-02-22 ENCOUNTER — TELEPHONE (OUTPATIENT)
Dept: FAMILY MEDICINE | Facility: CLINIC | Age: 53
End: 2023-02-22
Payer: COMMERCIAL

## 2023-02-22 ENCOUNTER — MYC MEDICAL ADVICE (OUTPATIENT)
Dept: FAMILY MEDICINE | Facility: CLINIC | Age: 53
End: 2023-02-22
Payer: COMMERCIAL

## 2023-02-22 NOTE — TELEPHONE ENCOUNTER
Patient Quality Outreach    Patient is due for the following:   Colon Cancer Screening  Breast Cancer Screening - Mammogram  Cervical Cancer Screening - PAP Needed  Physical Preventive Adult Physical      Topic Date Due     Hepatitis B Vaccine (1 of 3 - 3-dose series) Never done     Diptheria Tetanus Pertussis (DTAP/TDAP/TD) Vaccine (2 - Td or Tdap) 02/05/2019     Zoster (Shingles) Vaccine (1 of 2) Never done     COVID-19 Vaccine (2 - Booster for Dirk series) 06/06/2021     Flu Vaccine (1) 09/01/2022       Next Steps:   Patient has upcoming appointment, these items will be addressed at that time.    Type of outreach:    Sent [employer] message.      Questions for provider review:    None     Maira Jurado, CMA

## 2023-02-26 ENCOUNTER — MYC REFILL (OUTPATIENT)
Dept: FAMILY MEDICINE | Facility: CLINIC | Age: 53
End: 2023-02-26

## 2023-02-26 ENCOUNTER — E-VISIT (OUTPATIENT)
Dept: FAMILY MEDICINE | Facility: CLINIC | Age: 53
End: 2023-02-26
Payer: COMMERCIAL

## 2023-02-26 DIAGNOSIS — G89.4 CHRONIC PAIN DISORDER: ICD-10-CM

## 2023-02-26 DIAGNOSIS — F11.20 NARCOTIC DEPENDENCE (H): ICD-10-CM

## 2023-02-26 DIAGNOSIS — J01.01 ACUTE RECURRENT MAXILLARY SINUSITIS: ICD-10-CM

## 2023-02-26 PROCEDURE — 99421 OL DIG E/M SVC 5-10 MIN: CPT | Performed by: PHYSICIAN ASSISTANT

## 2023-02-27 RX ORDER — OXYCODONE HYDROCHLORIDE 10 MG/1
10 TABLET ORAL 3 TIMES DAILY PRN
Qty: 84 TABLET | Refills: 0 | Status: SHIPPED | OUTPATIENT
Start: 2023-03-13 | End: 2023-02-28

## 2023-02-27 NOTE — PATIENT INSTRUCTIONS
Dear Cynthia Lyons    After reviewing your responses, I've been able to diagnose you with Acute recurrent maxillary sinusitis.      Based on your responses and diagnosis, I have prescribed   Orders Placed This Encounter     Adult ENT  Referral     amoxicillin-clavulanate (AUGMENTIN) 875-125 MG tablet    to treat your symptoms. I have sent this to your pharmacy.?     It is also important to stay well hydrated, get lots of rest and take over-the-counter decongestants,?tylenol?or ibuprofen if you?are able to?take those medications per your primary care provider to help relieve discomfort.?     It is important that you take?all of?your prescribed medication even if your symptoms are improving after a few doses.? Taking?all of?your medicine helps prevent the symptoms from returning.?     If your symptoms worsen, you develop severe headache, vomiting, high fever (>102), or are not improving in 7 days, please contact your primary care provider for an appointment or visit any of our convenient Walk-in Care or Urgent Care Centers to be seen which can be found on our website?here.?     Thanks again for choosing?us?as your health care partner,?   ?  Rosaura Flores PA-C?

## 2023-02-28 DIAGNOSIS — F11.20 NARCOTIC DEPENDENCE (H): ICD-10-CM

## 2023-02-28 DIAGNOSIS — G89.4 CHRONIC PAIN DISORDER: ICD-10-CM

## 2023-02-28 RX ORDER — OXYCODONE HYDROCHLORIDE 10 MG/1
10 TABLET ORAL 3 TIMES DAILY PRN
Qty: 84 TABLET | Refills: 0 | Status: SHIPPED | OUTPATIENT
Start: 2023-03-06 | End: 2023-03-27

## 2023-03-09 ENCOUNTER — MYC MEDICAL ADVICE (OUTPATIENT)
Dept: FAMILY MEDICINE | Facility: CLINIC | Age: 53
End: 2023-03-09
Payer: COMMERCIAL

## 2023-03-09 DIAGNOSIS — J01.01 ACUTE RECURRENT MAXILLARY SINUSITIS: ICD-10-CM

## 2023-03-25 ENCOUNTER — HEALTH MAINTENANCE LETTER (OUTPATIENT)
Age: 53
End: 2023-03-25

## 2023-03-27 ENCOUNTER — MYC REFILL (OUTPATIENT)
Dept: FAMILY MEDICINE | Facility: CLINIC | Age: 53
End: 2023-03-27
Payer: COMMERCIAL

## 2023-03-27 DIAGNOSIS — F11.20 NARCOTIC DEPENDENCE (H): ICD-10-CM

## 2023-03-27 DIAGNOSIS — G89.4 CHRONIC PAIN DISORDER: ICD-10-CM

## 2023-03-27 RX ORDER — OXYCODONE HYDROCHLORIDE 10 MG/1
10 TABLET ORAL 3 TIMES DAILY PRN
Qty: 84 TABLET | Refills: 0 | Status: SHIPPED | OUTPATIENT
Start: 2023-03-27 | End: 2023-04-24

## 2023-03-29 ENCOUNTER — ANCILLARY PROCEDURE (OUTPATIENT)
Dept: MAMMOGRAPHY | Facility: CLINIC | Age: 53
End: 2023-03-29
Attending: PHYSICIAN ASSISTANT
Payer: COMMERCIAL

## 2023-03-29 DIAGNOSIS — Z12.31 VISIT FOR SCREENING MAMMOGRAM: ICD-10-CM

## 2023-03-29 PROCEDURE — 77063 BREAST TOMOSYNTHESIS BI: CPT | Mod: TC | Performed by: RADIOLOGY

## 2023-03-29 PROCEDURE — 77067 SCR MAMMO BI INCL CAD: CPT | Mod: TC | Performed by: RADIOLOGY

## 2023-04-24 ENCOUNTER — MYC REFILL (OUTPATIENT)
Dept: FAMILY MEDICINE | Facility: CLINIC | Age: 53
End: 2023-04-24
Payer: COMMERCIAL

## 2023-04-24 DIAGNOSIS — F11.20 NARCOTIC DEPENDENCE (H): ICD-10-CM

## 2023-04-24 DIAGNOSIS — G89.4 CHRONIC PAIN DISORDER: ICD-10-CM

## 2023-04-24 RX ORDER — OXYCODONE HYDROCHLORIDE 10 MG/1
10 TABLET ORAL 3 TIMES DAILY PRN
Qty: 84 TABLET | Refills: 0 | Status: SHIPPED | OUTPATIENT
Start: 2023-05-01 | End: 2023-05-02

## 2023-04-29 DIAGNOSIS — K31.84 GASTROPARESIS: ICD-10-CM

## 2023-04-29 DIAGNOSIS — T88.7XXA SIDE EFFECT OF MEDICATION: ICD-10-CM

## 2023-04-29 DIAGNOSIS — F41.1 GENERALIZED ANXIETY DISORDER: ICD-10-CM

## 2023-04-29 DIAGNOSIS — F33.1 MODERATE RECURRENT MAJOR DEPRESSION (H): ICD-10-CM

## 2023-04-29 DIAGNOSIS — F43.0 ACUTE REACTION TO STRESS: ICD-10-CM

## 2023-05-01 ENCOUNTER — MYC MEDICAL ADVICE (OUTPATIENT)
Dept: FAMILY MEDICINE | Facility: CLINIC | Age: 53
End: 2023-05-01

## 2023-05-01 DIAGNOSIS — F11.20 NARCOTIC DEPENDENCE (H): ICD-10-CM

## 2023-05-01 DIAGNOSIS — G89.4 CHRONIC PAIN DISORDER: ICD-10-CM

## 2023-05-01 RX ORDER — ONDANSETRON 4 MG/1
4-8 TABLET, FILM COATED ORAL EVERY 8 HOURS PRN
Qty: 20 TABLET | Refills: 1 | Status: SHIPPED | OUTPATIENT
Start: 2023-05-01 | End: 2023-08-16

## 2023-05-01 RX ORDER — BUPROPION HYDROCHLORIDE 150 MG/1
150 TABLET ORAL EVERY MORNING
Qty: 30 TABLET | Refills: 2 | Status: ON HOLD | OUTPATIENT
Start: 2023-05-01 | End: 2023-07-03

## 2023-05-01 NOTE — TELEPHONE ENCOUNTER
Prescription approved per [facility] Refill Protocol.   has appointment in one month for follow up

## 2023-05-02 RX ORDER — OXYCODONE HYDROCHLORIDE 10 MG/1
10 TABLET ORAL 3 TIMES DAILY PRN
Qty: 84 TABLET | Refills: 0 | Status: SHIPPED | OUTPATIENT
Start: 2023-05-27 | End: 2023-06-18

## 2023-05-10 ENCOUNTER — MYC REFILL (OUTPATIENT)
Dept: FAMILY MEDICINE | Facility: CLINIC | Age: 53
End: 2023-05-10

## 2023-05-10 DIAGNOSIS — F33.9 RECURRENT MAJOR DEPRESSIVE DISORDER, REMISSION STATUS UNSPECIFIED (H): ICD-10-CM

## 2023-05-10 DIAGNOSIS — F41.1 GENERALIZED ANXIETY DISORDER: ICD-10-CM

## 2023-05-10 NOTE — TELEPHONE ENCOUNTER
Routing refill request to provider for review/approval because:  Labs out of range:        8/29/2022    11:27 AM 9/29/2022     2:04 PM 1/6/2023    10:27 AM   PHQ   PHQ-9 Total Score 0 0 18   Q9: Thoughts of better off dead/self-harm past 2 weeks Not at all Not at all Not at all    Julie Behrendt RN

## 2023-06-06 DIAGNOSIS — K22.70 BARRETT'S ESOPHAGUS WITHOUT DYSPLASIA: ICD-10-CM

## 2023-06-12 ENCOUNTER — MYC MEDICAL ADVICE (OUTPATIENT)
Dept: FAMILY MEDICINE | Facility: CLINIC | Age: 53
End: 2023-06-12

## 2023-06-15 ENCOUNTER — HOSPITAL ENCOUNTER (EMERGENCY)
Facility: CLINIC | Age: 53
Discharge: HOME | End: 2023-06-15
Attending: FAMILY MEDICINE | Admitting: FAMILY MEDICINE
Payer: COMMERCIAL

## 2023-06-15 VITALS
HEIGHT: 65 IN | DIASTOLIC BLOOD PRESSURE: 67 MMHG | RESPIRATION RATE: 18 BRPM | BODY MASS INDEX: 28.32 KG/M2 | OXYGEN SATURATION: 97 % | SYSTOLIC BLOOD PRESSURE: 141 MMHG | WEIGHT: 170 LBS | TEMPERATURE: 97.9 F | HEART RATE: 65 BPM

## 2023-06-15 DIAGNOSIS — K64.8 INTERNAL HEMORRHOID, BLEEDING: ICD-10-CM

## 2023-06-15 DIAGNOSIS — R10.13 EPIGASTRIC PAIN: ICD-10-CM

## 2023-06-15 LAB
ALBUMIN SERPL BCG-MCNC: 4.8 G/DL (ref 3.5–5.2)
ALP SERPL-CCNC: 67 U/L (ref 35–104)
ALT SERPL W P-5'-P-CCNC: 23 U/L (ref 0–50)
ANION GAP SERPL CALCULATED.3IONS-SCNC: 13 MMOL/L (ref 7–15)
AST SERPL W P-5'-P-CCNC: 20 U/L (ref 0–45)
BASOPHILS # BLD AUTO: 0.1 10E3/UL (ref 0–0.2)
BASOPHILS NFR BLD AUTO: 1 %
BILIRUB SERPL-MCNC: 0.3 MG/DL
BUN SERPL-MCNC: 11.5 MG/DL (ref 6–20)
CALCIUM SERPL-MCNC: 9.9 MG/DL (ref 8.6–10)
CHLORIDE SERPL-SCNC: 100 MMOL/L (ref 98–107)
CREAT SERPL-MCNC: 0.93 MG/DL (ref 0.51–0.95)
DEPRECATED HCO3 PLAS-SCNC: 24 MMOL/L (ref 22–29)
EOSINOPHIL # BLD AUTO: 0.1 10E3/UL (ref 0–0.7)
EOSINOPHIL NFR BLD AUTO: 1 %
ERYTHROCYTE [DISTWIDTH] IN BLOOD BY AUTOMATED COUNT: 14.4 % (ref 10–15)
GFR SERPL CREATININE-BSD FRML MDRD: 74 ML/MIN/1.73M2
GLUCOSE SERPL-MCNC: 95 MG/DL (ref 70–99)
HCT VFR BLD AUTO: 42.5 % (ref 35–47)
HEMOCCULT STL QL: NEGATIVE
HGB BLD-MCNC: 13.8 G/DL (ref 11.7–15.7)
HOLD SPECIMEN: NORMAL
IMM GRANULOCYTES # BLD: 0 10E3/UL
IMM GRANULOCYTES NFR BLD: 0 %
LIPASE SERPL-CCNC: 23 U/L (ref 13–60)
LYMPHOCYTES # BLD AUTO: 2.6 10E3/UL (ref 0.8–5.3)
LYMPHOCYTES NFR BLD AUTO: 34 %
MCH RBC QN AUTO: 29.7 PG (ref 26.5–33)
MCHC RBC AUTO-ENTMCNC: 32.5 G/DL (ref 31.5–36.5)
MCV RBC AUTO: 92 FL (ref 78–100)
MONOCYTES # BLD AUTO: 0.4 10E3/UL (ref 0–1.3)
MONOCYTES NFR BLD AUTO: 6 %
NEUTROPHILS # BLD AUTO: 4.3 10E3/UL (ref 1.6–8.3)
NEUTROPHILS NFR BLD AUTO: 58 %
NRBC # BLD AUTO: 0 10E3/UL
NRBC BLD AUTO-RTO: 0 /100
PLATELET # BLD AUTO: 429 10E3/UL (ref 150–450)
POTASSIUM SERPL-SCNC: 4.3 MMOL/L (ref 3.4–5.3)
PROT SERPL-MCNC: 8.3 G/DL (ref 6.4–8.3)
RBC # BLD AUTO: 4.64 10E6/UL (ref 3.8–5.2)
SODIUM SERPL-SCNC: 137 MMOL/L (ref 136–145)
WBC # BLD AUTO: 7.5 10E3/UL (ref 4–11)

## 2023-06-15 PROCEDURE — 36415 COLL VENOUS BLD VENIPUNCTURE: CPT | Performed by: FAMILY MEDICINE

## 2023-06-15 PROCEDURE — 99284 EMERGENCY DEPT VISIT MOD MDM: CPT | Performed by: FAMILY MEDICINE

## 2023-06-15 PROCEDURE — 96374 THER/PROPH/DIAG INJ IV PUSH: CPT | Performed by: FAMILY MEDICINE

## 2023-06-15 PROCEDURE — 83690 ASSAY OF LIPASE: CPT | Performed by: FAMILY MEDICINE

## 2023-06-15 PROCEDURE — 82272 OCCULT BLD FECES 1-3 TESTS: CPT | Performed by: FAMILY MEDICINE

## 2023-06-15 PROCEDURE — 99284 EMERGENCY DEPT VISIT MOD MDM: CPT | Mod: 25 | Performed by: FAMILY MEDICINE

## 2023-06-15 PROCEDURE — 250N000011 HC RX IP 250 OP 636: Performed by: FAMILY MEDICINE

## 2023-06-15 PROCEDURE — 85025 COMPLETE CBC W/AUTO DIFF WBC: CPT | Performed by: FAMILY MEDICINE

## 2023-06-15 PROCEDURE — 80053 COMPREHEN METABOLIC PANEL: CPT | Performed by: FAMILY MEDICINE

## 2023-06-15 RX ORDER — ONDANSETRON 2 MG/ML
4 INJECTION INTRAMUSCULAR; INTRAVENOUS ONCE
Status: COMPLETED | OUTPATIENT
Start: 2023-06-15 | End: 2023-06-15

## 2023-06-15 RX ORDER — HYDROCORTISONE ACETATE 25 MG/1
25 SUPPOSITORY RECTAL 2 TIMES DAILY
Qty: 20 SUPPOSITORY | Refills: 0 | Status: SHIPPED | OUTPATIENT
Start: 2023-06-15 | End: 2023-06-25

## 2023-06-15 RX ADMIN — ONDANSETRON 4 MG: 2 INJECTION INTRAMUSCULAR; INTRAVENOUS at 12:14

## 2023-06-15 ASSESSMENT — ACTIVITIES OF DAILY LIVING (ADL): ADLS_ACUITY_SCORE: 35
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
## 2023-06-15 NOTE — ED NOTES
"Blood in stool all week.  Denies dizziness, lightheadedness, chest pain or sob.   Pt reports trying to schedule colonoscopy \"but they don't call me back\"   pt vomited in driveway prior to coming to ED, pt took oral zofran  "

## 2023-06-15 NOTE — ED TRIAGE NOTES
Pt presents with abdominal pain and concerns for bloody stools. Pt has noted abdominal pain bloody stools this past Monday and again today. Pain located in epigastric and described as burning. Since the beginning of the week, pt has had 3 bloody stools.   Pt has hx of GI conditions but reports that her symptoms are different today.      Triage Assessment     Row Name 06/15/23 [id number]       Triage Assessment (Adult)    Airway WDL WDL       Respiratory WDL    Respiratory WDL WDL       Skin Circulation/Temperature WDL    Skin Circulation/Temperature WDL WDL       Cardiac WDL    Cardiac WDL WDL       Peripheral/Neurovascular WDL    Peripheral Neurovascular WDL WDL       Cognitive/Neuro/Behavioral WDL    Cognitive/Neuro/Behavioral WDL WDL

## 2023-06-15 NOTE — DISCHARGE INSTRUCTIONS
Increase fluid intake, increase dietary fiber both soluble and insoluble as we discussed.  Anusol HC suppositories as discussed.  I have placed an order for you for colonoscopy and you should get a call from surgery scheduling soon to schedule this examination.  Return to the emergency department if worse or changes.
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
## 2023-06-18 ENCOUNTER — MYC REFILL (OUTPATIENT)
Dept: FAMILY MEDICINE | Facility: CLINIC | Age: 53
End: 2023-06-18
Payer: COMMERCIAL

## 2023-06-18 DIAGNOSIS — F11.20 NARCOTIC DEPENDENCE (H): ICD-10-CM

## 2023-06-18 DIAGNOSIS — G89.4 CHRONIC PAIN DISORDER: ICD-10-CM

## 2023-06-19 ENCOUNTER — MYC MEDICAL ADVICE (OUTPATIENT)
Dept: GASTROENTEROLOGY | Facility: CLINIC | Age: 53
End: 2023-06-19

## 2023-06-19 ENCOUNTER — TELEPHONE (OUTPATIENT)
Dept: SURGERY | Facility: CLINIC | Age: 53
End: 2023-06-19

## 2023-06-19 RX ORDER — OXYCODONE HYDROCHLORIDE 10 MG/1
10 TABLET ORAL 3 TIMES DAILY PRN
Qty: 84 TABLET | Refills: 0 | Status: SHIPPED | OUTPATIENT
Start: 2023-06-24 | End: 2023-07-07

## 2023-06-19 NOTE — TELEPHONE ENCOUNTER
Screening Questions  BLUE  KIND OF PREP RED  LOCATION [review exclusion criteria] GREEN  SEDATION TYPE        y Are you active on mychart?       [place] Ordering/Referring Provider?        medica What type of coverage do you have?      n Have you had a positive covid test in the last 14 days?     28.29 1. BMI  [BMI 40+ - review exclusion criteria& smart-phrase document]    y  2. Are you able to give consent for your medical care? [IF NO,RN REVIEW]          y  3. Are you taking any prescription pain medications on a routine schedule Ocyodone  (ex narcotics: oxycodone, roxicodone, oxycontin,  and percocet)? [RN Review]        n  3a. EXTENDED PREP What kind of prescription?     n 4. Do you have any chemical dependencies such as alcohol, street drugs, or methadone?        **If yes 3- 5 , please schedule with MAC sedation.**          IF YES TO ANY 6 - 10 - HOSPITAL SETTING ONLY.     n 6.   Do you need assistance transferring?     n 7.   Have you had a heart or lung transplant?    n 8.   Are you currently on dialysis?   n 9.   Do you use daily home oxygen?   n 10. Do you take nitroglycerin?   10a. n If yes, how often?     n 11. Are you currently pregnant?    11a. n If yes, how many weeks? [ Greater than 12 weeks, OR NEEDED]    n 12. Do you have Pulmonary Hypertension? *NEED PAC APPT AT [facility] w/ [facility]*     n 13. [review exclusion criteria]  Do you have any implantable devices in your body (pacemaker, defib, LVAD)?    n 14. In the past 6 months, have you had any heart related issues including cardiomyopathy or heart attack?     14a. n If yes, did it require cardiac stenting if so when?     n 15. Have you had a stroke or Transient ischemic attack (TIA - aka  mini stroke ) within 6 months?      n 16. Do you have mod to severe Obstructive Sleep Apnea?  [Hospital only]    n 17. Do you have SEVERE AND UNCONTROLLED asthma? *NEED PAC APPT AT [facility] w/[facility]*     18.Do you take blood thinners?  No    n 19. Do you take any of the  "following medications?    nPhentermine    nOzempic    nWegovy (Semaglutide)      19a. If yes, \"Hold for 7 days before procedure.  Please consult your prescribing provider if you have questions about holding this medication.\"     n  20. Do you have chronic kidney disease?      n  21. Do you have a diagnosis of diabetes?     n  22. On a regular basis do you go 3-5 days between bowel movements?     y 23. Preferred [facility] for Pre Prescription         [address]    - CLOSING REMINDERS -    You will receive a call from a Nurse to review instructions and health history.  This assessment must be completed prior to your procedure.  Failure to complete the Nurse assessment may result in the procedure being cancelled.      On the day of your procedure, please designatean adult(s) who can drive you home stay with you for the next 24 hours. The medicines used in the exam will make you sleepy. You will not be able to drive.      You cannot take public transportation, ride share services, or non-medical taxi service without a responsible caregiver.  Medical transport services are allowed with the requirement that a responsible caregiver will receive you at your destination.  We require that drivers and caregivers are confirmed prior to your procedure.      - SCHEDULING DETAILS -  n & n Hospital Setting Required & If yes, what is the exclusion?   [facility]   Surgeon    7/3/23  Date of Procedure  Lower Endoscopy [Colonoscopy]  Type of Procedure Scheduled  [facility]- If you answer yes to questions #8, #20, #21 [  pts ]Which Colonoscopy Prep was Sent?     General  Sedation Type     n PAC / Pre-op Required       "

## 2023-06-19 NOTE — TELEPHONE ENCOUNTER
Routing to covering provider for consideration, not on refill protocol.           Charisse Orantes     RN MSN

## 2023-06-21 ENCOUNTER — TELEPHONE (OUTPATIENT)
Dept: FAMILY MEDICINE | Facility: CLINIC | Age: 53
End: 2023-06-21
Payer: COMMERCIAL

## 2023-06-21 ENCOUNTER — MYC MEDICAL ADVICE (OUTPATIENT)
Dept: FAMILY MEDICINE | Facility: CLINIC | Age: 53
End: 2023-06-21
Payer: COMMERCIAL

## 2023-06-21 DIAGNOSIS — R10.13 EPIGASTRIC PAIN: ICD-10-CM

## 2023-06-21 DIAGNOSIS — R10.13 EPIGASTRIC PAIN: Primary | ICD-10-CM

## 2023-06-21 RX ORDER — OMEPRAZOLE 40 MG/1
40 CAPSULE, DELAYED RELEASE ORAL DAILY
Qty: 30 CAPSULE | Refills: 0 | Status: CANCELLED | OUTPATIENT
Start: 2023-06-21

## 2023-06-21 RX ORDER — OMEPRAZOLE 40 MG/1
40 CAPSULE, DELAYED RELEASE ORAL DAILY
Qty: 30 CAPSULE | Refills: 0 | Status: SHIPPED | OUTPATIENT
Start: 2023-06-21 | End: 2023-07-25

## 2023-06-21 NOTE — TELEPHONE ENCOUNTER
Prior Authorization Retail Medication Request    Medication/Dose: Omeprazole 40mg  ICD code (if different than what is on RX):    Previously Tried and Failed:    Rationale:      Insurance Name:  [employer] commercial  Insurance ID: [id number]      Pharmacy Information (if different than what is on RX)  Name:  [facility]  Phone:  [phone]

## 2023-06-26 RX ORDER — BISACODYL 5 MG/1
TABLET, DELAYED RELEASE ORAL
Qty: 4 TABLET | Refills: 0 | Status: SHIPPED | OUTPATIENT
Start: 2023-06-26 | End: 2023-07-07

## 2023-06-27 ENCOUNTER — MYC MEDICAL ADVICE (OUTPATIENT)
Dept: FAMILY MEDICINE | Facility: CLINIC | Age: 53
End: 2023-06-27

## 2023-06-27 NOTE — TELEPHONE ENCOUNTER
PRIOR AUTHORIZATION DENIED    Medication: Omeprazole 40mg    Denial Date: 6/27/2023    Denial Rational:  Coverage is provided in situations where the patient is less then or equal to 12 years of age. Review and appeal are not available because of this exclusion.                Appeal Information:  N/A

## 2023-06-30 ENCOUNTER — ANESTHESIA EVENT (OUTPATIENT)
Dept: GASTROENTEROLOGY | Facility: CLINIC | Age: 53
End: 2023-06-30
Payer: COMMERCIAL

## 2023-06-30 RX ORDER — SODIUM CHLORIDE, SODIUM LACTATE, POTASSIUM CHLORIDE, CALCIUM CHLORIDE 600; 310; 30; 20 MG/100ML; MG/100ML; MG/100ML; MG/100ML
INJECTION, SOLUTION INTRAVENOUS CONTINUOUS
Status: CANCELLED | OUTPATIENT
Start: 2023-06-30

## 2023-06-30 ASSESSMENT — LIFESTYLE VARIABLES: TOBACCO_USE: 1
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
## 2023-07-03 ENCOUNTER — HOSPITAL ENCOUNTER (OUTPATIENT)
Facility: CLINIC | Age: 53
Discharge: HOME | End: 2023-07-03
Attending: SURGERY | Admitting: SURGERY
Payer: COMMERCIAL

## 2023-07-03 ENCOUNTER — ANESTHESIA (OUTPATIENT)
Dept: GASTROENTEROLOGY | Facility: CLINIC | Age: 53
End: 2023-07-03
Payer: COMMERCIAL

## 2023-07-03 VITALS
RESPIRATION RATE: 16 BRPM | BODY MASS INDEX: 28.32 KG/M2 | OXYGEN SATURATION: 99 % | HEIGHT: 65 IN | HEART RATE: 58 BPM | TEMPERATURE: 98.2 F | SYSTOLIC BLOOD PRESSURE: 135 MMHG | WEIGHT: 170 LBS | DIASTOLIC BLOOD PRESSURE: 81 MMHG

## 2023-07-03 DIAGNOSIS — Z12.11 SPECIAL SCREENING FOR MALIGNANT NEOPLASMS, COLON: Primary | ICD-10-CM

## 2023-07-03 LAB — COLONOSCOPY: NORMAL

## 2023-07-03 PROCEDURE — 250N000009 HC RX 250: Performed by: SURGERY

## 2023-07-03 PROCEDURE — 250N000011 HC RX IP 250 OP 636: Performed by: NURSE ANESTHETIST, CERTIFIED REGISTERED

## 2023-07-03 PROCEDURE — 258N000003 HC RX IP 258 OP 636: Performed by: SURGERY

## 2023-07-03 PROCEDURE — 45378 DIAGNOSTIC COLONOSCOPY: CPT | Performed by: SURGERY

## 2023-07-03 PROCEDURE — G0121 COLON CA SCRN NOT HI RSK IND: HCPCS | Performed by: SURGERY

## 2023-07-03 PROCEDURE — 250N000009 HC RX 250: Performed by: NURSE ANESTHETIST, CERTIFIED REGISTERED

## 2023-07-03 PROCEDURE — 370N000017 HC ANESTHESIA TECHNICAL FEE, PER MIN: Performed by: SURGERY

## 2023-07-03 RX ORDER — PROPOFOL 10 MG/ML
INJECTION, EMULSION INTRAVENOUS PRN
Status: DISCONTINUED | OUTPATIENT
Start: 2023-07-03 | End: 2023-07-03

## 2023-07-03 RX ORDER — LIDOCAINE HYDROCHLORIDE 20 MG/ML
INJECTION, SOLUTION INFILTRATION; PERINEURAL PRN
Status: DISCONTINUED | OUTPATIENT
Start: 2023-07-03 | End: 2023-07-03

## 2023-07-03 RX ORDER — LIDOCAINE 40 MG/G
CREAM TOPICAL
Status: DISCONTINUED | OUTPATIENT
Start: 2023-07-03 | End: 2023-07-03 | Stop reason: HOSPADM

## 2023-07-03 RX ORDER — GLYCOPYRROLATE 0.2 MG/ML
INJECTION, SOLUTION INTRAMUSCULAR; INTRAVENOUS PRN
Status: DISCONTINUED | OUTPATIENT
Start: 2023-07-03 | End: 2023-07-03

## 2023-07-03 RX ORDER — SODIUM CHLORIDE, SODIUM LACTATE, POTASSIUM CHLORIDE, CALCIUM CHLORIDE 600; 310; 30; 20 MG/100ML; MG/100ML; MG/100ML; MG/100ML
INJECTION, SOLUTION INTRAVENOUS CONTINUOUS
Status: DISCONTINUED | OUTPATIENT
Start: 2023-07-03 | End: 2023-07-03 | Stop reason: HOSPADM

## 2023-07-03 RX ORDER — PROPOFOL 10 MG/ML
INJECTION, EMULSION INTRAVENOUS CONTINUOUS PRN
Status: DISCONTINUED | OUTPATIENT
Start: 2023-07-03 | End: 2023-07-03

## 2023-07-03 RX ORDER — ONDANSETRON 2 MG/ML
INJECTION INTRAMUSCULAR; INTRAVENOUS PRN
Status: DISCONTINUED | OUTPATIENT
Start: 2023-07-03 | End: 2023-07-03

## 2023-07-03 RX ADMIN — GLYCOPYRROLATE 0.1 MG: 0.2 INJECTION, SOLUTION INTRAMUSCULAR; INTRAVENOUS at 10:38

## 2023-07-03 RX ADMIN — PROPOFOL 100 MG: 10 INJECTION, EMULSION INTRAVENOUS at 10:39

## 2023-07-03 RX ADMIN — ONDANSETRON 4 MG: 2 INJECTION INTRAMUSCULAR; INTRAVENOUS at 10:35

## 2023-07-03 RX ADMIN — PROPOFOL 200 MCG/KG/MIN: 10 INJECTION, EMULSION INTRAVENOUS at 10:39

## 2023-07-03 RX ADMIN — LIDOCAINE HYDROCHLORIDE 100 MG: 20 INJECTION, SOLUTION INFILTRATION; PERINEURAL at 10:39

## 2023-07-03 RX ADMIN — SODIUM CHLORIDE, POTASSIUM CHLORIDE, SODIUM LACTATE AND CALCIUM CHLORIDE: 600; 310; 30; 20 INJECTION, SOLUTION INTRAVENOUS at 09:49

## 2023-07-03 RX ADMIN — LIDOCAINE HYDROCHLORIDE 0.2 ML: 10 INJECTION, SOLUTION EPIDURAL; INFILTRATION; INTRACAUDAL; PERINEURAL at 09:49

## 2023-07-03 ASSESSMENT — ACTIVITIES OF DAILY LIVING (ADL): ADLS_ACUITY_SCORE: 35

## 2023-07-03 NOTE — ANESTHESIA CARE TRANSFER NOTE
Patient: Cynthia Lyons    Procedure: Procedure(s):  Colonoscopy       Diagnosis: Internal hemorrhoid, bleeding [K64.8]  Diagnosis Additional Information: No value filed.    Anesthesia Type:   General     Note:    Oropharynx: oropharynx clear of all foreign objects and spontaneously breathing  Level of Consciousness: awake  Oxygen Supplementation: room air    Independent Airway: airway patency satisfactory and stable  Dentition: dentition unchanged  Vital Signs Stable: post-procedure vital signs reviewed and stable  Report to RN Given: handoff report given  Patient transferred to: Phase II    Handoff Report: Identifed the Patient, Identified the Reponsible Provider, Reviewed the pertinent medical history, Discussed the surgical course, Reviewed Intra-OP anesthesia mangement and issues during anesthesia, Set expectations for post-procedure period and Allowed opportunity for questions and acknowledgement of understanding      Vitals:  Vitals Value Taken Time   BP     Temp     Pulse     Resp     SpO2 98 % 07/03/23 1056   Vitals shown include unvalidated device data.    Electronically Signed By: ALAN Lira CRNA  July 3, 2023  10:58 AM

## 2023-07-03 NOTE — ANESTHESIA POSTPROCEDURE EVALUATION
Patient: Cynthia Lyons    Procedure: Procedure(s):  Colonoscopy       Anesthesia Type:  General    Note:  Disposition: Outpatient   Postop Pain Control: Uneventful            Sign Out: Well controlled pain   PONV: No   Neuro/Psych: Uneventful            Sign Out: Acceptable/Baseline neuro status   Airway/Respiratory: Uneventful            Sign Out: Acceptable/Baseline resp. status   CV/Hemodynamics: Uneventful            Sign Out: Acceptable CV status; No obvious hypovolemia; No obvious fluid overload   Other NRE: NONE   DID A NON-ROUTINE EVENT OCCUR? No           Last vitals:  Vitals:    07/03/23 0927 07/03/23 0951   BP: (!) 130/119 (!) 143/94   Pulse: 68    Temp: 36.8  C (98.2  F)    SpO2: 97%        Electronically Signed By: ALAN Lira CRNA  July 3, 2023  10:58 AM

## 2023-07-03 NOTE — INTERVAL H&P NOTE
"I have reviewed the surgical (or preoperative) H&P that is linked to this encounter, and examined the patient. There are no significant changes    1st screening; no blood thinner; no famhx of colon cancer    Clinical Conditions Present on Arrival:  Clinically Significant Risk Factors Present on Admission                  # Overweight: Estimated body mass index is 28.29 kg/m  as calculated from the following:    Height as of this encounter: 1.651 m (5' 5\").    Weight as of this encounter: 77.1 kg (170 lb).       "

## 2023-07-07 ENCOUNTER — VIRTUAL VISIT (OUTPATIENT)
Dept: FAMILY MEDICINE | Facility: CLINIC | Age: 53
End: 2023-07-07
Payer: COMMERCIAL

## 2023-07-07 DIAGNOSIS — R10.13 EPIGASTRIC PAIN: Primary | ICD-10-CM

## 2023-07-07 DIAGNOSIS — F11.20 NARCOTIC DEPENDENCE (H): ICD-10-CM

## 2023-07-07 DIAGNOSIS — K22.70 BARRETT'S ESOPHAGUS WITHOUT DYSPLASIA: ICD-10-CM

## 2023-07-07 DIAGNOSIS — F33.42 RECURRENT MAJOR DEPRESSIVE DISORDER, IN FULL REMISSION (H): ICD-10-CM

## 2023-07-07 DIAGNOSIS — K31.84 GASTROPARESIS: ICD-10-CM

## 2023-07-07 DIAGNOSIS — G89.4 CHRONIC PAIN DISORDER: ICD-10-CM

## 2023-07-07 PROBLEM — F33.9 MAJOR DEPRESSION, RECURRENT (H): Status: ACTIVE | Noted: 2023-07-07

## 2023-07-07 PROCEDURE — 99214 OFFICE O/P EST MOD 30 MIN: CPT | Mod: VID | Performed by: PHYSICIAN ASSISTANT

## 2023-07-07 RX ORDER — OXYCODONE HYDROCHLORIDE 10 MG/1
10 TABLET ORAL 3 TIMES DAILY PRN
Qty: 84 TABLET | Refills: 0 | Status: SHIPPED | OUTPATIENT
Start: 2023-07-25 | End: 2023-08-16 | Stop reason: ALTCHOICE

## 2023-07-07 ASSESSMENT — PATIENT HEALTH QUESTIONNAIRE - PHQ9: SUM OF ALL RESPONSES TO PHQ QUESTIONS 1-9: 0

## 2023-07-07 NOTE — PROGRESS NOTES
"Karla is a 53 year old who is being evaluated via a billable video visit.      How would you like to obtain your AVS? MyChart  If the video visit is dropped, the invitation should be resent by: Text to cell phone: [phone]  Will anyone else be joining your video visit? No    Assessment & Plan     Epigastric pain  improved    Recurrent major depressive disorder, in full remission (H)  Controlled, on prozac    Narcotic dependence (H)  Chronic pain disorder  Controlled, wean belbuca per patient request.  Better lifestyle, exercise and wt loss may be helping pain.  - Buprenorphine HCl (BELBUCA) 450 MCG FILM buccal film  Dispense: 60 each; Refill: 0  - oxyCODONE IR (ROXICODONE) 10 MG tablet  Dispense: 84 tablet; Refill: 0    Guzman's esophagus without dysplasia  Due for recheck  - Adult GI  Referral - Procedure Only    Gastroparesis  Seems to be doing well, did discuss nausea and pain as possible symptoms however seems these symptoms were triggered by NSAID     MED REC REQUIRED  Post Medication Reconciliation Status: discharge medications reconciled and changed, per note/orders  BMI:   Estimated body mass index is 28.29 kg/m  as calculated from the following:    Height as of 7/3/23: 1.651 m (5' 5\").    Weight as of 7/3/23: 77.1 kg (170 lb).   Weight management plan: Discussed healthy diet and exercise guidelines    There are no Patient Instructions on file for this visit.    Rosaura Flores PA-C  [facility]    Subjective   Karla is a 53 year old, presenting for the following health issues:  ER F/U        7/7/2023    12:10 PM   Additional Questions   Roomed by lauren CAMACHO     ED/UC Followup:    Facility:  [facility]   Date of visit: 6/15/2023  Reason for visit: epigastric pain, abdominal pain   Current Status:  Now taking omeprazole which is helping     Epigastric pain better with omeprazole at 40 mg.  Had been taking some ibuprofen again that she " now realizes likely caused her stomach symptoms to flare.  Colonoscopy was normal.  Gastroparesis doing well.  Managed the dose reduction of omeprazole from Sept until June.   Due for saldana surveillance.   No smoking x 6 mo.  Still losing weight.  Down 30 pounds in past 1 yr.  Cut pop.  Now walking 1-2 mi per day.  Wants to wean down on belbuca films - having more teeth issues.        Objective           Vitals:  No vitals were obtained today due to virtual visit.          Video-Visit Details    Type of service:  Video Visit     Originating Location (pt. Location): Home  Distant Location (provider location):  On-site  Platform used for Video Visit: Alesha

## 2023-07-08 ENCOUNTER — MYC MEDICAL ADVICE (OUTPATIENT)
Dept: FAMILY MEDICINE | Facility: CLINIC | Age: 53
End: 2023-07-08
Payer: COMMERCIAL

## 2023-07-08 DIAGNOSIS — L98.9 SKIN PROBLEM: ICD-10-CM

## 2023-07-08 DIAGNOSIS — L81.4 LENTIGO: ICD-10-CM

## 2023-07-08 DIAGNOSIS — D18.01 ANGIOMA OF SKIN: ICD-10-CM

## 2023-07-08 DIAGNOSIS — D23.9 DERMAL NEVUS: ICD-10-CM

## 2023-07-08 DIAGNOSIS — L70.0 COMEDONE: ICD-10-CM

## 2023-07-08 DIAGNOSIS — L82.1 SEBORRHEIC KERATOSIS: ICD-10-CM

## 2023-07-10 ENCOUNTER — MYC MEDICAL ADVICE (OUTPATIENT)
Dept: FAMILY MEDICINE | Facility: CLINIC | Age: 53
End: 2023-07-10
Payer: COMMERCIAL

## 2023-07-10 ENCOUNTER — TELEPHONE (OUTPATIENT)
Dept: FAMILY MEDICINE | Facility: CLINIC | Age: 53
End: 2023-07-10
Payer: COMMERCIAL

## 2023-07-10 ENCOUNTER — MYC MEDICAL ADVICE (OUTPATIENT)
Dept: GASTROENTEROLOGY | Facility: CLINIC | Age: 53
End: 2023-07-10

## 2023-07-10 RX ORDER — TRETINOIN 0.5 MG/G
CREAM TOPICAL AT BEDTIME
Qty: 20 G | Refills: 3 | Status: SHIPPED | OUTPATIENT
Start: 2023-07-10

## 2023-07-10 NOTE — TELEPHONE ENCOUNTER
Prior Authorization Retail Medication Request    Medication/Dose: TRETINOIN 0.05% CREAM  ICD code (if different than what is on RX):    Previously Tried and Failed:    Rationale:      Insurance Name:  [employer]   Insurance ID:  [id number]  PHONE [phone]      Pharmacy Information (if different than what is on RX)  Name:    Phone:      Thank you,  Sunitha Nelson,  Pharmacy [facility]

## 2023-07-13 NOTE — TELEPHONE ENCOUNTER
Central Prior Authorization Team   Phone: [phone]      PA Initiation    Medication: TRETINOIN 0.05 % EX CREA  Insurance Company: [employer] - Phone [phone] Fax [phone]  Pharmacy Filling the Rx: [place], [place] - [address] [place] [address]  Filling Pharmacy Phone: [phone]  Filling Pharmacy Fax:    Start Date: 7/13/2023    Started PA on CMM and a response of This request has been approved using information available on the patient's profile. CaseId:[id number]

## 2023-07-13 NOTE — TELEPHONE ENCOUNTER
Prior Authorization Approval    Medication: TRETINOIN 0.05 % EX CREA  Authorization Effective Date: 6/13/2023  Authorization Expiration Date: 7/12/2024  Approved Dose/Quantity:   Reference #:     Insurance Company: [employer] - Phone [phone] Fax [phone]  Expected CoPay:       CoPay Card Available:      Financial Assistance Needed:   Which Pharmacy is filling the prescription: [facility], [place] - [address] [place] [address]  Pharmacy Notified: Yes  Patient Notified: No   **Instructed pharmacy to notify patient when script is ready to /ship.**

## 2023-07-25 ENCOUNTER — E-VISIT (OUTPATIENT)
Dept: FAMILY MEDICINE | Facility: CLINIC | Age: 53
End: 2023-07-25
Payer: COMMERCIAL

## 2023-07-25 ENCOUNTER — MYC REFILL (OUTPATIENT)
Dept: FAMILY MEDICINE | Facility: CLINIC | Age: 53
End: 2023-07-25
Payer: COMMERCIAL

## 2023-07-25 DIAGNOSIS — K12.2 ORAL INFECTION: Primary | ICD-10-CM

## 2023-07-25 DIAGNOSIS — R10.13 EPIGASTRIC PAIN: ICD-10-CM

## 2023-07-25 DIAGNOSIS — T88.7XXA SIDE EFFECT OF MEDICATION: ICD-10-CM

## 2023-07-25 PROCEDURE — 99421 OL DIG E/M SVC 5-10 MIN: CPT | Performed by: PHYSICIAN ASSISTANT

## 2023-07-25 RX ORDER — OMEPRAZOLE 40 MG/1
40 CAPSULE, DELAYED RELEASE ORAL DAILY
Qty: 30 CAPSULE | Refills: 0 | Status: SHIPPED | OUTPATIENT
Start: 2023-07-25 | End: 2023-08-25

## 2023-07-25 NOTE — TELEPHONE ENCOUNTER
Prescription approved per [facility] Refill Protocol     Charisse Orantes     RN MSN

## 2023-07-27 RX ORDER — ONDANSETRON 4 MG/1
4 TABLET, FILM COATED ORAL EVERY 8 HOURS PRN
Qty: 30 TABLET | Refills: 0 | Status: SHIPPED | OUTPATIENT
Start: 2023-07-27 | End: 2023-08-16

## 2023-08-10 ENCOUNTER — ANESTHESIA EVENT (OUTPATIENT)
Dept: GASTROENTEROLOGY | Facility: CLINIC | Age: 53
End: 2023-08-10
Payer: COMMERCIAL

## 2023-08-10 ASSESSMENT — LIFESTYLE VARIABLES: TOBACCO_USE: 1
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
## 2023-08-14 ENCOUNTER — ANESTHESIA (OUTPATIENT)
Dept: GASTROENTEROLOGY | Facility: CLINIC | Age: 53
End: 2023-08-14
Payer: COMMERCIAL

## 2023-08-14 ENCOUNTER — HOSPITAL ENCOUNTER (OUTPATIENT)
Facility: CLINIC | Age: 53
Discharge: HOME | End: 2023-08-14
Attending: SURGERY | Admitting: SURGERY
Payer: COMMERCIAL

## 2023-08-14 VITALS
WEIGHT: 170 LBS | TEMPERATURE: 98.2 F | SYSTOLIC BLOOD PRESSURE: 140 MMHG | HEIGHT: 65 IN | RESPIRATION RATE: 17 BRPM | DIASTOLIC BLOOD PRESSURE: 80 MMHG | BODY MASS INDEX: 28.32 KG/M2 | OXYGEN SATURATION: 96 % | HEART RATE: 67 BPM

## 2023-08-14 LAB — UPPER GI ENDOSCOPY: NORMAL

## 2023-08-14 PROCEDURE — 250N000009 HC RX 250: Performed by: NURSE ANESTHETIST, CERTIFIED REGISTERED

## 2023-08-14 PROCEDURE — 88305 TISSUE EXAM BY PATHOLOGIST: CPT | Mod: 26 | Performed by: PATHOLOGY

## 2023-08-14 PROCEDURE — 370N000017 HC ANESTHESIA TECHNICAL FEE, PER MIN: Performed by: SURGERY

## 2023-08-14 PROCEDURE — 250N000011 HC RX IP 250 OP 636: Performed by: NURSE ANESTHETIST, CERTIFIED REGISTERED

## 2023-08-14 PROCEDURE — 250N000011 HC RX IP 250 OP 636: Mod: JZ | Performed by: NURSE ANESTHETIST, CERTIFIED REGISTERED

## 2023-08-14 PROCEDURE — 258N000003 HC RX IP 258 OP 636: Performed by: NURSE ANESTHETIST, CERTIFIED REGISTERED

## 2023-08-14 PROCEDURE — 88305 TISSUE EXAM BY PATHOLOGIST: CPT | Mod: TC | Performed by: SURGERY

## 2023-08-14 PROCEDURE — 43239 EGD BIOPSY SINGLE/MULTIPLE: CPT | Performed by: SURGERY

## 2023-08-14 RX ORDER — LIDOCAINE HYDROCHLORIDE 10 MG/ML
INJECTION, SOLUTION INFILTRATION; PERINEURAL PRN
Status: DISCONTINUED | OUTPATIENT
Start: 2023-08-14 | End: 2023-08-14

## 2023-08-14 RX ORDER — PROPOFOL 10 MG/ML
INJECTION, EMULSION INTRAVENOUS PRN
Status: DISCONTINUED | OUTPATIENT
Start: 2023-08-14 | End: 2023-08-14

## 2023-08-14 RX ORDER — SODIUM CHLORIDE, SODIUM LACTATE, POTASSIUM CHLORIDE, CALCIUM CHLORIDE 600; 310; 30; 20 MG/100ML; MG/100ML; MG/100ML; MG/100ML
INJECTION, SOLUTION INTRAVENOUS CONTINUOUS
Status: DISCONTINUED | OUTPATIENT
Start: 2023-08-14 | End: 2023-08-14 | Stop reason: HOSPADM

## 2023-08-14 RX ORDER — GLYCOPYRROLATE 0.2 MG/ML
INJECTION, SOLUTION INTRAMUSCULAR; INTRAVENOUS PRN
Status: DISCONTINUED | OUTPATIENT
Start: 2023-08-14 | End: 2023-08-14

## 2023-08-14 RX ORDER — ONDANSETRON 2 MG/ML
4 INJECTION INTRAMUSCULAR; INTRAVENOUS EVERY 6 HOURS PRN
Status: DISCONTINUED | OUTPATIENT
Start: 2023-08-14 | End: 2023-08-14 | Stop reason: HOSPADM

## 2023-08-14 RX ADMIN — PROPOFOL 100 MG: 10 INJECTION, EMULSION INTRAVENOUS at 07:58

## 2023-08-14 RX ADMIN — GLYCOPYRROLATE 0.1 MG: 0.2 INJECTION, SOLUTION INTRAMUSCULAR; INTRAVENOUS at 07:58

## 2023-08-14 RX ADMIN — ONDANSETRON 4 MG: 2 INJECTION INTRAMUSCULAR; INTRAVENOUS at 08:24

## 2023-08-14 RX ADMIN — SODIUM CHLORIDE, POTASSIUM CHLORIDE, SODIUM LACTATE AND CALCIUM CHLORIDE: 600; 310; 30; 20 INJECTION, SOLUTION INTRAVENOUS at 07:26

## 2023-08-14 RX ADMIN — PROPOFOL 150 MG: 10 INJECTION, EMULSION INTRAVENOUS at 08:04

## 2023-08-14 RX ADMIN — TOPICAL ANESTHETIC 1 SPRAY: 200 SPRAY DENTAL; PERIODONTAL at 07:58

## 2023-08-14 RX ADMIN — LIDOCAINE HYDROCHLORIDE 50 MG: 10 INJECTION, SOLUTION INFILTRATION; PERINEURAL at 07:58

## 2023-08-14 ASSESSMENT — ACTIVITIES OF DAILY LIVING (ADL): ADLS_ACUITY_SCORE: 35
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
## 2023-08-14 NOTE — PROGRESS NOTES
[facility] DISCHARGE NOTE    Patient discharged to home at 8:46 AM via ambulation. Accompanied by spouse and staff. Discharge instructions reviewed with patient and spouse, opportunity offered to ask questions. Prescriptions - None ordered for discharge. All belongings sent with patient.  Patient given a dose of Zofran, per CRNA, Frances Nelson, before discharge home for nausea.      Chayito Baumann RN

## 2023-08-14 NOTE — ANESTHESIA POSTPROCEDURE EVALUATION
Patient: Cynthia Lyons    Procedure: Procedure(s):  ESOPHAGOGASTRODUODENOSCOPY, WITH BIOPSY       Anesthesia Type:  General    Note:  Disposition: Outpatient   Postop Pain Control: Uneventful            Sign Out: Well controlled pain   PONV: No   Neuro/Psych: Uneventful            Sign Out: Acceptable/Baseline neuro status   Airway/Respiratory: Uneventful            Sign Out: Acceptable/Baseline resp. status   CV/Hemodynamics: Uneventful            Sign Out: Acceptable CV status; No obvious hypovolemia; No obvious fluid overload   Other NRE: NONE   DID A NON-ROUTINE EVENT OCCUR? No           Last vitals:  Vitals Value Taken Time   /109 08/14/23 0816   Temp 36.8  C (98.2  F) 08/14/23 0816   Pulse 69 08/14/23 0816   Resp 17 08/14/23 0816   SpO2 96 % 08/14/23 0816       Electronically Signed By: ALAN Ochoa CRNA  August 14, 2023  8:19 AM

## 2023-08-14 NOTE — LETTER
Cynthia Lyons  [address] [facility] [address]  August 16, 2023    Dear Cynthia,   This letter is to inform you of the results of your pathology report on your upper endoscopy (EGD).    Your pathology report was:  Final Diagnosis   A. Esophagus, distal, biopsy:  -Gastric type columnar mucosa with intestinal metaplasia (see comment)  -Adjacent squamous mucosa with reactive epithelial changes of the type seen in reflux esophagitis.  -Negative for acute or eosinophilic esophagitis.  -Negative for dysplasia or malignancy.   Guzman's Esophagus. Guzman's Esophagus is not a cancer, but has a higher chance of becoming a cancer compared to normal esophageal tissue. You will need to have a follow up surveillance upper endoscopy with biopsies every two year(s). With close follow up we should be able to detect changes early.  Pathology also Showed findings consistent with reflux (heartburn).  Please continue your omeprazole.      If you have any questions or concerns please do not hesitate to call my office at [phone].  Sincerely,     Prieto-Aimee DO Hughes FACOS  [facility]

## 2023-08-14 NOTE — ANESTHESIA CARE TRANSFER NOTE
Patient: Cynthia Lyons    Procedure: Procedure(s):  ESOPHAGOGASTRODUODENOSCOPY, WITH BIOPSY       Diagnosis: Guzman's esophagus without dysplasia [K22.70]  Diagnosis Additional Information: No value filed.    Anesthesia Type:   General     Note:    Oropharynx: oropharynx clear of all foreign objects  Level of Consciousness: drowsy  Oxygen Supplementation: room air    Independent Airway: airway patency satisfactory and stable  Dentition: dentition unchanged  Vital Signs Stable: post-procedure vital signs reviewed and stable  Report to RN Given: handoff report given  Patient transferred to: Phase II    Handoff Report: Identifed the Patient, Identified the Reponsible Provider, Reviewed the pertinent medical history, Discussed the surgical course, Reviewed Intra-OP anesthesia mangement and issues during anesthesia, Set expectations for post-procedure period and Allowed opportunity for questions and acknowledgement of understanding    Vitals:  Vitals Value Taken Time   /115 08/14/23 0808   Temp     Pulse 62 08/14/23 0808   Resp     SpO2 96 % 08/14/23 0811   Vitals shown include unvalidated device data.    Electronically Signed By: ALAN Ochoa CRNA  August 14, 2023  8:11 AM

## 2023-08-15 LAB
PATH REPORT.COMMENTS IMP SPEC: NORMAL
PATH REPORT.FINAL DX SPEC: NORMAL
PATH REPORT.GROSS SPEC: NORMAL
PATH REPORT.MICROSCOPIC SPEC OTHER STN: NORMAL
PATH REPORT.RELEVANT HX SPEC: NORMAL
PHOTO IMAGE: NORMAL

## 2023-08-16 ENCOUNTER — OFFICE VISIT (OUTPATIENT)
Dept: FAMILY MEDICINE | Facility: CLINIC | Age: 53
End: 2023-08-16
Payer: COMMERCIAL

## 2023-08-16 ENCOUNTER — MYC MEDICAL ADVICE (OUTPATIENT)
Dept: FAMILY MEDICINE | Facility: CLINIC | Age: 53
End: 2023-08-16

## 2023-08-16 VITALS
HEART RATE: 91 BPM | WEIGHT: 173 LBS | RESPIRATION RATE: 16 BRPM | HEIGHT: 66 IN | DIASTOLIC BLOOD PRESSURE: 84 MMHG | TEMPERATURE: 98.6 F | BODY MASS INDEX: 27.8 KG/M2 | SYSTOLIC BLOOD PRESSURE: 139 MMHG | OXYGEN SATURATION: 94 %

## 2023-08-16 DIAGNOSIS — T88.7XXA SIDE EFFECT OF MEDICATION: ICD-10-CM

## 2023-08-16 DIAGNOSIS — K31.84 GASTROPARESIS: ICD-10-CM

## 2023-08-16 DIAGNOSIS — K22.4 ESOPHAGEAL DYSMOTILITY: ICD-10-CM

## 2023-08-16 DIAGNOSIS — G89.4 CHRONIC PAIN DISORDER: ICD-10-CM

## 2023-08-16 DIAGNOSIS — F33.9 RECURRENT MAJOR DEPRESSIVE DISORDER, REMISSION STATUS UNSPECIFIED (H): ICD-10-CM

## 2023-08-16 DIAGNOSIS — K22.70 BARRETT'S ESOPHAGUS WITHOUT DYSPLASIA: ICD-10-CM

## 2023-08-16 DIAGNOSIS — Z87.891 PERSONAL HISTORY OF TOBACCO USE: ICD-10-CM

## 2023-08-16 DIAGNOSIS — A69.23 LYME ARTHRITIS (H): ICD-10-CM

## 2023-08-16 DIAGNOSIS — F41.1 GENERALIZED ANXIETY DISORDER: ICD-10-CM

## 2023-08-16 DIAGNOSIS — F11.20 CONTINUOUS OPIOID DEPENDENCE (H): Primary | ICD-10-CM

## 2023-08-16 LAB — CREAT UR-MCNC: 284 MG/DL

## 2023-08-16 PROCEDURE — G0296 VISIT TO DETERM LDCT ELIG: HCPCS | Performed by: PHYSICIAN ASSISTANT

## 2023-08-16 PROCEDURE — 99215 OFFICE O/P EST HI 40 MIN: CPT | Performed by: PHYSICIAN ASSISTANT

## 2023-08-16 PROCEDURE — G0480 DRUG TEST DEF 1-7 CLASSES: HCPCS | Performed by: PHYSICIAN ASSISTANT

## 2023-08-16 RX ORDER — OXYCODONE HYDROCHLORIDE 5 MG/1
5 TABLET ORAL 2 TIMES DAILY PRN
Qty: 60 TABLET | Refills: 0 | Status: SHIPPED | OUTPATIENT
Start: 2023-08-16 | End: 2023-09-06 | Stop reason: ALTCHOICE

## 2023-08-16 RX ORDER — ONDANSETRON 8 MG/1
8 TABLET, FILM COATED ORAL EVERY 8 HOURS PRN
Qty: 90 TABLET | Refills: 11 | Status: SHIPPED | OUTPATIENT
Start: 2023-08-16 | End: 2024-01-09

## 2023-08-16 RX ORDER — MORPHINE SULFATE 15 MG/1
15 TABLET, FILM COATED, EXTENDED RELEASE ORAL EVERY 12 HOURS
Qty: 60 TABLET | Refills: 0 | Status: SHIPPED | OUTPATIENT
Start: 2023-08-16 | End: 2023-09-06 | Stop reason: ALTCHOICE

## 2023-08-16 ASSESSMENT — ENCOUNTER SYMPTOMS
HEARTBURN: 0
NERVOUS/ANXIOUS: 0
HEADACHES: 0
NAUSEA: 1
MYALGIAS: 0
SHORTNESS OF BREATH: 0
FEVER: 0
PALPITATIONS: 0
CONSTIPATION: 0
HEMATURIA: 0
PARESTHESIAS: 0
DIARRHEA: 0
HEMATOCHEZIA: 0
SORE THROAT: 0
COUGH: 0
DYSURIA: 0
JOINT SWELLING: 0
CHILLS: 0
EYE PAIN: 0
ARTHRALGIAS: 0
WEAKNESS: 0
ABDOMINAL PAIN: 0
DIZZINESS: 0
BREAST MASS: 0
FREQUENCY: 0

## 2023-08-16 ASSESSMENT — ANXIETY QUESTIONNAIRES
2. NOT BEING ABLE TO STOP OR CONTROL WORRYING: SEVERAL DAYS
5. BEING SO RESTLESS THAT IT IS HARD TO SIT STILL: NOT AT ALL
6. BECOMING EASILY ANNOYED OR IRRITABLE: NOT AT ALL
GAD7 TOTAL SCORE: 3
3. WORRYING TOO MUCH ABOUT DIFFERENT THINGS: SEVERAL DAYS
7. FEELING AFRAID AS IF SOMETHING AWFUL MIGHT HAPPEN: NOT AT ALL
GAD7 TOTAL SCORE: 3
IF YOU CHECKED OFF ANY PROBLEMS ON THIS QUESTIONNAIRE, HOW DIFFICULT HAVE THESE PROBLEMS MADE IT FOR YOU TO DO YOUR WORK, TAKE CARE OF THINGS AT HOME, OR GET ALONG WITH OTHER PEOPLE: SOMEWHAT DIFFICULT
1. FEELING NERVOUS, ANXIOUS, OR ON EDGE: SEVERAL DAYS

## 2023-08-16 ASSESSMENT — ACTIVITIES OF DAILY LIVING (ADL): CURRENT_FUNCTION: NO ASSISTANCE NEEDED

## 2023-08-16 ASSESSMENT — PATIENT HEALTH QUESTIONNAIRE - PHQ9
SUM OF ALL RESPONSES TO PHQ QUESTIONS 1-9: 0
5. POOR APPETITE OR OVEREATING: NOT AT ALL

## 2023-08-16 ASSESSMENT — PAIN SCALES - GENERAL: PAINLEVEL: MODERATE PAIN (5)

## 2023-08-16 NOTE — LETTER
Opioid / Opioid Plus Controlled Substance Agreement    This is an agreement between you and your provider about the safe and appropriate use of controlled substance/opioids prescribed by your care team. Controlled substances are medicines that can cause physical and mental dependence (abuse).    There are strict laws about having and using these medicines. We here at [facility] are committing to working with you in your efforts to get better. To support you in this work, we ll help you schedule regular office appointments for medicine refills. If we must cancel or change your appointment for any reason, we ll make sure you have enough medicine to last until your next appointment.     As a Provider, I will:  Listen carefully to your concerns and treat you with respect.   Recommend a treatment plan that I believe is in your best interest. This plan may involve therapies other than opioid pain medication.   Talk with you often about the possible benefits, and the risk of harm of any medicine that we prescribe for you.   Provide a plan on how to taper (discontinue or go off) using this medicine if the decision is made to stop its use.    As a Patient, I understand that opioid(s):   Are a controlled substance prescribed by my care team to help me function or work and manage my condition(s).   Are strong medicines and can cause serious side effects such as:  Drowsiness, which can seriously affect my driving ability  A lower breathing rate, enough to cause death  Harm to my thinking ability   Depression   Abuse of and addiction to this medicine  Need to be taken exactly as prescribed. Combining opioids with certain medicines or chemicals (such as illegal drugs, sedatives, sleeping pills, and benzodiazepines) can be dangerous or even fatal. If I stop opioids suddenly, I may have severe withdrawal symptoms.  Do not work for all types of pain nor for all patients. If they re not helpful, I may be asked to stop  them.        The risks, benefits and side effects of these medicine(s) were explained to me. I agree that:  I will take part in other treatments as advised by my care team. This may be psychiatry or counseling, physical therapy, behavioral therapy, group treatment or a referral to a specialist.     I will keep all my appointments. I understand that this is part of the monitoring of opioids. My care team may require an office visit for EVERY opioid/controlled substance refill. If I miss appointments or don t follow instructions, my care team may stop my medicine.    I will take my medicines as prescribed. I will not change the dose or schedule unless my care team tells me to. There will be no refills if I run out early.     I may be asked to come to the clinic and complete a urine drug test or complete a pill count at any time. If I don t give a urine sample or participate in a pill count, the care team may stop my medicine.    I will only receive prescriptions from this clinic for chronic pain. If I am treated by another provider for acute pain issues, I will tell them that I am taking opioid pain medication for chronic pain and that I have a treatment agreement with this provider. I will inform my [facility] care team within one business day if I am given a prescription for any pain medication by another healthcare provider. My [facility] care team can contact other providers and pharmacists about my use of any medicines.    It is up to me to make sure that I don t run out of my medicines on weekends or holidays. If my care team is willing to refill my opioid prescription without a visit, I must request refills only during office hours. Refills may take up to 3 business days to process. I will use one pharmacy to fill all my opioid and other controlled substance prescriptions. I will notify the clinic about any changes to my insurance or medication availability.    I am responsible for my  prescriptions. If the medicine/prescription is lost, stolen or destroyed, it will not be replaced. I also agree not to share controlled substance medicines with anyone.    I am aware I should not use any illegal or recreational drugs. I agree not to drink alcohol unless my care team says I can.       If I enroll in the [place] [facility] program, I will tell my care team prior to my next refill.     I will tell my care team right away if I become pregnant, have a new medical problem treated outside of my regular clinic, or have a change in my medications.    I understand that this medicine can affect my thinking, judgment and reaction time. Alcohol and drugs affect the brain and body, which can affect the safety of my driving. Being under the influence of alcohol or drugs can affect my decision-making, behaviors, personal safety, and the safety of others. Driving while impaired (DWI) can occur if a person is driving, operating, or in physical control of a car, motorcycle, boat, snowmobile, ATV, motorbike, off-road vehicle, or any other motor vehicle ([place] Statute 169A.20). I understand the risk if I choose to drive or operate any vehicle or machinery.    I understand that if I do not follow any of the conditions above, my prescriptions or treatment may be stopped or changed.          Opioids  What You Need to Know    What are opioids?   Opioids are pain medicines that must be prescribed by a doctor. They are also known as narcotics.     Examples are:   morphine (MS Contin, Jess)  oxycodone (Oxycontin)  oxycodone and acetaminophen (Percocet)  hydrocodone and acetaminophen (Vicodin, Norco)   fentanyl patch (Duragesic)   hydromorphone (Dilaudid)   methadone  codeine (Tylenol #3)     What do opioids do well?   Opioids are best for severe short-term pain such as after a surgery or injury. They may work well for cancer pain. They may help some people with long-lasting (chronic) pain.     What do opioids NOT do  well?   Opioids never get rid of pain entirely, and they don t work well for most patients with chronic pain. Opioids don t reduce swelling, one of the causes of pain.                                    Other ways to manage chronic pain and improve function include:     Treat the health problem that may be causing pain  Anti-inflammation medicines, which reduce swelling and tenderness, such as ibuprofen (Advil, Motrin) or naproxen (Aleve)  Acetaminophen (Tylenol)  Antidepressants and anti-seizure medicines, especially for nerve pain  Topical treatments such as patches or creams  Injections or nerve blocks  Chiropractic or osteopathic treatment  Acupuncture, massage, deep breathing, meditation, visual imagery, aromatherapy  Use heat or ice at the pain site  Physical therapy   Exercise  Stop smoking  Take part in therapy       Risks and side effects     Talk to your doctor before you start or decide to keep taking opioids. Possible side effects include:    Lowering your breathing rate enough to cause death  Overdose, including death, especially if taking higher than prescribed doses  Worse depression symptoms; less pleasure in things you usually enjoy  Feeling tired or sluggish  Slower thoughts or cloudy thinking  Being more sensitive to pain over time; pain is harder to control  Trouble sleeping or restless sleep  Changes in hormone levels (for example, less testosterone)  Changes in sex drive or ability to have sex  Constipation  Unsafe driving  Itching and sweating  Dizziness  Nausea, throwing up and dry mouth    What else should I know about opioids?    Opioids may lead to dependence, tolerance, or addiction.    Dependence means that if you stop or reduce the medicine too quickly, you will have withdrawal symptoms. These include loose poop (diarrhea), jitters, flu-like symptoms, nervousness and tremors. Dependence is not the same as addiction.                     Tolerance means needing higher doses over time to  get the same effect. This may increase the chance of serious side effects.    Addiction is when people improperly use a substance that harms their body, their mind or their relations with others. Use of opiates can cause a relapse of addiction if you have a history of drug or alcohol abuse.    People who have used opioids for a long time may have a lower quality of life, worse depression, higher levels of pain and more visits to doctors.    You can overdose on opioids. Take these steps to lower your risk of overdose:    Recognize the signs:  Signs of overdose include decrease or loss of consciousness (blackout), slowed breathing, trouble waking up and blue lips. If someone is worried about overdose, they should call 911.    Talk to your doctor about Narcan (naloxone).   If you are at risk for overdose, you may be given a prescription for Narcan. This medicine very quickly reverses the effects of opioids.   If you overdose, a friend or family member can give you Narcan while waiting for the ambulance. They need to know the signs of overdose and how to give Narcan.     Don't use alcohol or street drugs.   Taking them with opioids can cause death.    Do not take any of these medicines unless your doctor says it s OK. Taking these with opioids can cause death:  Benzodiazepines, such as lorazepam (Ativan), alprazolam (Xanax) or diazepam (Valium)  Muscle relaxers, such as cyclobenzaprine (Flexeril)  Sleeping pills like zolpidem (Ambien)   Other opioids      How to keep you and other people safe while taking opioids:    Never share your opioids with others.  Opioid medicines are regulated by the Drug Enforcement Agency ([place]AK[place]). Selling or sharing medications is a criminal act.    2. Be sure to store opioids in a secure place, locked up if possible. Young children can easily swallow them and overdose.    3. When you are traveling with your medicines, keep them in the original bottles. If you use a pill box, be sure you also  carry a copy of your medicine list from your clinic or pharmacy.    4. Safe disposal of opioids    Most pharmacies have places to get rid of medicine, called disposal kiosks. Medicine disposal options are also available in every [facility]. Search your county and  medication disposal  to find more options. You can find more details at:  https://www.pca.[facility].[email or web address]./living-green/managing-unwanted-medications     I agree that my provider, clinic care team, and pharmacy may work with any city, state or federal law enforcement agency that investigates the misuse, sale, or other diversion of my controlled medicine. I will allow my provider to discuss my care with, or share a copy of, this agreement with any other treating provider, pharmacy or emergency room where I receive care.    I have read this agreement and have asked questions about anything I did not understand.    _______________________________________________________  Patient Signature - Cynthia Lyons _____________________                   Date     _______________________________________________________  Provider Signature - Rosaura Flores PA-C   _____________________                   Date     _______________________________________________________  Witness Signature (required if provider not present while patient signing)   _____________________                   Date

## 2023-08-16 NOTE — PROGRESS NOTES
"   SUBJECTIVE:   CC: Karla is an 53 year old who presents for preventive health visit.       8/16/2023     7:27 AM   Additional Questions   Roomed by Stephanie     This visit was scheduled as a physical however due to time constraints the physical portion was deferred to address chronic issues.    Symptoms are much better with increasing omeprazole back to 40 mg.  No nausea daily constant nausea, eating well,   Endoscopy this week continues to show barretts.  Cut out pop.  Using zofran 8 mg a few times a week - has been subject to quantity limits from insurance - otherwise would take once daily.  Troubles just in morning.    Wants off belbuca due to teeth issues since starting it.  Has self weaned - went down on film dose to 450 then she cut in half, and now using it every 3 days.      Allergies doing well without zyrtec.        Hypertension Follow-up    Do you check your blood pressure regularly outside of the clinic? No   Are you following a low salt diet? Yes    OBJECTIVE:   /84 (BP Location: Right arm)   Pulse 91   Temp 98.6  F (37  C) (Tympanic)   Resp 16   Ht 1.676 m (5' 6\")   Wt 78.5 kg (173 lb)   LMP  (LMP Unknown)   SpO2 94%   BMI 27.92 kg/m      ASSESSMENT/PLAN:   (F11.20) F11.2 - Continuous opioid dependence (H)  (primary encounter diagnosis)  (A69.23) Lyme arthritis (H)  (G89.4) Chronic pain disorder  Comment: med change away from buprenorphine due to dental issues and patient preference.  Retry extended release + prn.  Plan: [id number] - Urine Drug Confirmation Panel         (Comprehensive), morphine (MS CONTIN) 15 MG CR         tablet, oxyCODONE (ROXICODONE) 5 MG tablet    (F33.9) Recurrent major depressive disorder, remission status unspecified (H)    (F41.1) Generalized anxiety disorder  Comment: stable refill  Plan: FLUoxetine (PROZAC) 20 MG capsule    (K22.70) Guzman's esophagus without dysplasia  (K22.4) Esophageal dysmotility and gastroparesis  (K31.84) Gastroparesis  Comment: change to " 8 mg tabs and may need to be cash pay due to insurance limitations  Plan: ondansetron (ZOFRAN) 8 MG tablet    (T88.7XXA) Side effect of medication  Comment: feels sick when takes her medications  Plan: ondansetron (ZOFRAN) 8 MG tablet    (Z87.891) Personal history of tobacco use  Plan: Prof fee: Shared Decision Making for Lung         Cancer Screening, CT Chest Lung Cancer Scrn Low        Dose wo    42 min spent on patient visit and documentation  Patient Instructions   No more buprenorphine or oxycodone 10 mg    Switch to long acting plus smaller dose of prn oxycodone     Changed zofran to 8 mg     Lung cancer screen - Call [phone] to set up your imaging testing.     Lung Cancer Screening   Frequently Asked Questions  If you are at high-risk for lung cancer, getting screened with low-dose computed tomography (LDCT) every year can help save your life. This handout offers answers to some of the most common questions about lung cancer screening. If you have other questions, please call [phone] ([phone]).     What is it?  Lung cancer screening uses special X-ray technology to create an image of your lung tissue. The exam is quick and easy and takes less than 10 seconds. We don t give you any medicine or use any needles. You can eat before and after the exam. You don t need to change your clothes as long as the clothing on your chest doesn t contain metal. But, you do need to be able to hold your breath for at least 6 seconds during the exam.    What is the goal of lung cancer screening?  The goal of lung cancer screening is to save lives. Many times, lung cancer is not found until a person starts having physical symptoms. Lung cancer screening can help detect lung cancer in the earliest stages when it may be easier to treat.    Who should be screened for lung cancer?  We suggest lung cancer screening for anyone who is at high-risk for lung cancer. You are in the high-risk group if you:     are  between the ages of 55 and 79, and   have smoked at least 1 pack of cigarettes a day for 20 or more years, and   still smoke or have quit within the past 15 years.    However, if you have a new cough or shortness of breath, you should talk to your doctor before being screened.    Why does it matter if I have symptoms?  Certain symptoms can be a sign that you have a condition in your lungs that should be checked and treated by your doctor. These symptoms include fever, chest pain, a new or changing cough, shortness of breath that you have never felt before, coughing up blood or unexplained weight loss. Having any of these symptoms can greatly affect the results of lung cancer screening.       Should all smokers get an LDCT lung cancer screening exam?  It depends. Lung cancer screening is for a very specific group of men and women who have a history of heavy smoking over a long period of time (see  Who should be screened for lung cancer  above).  I am in the high-risk group, but have been diagnosed with cancer in the past. Is LDCT lung cancer screening right for me?  In some cases, you should not have LDCT lung screening, such as when your doctor is already following your cancer with CT scan studies. Your doctor will help you decide if LDCT lung screening is right for you.  Do I need to have a screening exam every year?  Yes. If you are in the high-risk group described earlier, you should get an LDCT lung cancer screening exam every year until you are 79, or are no longer willing or able to undergo screening and possible procedures to diagnose and treat lung cancer.  How effective is LDCT at preventing death from lung cancer?  Studies have shown that LDCT lung cancer screening can lower the risk of death from lung cancer by 20 percent in people who are at high-risk.  What are the risks?  There are some risks and limitations of LDCT lung cancer screening. We want to make sure you understand the risks and benefits, so  please let us know if you have any questions. Your doctor may want to talk with you more about these risks.   Radiation exposure: As with any exam that uses radiation, there is a very small increased risk of cancer. The amount of radiation in LDCT is small--about the same amount a person would get from a mammogram. Your doctor orders the exam when he or she feels the potential benefits outweigh the risks.   False negatives: No test is perfect, including LDCT. It is possible that you may have a medical condition, including lung cancer, that is not found during your exam. This is called a false negative result.   False positives and more testing: LDCT very often finds something in the lung that could be cancer, but in fact is not. This is called a false positive result. False positive tests often cause anxiety. To make sure these findings are not cancer, you may need to have more tests. These tests will be done only if you give us permission. Sometimes patients need a treatment that can have side effects, such as a biopsy. For more information on false positives, see  What can I expect from the results?    Findings not related to lung cancer: Your LDCT exam also takes pictures of areas of your body next to your lungs. In a very small number of cases, the CT scan will show an abnormal finding in one of these areas, such as your kidneys, adrenal glands, liver or thyroid. This finding may not be serious, but you may need more tests. Your doctor can help you decide what other tests you may need, if any.  What can I expect from the results?  About 1 out of 4 LDCT exams will find something that may need more tests. Most of the time, these findings are lung nodules. Lung nodules are very small collections of tissue in the lung. These nodules are very common, and the vast majority--more than 97 percent--are not cancer (benign). Most are normal lymph nodes or small areas of scarring from past infections.  But, if a small lung  nodule is found to be cancer, the cancer can be cured more than 90 percent of the time. To know if the nodule is cancer, we may need to get more images before your next yearly screening exam. If the nodule has suspicious features (for example, it is large, has an odd shape or grows over time), we will refer you to a specialist for further testing.  Will my doctor also get the results?  Yes. Your doctor will get a copy of your results.  Is it okay to keep smoking now that there s a cancer screening exam?  No. Tobacco is one of the strongest cancer-causing agents. It causes not only lung cancer, but other cancers and cardiovascular (heart) diseases as well. The damage caused by smoking builds over time. This means that the longer you smoke, the higher your risk of disease. While it is never too late to quit, the sooner you quit, the better.  Where can I find help to quit smoking?  The best way to prevent lung cancer is to stop smoking. If you have already quit smoking, congratulations and keep it up! For help on quitting smoking, please call [employer] at 0-054-QUITNOW (1-420.672.2062) or the American Cancer Society at 1-629.738.1128 to find local resources near you.  One-on-one health coaching:  If you d prefer to work individually with a health care provider on tobacco cessation, we offer:     Medication Therapy Management:  Our specially trained pharmacists work closely with you and your doctor to help you quit smoking.  Call [phone] or [phone] (toll free).    Rosaura Flores PA-C  [facility]  Lung Cancer Screening Shared Decision Making Visit     Cynthia Lyons, a 53 year old female, is eligible for lung cancer screening      I have discussed with patient the risks and benefits of screening for lung cancer with low-dose CT.     The risks include:    radiation exposure: one low dose chest CT has as much ionizing radiation as about 15 chest x-rays, or 6 months of  background radiation living in [place]      false positives: most findings/nodules are NOT cancer, but some might still require additional diagnostic evaluation, including biopsy    over-diagnosis: some slow growing cancers that might never have been clinically significant will be detected and treated unnecessarily     The benefit of early detection of lung cancer is contingent upon adherence to annual screening or more frequent follow up if indicated.     Furthermore, to benefit from screening, [place] must be willing and able to undergo diagnostic procedures, if indicated. Although no specific guide is available for determining severity of comorbidities, it is reasonable to withhold screening in patients who have greater mortality risk from other diseases.     We did discuss that the best way to prevent lung cancer is to not smoke.    Some patients may value a numeric estimation of lung cancer risk when evaluating if lung cancer screening is right for them, here is one calculator:    ShouldIScreen

## 2023-08-16 NOTE — PATIENT INSTRUCTIONS
No more buprenorphine or oxycodone 10 mg    Switch to long acting plus smaller dose of prn oxycodone     Changed zofran to 8 mg     Lung cancer screen - Call [phone] to set up your imaging testing.     Lung Cancer Screening   Frequently Asked Questions  If you are at high-risk for lung cancer, getting screened with low-dose computed tomography (LDCT) every year can help save your life. This handout offers answers to some of the most common questions about lung cancer screening. If you have other questions, please call [phone] ([phone]).     What is it?  Lung cancer screening uses special X-ray technology to create an image of your lung tissue. The exam is quick and easy and takes less than 10 seconds. We don t give you any medicine or use any needles. You can eat before and after the exam. You don t need to change your clothes as long as the clothing on your chest doesn t contain metal. But, you do need to be able to hold your breath for at least 6 seconds during the exam.    What is the goal of lung cancer screening?  The goal of lung cancer screening is to save lives. Many times, lung cancer is not found until a person starts having physical symptoms. Lung cancer screening can help detect lung cancer in the earliest stages when it may be easier to treat.    Who should be screened for lung cancer?  We suggest lung cancer screening for anyone who is at high-risk for lung cancer. You are in the high-risk group if you:      are between the ages of 55 and 79, and    have smoked at least 1 pack of cigarettes a day for 20 or more years, and    still smoke or have quit within the past 15 years.    However, if you have a new cough or shortness of breath, you should talk to your doctor before being screened.    Why does it matter if I have symptoms?  Certain symptoms can be a sign that you have a condition in your lungs that should be checked and treated by your doctor. These symptoms include fever,  chest pain, a new or changing cough, shortness of breath that you have never felt before, coughing up blood or unexplained weight loss. Having any of these symptoms can greatly affect the results of lung cancer screening.       Should all smokers get an LDCT lung cancer screening exam?  It depends. Lung cancer screening is for a very specific group of men and women who have a history of heavy smoking over a long period of time (see  Who should be screened for lung cancer  above).  I am in the high-risk group, but have been diagnosed with cancer in the past. Is LDCT lung cancer screening right for me?  In some cases, you should not have LDCT lung screening, such as when your doctor is already following your cancer with CT scan studies. Your doctor will help you decide if LDCT lung screening is right for you.  Do I need to have a screening exam every year?  Yes. If you are in the high-risk group described earlier, you should get an LDCT lung cancer screening exam every year until you are 79, or are no longer willing or able to undergo screening and possible procedures to diagnose and treat lung cancer.  How effective is LDCT at preventing death from lung cancer?  Studies have shown that LDCT lung cancer screening can lower the risk of death from lung cancer by 20 percent in people who are at high-risk.  What are the risks?  There are some risks and limitations of LDCT lung cancer screening. We want to make sure you understand the risks and benefits, so please let us know if you have any questions. Your doctor may want to talk with you more about these risks.    Radiation exposure: As with any exam that uses radiation, there is a very small increased risk of cancer. The amount of radiation in LDCT is small--about the same amount a person would get from a mammogram. Your doctor orders the exam when he or she feels the potential benefits outweigh the risks.    False negatives: No test is perfect, including LDCT. It is  possible that you may have a medical condition, including lung cancer, that is not found during your exam. This is called a false negative result.    False positives and more testing: LDCT very often finds something in the lung that could be cancer, but in fact is not. This is called a false positive result. False positive tests often cause anxiety. To make sure these findings are not cancer, you may need to have more tests. These tests will be done only if you give us permission. Sometimes patients need a treatment that can have side effects, such as a biopsy. For more information on false positives, see  What can I expect from the results?     Findings not related to lung cancer: Your LDCT exam also takes pictures of areas of your body next to your lungs. In a very small number of cases, the CT scan will show an abnormal finding in one of these areas, such as your kidneys, adrenal glands, liver or thyroid. This finding may not be serious, but you may need more tests. Your doctor can help you decide what other tests you may need, if any.  What can I expect from the results?  About 1 out of 4 LDCT exams will find something that may need more tests. Most of the time, these findings are lung nodules. Lung nodules are very small collections of tissue in the lung. These nodules are very common, and the vast majority--more than 97 percent--are not cancer (benign). Most are normal lymph nodes or small areas of scarring from past infections.  But, if a small lung nodule is found to be cancer, the cancer can be cured more than 90 percent of the time. To know if the nodule is cancer, we may need to get more images before your next yearly screening exam. If the nodule has suspicious features (for example, it is large, has an odd shape or grows over time), we will refer you to a specialist for further testing.  Will my doctor also get the results?  Yes. Your doctor will get a copy of your results.  Is it okay to keep smoking  now that there s a cancer screening exam?  No. Tobacco is one of the strongest cancer-causing agents. It causes not only lung cancer, but other cancers and cardiovascular (heart) diseases as well. The damage caused by smoking builds over time. This means that the longer you smoke, the higher your risk of disease. While it is never too late to quit, the sooner you quit, the better.  Where can I find help to quit smoking?  The best way to prevent lung cancer is to stop smoking. If you have already quit smoking, congratulations and keep it up! For help on quitting smoking, please call [employer] at 9-939-QUIT-NOW (1-757.877.6866) or the American Cancer Society at 1-514.979.4058 to find local resources near you.  One-on-one health coaching:  If you d prefer to work individually with a health care provider on tobacco cessation, we offer:      Medication Therapy Management:  Our specially trained pharmacists work closely with you and your doctor to help you quit smoking.  Call [phone] or [phone] (toll free).

## 2023-08-17 ENCOUNTER — MYC MEDICAL ADVICE (OUTPATIENT)
Dept: FAMILY MEDICINE | Facility: CLINIC | Age: 53
End: 2023-08-17

## 2023-08-17 DIAGNOSIS — L65.9 HYPOTRICHOSIS: Primary | ICD-10-CM

## 2023-08-18 LAB
BUPRENORPHINE UR CFM-MCNC: 159 NG/ML
BUPRENORPHINE/CREAT UR: 56 NG/MG {CREAT}
NORBUPRENORPHINE UR CFM-MCNC: 146 NG/ML
NORBUPRENORPHINE/CREAT UR: 51 NG/MG {CREAT}
OXYCODONE UR CFM-MCNC: 2600 NG/ML
OXYCODONE/CREAT UR: 915 NG/MG {CREAT}
OXYMORPHONE UR CFM-MCNC: 429 NG/ML
OXYMORPHONE/CREAT UR: 151 NG/MG {CREAT}

## 2023-08-18 RX ORDER — BIMATOPROST 3 UG/ML
SOLUTION TOPICAL
Qty: 5 ML | Refills: 11 | Status: SHIPPED | OUTPATIENT
Start: 2023-08-18 | End: 2023-11-03

## 2023-08-18 RX ORDER — BIMATOPROST 3 UG/ML
SOLUTION TOPICAL
Qty: 5 ML | Refills: 11 | Status: SHIPPED | OUTPATIENT
Start: 2023-08-18 | End: 2023-08-18

## 2023-08-20 ENCOUNTER — ANCILLARY PROCEDURE (OUTPATIENT)
Dept: GENERAL RADIOLOGY | Facility: CLINIC | Age: 53
End: 2023-08-20
Payer: COMMERCIAL

## 2023-08-20 ENCOUNTER — MYC MEDICAL ADVICE (OUTPATIENT)
Dept: FAMILY MEDICINE | Facility: CLINIC | Age: 53
End: 2023-08-20

## 2023-08-20 ENCOUNTER — OFFICE VISIT (OUTPATIENT)
Dept: URGENT CARE | Facility: URGENT CARE | Age: 53
End: 2023-08-20
Payer: COMMERCIAL

## 2023-08-20 VITALS
DIASTOLIC BLOOD PRESSURE: 77 MMHG | SYSTOLIC BLOOD PRESSURE: 129 MMHG | HEART RATE: 76 BPM | OXYGEN SATURATION: 97 % | BODY MASS INDEX: 27.92 KG/M2 | TEMPERATURE: 98.7 F | WEIGHT: 173 LBS

## 2023-08-20 DIAGNOSIS — M25.551 HIP PAIN, RIGHT: ICD-10-CM

## 2023-08-20 DIAGNOSIS — M25.551 HIP PAIN, RIGHT: Primary | ICD-10-CM

## 2023-08-20 DIAGNOSIS — Z98.1 STATUS POST LAMINECTOMY WITH SPINAL FUSION: ICD-10-CM

## 2023-08-20 PROCEDURE — 73502 X-RAY EXAM HIP UNI 2-3 VIEWS: CPT | Mod: TC | Performed by: RADIOLOGY

## 2023-08-20 PROCEDURE — 99214 OFFICE O/P EST MOD 30 MIN: CPT

## 2023-08-20 RX ORDER — CYCLOBENZAPRINE HCL 10 MG
10 TABLET ORAL 3 TIMES DAILY PRN
Qty: 30 TABLET | Refills: 0 | Status: SHIPPED | OUTPATIENT
Start: 2023-08-20 | End: 2023-09-25

## 2023-08-20 RX ORDER — MELOXICAM 15 MG/1
15 TABLET ORAL DAILY
Qty: 30 TABLET | Refills: 0 | Status: SHIPPED | OUTPATIENT
Start: 2023-08-20 | End: 2023-09-25

## 2023-08-20 ASSESSMENT — PAIN SCALES - GENERAL: PAINLEVEL: EXTREME PAIN (9)

## 2023-08-20 NOTE — PROGRESS NOTES
URGENT CARE  Assessment & Plan   Assessment:   Cynthia Lyons is a 53 year old female who's clinical presentation today is consistent with:   1. Hip pain, right  - XR Hip Right 2-3 Views; Future  - meloxicam (MOBIC) 15 MG tablet;   - Orthopedic  Referral; Future  - cyclobenzaprine (FLEXERIL) 10 MG tablet;    Plan:  Radiologic films today were negative} for fractures or dislocation today, will treat patient at this time symptomatically and supportively, this will include encouraging: using NSAIDs/Tylenol to help decrease pain and inflammation, resting, applying ice/heat as needed, compression and elevation  Educated patient to follow-up with their PCP or ortho in the next 1-2 weeks for further evaluation and reassessment, and due to the possibility of an occult fracture} also discussed to return immediatly  if symptoms worsen after today's visit.   No alarm signs or symptoms present   Differential Diagnoses for this patient's chief complaint that I considered include:  fracture, dislocation, Ligamentous vs tendon pathology, musculoskeletal injury, soft tissue injury     Patient is} agreeable to treatment plan and state they will follow-up if symptoms do not improve and/or if symptoms worsen   see patient's AVS 'monitor for' section for specific patient instructions given and discussed regarding what to watch for and when to follow up    ALAN Valverde [facility] CARE [facility]      ______________________________________________________________________      Subjective     HPI: Cynthia Lyons  is a 53 year old  female who presents today for evaluation the following concerns:   Patient presents today endorsing they sustained a fall while getting up in the middle of the night to puke, last night, on 8/19/23  Patient states they landed on their right side and felt pain.  Patient localizes their pain to the right shoulder and right hip    Patient reports bruising, redness, swelling  to their right shoulder and right hip    Post injury patient endorses they tried resting but has had little improvement in pain.   Patient denies any numbness or tingling  Patient states she is not as concerned about her shoulder as she is about her hip     Review of Systems:  Pertinent review of systems as reflected in HPI, otherwise negative.     Objective    Physical Exam:  Vitals:    08/20/23 1217   BP: 129/77   Pulse: 76   Temp: 98.7  F (37.1  C)   TempSrc: Tympanic   SpO2: 97%   Weight: 78.5 kg (173 lb)      General:   alert and oriented, no acute distress, non}ill-appearing   Vital signs reviewed: afebrile and normotensive    Msk:   Right hip: no erythremia, ecchymosis, bruising, or inflammation present  Tenderness/pain elicited with palpation,  Ambulation at baseline is normal, noted: slight limping, slow movement and guarding}   Temperature equal} to body temperature, no crepitus, no gross deformity, skin intact, and no laceration(s) present.    Imaging:   All images were personally read by this provider (myself).   Per my independent interpretation the xray shows no fracture or dislocation      ______________________________________________________________________    I explained my diagnostic considerations and recommendations to the patient, who voiced understanding and agreement with the treatment plan.   All questions were answered.   We discussed potential side effects, risks and benefits of any prescribed or recommended therapies, as well as expectations for response to treatments.  Please see AVS for any patient instructions & handouts given.   Patient was advised to contact the Nurse Care Line, their Primary Care provider, Urgent Care, or the Emergency Department if there are new or worsening symptoms, or call 911 for emergencies.

## 2023-08-20 NOTE — PATIENT INSTRUCTIONS
"  Diagnosis: Orthopedic injury;       Today we did:  Xray:  negative for fracture or dislocation  Showed arthritis    Plan:   Avoid or restrict any activities that may aggravate your symptoms.   Cease exacerbating activity for 2 to 6 weeks, then gradually return to exercise/sport as tolerated.   light stretching (when able to tolerate) to reduce your risks of developing a frozen joint or stiffness in joint   This will also help to increase strength and flexibility over time related to injury   Recovery expected within 2-6 weeks depending on severity, but some may take up to 6-12 months.  If symptoms fail to resolve or worsen recommending follow-up with orthopedics/physical therapy after allowing adequate time for healing. - will place referral today     P.R.I.C.E.  For musculoskeletal injuries including: sprains, strains, bruises - use the acronym P.R.I.C.E. for symptomatic treatment:   Prevent & Protect further injury.  Rest affected area   Ice applied (or heat, or can alternate)   Compression of injured area (ACE bandage/splint).  Elevation of injured area.    Pain  Ibuprofen / tylenol for pain   - Ibuprofen 600mg Q6hr as needed  (can use celebrex if GI upset or GI bleed risk)    - tylenol 500mg Q8hr   - Can use aleve / naproxen / naprosyn also   - if no contraindications recommend naproxen and or Gels/creams such as a Voltaren, and lidocaine patches or creams   No narcotics - no evidence to support this use and people tend to over use \"injured\" extremity when not having pain    (Pain is body's way of making you take it easy for awhile)   - orthopedic speciality will discuss stronger medications if needed indicated at that time    Monitor for:   Worsening pain   Worsening numbness and tingling   Loss of range of motion or strength   Redness, warmth or infection at site   Fevers, chills    "

## 2023-08-21 RX ORDER — OXYCODONE HCL 5 MG/5 ML
10 SOLUTION, ORAL ORAL EVERY 8 HOURS
Qty: 210 ML | Refills: 0 | Status: SHIPPED | OUTPATIENT
Start: 2023-08-21 | End: 2023-09-06

## 2023-08-23 ENCOUNTER — TELEPHONE (OUTPATIENT)
Dept: ENDOCRINOLOGY | Facility: CLINIC | Age: 53
End: 2023-08-23

## 2023-08-23 NOTE — TELEPHONE ENCOUNTER
Pt had lab band with Dr. Machuca over 10 years ago and has been having a lot of complications. PCP recommended pt see Ikramuddin ASAP to see if band needs to be removed. Requested call back.    [phone]

## 2023-08-24 ENCOUNTER — MYC MEDICAL ADVICE (OUTPATIENT)
Dept: FAMILY MEDICINE | Facility: CLINIC | Age: 53
End: 2023-08-24

## 2023-08-24 DIAGNOSIS — Z98.84 HISTORY OF ADJUSTABLE GASTRIC BANDING: Primary | ICD-10-CM

## 2023-08-24 NOTE — TELEPHONE ENCOUNTER
Patient informed Lisa Marcelo would like her to have an Upper GI xray with band protocol and to follow up in clinic. Patient scheduled to see Dr Machuca 9/6 at 9:30 am and she will call to schedule the upper GI xray prior to appointment.

## 2023-08-24 NOTE — TELEPHONE ENCOUNTER
Patient had gastric band placed by Dr Machuca in 2009. Has been recently diagnosed with Barretts esophagus and was wondering if she should have her band removed or assessed. She is not having any complications with the band. Last upper GI xray was 3 years ago. Will inform Lisa Marcelo of above and have her advise and will call patient back with next steps. Patient verbalized understanding.

## 2023-08-25 ENCOUNTER — TELEPHONE (OUTPATIENT)
Dept: FAMILY MEDICINE | Facility: CLINIC | Age: 53
End: 2023-08-25

## 2023-08-25 DIAGNOSIS — R10.13 EPIGASTRIC PAIN: ICD-10-CM

## 2023-08-25 RX ORDER — OMEPRAZOLE 40 MG/1
40 CAPSULE, DELAYED RELEASE ORAL DAILY
Qty: 30 CAPSULE | Refills: 0 | Status: SHIPPED | OUTPATIENT
Start: 2023-08-25 | End: 2023-09-25

## 2023-08-25 NOTE — TELEPHONE ENCOUNTER
Karal is requesting a refill on her Omeprazole, didn't realize todays dose was her last one. She would like it hopefully for this weekend      Thank You,  Jyoti Abdalla, [facility], [place]

## 2023-08-28 ENCOUNTER — MYC MEDICAL ADVICE (OUTPATIENT)
Dept: FAMILY MEDICINE | Facility: CLINIC | Age: 53
End: 2023-08-28

## 2023-08-28 DIAGNOSIS — Z98.1 STATUS POST LAMINECTOMY WITH SPINAL FUSION: ICD-10-CM

## 2023-08-28 DIAGNOSIS — F33.9 RECURRENT MAJOR DEPRESSIVE DISORDER, REMISSION STATUS UNSPECIFIED (H): ICD-10-CM

## 2023-08-28 DIAGNOSIS — F41.1 GENERALIZED ANXIETY DISORDER: ICD-10-CM

## 2023-09-06 RX ORDER — FLUOXETINE 20 MG/5ML
60 SOLUTION ORAL DAILY
Qty: 450 ML | Refills: 0 | Status: SHIPPED | OUTPATIENT
Start: 2023-09-06 | End: 2023-11-15

## 2023-09-06 RX ORDER — OXYCODONE HCL 5 MG/5 ML
10 SOLUTION, ORAL ORAL EVERY 8 HOURS
Qty: 570 ML | Refills: 0 | Status: SHIPPED | OUTPATIENT
Start: 2023-09-06 | End: 2023-09-12

## 2023-09-07 ENCOUNTER — TELEPHONE (OUTPATIENT)
Dept: FAMILY MEDICINE | Facility: CLINIC | Age: 53
End: 2023-09-07

## 2023-09-07 DIAGNOSIS — K59.00 CONSTIPATION, UNSPECIFIED CONSTIPATION TYPE: ICD-10-CM

## 2023-09-08 RX ORDER — LACTULOSE 10 G/15ML
SOLUTION ORAL
Qty: 237 ML | Refills: 1 | Status: SHIPPED | OUTPATIENT
Start: 2023-09-08 | End: 2024-03-05

## 2023-09-08 NOTE — TELEPHONE ENCOUNTER
Spoke with pt who is requesting to have Lactulose on hand if needed for constipation.  Reports significant constipation when taking MS Contin but now that discontinued denies constipation issues.   Forwarded to Rosaura for review, recommendations.  BENTLEY Lyons RN

## 2023-09-08 NOTE — TELEPHONE ENCOUNTER
Karla would like an order for Lactulose 10gm/15 ml  Thanks   Mariaa Christiansen [facility]  [phone]

## 2023-09-12 RX ORDER — OXYCODONE HCL 5 MG/5 ML
10 SOLUTION, ORAL ORAL EVERY 8 HOURS
Qty: 150 ML | Refills: 0 | Status: SHIPPED | OUTPATIENT
Start: 2023-09-20 | End: 2023-09-25

## 2023-09-24 DIAGNOSIS — R10.13 EPIGASTRIC PAIN: ICD-10-CM

## 2023-09-24 NOTE — COMMUNITY RESOURCES LIST (ENGLISH)
09/24/2023   [facility] "[employer]  N/A  For questions about this resource list or additional care needs, please contact your primary care clinic or care manager.  Phone: [phone]   Email: N/A   Address: [address] [place] [address]   Hours: N/A        Financial Stability       Rent and mortgage payment assistance  1  [facility] Helping Hand Distance: 15.66 miles      In-Person, Phone/Virtual   [address] [place] [address]  Language: English  Hours: Mon - Sun Appt. Only  Fees: Free   Phone: (171) 371-4813 Email: joss@[employer] Website: http://www.[facility].org          Food and Nutrition       Food pantry  2  [facility] Distance: 0.51 miles      [facility], Pickup   [address] [place] [address]  Language: English  Hours: Mon - Thu 8:00 AM - 3:00 PM  Fees: Free   Phone: (431) 410-2730 Website: http://www.[facility].[employer]/     3  [employer] [facility] Distance: 1.07 miles      Pickup   [address] [place] [address]  Language: English  Hours: Mon 9:00 AM - 5:00 PM , Wed 9:00 AM - 5:00 PM , Fri 9:00 AM - 5:00 PM  Fees: Free   Phone: (727) 561-2600 Email: mail@[employer] Website: https://www.[place]/our-work/food-access-and-equity/     SNAP application assistance  4  [facility] ([facility]) - [place] Office Distance: 2.1 miles      In-Person, Phone/Virtual   [address] [place] [address]  Language: English  Hours: Mon - Fri 8:00 AM - 4:30 PM  Fees: Free   Phone: (784) 461-4887 Email: lulu@[facility] Hospital[facility] Website: https://www.[employer].org/agency-information     5  [facility] - [place] Family Investment Program (MFIP) - [place] Family Investment Program (MFIP) Distance: 9.76 miles      In-Person, Phone/Virtual   [address] [place] [address]  Language: English  Hours: Mon  - Fri 8:00 AM - 4:30 PM  Fees: Free   Phone: (551) 977-3210 Email: sarah@[facility].gov Website: https://www.Migo.me./499/MFIP-Biennial-Service-Agreement-VCA-Plan          Important Numbers & Websites       Emergency Services   911  [facility] Services   311  Poison Control   (344) 404-3553  Suicide Prevention Lifeline   (870) 186-4513 (TALK)  Child Abuse Hotline   (868) 465-4294 (4-A-Child)  Sexual Assault Hotline   (279) 846-4960 (HOPE)  National Runaway Safeline   (430) 688-2157 (RUNAWAY)  All-Options Talkline   (212) 129-5007  Substance Abuse Referral   (697) 212-5417 (HELP)

## 2023-09-25 ENCOUNTER — VIRTUAL VISIT (OUTPATIENT)
Dept: FAMILY MEDICINE | Facility: CLINIC | Age: 53
End: 2023-09-25
Payer: COMMERCIAL

## 2023-09-25 ENCOUNTER — MYC MEDICAL ADVICE (OUTPATIENT)
Dept: FAMILY MEDICINE | Facility: CLINIC | Age: 53
End: 2023-09-25

## 2023-09-25 DIAGNOSIS — Z98.1 STATUS POST LAMINECTOMY WITH SPINAL FUSION: ICD-10-CM

## 2023-09-25 DIAGNOSIS — J01.90 ACUTE SINUSITIS WITH SYMPTOMS > 10 DAYS: Primary | ICD-10-CM

## 2023-09-25 DIAGNOSIS — J01.90 ACUTE SINUSITIS WITH SYMPTOMS > 10 DAYS: ICD-10-CM

## 2023-09-25 DIAGNOSIS — K22.4 ESOPHAGEAL DYSMOTILITY: ICD-10-CM

## 2023-09-25 PROBLEM — E66.811 CLASS 1 OBESITY DUE TO EXCESS CALORIES WITH SERIOUS COMORBIDITY IN ADULT: Status: RESOLVED | Noted: 2017-10-05 | Resolved: 2023-09-25

## 2023-09-25 PROBLEM — E66.09 CLASS 1 OBESITY DUE TO EXCESS CALORIES WITH SERIOUS COMORBIDITY IN ADULT: Status: RESOLVED | Noted: 2017-10-05 | Resolved: 2023-09-25

## 2023-09-25 PROCEDURE — 99213 OFFICE O/P EST LOW 20 MIN: CPT | Mod: VID | Performed by: PHYSICIAN ASSISTANT

## 2023-09-25 RX ORDER — AMOXICILLIN AND CLAVULANATE POTASSIUM 400; 57 MG/5ML; MG/5ML
875 POWDER, FOR SUSPENSION ORAL 2 TIMES DAILY
Qty: 218 ML | Refills: 0 | Status: SHIPPED | OUTPATIENT
Start: 2023-09-25 | End: 2023-11-06

## 2023-09-25 RX ORDER — OMEPRAZOLE 40 MG/1
40 CAPSULE, DELAYED RELEASE ORAL DAILY
Qty: 30 CAPSULE | Refills: 10 | Status: SHIPPED | OUTPATIENT
Start: 2023-09-25

## 2023-09-25 RX ORDER — OXYCODONE HCL 5 MG/5 ML
10 SOLUTION, ORAL ORAL 3 TIMES DAILY PRN
Qty: 840 ML | Refills: 0 | Status: SHIPPED | OUTPATIENT
Start: 2023-09-25 | End: 2023-11-03

## 2023-09-25 RX ORDER — GUAIFENESIN 200 MG/10ML
200-400 LIQUID ORAL EVERY 4 HOURS PRN
Qty: 473 ML | Refills: 1 | Status: SHIPPED | OUTPATIENT
Start: 2023-09-25 | End: 2023-11-03

## 2023-09-25 RX ORDER — OXYCODONE HCL 5 MG/5 ML
10 SOLUTION, ORAL ORAL 3 TIMES DAILY PRN
Qty: 840 ML | Refills: 0 | Status: SHIPPED | OUTPATIENT
Start: 2023-09-25 | End: 2023-09-25

## 2023-09-25 RX ORDER — OXYCODONE HCL 5 MG/5 ML
10 SOLUTION, ORAL ORAL 3 TIMES DAILY PRN
Qty: 840 ML | Refills: 0 | Status: SHIPPED | OUTPATIENT
Start: 2023-10-23 | End: 2023-11-15

## 2023-09-25 RX ORDER — GUAIFENESIN 200 MG/10ML
200-400 LIQUID ORAL EVERY 4 HOURS PRN
Qty: 473 ML | Refills: 1 | Status: SHIPPED | OUTPATIENT
Start: 2023-09-25 | End: 2023-09-25

## 2023-09-25 NOTE — PROGRESS NOTES
Karla is a 53 year old who is being evaluated via a billable video visit.      How would you like to obtain your AVS? MyChart  If the video visit is dropped, the invitation should be resent by: Text to cell phone: [phone]  Will anyone else be joining your video visit? No    Assessment & Plan     Acute sinusitis with symptoms > 10 days  treat  - amoxicillin-clavulanate (AUGMENTIN) 400-57 MG/5ML suspension  Dispense: 218 mL; Refill: 0    Status post laminectomy with spinal fusion  Pain stable    Esophageal dysmotility and gastroparesis  Doing well now that back to liquid meds  Seeing surgeon    Patient Instructions   56 grams protein per day for sedentary woman your age     Rosaura Flores PA-C  [facility]   Karla is a 53 year old, presenting for the following health issues:  Sinus Problem and Pain        9/25/2023    12:45 PM   Additional Questions   Roomed by lauren       History of Present Illness       Reason for visit:  Med check/sinus    She eats 0-1 servings of fruits and vegetables daily.She consumes 0 sweetened beverage(s) daily.She exercises with enough effort to increase her heart rate 30 to 60 minutes per day.  She exercises with enough effort to increase her heart rate 4 days per week.   She is taking medications regularly.     Acute Illness  Acute illness concerns:  two weeks   Onset/Duration:   Symptoms:  Fever: YES  Chills/Sweats: No  Headache (location?): YES  Sinus Pressure: YES  Conjunctivitis:  YES  Ear Pain: no  Rhinorrhea: No  Congestion: YES  Sore Throat: No  Cough: YES - in her chest   Wheeze: No  Decreased Appetite: YES  Nausea: YES  Vomiting: YES  Diarrhea: No  Dysuria/Freq.: No  Dysuria or Hematuria: No  Fatigue/Achiness: YES  Sick/Strep Exposure: No  Therapies tried and outcome: tylenol, mucinex   Pain History:  When did you first notice your pain? Spinal fusion    Have you seen this provider for your pain in the past? Yes   Where in  your body do you have pain? Spine   Are you seeing anyone else for your pain? No    Sinus symptoms started with ragweed, restarted flonase but not zyrtec - thinks wasn't enough.  Temp 101 starting yesterday.  Now draining into chest.  Covid test neg.    Hadn't had sinus infection since she quit smoking in the winter.  No cravings.    Wt down to 156.8 lb.        7/7/2023    12:12 PM 8/16/2023     7:35 AM 9/24/2023     2:47 PM   PHQ-9 SCORE   PHQ-9 Total Score MyChart   0   PHQ-9 Total Score 0 0 0           9/29/2022     2:05 PM 1/6/2023    10:27 AM 8/16/2023     7:35 AM   NANCI-7 SCORE   Total Score  10 (moderate anxiety)    Total Score 0 10 3     Chronic Pain Follow Up:    Location of pain: spine   Analgesia/pain control:    - Recent changes:  no     - Overall control: Tolerable with discomfort    - Current treatments: oxycodone and cannabis    Adherence:     - Do you ever take more pain medicine than prescribed? No    - When did you take your last dose of pain medicine?  7 am    Adverse effects: No  PDMP Review         Value Time User    State PDMP site checked  Yes 9/25/2023  1:31 PM Rosaura Flores PA-C          Last CSA Agreement:   CSA -- Patient Level:     [Media Unavailable] Controlled Substance Agreement - Opioid - Scan on 8/16/2023  8:45 AM   [Media Unavailable] Controlled Substance Agreement - Opioid - Scan on 5/13/2021   [Media Unavailable] Controlled Substance Agreement - Opioid - Scan on 3/2/2020: OUTSIDE RECORD       Last UDS: 8/18/2023      Objective           Vitals:  No vitals were obtained today due to virtual visit.            Video-Visit Details    Type of service:  Video Visit     Originating Location (pt. Location): Home    Distant Location (provider location):  On-site  Platform used for Video Visit: Facio

## 2023-09-25 NOTE — LETTER
[facility]  [address] [place] [address]  Phone: [phone]  Fax: [phone]    10/04/23    Cynthia Lyons  [address] [facility] [address]      To whom it may concern:       Cynthia was previously on disability due to chronic pain.  She did attempt to return to work with some success for awhile.  However she now has other chronic health conditions which have now complicated her ability to work.  She has a lot of nausea due to both gastroparesis and likely her chronic narcotics.  She uses cannabis to help the nausea.  These things have made her feel rather crummy and complicated her ability to eat and go about life including work.  She also has upcoming medical appts to work on improving this, but symptoms are expected to be somewhat refractory.  She would like to resume her disability status.    Sincerely,      Rosaura Flores PA-C

## 2023-09-25 NOTE — LETTER
[facility]  [address] [place] [address]  Phone: [phone]  Fax: [phone]    09/25/23    Cynthia Lyons  [address] [facility] [address]      To whom it may concern:     Cynthia was previously on disability due to chronic pain.  She did attempt to return to work with some success for awhile.  However she now has other chronic health conditions which have now complicated her ability to work.  She would like to resume her disability status.    Sincerely,      Rosaura Flores PA-C

## 2023-09-27 ENCOUNTER — PATIENT OUTREACH (OUTPATIENT)
Dept: CARE COORDINATION | Facility: CLINIC | Age: 53
End: 2023-09-27

## 2023-09-27 NOTE — PROGRESS NOTES
Clinic Care Coordination Contact  Community Health Worker Initial Outreach    Referral reason: SDOH concerns   Make a future appointment with Cherie LUGO Initial Information Gathering:  Referral Source: PCP  Preferred Urgent Care: [facility], [phone]  No PCP office visit in Past Year: No  CHW Additional Questions  If ED/Hospital discharge, follow-up appointment scheduled as recommended?: N/A  Medication changes made following ED/Hospital discharge?: N/A  MyChart active?: Yes  Patient sent Social Determinants of Health questionnaire?: Yes      Patient accepts CC: Yes. Patient scheduled for assessment with MATI DALY on 9/29 at 10:30am. Patient noted desire to discuss resources and support services that could benefit her; patient unsure specifically what she is in need of .     Referral from screening involving social determinants of health (SDoH).    Adrianne RENE  Community Health Worker  [facility] Care Coordination  [facility]  [email or web address]   Office: [phone]

## 2023-09-29 ENCOUNTER — PATIENT OUTREACH (OUTPATIENT)
Dept: NURSING | Facility: CLINIC | Age: 53
End: 2023-09-29

## 2023-09-29 NOTE — PROGRESS NOTES
Clinic Care Coordination Contact    Order: SDOH Concern.    CHW: Patient noted desire to discuss resources and support services that could benefit her; patient unsure specifically what she is in need of. Referral from screening involving social determinants of health (SDoH).    PCP VV 9/25/23: Note is not complete.     Chart review:   SDoH:  - food - ran out, no money for more or worried it will run out  - housing - has housing, worried about losing it  Insurance -   [employer]?   Natalia Care? [facility] balance $2k+    -----------------------------------------------------------------     CC outreach to pt for initial assessment per CHW scheduling. She was driving, as she forgot about appt. Rescheduled for Monday 10/2/23 at 9 am. Call was dropped.     MARSHA Steele   Social Work Primary Care Clinic Care Coordinator   [facility]  [phone]  [email or web address]

## 2023-10-02 ENCOUNTER — PATIENT OUTREACH (OUTPATIENT)
Dept: NURSING | Facility: CLINIC | Age: 53
End: 2023-10-02

## 2023-10-02 ASSESSMENT — ACTIVITIES OF DAILY LIVING (ADL): DEPENDENT_IADLS:: INDEPENDENT

## 2023-10-02 NOTE — LETTER
[facility] CARE COORDINATION  [employer]  [address] [place] [address]    October 2, 2023    Cynthia Lyons  [address] [facility] [address]      Dear Cynthia,    I am a clinic care coordinator who works with Rosaura Flores PA-C with the [facility]. I wanted to thank you for spending the time to talk with me.  Below is a description of clinic care coordination and how I can further assist you.       The clinic care coordination team is made up of a registered nurse, , financial resource worker and community health worker who understand the health care system. The goal of clinic care coordination is to help you manage your health and improve access to the health care system. Our team works alongside your provider to assist you in determining your health and social needs. We can help you obtain health care and community resources, providing you with necessary information and education. We can work with you through any barriers and develop a care plan that helps coordinate and strengthen the communication between you and your care team.  Our services are voluntary and are offered without charge to you personally.    Please feel free to contact me with any questions or concerns regarding care coordination and what we can offer.      We are focused on providing you with the highest-quality healthcare experience possible.    Sincerely,     Cherie Daugherty, [facility]   Social Work Primary Care Clinic Care Coordinator   [facility]  [phone]  [email or web address]     Enclosed: I have enclosed a copy of the Patient Centered Plan of Care. This has helpful information and goals that we have talked about. Please keep this in an easy to access place to use as needed.

## 2023-10-02 NOTE — LETTER
[facility]  Patient Centered Plan of Care  About Me:        Patient Name:  Cynthia Lyons    YOB: 1970  Age:         53 year old   [place] MRN:    [id number] Telephone Information:  Home Phone [phone]   Mobile [phone]       Address:  [address]  [facility] [address] Email address:  [email or web address]      Emergency Contact(s)    Name Relationship Lgl Grd Work Phone Home Phone Mobile Phone   GRACE CERVANTES Spouse   [phone]           Primary language:  English     needed? No   [employer]:  [phone] op. 1  Other communication barriers:None  Preferred Method of Communication:  Mail  Current living arrangement: I live in a private home with spouse  Mobility Status/ Medical Equipment: Independent    Health Maintenance  Health Maintenance Reviewed: Due/Overdue   Health Maintenance Due   Topic Date Due    ADVANCE CARE PLANNING  Never done    HEPATITIS B IMMUNIZATION (1 of 3 - 3-dose series) Never done    HEPATITIS A IMMUNIZATION (1 of 2 - Risk 2-dose series) Never done    DTAP/TDAP/TD IMMUNIZATION (2 - Td or Tdap) 02/05/2019    YEARLY PREVENTIVE VISIT  10/03/2020    ZOSTER IMMUNIZATION (1 of 2) Never done    LUNG CANCER SCREENING  Never done    HPV TEST  08/09/2022    PAP  08/09/2022    NICOTINE/TOBACCO CESSATION COUNSELING Q 1 YR  04/07/2023    INFLUENZA VACCINE (1) 09/01/2023    COVID-19 Vaccine (2 - 2023-24 season) 09/01/2023     My Access Plan  Medical Emergency 911   Primary Clinic Line [facility] - [phone]   24 Hour Appointment Line [phone] or  [phone]) (toll-free)   24 Hour Nurse Line [phone] (toll-free)   Preferred Urgent Care [facility], [phone]     Preferred Hospital [place], [place]  [phone]     Preferred Pharmacy [facility], [place] - [address] [place] [address]      Behavioral Health Crisis Line The National Suicide Prevention Lifeline at 1-529.502.9489 or Text/Call 988             My Care Team Members  Patient Care Team         Relationship Specialty Notifications Start End    Rosaura Flores PA-C PCP - General   6/28/19     Phone: [phone] Fax: [phone]         [address] [place] [address]    Rosaura Flores PA-C Assigned PCP   10/16/16     Phone: [phone] Fax: [phone]         [address] [place] [address]    Lenin Ferrell, PharmD Pharmacist Pharmacist  2/7/22     Phone: [phone]          [facility] [address] [place] [address]    Rosaura Flores PA-C Assigned Pain Medication Provider   1/9/23     Phone: [phone] Fax: [phone]         [address] [place] [address]    Robbi Pedraza MD MD Otolaryngology  2/28/23     Phone: [phone] Fax: [phone]         [address] [facility] [address]    Adrianne Alvarez Community Health Worker Primary Care - CC Admissions 9/25/23     Phone: [phone]         Cherie Daugherty LSW Lead Care Coordinator Primary Care - [facility] Admissions 9/27/23     Phone: [phone]          [address] [facility] [address]              My Care Plans  Self Management and Treatment Plan  Care Plan  Care Plan: General       Problem: General       Goal: General       Start Date: 10/2/2023 Expected End Date: 6/1/2024    Priority: Medium    Note:     Barriers: Access  Strengths: Motivated   Patient expressed understanding of goal: Yes    Action steps to achieve this goal:  1. I will utilize [employer] program.   2. I will review and consider cash and utility assistance applications.   3. I will work with care coordination as needed.                                 Action Plans on File:            Depression          Advance Care Plans/Directives Type:   No data recorded    Honoring Choices    Advance Care Planning and Health Care Directives  When it comes to decisions  about your health care, it s important that your voice is heard. You may not always be able to speak for yourself.    We encourage you to have discussions with your loved ones, cultural or spiritual leaders and health care providers about your goals, values, beliefs and choices.    We are a part of [employer] Cho[employer] [place] , supporting and promoting the benefits of advance care planning conversations.    Our goals are to:  Help you make informed decisions about your healthcare choices and ensure that those choices are honored  Offer advance care planning discussions with trained staff  Ensure your choices are clearly defined, documented and available in your medical record  Translate your choices into medical orders as needed    Why is Advance Care Planning important?  Know what your health care choices are and decide what is right for you  An unexpected illness or injury could leave you unable to participate in important treatment decisions  When choices are left to others to decide that responsibility can be difficult and stressful  By discussing and outlining your choices, your voice is heard in the care you want to receive    How can I learn more?  We offer free classes at multiple locations, days and times. Our trained facilitators will provide information and guide you through a Health Care Directive document. They can also review, notarize and add your document to your medical record.    Call [employer] at [phone] or toll free at [phone] for assistance.      My Medical and Care Information  Problem List   Patient Active Problem List   Diagnosis    Benign essential hypertension    Backache    binge eating disorder    Allergic rhinitis    Moderate major depression (H)    Sleep apnea    Chronic sinusitis    Joint pain    Personal History of Tobacco Use, Presenting Hazards to Health - quit 1/2023    Lyme arthritis (H)    Status post laminectomy with spinal fusion    Panic anxiety  syndrome    Chronic pain disorder    Hyperlipidemia LDL goal <130    Generalized anxiety disorder    Acne    Genital HSV    S/P hysterectomy    Dyslipidemia    Microscopic hematuria    Prediabetes    Esophageal dysmotility and gastroparesis    Guzman's esophagus without dysplasia    Major depression, recurrent (H24)    F11.2 - Continuous opioid dependence (H)      Current Medications and Allergies:   Current Outpatient Medications   Medication Instructions    albuterol (PROAIR HFA/PROVENTIL HFA/VENTOLIN HFA) 108 (90 Base) MCG/ACT inhaler 2 puffs, Inhalation, EVERY 4 HOURS PRN    amoxicillin-clavulanate (AUGMENTIN) 400-57 MG/5ML suspension 875 mg, Oral, 2 TIMES DAILY    bimatoprost (LATISSE) 0.03 % external opthalmic solution Place one drop on applicator and apply evenly along the skin of the upper eyelid at base of each eyelash once daily at bedtime; repeat procedure for second eye    FLUoxetine (PROZAC) 60 mg, Oral, DAILY    fluticasone (FLONASE) 50 MCG/ACT nasal spray 1-2 sprays, Nasal, 2 TIMES DAILY, As needed for allergies    guaiFENesin (ROBITUSSIN) 200-400 mg, Oral, EVERY 4 HOURS PRN    lactulose (GENERLAC) 10 GM/15ML solution 30 ml up to three times a day as needed for constipation.    naloxone (NARCAN) 4 mg, Alternating Nostrils, PRN, every 2-3 minutes until assistance arrives    omeprazole (PRILOSEC) 40 mg, Oral, DAILY    ondansetron (ZOFRAN) 8 mg, Oral, EVERY 8 HOURS PRN    order for DME Equipment being ordered: Digital home blood pressure monitor kit    oxyCODONE (ROXICODONE) 10 mg, Oral, 3 TIMES DAILY PRN    [START ON 10/23/2023] oxyCODONE (ROXICODONE) 10 mg, Oral, 3 TIMES DAILY PRN    tretinoin (RETIN-A) 0.05 % external cream Topical, AT BEDTIME     Care Coordination Start Date: 9/25/2023   Frequency of Care Coordination: monthly     Form Last Updated: 10/02/2023

## 2023-10-02 NOTE — PROGRESS NOTES
Clinic Care Coordination Contact  Clinic Care Coordination Contact  OUTREACH    Referral Information:  Referral Source: PCP    Primary Diagnosis: Psychosocial    Chief Complaint   Patient presents with    Clinic Care Coordination - Initial        Universal Utilization:   Clinic Utilization  Difficulty keeping appointments: No  Compliance Concerns: No  No-Show Concerns: No  No PCP office visit in Past Year: No    Utilization      Hospital Admissions  2             ED Visits  1             No Show Count (past year)  0                    Current as of: 10/2/2023  7:10 AM                Clinical Concerns:  Current Medical Concerns: See PCP VV note when complete      Patient Active Problem List   Diagnosis    Benign essential hypertension    Backache    binge eating disorder    Allergic rhinitis    Moderate major depression (H)    Sleep apnea    Chronic sinusitis    Joint pain    Personal History of Tobacco Use, Presenting Hazards to Health - quit 1/2023    Lyme arthritis (H)    Status post laminectomy with spinal fusion    Panic anxiety syndrome    Chronic pain disorder    Hyperlipidemia LDL goal <130    Generalized anxiety disorder    Acne    Genital HSV    S/P hysterectomy    Dyslipidemia    Microscopic hematuria    Prediabetes    Esophageal dysmotility and gastroparesis    Guzman's esophagus without dysplasia    Major depression, recurrent (H24)    F11.2 - Continuous opioid dependence (H)       Current Behavioral Concerns: Family stress       Education Provided to patient: role of SW CC and clinic care coordination, food resources, financial resources     Order: SDOH Concern.    CHW: Patient noted desire to discuss resources and support services that could benefit her; patient unsure specifically what she is in need of. Referral from screening involving social determinants of health (SDoH).     PCP VV 9/25/23: Note is not complete.      Chart review:   SDoH:  - food - ran out, no money for more or worried it will  run out  - housing - has housing, worried about losing it  Insurance - 's employer   [employer]?   [facility]? [facility] balance $2k+    MATI DALY outreach to pt for initial assessment.     Pt discussed that she has been having new health issues that has made it so she cannot work. She is waiting for disability through SSDI. She had applied and got denied, did the appeal and has .     Pt has not applied for [facility] cash, thinks her  makes too much money. MATI DALY inquired, pt is not sure but thinks after tax on a good month about $5k (works in heating and air). Inquired about dependents. Pt stated her daughter, her fiance, and granddaughter are staying with them at this time as he was injured at work. He builds houses, fell of scaffolding, been off work. They are helping them with a place to stay and she watches granddaughter. They don't ask them for money, but utilities have gone up 3-4x. He is working on work comp with , daughter is . They buy their own groceries. MATI DALY to send applications to review if needed in future. Pt stated they should be ok, just waiting game.     Discussed [employer] referral. Pt agreed.     MATI DALY completed [employer] enrollment form.      Pain  Pain: Not discussed     Health Maintenance Reviewed: Due/Overdue     Health Maintenance Due   Topic Date Due    ADVANCE CARE PLANNING  Never done    HEPATITIS B IMMUNIZATION (1 of 3 - 3-dose series) Never done    HEPATITIS A IMMUNIZATION (1 of 2 - Risk 2-dose series) Never done    DTAP/TDAP/TD IMMUNIZATION (2 - Td or Tdap) 02/05/2019    YEARLY PREVENTIVE VISIT  10/03/2020    ZOSTER IMMUNIZATION (1 of 2) Never done    LUNG CANCER SCREENING  Never done    HPV TEST  08/09/2022    PAP  08/09/2022    NICOTINE/TOBACCO CESSATION COUNSELING Q 1 YR  04/07/2023    INFLUENZA VACCINE (1) 09/01/2023    COVID-19 Vaccine (2 - 2023-24 season) 09/01/2023       Clinical Pathway: None    Medication Management:  Medication review status:  Medications reviewed and no changes reported per patient.           Functional Status:  Dependent ADLs: Independent  Dependent IADLs: Independent  Bed or wheelchair confined: No  Mobility Status: Independent  Fallen 2 or more times in the past year?: No  Any fall with injury in the past year?: No    Living Situation:  Current living arrangement: I live in a private home with spouse  [facility]    Lifestyle & Psychosocial Needs:    Social Determinants of Health     Food Insecurity: High Risk (9/24/2023)    Food Insecurity     Within the past 12 months, did you worry that your food would run out before you got money to buy more?: Yes     Within the past 12 months, did the food you bought just not last and you didn t have money to get more?: Yes   Depression: Not at risk (9/25/2023)    PHQ-2     PHQ-2 Score: 0   Housing Stability: High Risk (9/24/2023)    Housing Stability     Do you have housing? : Yes     Are you worried about losing your housing?: Yes   Tobacco Use: Medium Risk (9/25/2023)    Patient History     Smoking Tobacco Use: Former     Smokeless Tobacco Use: Never     Passive Exposure: Not on file   Financial Resource Strain: Low Risk  (9/24/2023)    Financial Resource Strain     Within the past 12 months, have you or your family members you live with been unable to get utilities (heat, electricity) when it was really needed?: No   Alcohol Use: Not on file   Transportation Needs: Low Risk  (9/24/2023)    Transportation Needs     Within the past 12 months, has lack of transportation kept you from medical appointments, getting your medicines, non-medical meetings or appointments, work, or from getting things that you need?: No   Physical Activity: Not on file   Interpersonal Safety: Not on file   Stress: Not on file   Social Connections: Not on file       Diet: Regular  Inadequate nutrition: No  Tube Feeding: No  Inadequate activity/exercise: No  Significant changes in sleep  pattern: No  Transportation means: Regular car     Taoist or spiritual beliefs that impact treatment: No  Mental health DX: No  Mental health management concern: No  Chemical Dependency Status: No Current Concerns  Informal Support system: Children, Family, Spouse     Care Coordinator has reviewed patient's Social Determinants of Health (SDoH) on this date. Upon review, changes were not  made. Sent via [employer].      Resources and Interventions:  Current Resources:   Community Resources: Financial/Insurance  Supplies Currently Used at Home: None  Equipment Currently Used at Home: none  Employment Status: disabled    Advance Care Plan/Directive  Advanced Care Plans/Directives on file: No  Advanced Care Plan/Directive Status: Referral to Honoring Choices Facilitator    Referrals Placed: Community Resources, Other, Financial Services     Care Plan:  Care Plan: General       Problem: General       Goal: General       Start Date: 10/2/2023 Expected End Date: 6/1/2024    Priority: Medium    Note:     Barriers: Access  Strengths: Motivated   Patient expressed understanding of goal: Yes    Action steps to achieve this goal:  1. I will utilize [employer] program.   2. I will review and consider cash and utility assistance applications.   3. I will work with care coordination as needed.                                Patient/Caregiver understanding: Pt reports understanding and denies any additional questions or concerns at this times. MATI CC engaged in [employer] communication during encounter.    Outreach Frequency: Monthly    Plan: Patient was provided with this writer's contact information and encouraged to call with any questions or concerns.     MATI CC will send resources, care coordination introduction letter, and care plan to patient. MATI CC to chart review every 4-6 weeks.     CHW to outreach in 3-4 weeks.     MARSHA Steele   Social Work Primary Care Clinic Care Coordinator   [facility],  [facility]  [phone]  [email or web address]

## 2023-10-03 ENCOUNTER — TELEPHONE (OUTPATIENT)
Dept: FAMILY MEDICINE | Facility: CLINIC | Age: 53
End: 2023-10-03

## 2023-10-03 NOTE — TELEPHONE ENCOUNTER
"----- Message -----       From:Cynthia Lyons \"Karla\"       Sent:10/3/2023  2:19 PM CDT         To:Customer Service    Subject:/Disability Letter    Topic: Non-Medical Question.    Ziyad Kaplan-  My  wad wondering if we can get a more detailed letter?  Stating all my diagnoses and explaining the digestive issues a bit more in depth.  We are basically trying to tell them with my meds, cannabis and eating difficulties it's hard for me to not be nauseated and hard to drive on the one thing that helps...this should be the last thing I need from you personally:) thank you and I apologize for you to have to do this..    Loren [facility]    "

## 2023-10-04 ENCOUNTER — MYC MEDICAL ADVICE (OUTPATIENT)
Dept: FAMILY MEDICINE | Facility: CLINIC | Age: 53
End: 2023-10-04

## 2023-10-20 ENCOUNTER — MYC MEDICAL ADVICE (OUTPATIENT)
Dept: FAMILY MEDICINE | Facility: CLINIC | Age: 53
End: 2023-10-20

## 2023-10-27 ENCOUNTER — MYC MEDICAL ADVICE (OUTPATIENT)
Dept: FAMILY MEDICINE | Facility: CLINIC | Age: 53
End: 2023-10-27

## 2023-11-01 ENCOUNTER — PATIENT OUTREACH (OUTPATIENT)
Dept: CARE COORDINATION | Facility: CLINIC | Age: 53
End: 2023-11-01

## 2023-11-01 NOTE — PROGRESS NOTES
Clinic Care Coordination Contact  [facility]       Clinical Data: CHW Outreach    Outreach attempted x 1. Remi Spoke with Patient (Karla).  Patient expressed that she is unable to follow up at this time. Further requesting for CHW to call Patient at a later time (preferably before 12pm). CHW provided Patient with call back information and requested return call.    Plan: CHW will make another attempt to reach the patient via phone or MyChart.    CHW outreach in the next 2 weeks.      PARISH Mishra  Clinic Care Coordination   [facility]   Phone: [phone]  [email or web address]

## 2023-11-03 ENCOUNTER — ALLIED HEALTH/NURSE VISIT (OUTPATIENT)
Dept: FAMILY MEDICINE | Facility: CLINIC | Age: 53
End: 2023-11-03
Payer: COMMERCIAL

## 2023-11-03 ENCOUNTER — MYC MEDICAL ADVICE (OUTPATIENT)
Dept: FAMILY MEDICINE | Facility: CLINIC | Age: 53
End: 2023-11-03

## 2023-11-03 ENCOUNTER — TELEPHONE (OUTPATIENT)
Dept: FAMILY MEDICINE | Facility: CLINIC | Age: 53
End: 2023-11-03

## 2023-11-03 ENCOUNTER — OFFICE VISIT (OUTPATIENT)
Dept: FAMILY MEDICINE | Facility: CLINIC | Age: 53
End: 2023-11-03
Payer: COMMERCIAL

## 2023-11-03 VITALS
SYSTOLIC BLOOD PRESSURE: 162 MMHG | RESPIRATION RATE: 20 BRPM | OXYGEN SATURATION: 99 % | HEART RATE: 64 BPM | DIASTOLIC BLOOD PRESSURE: 100 MMHG | TEMPERATURE: 99 F

## 2023-11-03 VITALS
BODY MASS INDEX: 27.92 KG/M2 | HEIGHT: 66 IN | HEART RATE: 64 BPM | OXYGEN SATURATION: 99 % | TEMPERATURE: 99.1 F | DIASTOLIC BLOOD PRESSURE: 100 MMHG | SYSTOLIC BLOOD PRESSURE: 150 MMHG | RESPIRATION RATE: 20 BRPM

## 2023-11-03 DIAGNOSIS — I10 HYPERTENSION, UNSPECIFIED TYPE: ICD-10-CM

## 2023-11-03 DIAGNOSIS — S02.5XXB OPEN FRACTURE OF TOOTH, INITIAL ENCOUNTER: Primary | ICD-10-CM

## 2023-11-03 DIAGNOSIS — Z01.30 BLOOD PRESSURE CHECK: Primary | ICD-10-CM

## 2023-11-03 PROCEDURE — 99214 OFFICE O/P EST MOD 30 MIN: CPT | Performed by: PHYSICIAN ASSISTANT

## 2023-11-03 PROCEDURE — 99207 PR NO CHARGE NURSE ONLY: CPT

## 2023-11-03 RX ORDER — OXYCODONE HCL 5 MG/5 ML
5 SOLUTION, ORAL ORAL EVERY 6 HOURS PRN
Qty: 60 ML | Refills: 0 | Status: SHIPPED | OUTPATIENT
Start: 2023-11-03 | End: 2023-11-06

## 2023-11-03 RX ORDER — LISINOPRIL 10 MG/1
TABLET ORAL
Qty: 1200 ML | Refills: 0 | Status: SHIPPED | OUTPATIENT
Start: 2023-11-03 | End: 2023-11-15

## 2023-11-03 ASSESSMENT — PAIN SCALES - GENERAL: PAINLEVEL: SEVERE PAIN (6)

## 2023-11-03 NOTE — PATIENT INSTRUCTIONS
Start BP med lisinopril today  Follow directions  After tooth extraction, continue to monitor BP daily - may  be able to go back down or off the lisinopril  Long term daily BP goal under 130/80   Update me later next week via My Chart

## 2023-11-03 NOTE — PROGRESS NOTES
Assessment & Plan     Open fracture of tooth, initial encounter  Uncontrolled pain likely causing BP to spike - was controlled at Aug appt.  However also under more stress in general.  BP treatment below, form signed for dentist to schedule extraction.  5 days of oxycodone for dental pain - in addition to her chronic 10 mg three times daily.    - oxyCODONE (ROXICODONE) 5 MG/5ML solution  Dispense: 60 mL; Refill: 0    Hypertension, unspecified type  Comment: uncontrolled today, start lisinopril with update to me next week after extraction, when pain is more controlled  Plan: Home Blood Pressure Monitor Order  - lisinopril (PRINIVIL,ZESTRIL) 1 MG/ML suspension  Dispense: 1200 mL; Refill: 0    Patient Instructions   Start BP med lisinopril today  Follow directions  After tooth extraction, continue to monitor BP daily - may  be able to go back down or off the lisinopril  Long term daily BP goal under 130/80   Update me later next week via My Chart       Rosaura Flores PA-C  [facility]    Kaushik Acosta is a 53 year old, presenting for the following health issues:  Hypertension and Dental Problem        11/3/2023     1:12 PM   Additional Questions   Roomed by lauren ORTEGA     Dental Problem     Patient was at the dentist to get her broken tooth fixed, she had a high blood pressure reading and the dentist wouldn't treat it, she has a form to be filled out by a provider in regards to her blood pressure to get further treatment         Objective    LMP  (LMP Unknown)   There is no height or weight on file to calculate BMI.  Physical Exam   Lower R tooth split in half         DME (Durable Medical Equipment) Orders and Documentation  Orders Placed This Encounter   Procedures    Home Blood Pressure Monitor Order        The patient was assessed and it was determined the patient is in need of the following listed DME Supplies/Equipment. Please complete supporting documentation below to  demonstrate medical necessity.

## 2023-11-03 NOTE — PROGRESS NOTES
Cynthia Lyons is a 53 year old year old patient who comes in today for a Blood Pressure check because of Patient has a broken tooth on bottom right for a couple of days.  She got in to see an emergency dentist today in [place]. They could not do the extraction because her BP was 168/111 P 65, 161/103  P 69 and 156-92 P 61.   Vital Signs as repeated by RN at clinic:  150/100 P 64, 162/100.  Patient is taking medication as prescribed  Patient is tolerating medications well.  Patient is not monitoring Blood Pressure at home.    Current complaints: right lower tooth problem  Disposition:  huddled with provider, Rosaura will see her today, added to her schedule.   Michelle ARMANDO RN

## 2023-11-03 NOTE — TELEPHONE ENCOUNTER
Prior Authorization Retail Medication Request    Medication/Dose: qbrelis  ICD code (if different than what is on RX):    Previously Tried and Failed:    Rationale:      Insurance Name:  [facility] [employer]  Insurance ID:  [id number]  [phone]  ([phone])      Thank You,  Jyoti Abdalla, [facility]

## 2023-11-05 ENCOUNTER — MYC MEDICAL ADVICE (OUTPATIENT)
Dept: FAMILY MEDICINE | Facility: CLINIC | Age: 53
End: 2023-11-05

## 2023-11-05 ENCOUNTER — OFFICE VISIT (OUTPATIENT)
Dept: URGENT CARE | Facility: URGENT CARE | Age: 53
End: 2023-11-05
Payer: COMMERCIAL

## 2023-11-05 VITALS
WEIGHT: 163 LBS | HEART RATE: 82 BPM | SYSTOLIC BLOOD PRESSURE: 158 MMHG | OXYGEN SATURATION: 97 % | DIASTOLIC BLOOD PRESSURE: 87 MMHG | BODY MASS INDEX: 26.31 KG/M2 | TEMPERATURE: 98.8 F

## 2023-11-05 DIAGNOSIS — I10 BENIGN ESSENTIAL HYPERTENSION: Primary | ICD-10-CM

## 2023-11-05 DIAGNOSIS — J01.90 ACUTE SINUSITIS WITH SYMPTOMS > 10 DAYS: ICD-10-CM

## 2023-11-05 DIAGNOSIS — S02.5XXB OPEN FRACTURE OF TOOTH, INITIAL ENCOUNTER: ICD-10-CM

## 2023-11-05 PROCEDURE — 99213 OFFICE O/P EST LOW 20 MIN: CPT | Performed by: FAMILY MEDICINE

## 2023-11-05 RX ORDER — LISINOPRIL 10 MG/1
10 TABLET ORAL DAILY
Qty: 30 TABLET | Refills: 0 | Status: SHIPPED | OUTPATIENT
Start: 2023-11-05 | End: 2023-11-15

## 2023-11-05 NOTE — PROGRESS NOTES
Assessment & Plan     Benign essential hypertension  Patient is known to have benign essential hypertension, liquid lisinopril was prescribed by PCP last week however in process of prior authorization currently.  Lisinopril 10 mg tablet prescribed, can crush, and recommended regular exercise, healthy diet.  Follow-up with PCP next week.  All questions answered.  - lisinopril (ZESTRIL) 10 MG tablet; Take 1 tablet (10 mg) by mouth daily      Saul Castro MD  [facility]    Kaushik Acosta is a 53 year old, presenting for the following health issues:  Medication Request:     Has a tooth pain, but they won't take her tooth out because her bp is high, she is liquid diet, bp liquid meds needs prior auth. Pharmacist told her she needs to get script for pills and she will show her how to crush and turn into liquid form.      Review of Systems   Constitutional, HEENT, cardiovascular, pulmonary, gi and gu systems are negative, except as otherwise noted.        Objective    BP (!) 158/87   Pulse 82   Temp 98.8  F (37.1  C) (Tympanic)   Wt 73.9 kg (163 lb)   LMP  (LMP Unknown)   SpO2 97%   BMI 26.31 kg/m    Body mass index is 26.31 kg/m .  Physical Exam   GENERAL: alert and no distress  HENT: normal cephalic/atraumatic and broken right lower premolar  NECK: no adenopathy, no asymmetry, masses, or scars and thyroid normal to palpation  RESP: lungs clear to auscultation - no rales, rhonchi or wheezes  CV: regular rates and rhythm, normal S1 S2, no S3 or S4, and no murmur, click or rub  MS: no gross musculoskeletal defects noted, no edema

## 2023-11-06 ENCOUNTER — ALLIED HEALTH/NURSE VISIT (OUTPATIENT)
Dept: FAMILY MEDICINE | Facility: CLINIC | Age: 53
End: 2023-11-06
Payer: COMMERCIAL

## 2023-11-06 VITALS — DIASTOLIC BLOOD PRESSURE: 82 MMHG | SYSTOLIC BLOOD PRESSURE: 120 MMHG

## 2023-11-06 DIAGNOSIS — Z01.30 BP CHECK: Primary | ICD-10-CM

## 2023-11-06 PROCEDURE — 99207 PR NO CHARGE NURSE ONLY: CPT | Performed by: PHYSICIAN ASSISTANT

## 2023-11-06 RX ORDER — OXYCODONE HCL 5 MG/5 ML
5 SOLUTION, ORAL ORAL EVERY 6 HOURS PRN
Qty: 60 ML | Refills: 0 | Status: SHIPPED | OUTPATIENT
Start: 2023-11-06 | End: 2023-12-10

## 2023-11-06 RX ORDER — AMOXICILLIN AND CLAVULANATE POTASSIUM 400; 57 MG/5ML; MG/5ML
875 POWDER, FOR SUSPENSION ORAL 2 TIMES DAILY
Qty: 218 ML | Refills: 0 | Status: SHIPPED | OUTPATIENT
Start: 2023-11-06 | End: 2023-11-15

## 2023-11-06 NOTE — PROGRESS NOTES
Cynthia Lyons was evaluated at [facility] on November 6, 2023 at which time her blood pressure was:    BP Readings from Last 1 Encounters:   11/06/23 120/82     Pt presented recently at clinic / urgent care with elevated BP d/t tooth pain. She started lisinopril tablets 11/5 and presents today with BP at goal. States she has been participating in activities to help lower anxiety / excitability.     Symptoms: None    BP Goal:< 140/90 mmHg    BP Assessment:  BP at goal    Potential Reasons for BP too high: NA - Not applicable    BP Follow-Up Plan: Recheck BP in 6 months at pharmacy    Recommendation to Provider: ---    Note completed by: Roland Montano RPH

## 2023-11-08 NOTE — TELEPHONE ENCOUNTER
Central Prior Authorization Team   Phone: [phone]    PA Initiation    Medication: LISINOPRIL 10 MG PO TABS  Insurance Company: [employer] Non-Specialty PA's - Phone [phone] Fax [phone]  Pharmacy Filling the Rx: [facility] [place], [place] - [address] [place] [address]  Filling Pharmacy Phone: [phone]  Filling Pharmacy Fax:    Start Date: 11/8/2023

## 2023-11-13 NOTE — TELEPHONE ENCOUNTER
Prior Authorization Not Needed per Insurance    Medication: LISINOPRIL 10 MG PO TABS  Insurance Company: [employer] Non-Specialty PA's - Phone [phone] Fax [phone]  Expected CoPay: $    Pharmacy Filling the Rx: [facility] [place], [place] - [address] [place] [address]  Pharmacy Notified: Yes-pharmacy has a paid claim after they corrected their days supply.  Patient Notified: No

## 2023-11-15 ENCOUNTER — VIRTUAL VISIT (OUTPATIENT)
Dept: FAMILY MEDICINE | Facility: CLINIC | Age: 53
End: 2023-11-15
Payer: COMMERCIAL

## 2023-11-15 DIAGNOSIS — F41.1 GENERALIZED ANXIETY DISORDER: ICD-10-CM

## 2023-11-15 DIAGNOSIS — Z98.1 STATUS POST LAMINECTOMY WITH SPINAL FUSION: ICD-10-CM

## 2023-11-15 DIAGNOSIS — F33.9 RECURRENT MAJOR DEPRESSIVE DISORDER, REMISSION STATUS UNSPECIFIED (H): ICD-10-CM

## 2023-11-15 DIAGNOSIS — R63.4 ABNORMAL WEIGHT LOSS: ICD-10-CM

## 2023-11-15 DIAGNOSIS — G89.4 CHRONIC PAIN DISORDER: ICD-10-CM

## 2023-11-15 DIAGNOSIS — I10 HYPERTENSION, UNSPECIFIED TYPE: Primary | ICD-10-CM

## 2023-11-15 PROCEDURE — 99214 OFFICE O/P EST MOD 30 MIN: CPT | Mod: VID | Performed by: PHYSICIAN ASSISTANT

## 2023-11-15 RX ORDER — OXYCODONE HCL 5 MG/5 ML
10 SOLUTION, ORAL ORAL 3 TIMES DAILY PRN
Qty: 840 ML | Refills: 0 | Status: SHIPPED | OUTPATIENT
Start: 2023-12-20 | End: 2024-01-09

## 2023-11-15 RX ORDER — FLUOXETINE 20 MG/5ML
60 SOLUTION ORAL DAILY
Qty: 450 ML | Refills: 11 | Status: SHIPPED | OUTPATIENT
Start: 2023-11-15 | End: 2024-05-08

## 2023-11-15 RX ORDER — FLUOXETINE 20 MG/5ML
60 SOLUTION ORAL DAILY
Qty: 450 ML | Refills: 0 | Status: SHIPPED | OUTPATIENT
Start: 2023-11-15 | End: 2023-11-15

## 2023-11-15 RX ORDER — OXYCODONE HCL 5 MG/5 ML
10 SOLUTION, ORAL ORAL 3 TIMES DAILY PRN
Qty: 860 ML | Refills: 0 | Status: SHIPPED | OUTPATIENT
Start: 2023-12-04 | End: 2024-02-06

## 2023-11-15 NOTE — PATIENT INSTRUCTIONS
Monitor BP daily for a week - restart lisinopril if BP high    Labs anytime     Moving pain med refills to Wednesdays

## 2023-11-15 NOTE — PROGRESS NOTES
Karla is a 53 year old who is being evaluated via a billable video visit.      Assessment & Plan     Hypertension, unspecified type  Controlled x 2 days off lisinopril now that tooth pain has resolved with extraction.  Monitor.    Chronic pain disorder  Status post laminectomy with spinal fusion  Stable, moving date of refills to mid-week as having troubles with pharmacy not having meds on Mondays  - oxyCODONE (ROXICODONE) 5 MG/5ML solution  Dispense: 840 mL; Refill: 0  - oxyCODONE (ROXICODONE) 5 MG/5ML solution  Dispense: 860 mL; Refill: 0      Recurrent major depressive disorder, remission status unspecified (H24)  Generalized anxiety disorder  Refill stable  - FLUoxetine (PROZAC) 20 MG/5ML solution  Dispense: 450 mL; Refill: 0    Abnormal weight loss  Seems she gains 7 pounds when able to eat then to lose 11 pounds when less able to eat.  Gastroparesis.  Exploring getting lap band out.  Labs to reassure against other causes of wt loss.  - TSH with free T4 reflex  - Hemoglobin A1c  - CBC with platelets  - Comprehensive metabolic panel (BMP + Alb, Alk Phos, ALT, AST, Total. Bili, TP)  - UA with Microscopic - lab collect    Patient Instructions   Monitor BP daily for a week - restart lisinopril if BP high    Labs anytime     Moving pain med refills to Wednesdays    Rosaura Flores PA-C  [facility]    Subjective   Karla is a 53 year old, presenting for the following health issues:  Pain, Hypertension, and Barrets Esophagus        11/15/2023     11:11 AM   Additional Questions   Roomed by Nat ORTEGA       History of Present Illness       Hypertension: She presents for follow up of hypertension.  She does check blood pressure  regularly outside of the clinic. Outside blood pressures have been over 140/90. She follows a low salt diet.     She eats 2-3 servings of fruits and vegetables daily.She consumes 0 sweetened beverage(s) daily.She exercises with enough effort to increase her heart  rate 20 to 29 minutes per day.  She exercises with enough effort to increase her heart rate 3 or less days per week.   She is taking medications regularly.     Chronic/Recurring Back Pain Follow Up  Where is your back pain located? (Select all that apply) low back bilateral  How would you describe your back pain?  dull ache, sharp, and stabbing  Where does your back pain spread? the right and left buttock, the right and left  thigh, and the right and left neck  Since your last clinic visit for back pain, how has your pain changed? unchanged  Does your back pain interfere with your job? Not applicable  Since your last visit, have you tried any new treatment? No    Medication Followup of Omeprazole   Taking Medication as prescribed: yes  Side Effects:  None  Medication Helping Symptoms:  yes          Objective    Vitals - Patient Reported  Systolic (Patient Reported): (!) 151  Diastolic (Patient Reported): (!) 98  Blood pressure taken with manual cuff (will exclude from quality measure): Yes  Weight (Patient Reported): 69.9 kg (154 lb)  Pain Score: Mild Pain (3)  Pain Loc: Low Back    Vitals:  No vitals were obtained today due to virtual visit.    Video-Visit Details    Type of service:  Video Visit     Originating Location (pt. Location): Home  Distant Location (provider location):  On-site  Platform used for Video Visit: Alesha

## 2023-11-17 ENCOUNTER — PATIENT OUTREACH (OUTPATIENT)
Dept: CARE COORDINATION | Facility: CLINIC | Age: 53
End: 2023-11-17

## 2023-11-17 NOTE — PROGRESS NOTES
Clinic Care Coordination Contact  Care Coordination Clinician Chart Review    Situation: Patient chart reviewed by Care Coordinator.       Background: Care Coordination Program started: 9/25/2023. Initial assessment completed 10/2/23 and patient-centered care plan(s) were developed with participation from patient. Lead CC handed patient off to CHW for continued outreaches.       Assessment: Per chart review, patient outreach completed by CC CHW on 11/1/23. Patient is actively working to accomplish goal(s). Patient's goal(s) appropriate and relevant at this time.     Patient is not due for updated Plan of Care.      Assessments will be completed annually or as needed/with change of patient status.        Care Plan: General       Problem: General       Goal: General       Start Date: 10/2/2023 Expected End Date: 6/1/2024    Priority: Medium    Note:     Barriers: Access  Strengths: Motivated   Patient expressed understanding of goal: Yes    Action steps to achieve this goal:  1. I will utilize [employer] program.   2. I will review and consider cash and utility assistance applications.   3. I will work with care coordination as needed.                                   Plan/Recommendations: The patient will continue working with Care Coordination to achieve goal(s) as above.     CHW will continue outreaches at minimum every 30 days and will involve Lead CC as needed or if patient is ready to move to Maintenance.     Lead CC will continue to monitor CHW outreaches and patient's progress to goal(s) every 6 weeks.     Plan of Care updated and sent to patient: MARSHA العراقي   Social Work Primary Care Clinic Care Coordinator   [facility]  [phone]  [email or web address]

## 2023-11-17 NOTE — LETTER
[facility]  Patient Centered Plan of Care  About Me:        Patient Name:  Cynthia Lyons    YOB: 1970  Age:         53 year old   [place] MRN:    [id number] Telephone Information:  Home Phone [phone]   Mobile [phone]       Address:  [address] [address] [place] [address] Email address:  [email or web address]      Emergency Contact(s)    Name Relationship Lgl Grd Work Phone Home Phone Mobile Phone   GRACE CERVANTES Spouse   [phone]           Primary language:  English     needed? No   [place] [employer] [facility]:  [phone] op. 1  Other communication barriers:None  Preferred Method of Communication:  Mail  Current living arrangement: I live in a private home with spouse  Mobility Status/ Medical Equipment: Independent    Health Maintenance  Health Maintenance Reviewed: Due/Overdue   Health Maintenance Due   Topic Date Due    ADVANCE CARE PLANNING  Never done    HEPATITIS B IMMUNIZATION (1 of 3 - 3-dose series) Never done    HEPATITIS A IMMUNIZATION (1 of 2 - Risk 2-dose series) Never done    DTAP/TDAP/TD IMMUNIZATION (2 - Td or Tdap) 02/05/2019    YEARLY PREVENTIVE VISIT  10/03/2020    ZOSTER IMMUNIZATION (1 of 2) Never done    LUNG CANCER SCREENING  Never done    HPV TEST  08/09/2022    PAP  08/09/2022    INFLUENZA VACCINE (1) 09/01/2023    COVID-19 Vaccine (2 - 2023-24 season) 09/01/2023     My Access Plan  Medical Emergency 911   Primary Clinic Line [facility] - [phone]   24 Hour Appointment Line [phone] or  [phone]) (toll-free)   24 Hour Nurse Line [phone] (toll-free)   Preferred Urgent Care [facility], [phone]     Preferred Hospital [place], [place]  [phone]     Preferred Pharmacy [facility] [place] [facility], [place] - [address] [place] [address]     Behavioral Health Crisis Line The National Suicide  Prevention Lifeline at 1-194.354.3715 or Text/Call 988       My Care Team Members  Patient Care Team         Relationship Specialty Notifications Start End    Rosaura Folres PA-C PCP - General   6/28/19     Phone: [phone] Fax: [phone]         [address] [place] [address]    Rosaura Flores PA-C Assigned PCP   10/16/16     Phone: [phone] Fax: [phone]         [address] [place] [address]    Lenin Ferrell, PharmD Pharmacist Pharmacist  2/7/22     Phone: [phone]          [facility] [address] [address] [place] [address]    Rosaura Flores PA-C Assigned Pain Medication Provider   1/9/23     Phone: [phone] Fax: [phone]         [address] [place] [address]    Robbi Pedraza MD MD Otolaryngology  2/28/23     Phone: [phone] Fax: [phone]         [address] [facility] [address]    Cherie Daugherty LSW Lead Care Coordinator Primary Care -  Admissions 9/27/23     Phone: [phone]          [address] [facility] [address]    Ninfa Garcia, W Community Health Worker  Admissions 11/20/23                 My Care Plans  Self Management and Treatment Plan    Care Plan  Care Plan: General       Problem: General       Goal: General       Start Date: 10/2/2023 Expected End Date: 6/1/2024    This Visit's Progress: 40%    Priority: Medium    Note:     Barriers: Access  Strengths: Motivated   Patient expressed understanding of goal: Yes    Action steps to achieve this goal:  1. I will utilize [employer] program. (CHW re-enrolled patient 12/27/2023)  2. I will review and consider cash and utility assistance applications. (Patient waiting for response from ssdi)  3. I will work with care coordination as needed.                                Action Plans on File:            Depression          Advance Care Plans/Directives:   Advanced Care Plan/Directives on file:   No    Discussed with patient/caregiver(s): No data recorded        Honoring Choices    Advance Care Planning and Health Care Directives  When it comes to decisions about your health care, it s important that your voice is heard. You may not always be able to speak for yourself.    We encourage you to have discussions with your loved ones, cultural or spiritual leaders and health care providers about your goals, values, beliefs and choices.    We are a part of [employer] [place] , supporting and promoting the benefits of advance care planning conversations.    Our goals are to:  Help you make informed decisions about your healthcare choices and ensure that those choices are honored  Offer advance care planning discussions with trained staff  Ensure your choices are clearly defined, documented and available in your medical record  Translate your choices into medical orders as needed    Why is Advance Care Planning important?  Know what your health care choices are and decide what is right for you  An unexpected illness or injury could leave you unable to participate in important treatment decisions  When choices are left to others to decide that responsibility can be difficult and stressful  By discussing and outlining your choices, your voice is heard in the care you want to receive    How can I learn more?  We offer free classes at multiple locations, days and times. Our trained facilitators will provide information and guide you through a Health Care Directive document. They can also review, notarize and add your document to your medical record.    Call [employer] at [phone] or toll free at [phone] for assistance.        My Medical and Care Information  Problem List   Patient Active Problem List   Diagnosis    Benign essential hypertension    Backache    binge eating disorder    Allergic rhinitis    Moderate major depression (H)    Sleep apnea    Chronic sinusitis    Joint pain    Personal History of Tobacco Use, Presenting Hazards to Health -  quit 1/2023    Lyme arthritis (H)    Status post laminectomy with spinal fusion    Panic anxiety syndrome    Chronic pain disorder    Hyperlipidemia LDL goal <130    Generalized anxiety disorder    Acne    Genital HSV    S/P hysterectomy    Dyslipidemia    Microscopic hematuria    Prediabetes    Esophageal dysmotility and gastroparesis    Guzman's esophagus without dysplasia    Major depression, recurrent (H24)    F11.2 - Continuous opioid dependence (H)      Current Medications and Allergies:    Current Outpatient Medications   Medication Instructions    albuterol (PROAIR HFA/PROVENTIL HFA/VENTOLIN HFA) 108 (90 Base) MCG/ACT inhaler 2 puffs, Inhalation, EVERY 4 HOURS PRN    cetirizine (ZYRTEC) 10 mg, Oral, DAILY    FLUoxetine (PROZAC) 60 mg, Oral, DAILY    fluticasone (FLONASE) 50 MCG/ACT nasal spray 1-2 sprays, Nasal, 2 TIMES DAILY, As needed for allergies    lactulose (GENERLAC) 10 GM/15ML solution 30 ml up to three times a day as needed for constipation.    lisinopril (ZESTRIL) 10 mg, Oral, DAILY    naloxone (NARCAN) 4 mg, Alternating Nostrils, PRN, every 2-3 minutes until assistance arrives    omeprazole (PRILOSEC) 40 mg, Oral, DAILY    ondansetron (ZOFRAN) 8 mg, Oral, EVERY 8 HOURS PRN    order for DME Equipment being ordered: Digital home blood pressure monitor kit    oxyCODONE (ROXICODONE) 10 mg, Oral, 3 TIMES DAILY PRN    oxyCODONE (ROXICODONE) 10 mg, Oral, 3 TIMES DAILY PRN    tretinoin (RETIN-A) 0.05 % external cream Topical, AT BEDTIME     Care Coordination Start Date: 9/25/2023   Frequency of Care Coordination: monthly, more frequently as needed     Form Last Updated: 12/28/2023

## 2023-11-21 ENCOUNTER — MYC MEDICAL ADVICE (OUTPATIENT)
Dept: CARE COORDINATION | Facility: CLINIC | Age: 53
End: 2023-11-21

## 2023-11-21 ENCOUNTER — PATIENT OUTREACH (OUTPATIENT)
Dept: CARE COORDINATION | Facility: CLINIC | Age: 53
End: 2023-11-21

## 2023-11-21 NOTE — PROGRESS NOTES
Clinic Care Coordination Contact  [facility]/Voicemail    Clinical Data: Care Coordinator Outreach    Outreach Documentation Number of Outreach Attempt   11/21/2023   9:31 AM 2       Voicemail not available, unable to leave a message.    Plan: Care Coordinator will send unable to contact letter with care coordinator contact information via [employer]. Care Coordinator will try to reach patient again in 1 month.    PARISH Pierce  [facility] Care Coordination  [facility]  [phone]

## 2023-11-27 ENCOUNTER — MYC MEDICAL ADVICE (OUTPATIENT)
Dept: FAMILY MEDICINE | Facility: CLINIC | Age: 53
End: 2023-11-27

## 2023-12-10 ENCOUNTER — OFFICE VISIT (OUTPATIENT)
Dept: URGENT CARE | Facility: URGENT CARE | Age: 53
End: 2023-12-10
Payer: COMMERCIAL

## 2023-12-10 VITALS
OXYGEN SATURATION: 100 % | TEMPERATURE: 97.7 F | DIASTOLIC BLOOD PRESSURE: 78 MMHG | BODY MASS INDEX: 24.69 KG/M2 | RESPIRATION RATE: 16 BRPM | SYSTOLIC BLOOD PRESSURE: 132 MMHG | HEART RATE: 66 BPM | WEIGHT: 153 LBS

## 2023-12-10 DIAGNOSIS — R30.0 DYSURIA: Primary | ICD-10-CM

## 2023-12-10 LAB
ALBUMIN UR-MCNC: NEGATIVE MG/DL
APPEARANCE UR: CLEAR
BILIRUB UR QL STRIP: NEGATIVE
CLUE CELLS: ABNORMAL
COLOR UR AUTO: YELLOW
GLUCOSE UR STRIP-MCNC: NEGATIVE MG/DL
HGB UR QL STRIP: ABNORMAL
KETONES UR STRIP-MCNC: NEGATIVE MG/DL
LEUKOCYTE ESTERASE UR QL STRIP: NEGATIVE
MUCOUS THREADS #/AREA URNS LPF: PRESENT /LPF
NITRATE UR QL: NEGATIVE
PH UR STRIP: 5.5 [PH] (ref 5–7)
RBC #/AREA URNS AUTO: ABNORMAL /HPF
SP GR UR STRIP: 1.01 (ref 1–1.03)
TRICHOMONAS, WET PREP: ABNORMAL
UROBILINOGEN UR STRIP-ACNC: 0.2 E.U./DL
WBC #/AREA URNS AUTO: ABNORMAL /HPF
WBC'S/HIGH POWER FIELD, WET PREP: ABNORMAL
YEAST, WET PREP: ABNORMAL

## 2023-12-10 PROCEDURE — 81001 URINALYSIS AUTO W/SCOPE: CPT | Performed by: PHYSICIAN ASSISTANT

## 2023-12-10 PROCEDURE — 87210 SMEAR WET MOUNT SALINE/INK: CPT | Performed by: PHYSICIAN ASSISTANT

## 2023-12-10 PROCEDURE — 99213 OFFICE O/P EST LOW 20 MIN: CPT | Performed by: PHYSICIAN ASSISTANT

## 2023-12-10 RX ORDER — CETIRIZINE HYDROCHLORIDE 10 MG/1
10 TABLET ORAL
COMMUNITY
End: 2023-12-21

## 2023-12-10 RX ORDER — LISINOPRIL 10 MG/1
10 TABLET ORAL DAILY
COMMUNITY
End: 2023-12-21

## 2023-12-10 NOTE — PROGRESS NOTES
Assessment & Plan     Dysuria  Reassurance, normal UA. Continue to monitor symptoms. Push fluids. Return to clinic if symptoms worsen or do not improve; otherwise follow up as needed      - UA Macroscopic with reflex to Microscopic and Culture - Clinic Collect  - Wet prep - Clinic Collect  - UA Microscopic with Reflex to Culture                 Return in about 1 week (around 12/17/2023), or if symptoms worsen or fail to improve.    RAQUEL Ramsey [facility]            Subjective   Chief Complaint   Patient presents with    UTI     Frequency, urgency, and pressure, flank pain on the right       HPI     UTI    Onset of symptoms was 2day(s).  Course of illness is same  Severity moderate  Current and associated symptoms frequency and urgency  Treatment and measures tried Increase fluid intake  Predisposing factors include kidney stones  Patient denies rigors, flank pain, and temperature > 101 degrees F.                    Review of Systems         Objective    /78   Pulse 66   Temp 97.7  F (36.5  C) (Tympanic)   Resp 16   Wt 69.4 kg (153 lb)   LMP  (LMP Unknown)   SpO2 100%   BMI 24.69 kg/m    Body mass index is 24.69 kg/m .  Physical Exam  Constitutional:       General: She is not in acute distress.     Appearance: She is well-developed.   Psychiatric:         Behavior: Behavior normal.            Results for orders placed or performed in visit on 12/10/23 (from the past 24 hour(s))   UA Macroscopic with reflex to Microscopic and Culture - Clinic Collect    Specimen: Urine, Clean Catch   Result Value Ref Range    Color Urine Yellow Colorless, Straw, Light Yellow, Yellow    Appearance Urine Clear Clear    Glucose Urine Negative Negative mg/dL    Bilirubin Urine Negative Negative    Ketones Urine Negative Negative mg/dL    Specific Gravity Urine 1.015 1.003 - 1.035    Blood Urine Small (A) Negative    pH Urine 5.5 5.0 - 7.0    Protein Albumin Urine Negative Negative  mg/dL    Urobilinogen Urine 0.2 0.2, 1.0 E.U./dL    Nitrite Urine Negative Negative    Leukocyte Esterase Urine Negative Negative   Wet prep - Clinic Collect    Specimen: Vagina; Swab   Result Value Ref Range    Trichomonas Absent Absent    Yeast Absent Absent    Clue Cells Absent Absent    WBCs/high power field 3+ (A) None   UA Microscopic with Reflex to Culture   Result Value Ref Range    RBC Urine 0-2 0-2 /HPF /HPF    WBC Urine 0-5 0-5 /HPF /HPF    Mucus Urine Present (A) None Seen /LPF    Narrative    Urine Culture not indicated     *Note: Due to a large number of results and/or encounters for the requested time period, some results have not been displayed. A complete set of results can be found in Results Review.

## 2023-12-21 ENCOUNTER — MYC REFILL (OUTPATIENT)
Dept: FAMILY MEDICINE | Facility: CLINIC | Age: 53
End: 2023-12-21

## 2023-12-21 DIAGNOSIS — I10 HYPERTENSION, UNSPECIFIED TYPE: Primary | ICD-10-CM

## 2023-12-21 DIAGNOSIS — J30.9 ALLERGIC RHINITIS, UNSPECIFIED SEASONALITY, UNSPECIFIED TRIGGER: Primary | ICD-10-CM

## 2023-12-21 RX ORDER — LISINOPRIL 10 MG/1
10 TABLET ORAL DAILY
Qty: 90 TABLET | Refills: 1 | Status: SHIPPED | OUTPATIENT
Start: 2023-12-21 | End: 2024-06-12

## 2023-12-21 RX ORDER — CETIRIZINE HYDROCHLORIDE 10 MG/1
10 TABLET ORAL DAILY
Qty: 100 TABLET | Refills: 3 | Status: SHIPPED | OUTPATIENT
Start: 2023-12-21 | End: 2024-05-08

## 2023-12-27 ENCOUNTER — PATIENT OUTREACH (OUTPATIENT)
Dept: CARE COORDINATION | Facility: CLINIC | Age: 53
End: 2023-12-27

## 2023-12-27 NOTE — PROGRESS NOTES
Clinic Care Coordination Contact  Community Health Worker Follow Up    Care Gaps:     Health Maintenance Due   Topic Date Due    ADVANCE CARE PLANNING  Never done    HEPATITIS B IMMUNIZATION (1 of 3 - 3-dose series) Never done    HEPATITIS A IMMUNIZATION (1 of 2 - Risk 2-dose series) Never done    DTAP/TDAP/TD IMMUNIZATION (2 - Td or Tdap) 02/05/2019    YEARLY PREVENTIVE VISIT  10/03/2020    ZOSTER IMMUNIZATION (1 of 2) Never done    LUNG CANCER SCREENING  Never done    HPV TEST  08/09/2022    PAP  08/09/2022    INFLUENZA VACCINE (1) 09/01/2023    COVID-19 Vaccine (2 - 2023-24 season) 09/01/2023       Postponed to next CHW outreach.     Care Plan:   Care Plan: General       Problem: General       Goal: General       Start Date: 10/2/2023 Expected End Date: 6/1/2024    This Visit's Progress: 40%    Priority: Medium    Note:     Barriers: Access  Strengths: Motivated   Patient expressed understanding of goal: Yes    Action steps to achieve this goal:  1. I will utilize [employer] program. (CHW re-enrolled patient 12/27/2023)  2. I will review and consider cash and utility assistance applications. (Patient waiting for response from ssdi)  3. I will work with care coordination as needed.                                ** CHW spoke to patient who stated she has been doing well. Patient applied for social security disability in October and is waiting to find out more about her application. Patient stated she is still interested in trying [employer], but misplaced the card she received. CHW re-enrolled patient and she will reach out to [facility] if needed.    Intervention and Education during outreach: CHW encouraged patient to reach out to [employer] if any needs arise.    CHW Plan: next CHW outreach in one month.    PARISH Pierce  [facility] Care Coordination  [facility]  [phone]

## 2023-12-28 NOTE — PROGRESS NOTES
90 day letter sent.     Cherie Daugherty [facility]   Social Work Primary Care Clinic Care Coordinator   [facility]  [phone]  [email or web address]

## 2024-01-04 ENCOUNTER — PATIENT OUTREACH (OUTPATIENT)
Dept: CARE COORDINATION | Facility: CLINIC | Age: 54
End: 2024-01-04
Payer: COMMERCIAL

## 2024-01-04 NOTE — PROGRESS NOTES
Clinic Care Coordination Contact  Care Coordination Clinician Chart Review    Situation: Patient chart reviewed by Care Coordinator.       Background: Care Coordination Program started: 9/25/2023. Initial assessment completed 10/2/23 and patient-centered care plan(s) were developed with participation from patient. Lead CC handed patient off to CHW for continued outreaches.       Assessment: Per chart review, patient outreach completed by CC CHW on 12/27/23. Patient is actively working to accomplish goal(s). Patient's goal(s) appropriate and relevant at this time.     Patient is not due for updated Plan of Care.      Assessments will be completed annually or as needed/with change of patient status. Due 10/2/24.         Care Plan: General       Problem: General       Goal: General       Start Date: 10/2/2023 Expected End Date: 6/1/2024    This Visit's Progress: 40%    Priority: Medium    Note:     Barriers: Access  Strengths: Motivated   Patient expressed understanding of goal: Yes    Action steps to achieve this goal:  1. I will utilize [employer] program. (CHW re-enrolled patient 12/27/2023)  2. I will review and consider cash and utility assistance applications. (Patient waiting for response from ssdi)  3. I will work with care coordination as needed.                                   Plan/Recommendations: The patient will continue working with Care Coordination to achieve goal(s) as above.     CHW will continue outreaches at minimum every 30 days and will involve Lead CC as needed or if patient is ready to move to Maintenance.     Lead CC will continue to monitor CHW outreaches and patient's progress to goal(s) every 6 weeks.     Plan of Care updated and sent to patient: MARSHA العراقي   Social Work Primary Care Clinic Care Coordinator   [facility]  [phone]  [email or web address]

## 2024-01-09 ENCOUNTER — MYC REFILL (OUTPATIENT)
Dept: FAMILY MEDICINE | Facility: CLINIC | Age: 54
End: 2024-01-09
Payer: COMMERCIAL

## 2024-01-09 ENCOUNTER — MYC MEDICAL ADVICE (OUTPATIENT)
Dept: FAMILY MEDICINE | Facility: CLINIC | Age: 54
End: 2024-01-09
Payer: COMMERCIAL

## 2024-01-09 DIAGNOSIS — T88.7XXA SIDE EFFECT OF MEDICATION: ICD-10-CM

## 2024-01-09 DIAGNOSIS — K31.84 GASTROPARESIS: ICD-10-CM

## 2024-01-09 DIAGNOSIS — Z98.1 STATUS POST LAMINECTOMY WITH SPINAL FUSION: ICD-10-CM

## 2024-01-09 RX ORDER — ONDANSETRON 8 MG/1
8 TABLET, FILM COATED ORAL EVERY 8 HOURS PRN
Qty: 90 TABLET | Refills: 11 | Status: SHIPPED | OUTPATIENT
Start: 2024-01-09

## 2024-01-09 RX ORDER — OXYCODONE HCL 5 MG/5 ML
10 SOLUTION, ORAL ORAL 3 TIMES DAILY PRN
Qty: 840 ML | Refills: 0 | Status: SHIPPED | OUTPATIENT
Start: 2024-01-17 | End: 2024-02-05

## 2024-01-18 ENCOUNTER — MYC MEDICAL ADVICE (OUTPATIENT)
Dept: FAMILY MEDICINE | Facility: CLINIC | Age: 54
End: 2024-01-18
Payer: COMMERCIAL

## 2024-01-30 ENCOUNTER — PATIENT OUTREACH (OUTPATIENT)
Dept: CARE COORDINATION | Facility: CLINIC | Age: 54
End: 2024-01-30
Payer: COMMERCIAL

## 2024-01-30 NOTE — PROGRESS NOTES
Clinic Care Coordination Contact      ** CHW briefly spoke to patient who was driving and stated she will call CHW back.    PARISH Pierce  [facility] Care Coordination  [facility]  [phone]

## 2024-02-01 ENCOUNTER — MYC MEDICAL ADVICE (OUTPATIENT)
Dept: FAMILY MEDICINE | Facility: CLINIC | Age: 54
End: 2024-02-01
Payer: COMMERCIAL

## 2024-02-01 NOTE — PROGRESS NOTES
Clinic Care Coordination Contact  Community Health Worker Follow Up    Care Gaps:     Health Maintenance Due   Topic Date Due    ADVANCE CARE PLANNING  Never done    HEPATITIS B IMMUNIZATION (1 of 3 - 3-dose series) Never done    HEPATITIS A IMMUNIZATION (1 of 2 - Risk 2-dose series) Never done    DTAP/TDAP/TD IMMUNIZATION (2 - Td or Tdap) 02/05/2019    YEARLY PREVENTIVE VISIT  10/03/2020    ZOSTER IMMUNIZATION (1 of 2) Never done    LUNG CANCER SCREENING  Never done    HPV TEST  08/09/2022    PAP  08/09/2022    INFLUENZA VACCINE (1) 09/01/2023    COVID-19 Vaccine (2 - 2023-24 season) 09/01/2023       Care Gaps Last addressed on 02/ 01/2024 - PCP visit on 02/14/2024.    Care Plan:   Care Plan: General       Problem: General       Goal: General       Start Date: 10/2/2023 Expected End Date: 6/1/2024    This Visit's Progress: 50% Recent Progress: 40%    Priority: Medium    Note:     Barriers: Access  Strengths: Motivated   Patient expressed understanding of goal: Yes    Action steps to achieve this goal:  1. I will utilize [employer] program. (CHW re-enrolled patient 12/27/2023)  2. I will review and consider cash and utility assistance applications. (Patient waiting for response from ssdi)  3. I will work with care coordination as needed.                                ** CHW spoke to patient who stated there has been a lot going on with her in terms of dental work. Patient has several appointments next week to resolve dental issues but states she is feeling better than she was a few weeks ago. Patient has not heard anything back from social security yet but will try contacting her  again.    Intervention and Education during outreach: CHW encouraged patient to reach out to [facility] if any needs arise.    CHW Plan: next CHW outreach in one month.    PARISH Pierce  [facility] Care Coordination  [facility]  [phone]

## 2024-02-05 ENCOUNTER — MYC REFILL (OUTPATIENT)
Dept: FAMILY MEDICINE | Facility: CLINIC | Age: 54
End: 2024-02-05
Payer: COMMERCIAL

## 2024-02-05 DIAGNOSIS — Z98.1 STATUS POST LAMINECTOMY WITH SPINAL FUSION: ICD-10-CM

## 2024-02-06 RX ORDER — OXYCODONE HCL 5 MG/5 ML
10 SOLUTION, ORAL ORAL 3 TIMES DAILY PRN
Qty: 840 ML | Refills: 0 | Status: SHIPPED | OUTPATIENT
Start: 2024-02-12 | End: 2024-03-04

## 2024-02-19 ENCOUNTER — PATIENT OUTREACH (OUTPATIENT)
Dept: CARE COORDINATION | Facility: CLINIC | Age: 54
End: 2024-02-19
Payer: COMMERCIAL

## 2024-02-19 NOTE — PROGRESS NOTES
Clinic Care Coordination Contact  Care Coordination Clinician Chart Review    Situation: Patient chart reviewed by Care Coordinator.       Background: Care Coordination Program started: 9/25/2023. Initial assessment completed 10/2/23 and patient-centered care plan(s) were developed with participation from patient. Lead CC handed patient off to CHW for continued outreaches.       Assessment: Per chart review, patient outreach completed by CC CHW on 2/1/24. Patient is actively working to accomplish goal(s). Patient's goal(s) appropriate and relevant at this time.     Patient is not due for updated Plan of Care.      Assessments will be completed annually or as needed/with change of patient status. Due 10/2/24.         Care Plan: General       Problem: General       Goal: General       Start Date: 10/2/2023 Expected End Date: 6/1/2024    This Visit's Progress: 50% Recent Progress: 40%    Priority: Medium    Note:     Barriers: Access  Strengths: Motivated   Patient expressed understanding of goal: Yes    Action steps to achieve this goal:  1. I will utilize [employer] program. (CHW re-enrolled patient 12/27/2023)  2. I will review and consider cash and utility assistance applications. (Patient waiting for response from ssdi)  3. I will work with care coordination as needed.                                   Plan/Recommendations: The patient will continue working with Care Coordination to achieve goal(s) as above.     CHW will continue outreaches at minimum every 30 days and will involve Lead CC as needed or if patient is ready to move to Maintenance.     Lead CC will continue to monitor CHW outreaches and patient's progress to goal(s) every 6 weeks.     Plan of Care updated and sent to patient: No    MARSHA Steele   Social Work Primary Care Clinic Care Coordinator   [facility]  [phone]  [email or web address]

## 2024-03-01 ENCOUNTER — PATIENT OUTREACH (OUTPATIENT)
Dept: CARE COORDINATION | Facility: CLINIC | Age: 54
End: 2024-03-01
Payer: COMMERCIAL

## 2024-03-01 NOTE — PROGRESS NOTES
Clinic Care Coordination Contact  [facility]/Voicemail    Clinical Data: Care Coordinator Outreach    Outreach Documentation Number of Outreach Attempt   3/1/2024  10:38 AM 1       Left message on patient's voicemail with call back information and requested return call.    Plan: Care Coordinator will try to reach patient again in 10 business days.    PARISH Pierce  [facility] Care Coordination  [facility]  [phone]

## 2024-03-04 ENCOUNTER — MYC REFILL (OUTPATIENT)
Dept: FAMILY MEDICINE | Facility: CLINIC | Age: 54
End: 2024-03-04
Payer: COMMERCIAL

## 2024-03-04 DIAGNOSIS — Z98.1 STATUS POST LAMINECTOMY WITH SPINAL FUSION: ICD-10-CM

## 2024-03-05 ENCOUNTER — MYC REFILL (OUTPATIENT)
Dept: FAMILY MEDICINE | Facility: CLINIC | Age: 54
End: 2024-03-05
Payer: COMMERCIAL

## 2024-03-05 DIAGNOSIS — K59.00 CONSTIPATION, UNSPECIFIED CONSTIPATION TYPE: ICD-10-CM

## 2024-03-06 ENCOUNTER — MYC MEDICAL ADVICE (OUTPATIENT)
Dept: FAMILY MEDICINE | Facility: CLINIC | Age: 54
End: 2024-03-06
Payer: COMMERCIAL

## 2024-03-06 RX ORDER — OXYCODONE HCL 5 MG/5 ML
10 SOLUTION, ORAL ORAL 3 TIMES DAILY PRN
Qty: 840 ML | Refills: 0 | Status: SHIPPED | OUTPATIENT
Start: 2024-03-11 | End: 2024-03-28

## 2024-03-06 RX ORDER — LACTULOSE 10 G/15ML
SOLUTION ORAL
Qty: 237 ML | Refills: 1 | Status: SHIPPED | OUTPATIENT
Start: 2024-03-06 | End: 2024-04-12

## 2024-03-18 ENCOUNTER — PATIENT OUTREACH (OUTPATIENT)
Dept: CARE COORDINATION | Facility: CLINIC | Age: 54
End: 2024-03-18

## 2024-03-18 NOTE — PROGRESS NOTES
Clinic Care Coordination Contact  [facility]/Voicemail    Clinical Data: Care Coordinator Outreach    Outreach Documentation Number of Outreach Attempt   3/1/2024  10:38 AM 1   3/18/2024   1:58 PM 2       Patient's phone did not have an option for CHW to leave a message. CHW unable to leave message.    Plan: Care Coordinator will try to reach patient again in 1-2 business days.    Ashley Murillo CHW, B.A. [facility] Care Coordination  [facility]:   [facility]  [phone]

## 2024-03-19 ENCOUNTER — PATIENT OUTREACH (OUTPATIENT)
Dept: CARE COORDINATION | Facility: CLINIC | Age: 54
End: 2024-03-19
Payer: COMMERCIAL

## 2024-03-19 NOTE — PROGRESS NOTES
Clinic Care Coordination Contact  Community Health Worker Follow Up    Care Gaps:     Health Maintenance Due   Topic Date Due    ADVANCE CARE PLANNING  Never done    HEPATITIS A IMMUNIZATION (1 of 2 - Risk 2-dose series) Never done    HEPATITIS B IMMUNIZATION (1 of 3 - 19+ 3-dose series) Never done    DTAP/TDAP/TD IMMUNIZATION (2 - Td or Tdap) 02/05/2019    YEARLY PREVENTIVE VISIT  10/03/2020    ZOSTER IMMUNIZATION (1 of 2) Never done    LUNG CANCER SCREENING  Never done    LIPID  03/04/2022    HPV TEST  08/09/2022    PAP  08/09/2022    INFLUENZA VACCINE (1) 09/01/2023    COVID-19 Vaccine (2 - 2023-24 season) 09/01/2023       Postponed to next CHW outreach.     Care Plan:   Care Plan: General       Problem: General       Goal: General       Start Date: 10/2/2023 Expected End Date: 6/1/2024    This Visit's Progress: On Hold Recent Progress: 50%    Priority: Medium    Note:     Barriers: Access  Strengths: Motivated   Patient expressed understanding of goal: Yes    Action steps to achieve this goal:  1. I will utilize [employer] program. (CHW re-enrolled patient 12/27/2023)  2. I will review and consider cash and utility assistance applications. (Patient waiting for response from ssdi)  3. I will work with care coordination as needed.                                Intervention and Education during outreach:   Patient states that she has a lot going on in her life right now and has been primarily focusing on her daughter's health. Patient requested to put goals on hold and states that she will reach out to CHW when she is ready to talk. Patient expressed her gratitude towards CC and took note of CHW's contact information.    CHW Plan: CHW will route patient to [facility] to review.    PARISH Gómez, B.A. [facility] Care Coordination  [facility]:   [facility]  [phone]

## 2024-03-19 NOTE — PROGRESS NOTES
Clinic Care Coordination Contact    Per patient request, maintenance is approved. CHW to outreach in 2 months to ensure pt has what she needs at this time.     Cherie Daugherty, W   Social Work Primary Care Clinic Care Coordinator   [facility]  [phone]  [email or web address]

## 2024-03-26 NOTE — TELEPHONE ENCOUNTER
REFERRAL INFORMATION:  Referring Provider: Self-Referred  Referring Clinic: N/A  Reason for Visit/Diagnosis: Previous lap bad       FUTURE VISIT INFORMATION:  Appointment Date: 5/8/2024  Appointment Time: 9:30 AM     NOTES RECORD STATUS  DETAILS   OFFICE NOTE from Referring Provider N/A    OFFICE NOTE from Other Specialists Internal [facility]:  11/15/23 - [facility] OV with DAMEON Griffin    [facility]:  5/11/10 - GEN SURG OV with LONDON Donovan   HOSPITAL DISCHARGE SUMMARY/ ED VISITS  N/A    OPERATIVE REPORT Internal [facility]:  2/10/09 - OP Note for LAP BAND PLACEMENT with Dr. Machuca   ENDOSCOPY (EGD)  Internal [facility]:  8/14/23 - EGD   PERTINENT LABS Internal    IMAGING (CT, MRI, US, XR)  Internal [facility]:  4/15/21 - NM Gastric  10/16/19 - CT Abd/Pelvis

## 2024-03-27 ENCOUNTER — MYC MEDICAL ADVICE (OUTPATIENT)
Dept: FAMILY MEDICINE | Facility: CLINIC | Age: 54
End: 2024-03-27
Payer: COMMERCIAL

## 2024-03-27 DIAGNOSIS — Z98.1 STATUS POST LAMINECTOMY WITH SPINAL FUSION: ICD-10-CM

## 2024-03-28 RX ORDER — OXYCODONE HCL 5 MG/5 ML
10 SOLUTION, ORAL ORAL 3 TIMES DAILY PRN
Qty: 840 ML | Refills: 0 | Status: SHIPPED | OUTPATIENT
Start: 2024-04-08 | End: 2024-04-28

## 2024-03-29 NOTE — TELEPHONE ENCOUNTER
Spoke with Noemy at [facility] authorization given for refill of oxycodone 5 mg/5 ml solution okay to fill on 4/5/24 per Rosaura XIAO.    Julie Behrendt RN

## 2024-03-29 NOTE — TELEPHONE ENCOUNTER
Apparently pharmacy needs verbal ok to  on 4/5 despite my writing that on her prescription.  Please give verbal ok.

## 2024-04-03 NOTE — TELEPHONE ENCOUNTER
Ok for 4/4  instead due to tight timeline to get to 's appt on 4/5.  Call pharmacy or whomever is needed to get this okayed.

## 2024-04-04 NOTE — TELEPHONE ENCOUNTER
Patient calls the clinic to inquire about approval of early refill, this has been approved by PCP in note below.    Pharmacy called, spoke to pharmacist Annabella and provided verbal order for early refill today per PCP.    Patient called back and is notified.      LUDIVINA Ward

## 2024-04-08 ENCOUNTER — TELEPHONE (OUTPATIENT)
Dept: ENDOCRINOLOGY | Facility: CLINIC | Age: 54
End: 2024-04-08
Payer: COMMERCIAL

## 2024-04-08 NOTE — TELEPHONE ENCOUNTER
Sent Mychart (1st Attempt) ( Unable to LVM ) for the patient to call back and schedule the following:    Appointment type: BARIATRIC SURGEON  Provider: Salvador Machuca MD  Return date: 05/08/2024  Specialty phone number: [phone]  Additional appointment(s) needed: no   Additonal Notes: Provider unavailable, pt needs to reschedule

## 2024-04-12 ENCOUNTER — MYC REFILL (OUTPATIENT)
Dept: FAMILY MEDICINE | Facility: CLINIC | Age: 54
End: 2024-04-12
Payer: COMMERCIAL

## 2024-04-12 DIAGNOSIS — K59.00 CONSTIPATION, UNSPECIFIED CONSTIPATION TYPE: ICD-10-CM

## 2024-04-12 RX ORDER — LACTULOSE 10 G/15ML
SOLUTION ORAL
Qty: 237 ML | Refills: 1 | Status: SHIPPED | OUTPATIENT
Start: 2024-04-12

## 2024-04-12 NOTE — TELEPHONE ENCOUNTER
Pending Prescriptions:                       Disp   Refills    lactulose (GENERLAC) 10 GM/15ML solution   237 mL 1        Si ml up to three times a day as needed for           constipation.    Routing refill request to provider for review/approval because:  Drug not on the [facility] refill protocol     Candelaria Conway RN  [facility]

## 2024-04-18 ENCOUNTER — MYC MEDICAL ADVICE (OUTPATIENT)
Dept: FAMILY MEDICINE | Facility: CLINIC | Age: 54
End: 2024-04-18
Payer: COMMERCIAL

## 2024-04-18 DIAGNOSIS — B00.1 COLD SORE: Primary | ICD-10-CM

## 2024-04-20 RX ORDER — VALACYCLOVIR HYDROCHLORIDE 500 MG/1
500 TABLET, FILM COATED ORAL 2 TIMES DAILY
Qty: 96 TABLET | Refills: 3 | Status: SHIPPED | OUTPATIENT
Start: 2024-04-20 | End: 2024-06-13

## 2024-04-28 ENCOUNTER — MYC REFILL (OUTPATIENT)
Dept: FAMILY MEDICINE | Facility: CLINIC | Age: 54
End: 2024-04-28
Payer: COMMERCIAL

## 2024-04-28 DIAGNOSIS — Z98.1 STATUS POST LAMINECTOMY WITH SPINAL FUSION: ICD-10-CM

## 2024-04-30 RX ORDER — OXYCODONE HCL 5 MG/5 ML
10 SOLUTION, ORAL ORAL 3 TIMES DAILY PRN
Qty: 840 ML | Refills: 0 | Status: SHIPPED | OUTPATIENT
Start: 2024-05-06 | End: 2024-05-24

## 2024-05-08 ENCOUNTER — PRE VISIT (OUTPATIENT)
Dept: ENDOCRINOLOGY | Facility: CLINIC | Age: 54
End: 2024-05-08

## 2024-05-08 ENCOUNTER — OFFICE VISIT (OUTPATIENT)
Dept: FAMILY MEDICINE | Facility: CLINIC | Age: 54
End: 2024-05-08
Payer: COMMERCIAL

## 2024-05-08 VITALS
HEART RATE: 63 BPM | OXYGEN SATURATION: 100 % | BODY MASS INDEX: 25.55 KG/M2 | TEMPERATURE: 97.9 F | RESPIRATION RATE: 18 BRPM | HEIGHT: 66 IN | DIASTOLIC BLOOD PRESSURE: 84 MMHG | SYSTOLIC BLOOD PRESSURE: 136 MMHG | WEIGHT: 159 LBS

## 2024-05-08 DIAGNOSIS — Z98.1 STATUS POST LAMINECTOMY WITH SPINAL FUSION: ICD-10-CM

## 2024-05-08 DIAGNOSIS — K22.70 BARRETT'S ESOPHAGUS WITHOUT DYSPLASIA: ICD-10-CM

## 2024-05-08 DIAGNOSIS — F41.1 GENERALIZED ANXIETY DISORDER: ICD-10-CM

## 2024-05-08 DIAGNOSIS — Z00.00 ROUTINE GENERAL MEDICAL EXAMINATION AT A HEALTH CARE FACILITY: ICD-10-CM

## 2024-05-08 DIAGNOSIS — K22.4 ESOPHAGEAL DYSMOTILITY: ICD-10-CM

## 2024-05-08 DIAGNOSIS — F33.9 RECURRENT MAJOR DEPRESSIVE DISORDER, REMISSION STATUS UNSPECIFIED (H): ICD-10-CM

## 2024-05-08 DIAGNOSIS — Z12.4 CERVICAL CANCER SCREENING: ICD-10-CM

## 2024-05-08 DIAGNOSIS — E78.5 HYPERLIPIDEMIA LDL GOAL <130: ICD-10-CM

## 2024-05-08 DIAGNOSIS — G89.4 CHRONIC PAIN DISORDER: ICD-10-CM

## 2024-05-08 DIAGNOSIS — J30.9 ALLERGIC RHINITIS, UNSPECIFIED SEASONALITY, UNSPECIFIED TRIGGER: ICD-10-CM

## 2024-05-08 DIAGNOSIS — F11.20 CONTINUOUS OPIOID DEPENDENCE (H): ICD-10-CM

## 2024-05-08 DIAGNOSIS — F43.9 STRESS: Primary | ICD-10-CM

## 2024-05-08 DIAGNOSIS — R73.03 PREDIABETES: ICD-10-CM

## 2024-05-08 LAB
ALBUMIN SERPL BCG-MCNC: 4.6 G/DL (ref 3.5–5.2)
ALP SERPL-CCNC: 46 U/L (ref 40–150)
ALT SERPL W P-5'-P-CCNC: 12 U/L (ref 0–50)
AMPHETAMINES UR QL SCN: ABNORMAL
ANION GAP SERPL CALCULATED.3IONS-SCNC: 11 MMOL/L (ref 7–15)
AST SERPL W P-5'-P-CCNC: 18 U/L (ref 0–45)
BARBITURATES UR QL SCN: ABNORMAL
BENZODIAZ UR QL SCN: ABNORMAL
BILIRUB SERPL-MCNC: 0.2 MG/DL
BUN SERPL-MCNC: 9.4 MG/DL (ref 6–20)
BZE UR QL SCN: ABNORMAL
CALCIUM SERPL-MCNC: 9.8 MG/DL (ref 8.6–10)
CANNABINOIDS UR QL SCN: ABNORMAL
CHLORIDE SERPL-SCNC: 100 MMOL/L (ref 98–107)
CHOLEST SERPL-MCNC: 265 MG/DL
CREAT SERPL-MCNC: 0.88 MG/DL (ref 0.51–0.95)
CREAT UR-MCNC: 12.8 MG/DL
DEPRECATED HCO3 PLAS-SCNC: 27 MMOL/L (ref 22–29)
EGFRCR SERPLBLD CKD-EPI 2021: 78 ML/MIN/1.73M2
ERYTHROCYTE [DISTWIDTH] IN BLOOD BY AUTOMATED COUNT: 15.4 % (ref 10–15)
FASTING STATUS PATIENT QL REPORTED: ABNORMAL
FASTING STATUS PATIENT QL REPORTED: ABNORMAL
FENTANYL UR QL: ABNORMAL
GLUCOSE SERPL-MCNC: 112 MG/DL (ref 70–99)
HBA1C MFR BLD: 5.4 % (ref 0–5.6)
HCT VFR BLD AUTO: 37.8 % (ref 35–47)
HDLC SERPL-MCNC: 59 MG/DL
HGB BLD-MCNC: 12.2 G/DL (ref 11.7–15.7)
LDLC SERPL CALC-MCNC: 186 MG/DL
MCH RBC QN AUTO: 30.4 PG (ref 26.5–33)
MCHC RBC AUTO-ENTMCNC: 32.3 G/DL (ref 31.5–36.5)
MCV RBC AUTO: 94 FL (ref 78–100)
NONHDLC SERPL-MCNC: 206 MG/DL
OPIATES UR QL SCN: ABNORMAL
PCP QUAL URINE (ROCHE): ABNORMAL
PLATELET # BLD AUTO: 424 10E3/UL (ref 150–450)
POTASSIUM SERPL-SCNC: 4.2 MMOL/L (ref 3.4–5.3)
PROT SERPL-MCNC: 7.6 G/DL (ref 6.4–8.3)
RBC # BLD AUTO: 4.01 10E6/UL (ref 3.8–5.2)
SODIUM SERPL-SCNC: 138 MMOL/L (ref 135–145)
TRIGL SERPL-MCNC: 100 MG/DL
TSH SERPL DL<=0.005 MIU/L-ACNC: 3.42 UIU/ML (ref 0.3–4.2)
VIT B12 SERPL-MCNC: 1349 PG/ML (ref 232–1245)
WBC # BLD AUTO: 9 10E3/UL (ref 4–11)

## 2024-05-08 PROCEDURE — 99214 OFFICE O/P EST MOD 30 MIN: CPT | Mod: 25 | Performed by: PHYSICIAN ASSISTANT

## 2024-05-08 PROCEDURE — 36415 COLL VENOUS BLD VENIPUNCTURE: CPT | Performed by: PHYSICIAN ASSISTANT

## 2024-05-08 PROCEDURE — 84443 ASSAY THYROID STIM HORMONE: CPT | Performed by: PHYSICIAN ASSISTANT

## 2024-05-08 PROCEDURE — 82570 ASSAY OF URINE CREATININE: CPT | Performed by: PHYSICIAN ASSISTANT

## 2024-05-08 PROCEDURE — 83036 HEMOGLOBIN GLYCOSYLATED A1C: CPT | Performed by: PHYSICIAN ASSISTANT

## 2024-05-08 PROCEDURE — 82607 VITAMIN B-12: CPT | Performed by: PHYSICIAN ASSISTANT

## 2024-05-08 PROCEDURE — 80307 DRUG TEST PRSMV CHEM ANLYZR: CPT | Mod: 59 | Performed by: PHYSICIAN ASSISTANT

## 2024-05-08 PROCEDURE — G0145 SCR C/V CYTO,THINLAYER,RESCR: HCPCS | Performed by: PHYSICIAN ASSISTANT

## 2024-05-08 PROCEDURE — 99396 PREV VISIT EST AGE 40-64: CPT | Performed by: PHYSICIAN ASSISTANT

## 2024-05-08 PROCEDURE — 80053 COMPREHEN METABOLIC PANEL: CPT | Performed by: PHYSICIAN ASSISTANT

## 2024-05-08 PROCEDURE — 87624 HPV HI-RISK TYP POOLED RSLT: CPT | Performed by: PHYSICIAN ASSISTANT

## 2024-05-08 PROCEDURE — 85027 COMPLETE CBC AUTOMATED: CPT | Performed by: PHYSICIAN ASSISTANT

## 2024-05-08 PROCEDURE — 80061 LIPID PANEL: CPT | Performed by: PHYSICIAN ASSISTANT

## 2024-05-08 RX ORDER — CETIRIZINE HYDROCHLORIDE 5 MG/1
10 TABLET ORAL 2 TIMES DAILY
Qty: 600 ML | Refills: 11 | Status: SHIPPED | OUTPATIENT
Start: 2024-05-08 | End: 2024-08-09

## 2024-05-08 RX ORDER — FLUOXETINE 20 MG/5ML
80 SOLUTION ORAL DAILY
Qty: 600 ML | Refills: 11 | Status: SHIPPED | OUTPATIENT
Start: 2024-05-08 | End: 2024-06-19 | Stop reason: ALTCHOICE

## 2024-05-08 SDOH — HEALTH STABILITY: PHYSICAL HEALTH: ON AVERAGE, HOW MANY DAYS PER WEEK DO YOU ENGAGE IN MODERATE TO STRENUOUS EXERCISE (LIKE A BRISK WALK)?: 4 DAYS

## 2024-05-08 SDOH — HEALTH STABILITY: PHYSICAL HEALTH: ON AVERAGE, HOW MANY MINUTES DO YOU ENGAGE IN EXERCISE AT THIS LEVEL?: 30 MIN

## 2024-05-08 ASSESSMENT — ANXIETY QUESTIONNAIRES
1. FEELING NERVOUS, ANXIOUS, OR ON EDGE: NEARLY EVERY DAY
7. FEELING AFRAID AS IF SOMETHING AWFUL MIGHT HAPPEN: NEARLY EVERY DAY
GAD7 TOTAL SCORE: 19
5. BEING SO RESTLESS THAT IT IS HARD TO SIT STILL: MORE THAN HALF THE DAYS
3. WORRYING TOO MUCH ABOUT DIFFERENT THINGS: NEARLY EVERY DAY
2. NOT BEING ABLE TO STOP OR CONTROL WORRYING: NEARLY EVERY DAY
6. BECOMING EASILY ANNOYED OR IRRITABLE: NEARLY EVERY DAY
GAD7 TOTAL SCORE: 19
IF YOU CHECKED OFF ANY PROBLEMS ON THIS QUESTIONNAIRE, HOW DIFFICULT HAVE THESE PROBLEMS MADE IT FOR YOU TO DO YOUR WORK, TAKE CARE OF THINGS AT HOME, OR GET ALONG WITH OTHER PEOPLE: EXTREMELY DIFFICULT

## 2024-05-08 ASSESSMENT — PATIENT HEALTH QUESTIONNAIRE - PHQ9
5. POOR APPETITE OR OVEREATING: MORE THAN HALF THE DAYS
SUM OF ALL RESPONSES TO PHQ QUESTIONS 1-9: 11

## 2024-05-08 ASSESSMENT — PAIN SCALES - GENERAL: PAINLEVEL: EXTREME PAIN (8)

## 2024-05-08 ASSESSMENT — SOCIAL DETERMINANTS OF HEALTH (SDOH): HOW OFTEN DO YOU GET TOGETHER WITH FRIENDS OR RELATIVES?: ONCE A WEEK

## 2024-05-08 NOTE — RESULT ENCOUNTER NOTE
Karla  CBC looks fine.  No worries on the RDW.  It has been similar in past.  Normal blood salts, kidney and liver function.  Thyroid looks fine as well.  Blood sugars are normal.  I do note that your LDL cholesterol  is 186. At an LDL level of 190, people are recommended to be on cholesterol medication.  Given that your cholesterol has been above 190 in past, I suggest we consider restarting this medication.  We can discuss at follow up appt in a month.  Rosaura

## 2024-05-08 NOTE — PROGRESS NOTES
Preventive Care Visit  [facility]  Rosaura Flores PA-C, Family Medicine  May 8, 2024      Assessment & Plan     Stress  Generalized anxiety disorder  Worse symptoms, increase prozac and add counseling  - FLUoxetine (PROZAC) 20 MG/5ML solution; Take 20 mLs (80 mg) by mouth daily  - Vitamin B12; Future  -MENTAL HEALTH     Recurrent major depressive disorder, remission status unspecified (H24)  Worsened symptoms, largely related to anxiety  - FLUoxetine (PROZAC) 20 MG/5ML solution; Take 20 mLs (80 mg) by mouth daily    F11.2 - Continuous opioid dependence (H)  Chronic pain disorder    Status post laminectomy with spinal fusion  Renew contract today  - Urine Drug Screen Creatinine Urine Random [id number]  - naloxone (NARCAN) 4 MG/0.1ML nasal spray; Spray 1 spray (4 mg) into one nostril alternating nostrils as needed for opioid reversal every 2-3 minutes until assistance arrives    Esophageal dysmotility and gastroparesis  Guzman's esophagus without dysplasia  Uncontrolled symptoms, somewhat related to stress but not great control at baseline.  She declines retrying prokinetic medication, wants to see how things go with prozac increase.  Declines nutritionist or return to [facility] at this time - wants to see about lap band removal first.  Screen some nutrition.  Her wt loss resolved.  - Adult GI  Referral - Procedure Only; Future  - Comprehensive metabolic panel (BMP + Alb, Alk Phos, ALT, AST, Total. Bili, TP)  - CBC with platelets  - TSH with free T4 reflex  - Vitamin B12; Future    Hyperlipidemia LDL goal <130  Recheck, currently off statin per med list  - Lipid panel reflex to direct LDL Non-fasting    Prediabetes  recheck  - Hemoglobin A1c    Allergic rhinitis, unspecified seasonality, unspecified trigger  Change to liquid due to gastroparesis  - cetirizine (ZYRTEC) 5 MG/5ML solution; Take 10 mLs (10 mg) by mouth 2 times daily    Cervical cancer screening  - Pap Screen  "with HPV - recommended age 30 - 65 years    Routine general medical examination at a health care facility    BMI  Estimated body mass index is 25.68 kg/m  as calculated from the following:    Height as of this encounter: 1.676 m (5' 5.98\").    Weight as of this encounter: 72.1 kg (159 lb).   Weight management plan: Discussed healthy diet and exercise guidelines    Counseling  Appropriate preventive services were discussed with this patient, including applicable screening as appropriate for fall prevention, nutrition, physical activity, Tobacco-use cessation, weight loss and cognition.  Checklist reviewing preventive services available has been given to the patient.  Reviewed patient's diet, addressing concerns and/or questions.   Discussed possible causes of fatigue. The patient's PHQ-9 score is consistent with moderate depression. She was provided with information regarding depression.   I have reviewed Opioid Use Disorder and Substance Use Disorder risk factors and made any needed referrals.       Patient Instructions   Increase prozac   Add counseling  Future consideration for switching lisinopril to propranolol which can also help anxiety (in addition to controlling BP)    Switch zyrtec to liquid    Order placed for endoscopy - but check on if you were to have it in 1 yr as I can't find documentation on that.    Decided to hold off on vaccines today -  But due for tetanus update  Consider hep A vaccine   Consider new shingles shot.  Need 2 doses.  Some people don't feel great day of, or for a few days.  How you feel after first dose does not predict whether you'll feel good or bad after next dose.  In case you have side effects, pick a time to get the vaccine that its ok if you feel a bit under the weather.  Take this precaution with both doses of the vaccine, even if you feel great after the first dose.  Pharmacy is often cheaper than clinic and can at least tell you your cost in advance, unlike a clinic. " "    Consider if want bone density test      Preventive Care Advice   This is general advice we often give to help people stay healthy. Your care team may have specific advice just for you. Please talk to your care team about your own preventive care needs.  Lifestyle  Exercise at least 150 minutes each week (30 minutes a day, 5 days a week).  Do muscle strengthening activities 2 days a week. These help control your weight and prevent disease.  No smoking.  Wear sunscreen to prevent skin cancer.  Have your home tested for radon every 2 to 5 years. Radon is a colorless, odorless gas that can harm your lungs. To learn more, go to www.health.[facility].mn.us and search for \"Radon in Homes.\"  Keep guns unloaded and locked up in a safe place like a safe or gun vault, or, use a gun lock and hide the keys. Always lock away bullets separately. To learn more, visit Cloudscaling.mn.gov and search for \"safe gun storage.\"  Nutrition  Eat 5 or more servings of fruits and vegetables each day.  Try wheat bread, brown rice and whole grain pasta (instead of white bread, rice, and pasta).  Get enough calcium and vitamin D. Check the label on foods and aim for 100% of the RDA (recommended daily allowance).  Regular exams  Have a dental exam and cleaning every 6 months.  See your health care team every year to talk about:  Any changes in your health.  Any medicines your care team has prescribed.  Preventive care, family planning, and ways to prevent chronic diseases.  Shots (vaccines)   HPV shots (up to age 26), if you've never had them before.  Hepatitis B shots (up to age 59), if you've never had them before.  COVID-19 shot: Get this shot when it's due.  Flu shot: Get a flu shot every year.  Tetanus shot: Get a tetanus shot every 10 years.  Pneumococcal, hepatitis A, and RSV shots: Ask your care team if you need these based on your risk.  Shingles shot (for age 50 and up).  General health tests  Diabetes screening:  Starting at age 35, Get screened " for diabetes at least every 3 years.  If you are younger than age 35, ask your care team if you should be screened for diabetes.  Cholesterol test: At age 39, start having a cholesterol test every 5 years, or more often if advised.  Bone density scan (DEXA): At age 50, ask your care team if you should have this scan for osteoporosis (brittle bones).  Hepatitis C: Get tested at least once in your life.  Abdominal aortic aneurysm screening: Talk to your doctor about having this screening if you:  Have ever smoked; and  Are biologically male; and  Are between the ages of 65 and 75.  STIs (sexually transmitted infections)  Before age 24: Ask your care team if you should be screened for STIs.  After age 24: Get screened for STIs if you're at risk. You are at risk for STIs (including HIV) if:  You are sexually active with more than one person.  You don't use condoms every time.  You or a partner was diagnosed with a sexually transmitted infection.  If you are at risk for HIV, ask about PrEP medicine to prevent HIV.  Get tested for HIV at least once in your life, whether you are at risk for HIV or not.  Cancer screening tests  Cervical cancer screening: If you have a cervix, begin getting regular cervical cancer screening tests at age 21. Most people who have regular screenings with normal results can stop after age 65. Talk about this with your provider.  Breast cancer scan (mammogram): If you've ever had breasts, begin having regular mammograms starting at age 40. This is a scan to check for breast cancer.  Colon cancer screening: It is important to start screening for colon cancer at age 45.  Have a colonoscopy test every 10 years (or more often if you're at risk) Or, ask your provider about stool tests like a FIT test every year or Cologuard test every 3 years.  To learn more about your testing options, visit: www.Native/163348.pdf.  For help making a decision, visit: judy/pp67156.  Prostate cancer screening  test: If you have a prostate and are age 55 to 69, ask your provider if you would benefit from a yearly prostate cancer screening test.  Lung cancer screening: If you are a current or former smoker age 50 to 80, ask your care team if ongoing lung cancer screenings are right for you.  For informational purposes only. Not to replace the advice of your health care provider. Copyright   2023 [facility]. All rights reserved. Clinically reviewed by the [facility] Transitions Program. [employer] [id number] - REV 04/24.    Learning About Stress  What is stress?     Stress is your body's response to a hard situation. Your body can have a physical, emotional, or mental response. Stress is a fact of life for most people, and it affects everyone differently. What causes stress for you may not be stressful for someone else.  A lot of things can cause stress. You may feel stress when you go on a job interview, take a test, or run a race. This kind of short-term stress is normal and even useful. It can help you if you need to work hard or react quickly. For example, stress can help you finish an important job on time.  Long-term stress is caused by ongoing stressful situations or events. Examples of long-term stress include long-term health problems, ongoing problems at work, or conflicts in your family. Long-term stress can harm your health.  How does stress affect your health?  When you are stressed, your body responds as though you are in danger. It makes hormones that speed up your heart, make you breathe faster, and give you a burst of energy. This is called the fight-or-flight stress response. If the stress is over quickly, your body goes back to normal and no harm is done.  But if stress happens too often or lasts too long, it can have bad effects. Long-term stress can make you more likely to get sick, and it can make symptoms of some diseases worse. If you tense up when you are stressed, you may develop  neck, shoulder, or low back pain. Stress is linked to high blood pressure and heart disease.  Stress also harms your emotional health. It can make you asif, tense, or depressed. Your relationships may suffer, and you may not do well at work or school.  What can you do to manage stress?  You can try these things to help manage stress:   Do something active. Exercise or activity can help reduce stress. Walking is a great way to get started. Even everyday activities such as housecleaning or yard work can help.  Try yoga or donna chi. These techniques combine exercise and meditation. You may need some training at first to learn them.  Do something you enjoy. For example, listen to music or go to a movie. Practice your hobby or do volunteer work.  Meditate. This can help you relax, because you are not worrying about what happened before or what may happen in the future.  Do guided imagery. Imagine yourself in any setting that helps you feel calm. You can use online videos, books, or a teacher to guide you.  Do breathing exercises. For example:  From a standing position, bend forward from the waist with your knees slightly bent. Let your arms dangle close to the floor.  Breathe in slowly and deeply as you return to a standing position. Roll up slowly and lift your head last.  Hold your breath for just a few seconds in the standing position.  Breathe out slowly and bend forward from the waist.  Let your feelings out. Talk, laugh, cry, and express anger when you need to. Talking with supportive friends or family, a counselor, or a eliel leader about your feelings is a healthy way to relieve stress. Avoid discussing your feelings with people who make you feel worse.  Write. It may help to write about things that are bothering you. This helps you find out how much stress you feel and what is causing it. When you know this, you can find better ways to cope.  What can you do to prevent stress?  You might try some of these things  "to help prevent stress:  Manage your time. This helps you find time to do the things you want and need to do.  Get enough sleep. Your body recovers from the stresses of the day while you are sleeping.  Get support. Your family, friends, and community can make a difference in how you experience stress.  Limit your news feed. Avoid or limit time on social media or news that may make you feel stressed.  Do something active. Exercise or activity can help reduce stress. Walking is a great way to get started.  Where can you learn more?  Go to https://www.[employer].net/patiented  Enter N032 in the search box to learn more about \"Learning About Stress.\"  Current as of: October 24, 2023               Content Version: 14.0    6219-2607 [employer].   Care instructions adapted under license by your healthcare professional. If you have questions about a medical condition or this instruction, always ask your healthcare professional. [employer] disclaims any warranty or liability for your use of this information.      Learning About Sleeping Well  What does sleeping well mean?     Sleeping well means getting enough sleep to feel good and stay healthy. How much sleep is enough varies among people.  The number of hours you sleep and how you feel when you wake up are both important. If you do not feel refreshed, you probably need more sleep. Another sign of not getting enough sleep is feeling tired during the day.  Experts recommend that adults get at least 7 or more hours of sleep per day. Children and older adults need more sleep.  Why is getting enough sleep important?  Getting enough quality sleep is a basic part of good health. When your sleep suffers, your physical health, mood, and your thoughts can suffer too. You may find yourself feeling more grumpy or stressed. Not getting enough sleep also can lead to serious problems, including injury, accidents, anxiety, and depression.  What might cause " "poor sleeping?  Many things can cause sleep problems, including:  Changes to your sleep schedule.  Stress. Stress can be caused by fear about a single event, such as giving a speech. Or you may have ongoing stress, such as worry about work or school.  Depression, anxiety, and other mental or emotional conditions.  Changes in your sleep habits or surroundings. This includes changes that happen where you sleep, such as noise, light, or sleeping in a different bed. It also includes changes in your sleep pattern, such as having jet lag or working a late shift.  Health problems, such as pain, breathing problems, and restless legs syndrome.  Lack of regular exercise.  Using alcohol, nicotine, or caffeine before bed.  How can you help yourself?  Here are some tips that may help you sleep more soundly and wake up feeling more refreshed.  Your sleeping area   Use your bedroom only for sleeping and sex. A bit of light reading may help you fall asleep. But if it doesn't, do your reading elsewhere in the house. Try not to use your TV, computer, smartphone, or tablet while you are in bed.  Be sure your bed is big enough to stretch out comfortably, especially if you have a sleep partner.  Keep your bedroom quiet, dark, and cool. Use curtains, blinds, or a sleep mask to block out light. To block out noise, use earplugs, soothing music, or a \"white noise\" machine.  Your evening and bedtime routine   Create a relaxing bedtime routine. You might want to take a warm shower or bath, or listen to soothing music.  Go to bed at the same time every night. And get up at the same time every morning, even if you feel tired.  What to avoid   Limit caffeine (coffee, tea, caffeinated sodas) during the day, and don't have any for at least 6 hours before bedtime.  Avoid drinking alcohol before bedtime. Alcohol can cause you to wake up more often during the night.  Try not to smoke or use tobacco, especially in the evening. Nicotine can keep you " "awake.  Limit naps during the day, especially close to bedtime.  Avoid lying in bed awake for too long. If you can't fall asleep or if you wake up in the middle of the night and can't get back to sleep within about 20 minutes, get out of bed and go to another room until you feel sleepy.  Avoid taking medicine right before bed that may keep you awake or make you feel hyper or energized. Your doctor can tell you if your medicine may do this and if you can take it earlier in the day.  If you can't sleep   Imagine yourself in a peaceful, pleasant scene. Focus on the details and feelings of being in a place that is relaxing.  Get up and do a quiet or boring activity until you feel sleepy.  Avoid drinking any liquids before going to bed to help prevent waking up often to use the bathroom.  Where can you learn more?  Go to https://www.WalletKit.Liveclubs/patiented  Enter J942 in the search box to learn more about \"Learning About Sleeping Well.\"  Current as of: July 10, 2023               Content Version: 14.0    9630-8084 [employer].   Care instructions adapted under license by your healthcare professional. If you have questions about a medical condition or this instruction, always ask your healthcare professional. [employer] disclaims any warranty or liability for your use of this information.      Learning About Depression Screening  What is depression screening?  Depression screening is a way to see if you have depression symptoms. It may be done by a doctor or counselor. It's often part of a routine checkup. That's because your mental health is just as important as your physical health.  Depression is a mental health condition that affects how you feel, think, and act. You may:  Have less energy.  Lose interest in your daily activities.  Feel sad and grouchy for a long time.  Depression is very common. It affects people of all ages.  Many things can lead to depression. Some people become " "depressed after they have a stroke or find out they have a major illness like cancer or heart disease. The death of a loved one or a breakup may lead to depression. It can run in families. Most experts believe that a combination of inherited genes and stressful life events can cause it.  What happens during screening?  You may be asked to fill out a form about your depression symptoms. You and the doctor will discuss your answers. The doctor may ask you more questions to learn more about how you think, act, and feel.  What happens after screening?  If you have symptoms of depression, your doctor will talk to you about your options.  Doctors usually treat depression with medicines or counseling. Often, combining the two works best. Many people don't get help because they think that they'll get over the depression on their own. But people with depression may not get better unless they get treatment.  The cause of depression is not well understood. There may be many factors involved. But if you have depression, it's not your fault.  A serious symptom of depression is thinking about death or suicide. If you or someone you care about talks about this or about feeling hopeless, get help right away.  It's important to know that depression can be treated. Medicine, counseling, and self-care may help.  Where can you learn more?  Go to https://www.[employer].net/patiented  Enter T185 in the search box to learn more about \"Learning About Depression Screening.\"  Current as of: June 24, 2023               Content Version: 14.0    2253-3888 [employer].   Care instructions adapted under license by your healthcare professional. If you have questions about a medical condition or this instruction, always ask your healthcare professional. [employer] disclaims any warranty or liability for your use of this information.      Substance Use Disorder: Care Instructions  Overview     You can improve your life and " health by stopping your use of alcohol or drugs. When you don't drink or use drugs, you may feel and sleep better. You may get along better with your family, friends, and coworkers. There are medicines and programs that can help with substance use disorder.  How can you care for yourself at home?  Here are some ways to help you stay sober and prevent relapse.  If you have been given medicine to help keep you sober or reduce your cravings, be sure to take it exactly as prescribed.  Talk to your doctor about programs that can help you stop using drugs or drinking alcohol.  Do not keep alcohol or drugs in your home.  Plan ahead. Think about what you'll say if other people ask you to drink or use drugs. Try not to spend time with people who drink or use drugs.  Use the time and money spent on drinking or drugs to do something that's important to you.  Preventing a relapse  Have a plan to deal with relapse. Learn to recognize changes in your thinking that lead you to drink or use drugs. Get help before you start to drink or use drugs again.  Try to stay away from situations, friends, or places that may lead you to drink or use drugs.  If you feel the need to drink alcohol or use drugs again, seek help right away. Call a trusted friend or family member. Some people get support from organizations such as Narcotics Anonymous or [facility] or from treatment facilities.  If you relapse, get help as soon as you can. Some people make a plan with another person that outlines what they want that person to do for them if they relapse. The plan usually includes how to handle the relapse and who to notify in case of relapse.  Don't give up. Remember that a relapse doesn't mean that you have failed. Use the experience to learn the triggers that lead you to drink or use drugs. Then quit again. Recovery is a lifelong process. Many people have several relapses before they are able to quit for good.  Follow-up care is a key part of  "your treatment and safety. Be sure to make and go to all appointments, and call your doctor if you are having problems. It's also a good idea to know your test results and keep a list of the medicines you take.  When should you call for help?   Call 911  anytime you think you may need emergency care. For example, call if you or someone else:    Has overdosed or has withdrawal signs. Be sure to tell the emergency workers that you are or someone else is using or trying to quit using drugs. Overdose or withdrawal signs may include:  Losing consciousness.  Seizure.  Seeing or hearing things that aren't there (hallucinations).     Is thinking or talking about suicide or harming others.   Where to get help 24 hours a day, 7 days a week   If you or someone you know talks about suicide, self-harm, a mental health crisis, a substance use crisis, or any other kind of emotional distress, get help right away. You can:    Call the Suicide and Crisis Lifeline at 224.     Call 8-377-832-TALK (1-185.346.2769).     Text HOME to 641157 to access the Crisis Text Line.   Consider saving these numbers in your phone.  Go to Shopnlist for more information or to chat online.  Call your doctor now or seek immediate medical care if:    You are having withdrawal symptoms. These may include nausea or vomiting, sweating, shakiness, and anxiety.   Watch closely for changes in your health, and be sure to contact your doctor if:    You have a relapse.     You need more help or support to stop.   Where can you learn more?  Go to https://www.healthDermaMedics.net/patiented  Enter H573 in the search box to learn more about \"Substance Use Disorder: Care Instructions.\"  Current as of: November 15, 2023               Content Version: 14.0    8874-6130 [employer].   Care instructions adapted under license by your healthcare professional. If you have questions about a medical condition or this instruction, always ask your healthcare " professional. [employer] disclaims any warranty or liability for your use of this information.      Chronic Pain: Care Instructions  Your Care Instructions     Chronic pain is pain that lasts a long time (months or even years) and may or may not have a clear cause. It is different from acute pain, which usually does have a clear cause--like an injury or illness--and gets better over time. Chronic pain:  Lasts over time but may vary from day to day.  Does not go away despite efforts to end it.  May disrupt your sleep and lead to fatigue.  May cause depression or anxiety.  May make your muscles tense, causing more pain.  Can disrupt your work, hobbies, home life, and relationships with friends and family.  Chronic pain is a very real condition. It is not just in your head. Treatment can help and usually includes several methods used together, such as medicines, physical therapy, exercise, and other treatments. Learning how to relax and changing negative thought patterns can also help you cope.  Chronic pain is complex. Taking an active role in your treatment will help you better manage your pain. Tell your doctor if you have trouble dealing with your pain. You may have to try several things before you find what works best for you.  Follow-up care is a key part of your treatment and safety. Be sure to make and go to all appointments, and call your doctor if you are having problems. It's also a good idea to know your test results and keep a list of the medicines you take.  How can you care for yourself at home?  Pace yourself. Break up large jobs into smaller tasks. Save harder tasks for days when you have less pain, or go back and forth between hard tasks and easier ones. Take rest breaks.  Relax, and reduce stress. Relaxation techniques such as deep breathing or meditation can help.  Keep moving. Gentle, daily exercise can help reduce pain over the long run. Try low- or no-impact exercises such as  walking, swimming, and stationary biking. Do stretches to stay flexible.  Try heat, cold packs, and massage.  Get enough sleep. Chronic pain can make you tired and drain your energy. Talk with your doctor if you have trouble sleeping because of pain.  Think positive. Your thoughts can affect your pain level. Do things that you enjoy to distract yourself when you have pain instead of focusing on the pain. See a movie, read a book, listen to music, or spend time with a friend.  If you think you are depressed, talk to your doctor about treatment.  Keep a daily pain diary. Record how your moods, thoughts, sleep patterns, activities, and medicine affect your pain. You may find that your pain is worse during or after certain activities or when you are feeling a certain emotion. Having a record of your pain can help you and your doctor find the best ways to treat your pain.  Take pain medicines exactly as directed.  If the doctor gave you a prescription medicine for pain, take it as prescribed.  If you are not taking a prescription pain medicine, ask your doctor if you can take an over-the-counter medicine.  Reducing constipation caused by pain medicine  Talk to your doctor about a laxative. If a laxative doesn't work, your doctor may suggest a prescription medicine.  Include fruits, vegetables, beans, and whole grains in your diet each day. These foods are high in fiber.  If your doctor recommends it, get more exercise. Walking is a good choice. Bit by bit, increase the amount you walk every day. Try for at least 30 minutes on most days of the week.  Schedule time each day for a bowel movement. A daily routine may help. Take your time and do not strain when having a bowel movement.  When should you call for help?   Call your doctor now or seek immediate medical care if:    Your pain gets worse or is out of control.     You feel down or blue, or you do not enjoy things like you once did. You may be depressed, which is  "common in people with chronic pain. Depression can be treated.     You have vomiting or cramps for more than 2 hours.   Watch closely for changes in your health, and be sure to contact your doctor if:    You cannot sleep because of pain.     You are very worried or anxious about your pain.     You have trouble taking your pain medicine.     You have any concerns about your pain medicine.     You have trouble with bowel movements, such as:  No bowel movement in 3 days.  Blood in the anal area, in your stool, or on the toilet paper.  Diarrhea for more than 24 hours.   Where can you learn more?  Go to https://www.[employer].net/patiented  Enter N004 in the search box to learn more about \"Chronic Pain: Care Instructions.\"  Current as of: July 10, 2023               Content Version: 14.0    8240-1975 [employer].   Care instructions adapted under license by your healthcare professional. If you have questions about a medical condition or this instruction, always ask your healthcare professional. [employer] disclaims any warranty or liability for your use of this information.        Kaushik Acosta is a 54 year old, presenting for the following:  Physical        5/8/2024     7:51 AM   Additional Questions   Roomed by kamari davis cma        Health Care Directive  Patient does not have a Health Care Directive or Living Will: Discussed advance care planning with patient; information given to patient to review.    HPI    Depression and Anxiety   How are you doing with your depression since your last visit? Worsened   How are you doing with your anxiety since your last visit?  Worsened   Are you having other symptoms that might be associated with depression or anxiety?   Have you had a significant life event? Health Concerns   Do you have any concerns with your use of alcohol or other drugs? No    A lot going on with her daughter and mom.  No longer feels she can handle everyone other's stuff like she " used to do.  Mind spins on all areas of her health.  Is noting her stomach symptoms get worse with stress.  Stomach - on a good week gets 1-2 good days.  Upcoming lap band appt in 2 wks - but they've cancelled on her 3 times now.  Renew medical marijuana.    Wt loss resolved with starting protein shakes.      BP running under 120/80    Social History     Tobacco Use    Smoking status: Former     Current packs/day: 0.00     Average packs/day: 0.5 packs/day for 40.0 years (20.0 ttl pk-yrs)     Types: Cigarettes     Start date: 1983     Quit date: 2023     Years since quittin.3    Smokeless tobacco: Never    Tobacco comments:     started smoking age 22.  never more than 0.5 ppd   Vaping Use    Vaping status: Former   Substance Use Topics    Alcohol use: Not Currently    Drug use: Yes     Types: Oxycodone         2023     2:47 PM 4/10/2024     1:09 PM 2024     7:55 AM   PHQ   PHQ-9 Total Score 0 3 11   Q9: Thoughts of better off dead/self-harm past 2 weeks Not at all Not at all Not at all         2023    10:27 AM 2023     7:35 AM 2024     7:55 AM   NANCI-7 SCORE   Total Score 10 (moderate anxiety)     Total Score 10 3 19         2024     7:55 AM   Last PHQ-9   1.  Little interest or pleasure in doing things 3   2.  Feeling down, depressed, or hopeless 2   3.  Trouble falling or staying asleep, or sleeping too much 0   4.  Feeling tired or having little energy 3   5.  Poor appetite or overeating 0   6.  Feeling bad about yourself 0   7.  Trouble concentrating 3   8.  Moving slowly or restless 0   Q9: Thoughts of better off dead/self-harm past 2 weeks 0   PHQ-9 Total Score 11   Difficulty at work, home, or with people Extremely dIfficult         2024     7:55 AM   NANCI-7    1. Feeling nervous, anxious, or on edge 3   2. Not being able to stop or control worrying 3   3. Worrying too much about different things 3   4. Trouble relaxing 2   5. Being so restless that it is hard to sit still  2   6. Becoming easily annoyed or irritable 3   7. Feeling afraid, as if something awful might happen 3   NANCI-7 Total Score 19   If you checked any problems, how difficult have they made it for you to do your work, take care of things at home, or get along with other people? Extremely difficult     /-  /    2024   General Health   How would you rate your overall physical health? (!) POOR   Feel stress (tense, anxious, or unable to sleep) Very much   (!) STRESS CONCERN      2024   Nutrition   Diet: Other   If other, please elaborate: depends on day         2024   Exercise   Days per week of moderate/strenous exercise 4 days   Average minutes spent exercising at this level 30 min         2024   Social Factors   Frequency of gathering with friends or relatives Once a week   Worry food won't last until get money to buy more No   Food not last or not have enough money for food? No   Do you have housing?  Yes   Are you worried about losing your housing? No   Lack of transportation? Yes   Unable to get utilities (heat,electricity)? No    (!) TRANSPORTATION CONCERN PRESENT      2024   Fall Risk   Fallen 2 or more times in the past year? No   Trouble with walking or balance? No          2024   Dental   Dentist two times every year? Yes         4/10/2024   TB Screening   Were you born outside of the [place]? No         2024   Substance Use   Alcohol more than 3/day or more than 7/wk Not Applicable   Do you use any other substances recreationally? (!) CANNABIS PRODUCTS     Social History     Tobacco Use    Smoking status: Former     Current packs/day: 0.00     Average packs/day: 0.5 packs/day for 40.0 years (20.0 ttl pk-yrs)     Types: Cigarettes     Start date: 1983     Quit date: 2023     Years since quittin.3    Smokeless tobacco: Never    Tobacco comments:     started smoking age 22.  never more than 0.5 ppd   Vaping Use    Vaping status: Former   Substance Use Topics    Alcohol use: Not  "Currently    Drug use: Yes     Types: Oxycodone         8/15/2023   LAST FHS-7 RESULTS   1st degree relative breast or ovarian cancer No - 2nd degree relative - maternal aunt   Any relative bilateral breast cancer No   Any male have breast cancer No   Any ONE woman have BOTH breast AND ovarian cancer No   Any woman with breast cancer before 50yrs No   2 or more relatives with breast AND/OR ovarian cancer No   2 or more relatives with breast AND/OR bowel cancer No     Mammogram Screening - Mammogram every 1-2 years updated in Health Maintenance based on mutual decision making    History of abnormal Pap smear: NO - age 30-65 PAP every 5 years with negative HPV co-testing recommended        Latest Ref Rng & Units 8/9/2017     8:56 AM 8/9/2017     8:40 AM 8/22/2013    12:00 AM   PAP / HPV   PAP (Historical)  NIL   NIL    HPV 16 DNA NEG^Negative  Negative     HPV 18 DNA NEG^Negative  Negative     Other HR HPV NEG^Negative  Negative       ASCVD Risk   The 10-year ASCVD risk score (Pj MCKOY, et al., 2019) is: 4.1%    Values used to calculate the score:      Age: 54 years      Sex: Female      Is Non- : No      Diabetic: No      Tobacco smoker: No      Systolic Blood Pressure: 136 mmHg      Is BP treated: Yes      HDL Cholesterol: 50 mg/dL      Total Cholesterol: 280 mg/dL    Reviewed and updated as needed this visit by Provider   Tobacco  Allergies  Meds  Problems  Med Hx  Surg Hx  Fam Hx               Objective    Exam  /84 (BP Location: Right arm, Patient Position: Sitting, Cuff Size: Adult Regular)   Pulse 63   Temp 97.9  F (36.6  C) (Tympanic)   Resp 18   Ht 1.676 m (5' 5.98\")   Wt 72.1 kg (159 lb)   LMP  (LMP Unknown)   SpO2 100%   BMI 25.68 kg/m     Estimated body mass index is 25.68 kg/m  as calculated from the following:    Height as of this encounter: 1.676 m (5' 5.98\").    Weight as of this encounter: 72.1 kg (159 lb).    Physical Exam  GENERAL: alert and no " distress  EYES: Eyes grossly normal to inspection, PERRL and conjunctivae and sclerae normal  HENT: ear canals and TM's normal, nose and mouth without ulcers or lesions  NECK: no adenopathy, no asymmetry, masses, or scars  RESP: lungs clear to auscultation - no rales, rhonchi or wheezes  CV: regular rate and rhythm, normal S1 S2, no S3 or S4, no murmur, click or rub, no peripheral edema  ABDOMEN: soft, nontender, no hepatosplenomegaly, no masses and bowel sounds normal   (female): normal female external genitalia, normal urethral meatus, normal vaginal mucosa  MS: no gross musculoskeletal defects noted, no edema  SKIN: no suspicious lesions or rashes  NEURO: Normal strength and tone, mentation intact and speech normal  PSYCH: mentation appears normal, affect normal/bright    Signed Electronically by: Rosaura Flores PA-C

## 2024-05-08 NOTE — PATIENT INSTRUCTIONS
"Increase prozac   Add counseling  Future consideration for switching lisinopril to propranolol which can also help anxiety (in addition to controlling BP)    Switch zyrtec to liquid    Order placed for endoscopy - but check on if you were to have it in 1 yr as I can't find documentation on that.    Decided to hold off on vaccines today -  But due for tetanus update  Consider hep A vaccine   Consider new shingles shot.  Need 2 doses.  Some people don't feel great day of, or for a few days.  How you feel after first dose does not predict whether you'll feel good or bad after next dose.  In case you have side effects, pick a time to get the vaccine that its ok if you feel a bit under the weather.  Take this precaution with both doses of the vaccine, even if you feel great after the first dose.  Pharmacy is often cheaper than clinic and can at least tell you your cost in advance, unlike a clinic.     Consider if want bone density test      Preventive Care Advice   This is general advice we often give to help people stay healthy. Your care team may have specific advice just for you. Please talk to your care team about your own preventive care needs.  Lifestyle  Exercise at least 150 minutes each week (30 minutes a day, 5 days a week).  Do muscle strengthening activities 2 days a week. These help control your weight and prevent disease.  No smoking.  Wear sunscreen to prevent skin cancer.  Have your home tested for radon every 2 to 5 years. Radon is a colorless, odorless gas that can harm your lungs. To learn more, go to www.health.[facility].mn. and search for \"Radon in Homes.\"  Keep guns unloaded and locked up in a safe place like a safe or gun vault, or, use a gun lock and hide the keys. Always lock away bullets separately. To learn more, visit [employer].[place].gov and search for \"safe gun storage.\"  Nutrition  Eat 5 or more servings of fruits and vegetables each day.  Try wheat bread, brown rice and whole grain pasta (instead of " white bread, rice, and pasta).  Get enough calcium and vitamin D. Check the label on foods and aim for 100% of the RDA (recommended daily allowance).  Regular exams  Have a dental exam and cleaning every 6 months.  See your health care team every year to talk about:  Any changes in your health.  Any medicines your care team has prescribed.  Preventive care, family planning, and ways to prevent chronic diseases.  Shots (vaccines)   HPV shots (up to age 26), if you've never had them before.  Hepatitis B shots (up to age 59), if you've never had them before.  COVID-19 shot: Get this shot when it's due.  Flu shot: Get a flu shot every year.  Tetanus shot: Get a tetanus shot every 10 years.  Pneumococcal, hepatitis A, and RSV shots: Ask your care team if you need these based on your risk.  Shingles shot (for age 50 and up).  General health tests  Diabetes screening:  Starting at age 35, Get screened for diabetes at least every 3 years.  If you are younger than age 35, ask your care team if you should be screened for diabetes.  Cholesterol test: At age 39, start having a cholesterol test every 5 years, or more often if advised.  Bone density scan (DEXA): At age 50, ask your care team if you should have this scan for osteoporosis (brittle bones).  Hepatitis C: Get tested at least once in your life.  Abdominal aortic aneurysm screening: Talk to your doctor about having this screening if you:  Have ever smoked; and  Are biologically male; and  Are between the ages of 65 and 75.  STIs (sexually transmitted infections)  Before age 24: Ask your care team if you should be screened for STIs.  After age 24: Get screened for STIs if you're at risk. You are at risk for STIs (including HIV) if:  You are sexually active with more than one person.  You don't use condoms every time.  You or a partner was diagnosed with a sexually transmitted infection.  If you are at risk for HIV, ask about PrEP medicine to prevent HIV.  Get tested for  HIV at least once in your life, whether you are at risk for HIV or not.  Cancer screening tests  Cervical cancer screening: If you have a cervix, begin getting regular cervical cancer screening tests at age 21. Most people who have regular screenings with normal results can stop after age 65. Talk about this with your provider.  Breast cancer scan (mammogram): If you've ever had breasts, begin having regular mammograms starting at age 40. This is a scan to check for breast cancer.  Colon cancer screening: It is important to start screening for colon cancer at age 45.  Have a colonoscopy test every 10 years (or more often if you're at risk) Or, ask your provider about stool tests like a FIT test every year or Cologuard test every 3 years.  To learn more about your testing options, visit: www.Viableware/033293.pdf.  For help making a decision, visit: judy/gv44351.  Prostate cancer screening test: If you have a prostate and are age 55 to 69, ask your provider if you would benefit from a yearly prostate cancer screening test.  Lung cancer screening: If you are a current or former smoker age 50 to 80, ask your care team if ongoing lung cancer screenings are right for you.  For informational purposes only. Not to replace the advice of your health care provider. Copyright   2023 [facility]. All rights reserved. Clinically reviewed by the [facility]itions [facility]. Flocations [id number] - REV 04/24.    Learning About Stress  What is stress?     Stress is your body's response to a hard situation. Your body can have a physical, emotional, or mental response. Stress is a fact of life for most people, and it affects everyone differently. What causes stress for you may not be stressful for someone else.  A lot of things can cause stress. You may feel stress when you go on a job interview, take a test, or run a race. This kind of short-term stress is normal and even useful. It can help you if you need to  work hard or react quickly. For example, stress can help you finish an important job on time.  Long-term stress is caused by ongoing stressful situations or events. Examples of long-term stress include long-term health problems, ongoing problems at work, or conflicts in your family. Long-term stress can harm your health.  How does stress affect your health?  When you are stressed, your body responds as though you are in danger. It makes hormones that speed up your heart, make you breathe faster, and give you a burst of energy. This is called the fight-or-flight stress response. If the stress is over quickly, your body goes back to normal and no harm is done.  But if stress happens too often or lasts too long, it can have bad effects. Long-term stress can make you more likely to get sick, and it can make symptoms of some diseases worse. If you tense up when you are stressed, you may develop neck, shoulder, or low back pain. Stress is linked to high blood pressure and heart disease.  Stress also harms your emotional health. It can make you asif, tense, or depressed. Your relationships may suffer, and you may not do well at work or school.  What can you do to manage stress?  You can try these things to help manage stress:   Do something active. Exercise or activity can help reduce stress. Walking is a great way to get started. Even everyday activities such as housecleaning or yard work can help.  Try yoga or donna chi. These techniques combine exercise and meditation. You may need some training at first to learn them.  Do something you enjoy. For example, listen to music or go to a movie. Practice your hobby or do volunteer work.  Meditate. This can help you relax, because you are not worrying about what happened before or what may happen in the future.  Do guided imagery. Imagine yourself in any setting that helps you feel calm. You can use online videos, books, or a teacher to guide you.  Do breathing exercises. For  "example:  From a standing position, bend forward from the waist with your knees slightly bent. Let your arms dangle close to the floor.  Breathe in slowly and deeply as you return to a standing position. Roll up slowly and lift your head last.  Hold your breath for just a few seconds in the standing position.  Breathe out slowly and bend forward from the waist.  Let your feelings out. Talk, laugh, cry, and express anger when you need to. Talking with supportive friends or family, a counselor, or a eliel leader about your feelings is a healthy way to relieve stress. Avoid discussing your feelings with people who make you feel worse.  Write. It may help to write about things that are bothering you. This helps you find out how much stress you feel and what is causing it. When you know this, you can find better ways to cope.  What can you do to prevent stress?  You might try some of these things to help prevent stress:  Manage your time. This helps you find time to do the things you want and need to do.  Get enough sleep. Your body recovers from the stresses of the day while you are sleeping.  Get support. Your family, friends, and community can make a difference in how you experience stress.  Limit your news feed. Avoid or limit time on social media or news that may make you feel stressed.  Do something active. Exercise or activity can help reduce stress. Walking is a great way to get started.  Where can you learn more?  Go to https://www.Health in [employer].net/patiented  Enter N032 in the search box to learn more about \"Learning About Stress.\"  Current as of: October 24, 2023               Content Version: 14.0    9376-9191 [employer].   Care instructions adapted under license by your healthcare professional. If you have questions about a medical condition or this instruction, always ask your healthcare professional. [employer] disclaims any warranty or liability for your use of this " information.      Learning About Sleeping Well  What does sleeping well mean?     Sleeping well means getting enough sleep to feel good and stay healthy. How much sleep is enough varies among people.  The number of hours you sleep and how you feel when you wake up are both important. If you do not feel refreshed, you probably need more sleep. Another sign of not getting enough sleep is feeling tired during the day.  Experts recommend that adults get at least 7 or more hours of sleep per day. Children and older adults need more sleep.  Why is getting enough sleep important?  Getting enough quality sleep is a basic part of good health. When your sleep suffers, your physical health, mood, and your thoughts can suffer too. You may find yourself feeling more grumpy or stressed. Not getting enough sleep also can lead to serious problems, including injury, accidents, anxiety, and depression.  What might cause poor sleeping?  Many things can cause sleep problems, including:  Changes to your sleep schedule.  Stress. Stress can be caused by fear about a single event, such as giving a speech. Or you may have ongoing stress, such as worry about work or school.  Depression, anxiety, and other mental or emotional conditions.  Changes in your sleep habits or surroundings. This includes changes that happen where you sleep, such as noise, light, or sleeping in a different bed. It also includes changes in your sleep pattern, such as having jet lag or working a late shift.  Health problems, such as pain, breathing problems, and restless legs syndrome.  Lack of regular exercise.  Using alcohol, nicotine, or caffeine before bed.  How can you help yourself?  Here are some tips that may help you sleep more soundly and wake up feeling more refreshed.  Your sleeping area   Use your bedroom only for sleeping and sex. A bit of light reading may help you fall asleep. But if it doesn't, do your reading elsewhere in the house. Try not to use  "your TV, computer, smartphone, or tablet while you are in bed.  Be sure your bed is big enough to stretch out comfortably, especially if you have a sleep partner.  Keep your bedroom quiet, dark, and cool. Use curtains, blinds, or a sleep mask to block out light. To block out noise, use earplugs, soothing music, or a \"white noise\" machine.  Your evening and bedtime routine   Create a relaxing bedtime routine. You might want to take a warm shower or bath, or listen to soothing music.  Go to bed at the same time every night. And get up at the same time every morning, even if you feel tired.  What to avoid   Limit caffeine (coffee, tea, caffeinated sodas) during the day, and don't have any for at least 6 hours before bedtime.  Avoid drinking alcohol before bedtime. Alcohol can cause you to wake up more often during the night.  Try not to smoke or use tobacco, especially in the evening. Nicotine can keep you awake.  Limit naps during the day, especially close to bedtime.  Avoid lying in bed awake for too long. If you can't fall asleep or if you wake up in the middle of the night and can't get back to sleep within about 20 minutes, get out of bed and go to another room until you feel sleepy.  Avoid taking medicine right before bed that may keep you awake or make you feel hyper or energized. Your doctor can tell you if your medicine may do this and if you can take it earlier in the day.  If you can't sleep   Imagine yourself in a peaceful, pleasant scene. Focus on the details and feelings of being in a place that is relaxing.  Get up and do a quiet or boring activity until you feel sleepy.  Avoid drinking any liquids before going to bed to help prevent waking up often to use the bathroom.  Where can you learn more?  Go to https://www.healthwise.net/patiented  Enter J942 in the search box to learn more about \"Learning About Sleeping Well.\"  Current as of: July 10, 2023               Content Version: 14.0    8048-1753 " [employer].   Care instructions adapted under license by your healthcare professional. If you have questions about a medical condition or this instruction, always ask your healthcare professional. [employer] disclaims any warranty or liability for your use of this information.      Learning About Depression Screening  What is depression screening?  Depression screening is a way to see if you have depression symptoms. It may be done by a doctor or counselor. It's often part of a routine checkup. That's because your mental health is just as important as your physical health.  Depression is a mental health condition that affects how you feel, think, and act. You may:  Have less energy.  Lose interest in your daily activities.  Feel sad and grouchy for a long time.  Depression is very common. It affects people of all ages.  Many things can lead to depression. Some people become depressed after they have a stroke or find out they have a major illness like cancer or heart disease. The death of a loved one or a breakup may lead to depression. It can run in families. Most experts believe that a combination of inherited genes and stressful life events can cause it.  What happens during screening?  You may be asked to fill out a form about your depression symptoms. You and the doctor will discuss your answers. The doctor may ask you more questions to learn more about how you think, act, and feel.  What happens after screening?  If you have symptoms of depression, your doctor will talk to you about your options.  Doctors usually treat depression with medicines or counseling. Often, combining the two works best. Many people don't get help because they think that they'll get over the depression on their own. But people with depression may not get better unless they get treatment.  The cause of depression is not well understood. There may be many factors involved. But if you have depression, it's not  "your fault.  A serious symptom of depression is thinking about death or suicide. If you or someone you care about talks about this or about feeling hopeless, get help right away.  It's important to know that depression can be treated. Medicine, counseling, and self-care may help.  Where can you learn more?  Go to https://www.TheCreator.ME.Bunch/patiented  Enter T185 in the search box to learn more about \"Learning About Depression Screening.\"  Current as of: June 24, 2023               Content Version: 14.0    6452-8655 [employer].   Care instructions adapted under license by your healthcare professional. If you have questions about a medical condition or this instruction, always ask your healthcare professional. [employer] disclaims any warranty or liability for your use of this information.      Substance Use Disorder: Care Instructions  Overview     You can improve your life and health by stopping your use of alcohol or drugs. When you don't drink or use drugs, you may feel and sleep better. You may get along better with your family, friends, and coworkers. There are medicines and programs that can help with substance use disorder.  How can you care for yourself at home?  Here are some ways to help you stay sober and prevent relapse.  If you have been given medicine to help keep you sober or reduce your cravings, be sure to take it exactly as prescribed.  Talk to your doctor about programs that can help you stop using drugs or drinking alcohol.  Do not keep alcohol or drugs in your home.  Plan ahead. Think about what you'll say if other people ask you to drink or use drugs. Try not to spend time with people who drink or use drugs.  Use the time and money spent on drinking or drugs to do something that's important to you.  Preventing a relapse  Have a plan to deal with relapse. Learn to recognize changes in your thinking that lead you to drink or use drugs. Get help before you start to drink " or use drugs again.  Try to stay away from situations, friends, or places that may lead you to drink or use drugs.  If you feel the need to drink alcohol or use drugs again, seek help right away. Call a trusted friend or family member. Some people get support from organizations such as Narcotics Anonymous or [employer] or from treatment facilities.  If you relapse, get help as soon as you can. Some people make a plan with another person that outlines what they want that person to do for them if they relapse. The plan usually includes how to handle the relapse and who to notify in case of relapse.  Don't give up. Remember that a relapse doesn't mean that you have failed. Use the experience to learn the triggers that lead you to drink or use drugs. Then quit again. Recovery is a lifelong process. Many people have several relapses before they are able to quit for good.  Follow-up care is a key part of your treatment and safety. Be sure to make and go to all appointments, and call your doctor if you are having problems. It's also a good idea to know your test results and keep a list of the medicines you take.  When should you call for help?   Call [phone]  anytime you think you may need emergency care. For example, call if you or someone else:    Has overdosed or has withdrawal signs. Be sure to tell the emergency workers that you are or someone else is using or trying to quit using drugs. Overdose or withdrawal signs may include:  Losing consciousness.  Seizure.  Seeing or hearing things that aren't there (hallucinations).     Is thinking or talking about suicide or harming others.   Where to get help 24 hours a day, 7 days a week   If you or someone you know talks about suicide, self-harm, a mental health crisis, a substance use crisis, or any other kind of emotional distress, get help right away. You can:    Call the Suicide and Crisis Lifeline at 373.     Call 3-960-116-TALK (1-725.213.2568).     Text HOME to 117592  "to access the Crisis Text Line.   Consider saving these numbers in your phone.  Go to [employer] for more information or to chat online.  Call your doctor now or seek immediate medical care if:    You are having withdrawal symptoms. These may include nausea or vomiting, sweating, shakiness, and anxiety.   Watch closely for changes in your health, and be sure to contact your doctor if:    You have a relapse.     You need more help or support to stop.   Where can you learn more?  Go to https://www.[employer].net/patiented  Enter H573 in the search box to learn more about \"Substance Use Disorder: Care Instructions.\"  Current as of: November 15, 2023               Content Version: 14.0    7131-9620 [employer].   Care instructions adapted under license by your healthcare professional. If you have questions about a medical condition or this instruction, always ask your healthcare professional. [employer] disclaims any warranty or liability for your use of this information.      Chronic Pain: Care Instructions  Your Care Instructions     Chronic pain is pain that lasts a long time (months or even years) and may or may not have a clear cause. It is different from acute pain, which usually does have a clear cause--like an injury or illness--and gets better over time. Chronic pain:  Lasts over time but may vary from day to day.  Does not go away despite efforts to end it.  May disrupt your sleep and lead to fatigue.  May cause depression or anxiety.  May make your muscles tense, causing more pain.  Can disrupt your work, hobbies, home life, and relationships with friends and family.  Chronic pain is a very real condition. It is not just in your head. Treatment can help and usually includes several methods used together, such as medicines, physical therapy, exercise, and other treatments. Learning how to relax and changing negative thought patterns can also help you cope.  Chronic pain is " complex. Taking an active role in your treatment will help you better manage your pain. Tell your doctor if you have trouble dealing with your pain. You may have to try several things before you find what works best for you.  Follow-up care is a key part of your treatment and safety. Be sure to make and go to all appointments, and call your doctor if you are having problems. It's also a good idea to know your test results and keep a list of the medicines you take.  How can you care for yourself at home?  Pace yourself. Break up large jobs into smaller tasks. Save harder tasks for days when you have less pain, or go back and forth between hard tasks and easier ones. Take rest breaks.  Relax, and reduce stress. Relaxation techniques such as deep breathing or meditation can help.  Keep moving. Gentle, daily exercise can help reduce pain over the long run. Try low- or no-impact exercises such as walking, swimming, and stationary biking. Do stretches to stay flexible.  Try heat, cold packs, and massage.  Get enough sleep. Chronic pain can make you tired and drain your energy. Talk with your doctor if you have trouble sleeping because of pain.  Think positive. Your thoughts can affect your pain level. Do things that you enjoy to distract yourself when you have pain instead of focusing on the pain. See a movie, read a book, listen to music, or spend time with a friend.  If you think you are depressed, talk to your doctor about treatment.  Keep a daily pain diary. Record how your moods, thoughts, sleep patterns, activities, and medicine affect your pain. You may find that your pain is worse during or after certain activities or when you are feeling a certain emotion. Having a record of your pain can help you and your doctor find the best ways to treat your pain.  Take pain medicines exactly as directed.  If the doctor gave you a prescription medicine for pain, take it as prescribed.  If you are not taking a prescription  "pain medicine, ask your doctor if you can take an over-the-counter medicine.  Reducing constipation caused by pain medicine  Talk to your doctor about a laxative. If a laxative doesn't work, your doctor may suggest a prescription medicine.  Include fruits, vegetables, beans, and whole grains in your diet each day. These foods are high in fiber.  If your doctor recommends it, get more exercise. Walking is a good choice. Bit by bit, increase the amount you walk every day. Try for at least 30 minutes on most days of the week.  Schedule time each day for a bowel movement. A daily routine may help. Take your time and do not strain when having a bowel movement.  When should you call for help?   Call your doctor now or seek immediate medical care if:    Your pain gets worse or is out of control.     You feel down or blue, or you do not enjoy things like you once did. You may be depressed, which is common in people with chronic pain. Depression can be treated.     You have vomiting or cramps for more than 2 hours.   Watch closely for changes in your health, and be sure to contact your doctor if:    You cannot sleep because of pain.     You are very worried or anxious about your pain.     You have trouble taking your pain medicine.     You have any concerns about your pain medicine.     You have trouble with bowel movements, such as:  No bowel movement in 3 days.  Blood in the anal area, in your stool, or on the toilet paper.  Diarrhea for more than 24 hours.   Where can you learn more?  Go to https://www.[employer].net/patiented  Enter N004 in the search box to learn more about \"Chronic Pain: Care Instructions.\"  Current as of: July 10, 2023               Content Version: 14.0    4071-3230 epicurio.   Care instructions adapted under license by your healthcare professional. If you have questions about a medical condition or this instruction, always ask your healthcare professional. Healthwise, [employer] " disclaims any warranty or liability for your use of this information.

## 2024-05-08 NOTE — COMMUNITY RESOURCES LIST (ENGLISH)
May 8, 2024           YOUR PERSONALIZED LIST OF SERVICES & PROGRAMS               Medical Transportation, (NEMT)      Social Service [facility] - Neighbor to Neighbor Program  Phone: [phone]  Email: [email or web address]  Website: https://www.[facility].org/services/older-adults/-services/neighbor-to-neighbor  Language: English  Hours: Mon 8:00 AM - 5:00 PM Tue 8:00 AM - 5:00 PM Wed 8:00 AM - 5:00 PM Thu 8:00 AM - 5:00 PM Fri 8:00 AM - 5:00 PM  Fee: Insurance, Self pay  Accessibility: Deaf or hard of hearing, Blind accommodation, Translation services      [employer]  Phone: [phone]  Website: https://The Local/  Language: English  Hours: Mon 9:00 AM - 8:00 PM Tue 9:00 AM - 8:00 PM Wed 9:00 AM - 8:00 PM Thu 9:00 AM - 8:00 PM Fri 9:00 AM - 8:00 PM  Fee: Insurance, Self pay  Transportation Options: Free transportation    Expense Assistance      [facility]  [address] [place] [address] (Distance: 16.5 miles)  Phone: [phone]) [phone]  Website: http://www.[facility].net/  Language: English  Fee: Free      - Dislocated Worker/Adult [employer] Employment Program  Phone: (360) [phone]  Email: tatyana@[employer]  Website: https://[employer]/services/employment-services/dislocated-worker-program/  Language: English, [place]an  Hours: Mon 8:00 AM - 4:30 PM Tue 8:00 AM - 4:30 PM Wed 8:00 AM - 4:30 PM Thu 8:00 AM - 4:30 PM Fri 8:00 AM - 4:30 PM  Fee: Free  Accessibility: Ada accessible    Coordination      [employer] [facility] [employer]  [address] [place] [address] (Distance: 1.8 miles)  Phone: [phone]  Website: https://[employer]/services/scheduled-rides/  Language: English  Fee: Self pay  Accessibility: Ada accessible      [employer] [facility] [employer]  [address] [place] [address] (Distance: 1.8 miles)  Phone: [phone]) [phone]  Website: https://[employer]/services/dial-a-ride/  Language:  English  Fee: Self pay      - [place] [facility]  Phone: (301) 817-5130  Website: http://www.orderTopia               IMPORTANT NUMBERS & WEBSITES        Emergency Services  911  .   [facility]  211 http://211unitedway.org  .   Poison Control  (993) 699-4616 http://[place]poison.org http://wis[place]poison.org  .     Suicide and Crisis Lifeline  988 http://988Sinbad's supply chainline.org  .   Childhelp [place] Child Abuse Hotline  415.397.2898 http://Childhelphotline.org   .   National Sexual Assault Hotline  (413) 717-6327 (HOPE) http://ishBowln.org   .     National Runaway Safeline  (879) 795-3050 (RUNAWAY) http://[employer]  .   Pregnancy & Postpartum Support  Call/text 886-134-7367  [place]: http://ppsupportmn.org  [place]: http://LendMeYourLiteracy.com/wi  .   Substance Abuse National Helpline ([facility])  842-011-HELP (3465) http://Findtreatment.gov   .                DISCLAIMER: These resources have been generated via the [employer]. [employer] does not endorse any service providers mentioned in this resource list. [employer] does not guarantee that the services mentioned in this resource list will be available to you or will improve your health or wellness.    [facility]

## 2024-05-08 NOTE — NURSING NOTE
"Chief Complaint   Patient presents with    Physical       Initial Pulse 63   Resp 18   Ht 1.676 m (5' 5.98\")   Wt 72.1 kg (159 lb)   LMP  (LMP Unknown)   SpO2 100%   BMI 25.68 kg/m   Estimated body mass index is 25.68 kg/m  as calculated from the following:    Height as of this encounter: 1.676 m (5' 5.98\").    Weight as of this encounter: 72.1 kg (159 lb).    Patient presents to the clinic using No DME    Is there anyone who you would like to be able to receive your results? No  If yes have patient fill out CICI      "

## 2024-05-08 NOTE — LETTER

## 2024-05-09 NOTE — RESULT ENCOUNTER NOTE
Karla  Labs show no vitamin deficiencies.  We can discuss any questions at your next appt.  Rosaura

## 2024-05-12 LAB
BKR LAB AP GYN ADEQUACY: NORMAL
BKR LAB AP GYN INTERPRETATION: NORMAL
BKR LAB AP HPV REFLEX: NORMAL
BKR LAB AP PREVIOUS ABNORMAL: NORMAL
PATH REPORT.COMMENTS IMP SPEC: NORMAL
PATH REPORT.COMMENTS IMP SPEC: NORMAL
PATH REPORT.RELEVANT HX SPEC: NORMAL

## 2024-05-14 ENCOUNTER — PATIENT OUTREACH (OUTPATIENT)
Dept: FAMILY MEDICINE | Facility: CLINIC | Age: 54
End: 2024-05-14
Payer: COMMERCIAL

## 2024-05-14 ENCOUNTER — MYC MEDICAL ADVICE (OUTPATIENT)
Dept: FAMILY MEDICINE | Facility: CLINIC | Age: 54
End: 2024-05-14
Payer: COMMERCIAL

## 2024-05-14 PROBLEM — R87.810 CERVICAL HIGH RISK HPV (HUMAN PAPILLOMAVIRUS) TEST POSITIVE: Status: ACTIVE | Noted: 2024-05-08

## 2024-05-14 LAB
HUMAN PAPILLOMA VIRUS 16 DNA: NEGATIVE
HUMAN PAPILLOMA VIRUS 18 DNA: NEGATIVE
HUMAN PAPILLOMA VIRUS FINAL DIAGNOSIS: ABNORMAL
HUMAN PAPILLOMA VIRUS OTHER HR: POSITIVE

## 2024-05-16 ENCOUNTER — HOSPITAL ENCOUNTER (OUTPATIENT)
Facility: CLINIC | Age: 54
End: 2024-05-16
Attending: SURGERY | Admitting: SURGERY
Payer: COMMERCIAL

## 2024-05-20 ENCOUNTER — PATIENT OUTREACH (OUTPATIENT)
Dept: CARE COORDINATION | Facility: CLINIC | Age: 54
End: 2024-05-20
Payer: COMMERCIAL

## 2024-05-20 DIAGNOSIS — Z98.84 HISTORY OF ADJUSTABLE GASTRIC BANDING: Primary | ICD-10-CM

## 2024-05-20 NOTE — PROGRESS NOTES
Clinic Care Coordination Contact  [facility]/Voicemail    Clinical Data: Care Coordinator Outreach    Outreach Documentation Number of Outreach Attempt   5/20/2024  10:17 AM 1       Patient's listed number did not have a voice mail system set up. CHW unable to leave a message.    Plan: Care Coordinator will try to reach patient again in 10 business days.    PARISH Gómez, B.A. [facility] Care Coordination  [facility]:   [facility]  [phone]

## 2024-05-22 ENCOUNTER — OFFICE VISIT (OUTPATIENT)
Dept: ENDOCRINOLOGY | Facility: CLINIC | Age: 54
End: 2024-05-22
Payer: COMMERCIAL

## 2024-05-22 VITALS
BODY MASS INDEX: 25.97 KG/M2 | DIASTOLIC BLOOD PRESSURE: 102 MMHG | WEIGHT: 161.6 LBS | OXYGEN SATURATION: 95 % | HEART RATE: 66 BPM | HEIGHT: 66 IN | SYSTOLIC BLOOD PRESSURE: 149 MMHG

## 2024-05-22 DIAGNOSIS — Z98.84 LAP-BAND SURGERY STATUS: Primary | ICD-10-CM

## 2024-05-22 DIAGNOSIS — Z98.84 HISTORY OF ADJUSTABLE GASTRIC BANDING: Primary | ICD-10-CM

## 2024-05-22 PROCEDURE — 99202 OFFICE O/P NEW SF 15 MIN: CPT | Performed by: SURGERY

## 2024-05-22 RX ORDER — CEFAZOLIN SODIUM 2 G/50ML
2 SOLUTION INTRAVENOUS SEE ADMIN INSTRUCTIONS
Status: CANCELLED | OUTPATIENT
Start: 2024-05-22

## 2024-05-22 RX ORDER — CEFAZOLIN SODIUM 2 G/50ML
2 SOLUTION INTRAVENOUS
Status: CANCELLED | OUTPATIENT
Start: 2024-05-22

## 2024-05-22 ASSESSMENT — PAIN SCALES - GENERAL: PAINLEVEL: NO PAIN (0)
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
## 2024-05-22 NOTE — Clinical Note
Please schedule a lap band (realize band) removal done laparoscopically it is a 90-minute procedure it can be done either with me or Dr. Parson or Dr. Steiner over the summer.  Patient likely can go home same day.

## 2024-05-22 NOTE — NURSING NOTE
"Chief Complaint   Patient presents with    RECHECK     Discuss lap band removal       Vitals:    05/22/24 0802   BP: (!) 149/102   BP Location: Left arm   Patient Position: Sitting   Cuff Size: Adult Regular   Pulse: 66   SpO2: 95%   Weight: 73.3 kg (161 lb 9.6 oz)   Height: 1.676 m (5' 6\")       Body mass index is 26.08 kg/m .                          Julio Barker, EMT    "
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
## 2024-05-24 ENCOUNTER — MYC REFILL (OUTPATIENT)
Dept: FAMILY MEDICINE | Facility: CLINIC | Age: 54
End: 2024-05-24
Payer: COMMERCIAL

## 2024-05-24 DIAGNOSIS — Z98.1 STATUS POST LAMINECTOMY WITH SPINAL FUSION: ICD-10-CM

## 2024-05-24 RX ORDER — OXYCODONE HCL 5 MG/5 ML
10 SOLUTION, ORAL ORAL 3 TIMES DAILY PRN
Qty: 840 ML | Refills: 0 | Status: SHIPPED | OUTPATIENT
Start: 2024-06-05 | End: 2024-08-09

## 2024-05-24 NOTE — TELEPHONE ENCOUNTER
Pending Prescriptions:                       Disp   Refills    oxyCODONE (ROXICODONE) 5 MG/5ML solution   840 mL 0        Sig: Take 10 mLs (10 mg) by mouth 3 times daily as needed           for moderate to severe pain    Routing refill request to provider for review/approval because:  Drug not on the [facility] refill protocol     Candelaria Conway RN  [facility]

## 2024-05-28 ENCOUNTER — MYC MEDICAL ADVICE (OUTPATIENT)
Dept: FAMILY MEDICINE | Facility: CLINIC | Age: 54
End: 2024-05-28
Payer: COMMERCIAL

## 2024-05-28 ENCOUNTER — APPOINTMENT (OUTPATIENT)
Dept: CT IMAGING | Facility: CLINIC | Age: 54
End: 2024-05-28
Attending: FAMILY MEDICINE
Payer: COMMERCIAL

## 2024-05-28 ENCOUNTER — HOSPITAL ENCOUNTER (EMERGENCY)
Facility: CLINIC | Age: 54
Discharge: HOME | End: 2024-05-28
Attending: FAMILY MEDICINE | Admitting: FAMILY MEDICINE
Payer: COMMERCIAL

## 2024-05-28 VITALS
HEART RATE: 69 BPM | RESPIRATION RATE: 20 BRPM | SYSTOLIC BLOOD PRESSURE: 124 MMHG | OXYGEN SATURATION: 98 % | DIASTOLIC BLOOD PRESSURE: 97 MMHG | HEIGHT: 66 IN | TEMPERATURE: 98.2 F | BODY MASS INDEX: 26.08 KG/M2

## 2024-05-28 DIAGNOSIS — R10.13 EPIGASTRIC PAIN: ICD-10-CM

## 2024-05-28 DIAGNOSIS — K92.1 HEMATOCHEZIA: ICD-10-CM

## 2024-05-28 DIAGNOSIS — R10.32 LLQ ABDOMINAL PAIN: ICD-10-CM

## 2024-05-28 DIAGNOSIS — R11.2 NAUSEA AND VOMITING, UNSPECIFIED VOMITING TYPE: ICD-10-CM

## 2024-05-28 LAB
ALBUMIN SERPL BCG-MCNC: 4.9 G/DL (ref 3.5–5.2)
ALP SERPL-CCNC: 58 U/L (ref 40–150)
ALT SERPL W P-5'-P-CCNC: 17 U/L (ref 0–50)
ANION GAP SERPL CALCULATED.3IONS-SCNC: 16 MMOL/L (ref 7–15)
AST SERPL W P-5'-P-CCNC: 17 U/L (ref 0–45)
BASOPHILS # BLD AUTO: 0.1 10E3/UL (ref 0–0.2)
BASOPHILS NFR BLD AUTO: 1 %
BILIRUB SERPL-MCNC: 0.5 MG/DL
BUN SERPL-MCNC: 17.2 MG/DL (ref 6–20)
CALCIUM SERPL-MCNC: 10.2 MG/DL (ref 8.6–10)
CHLORIDE SERPL-SCNC: 103 MMOL/L (ref 98–107)
CREAT SERPL-MCNC: 0.89 MG/DL (ref 0.51–0.95)
DEPRECATED HCO3 PLAS-SCNC: 21 MMOL/L (ref 22–29)
EGFRCR SERPLBLD CKD-EPI 2021: 77 ML/MIN/1.73M2
EOSINOPHIL # BLD AUTO: 0.1 10E3/UL (ref 0–0.7)
EOSINOPHIL NFR BLD AUTO: 1 %
ERYTHROCYTE [DISTWIDTH] IN BLOOD BY AUTOMATED COUNT: 15.7 % (ref 10–15)
GLUCOSE SERPL-MCNC: 109 MG/DL (ref 70–99)
HCT VFR BLD AUTO: 41.2 % (ref 35–47)
HGB BLD-MCNC: 13.9 G/DL (ref 11.7–15.7)
HOLD SPECIMEN: NORMAL
IMM GRANULOCYTES # BLD: 0 10E3/UL
IMM GRANULOCYTES NFR BLD: 0 %
LYMPHOCYTES # BLD AUTO: 2.6 10E3/UL (ref 0.8–5.3)
LYMPHOCYTES NFR BLD AUTO: 35 %
MCH RBC QN AUTO: 31.2 PG (ref 26.5–33)
MCHC RBC AUTO-ENTMCNC: 33.7 G/DL (ref 31.5–36.5)
MCV RBC AUTO: 93 FL (ref 78–100)
MONOCYTES # BLD AUTO: 0.5 10E3/UL (ref 0–1.3)
MONOCYTES NFR BLD AUTO: 6 %
NEUTROPHILS # BLD AUTO: 4.3 10E3/UL (ref 1.6–8.3)
NEUTROPHILS NFR BLD AUTO: 57 %
NRBC # BLD AUTO: 0 10E3/UL
NRBC BLD AUTO-RTO: 0 /100
PLATELET # BLD AUTO: 501 10E3/UL (ref 150–450)
POTASSIUM SERPL-SCNC: 4.1 MMOL/L (ref 3.4–5.3)
PROT SERPL-MCNC: 8.3 G/DL (ref 6.4–8.3)
RBC # BLD AUTO: 4.45 10E6/UL (ref 3.8–5.2)
SODIUM SERPL-SCNC: 140 MMOL/L (ref 135–145)
WBC # BLD AUTO: 7.6 10E3/UL (ref 4–11)

## 2024-05-28 PROCEDURE — 258N000003 HC RX IP 258 OP 636: Performed by: FAMILY MEDICINE

## 2024-05-28 PROCEDURE — C9113 INJ PANTOPRAZOLE SODIUM, VIA: HCPCS | Performed by: FAMILY MEDICINE

## 2024-05-28 PROCEDURE — 74177 CT ABD & PELVIS W/CONTRAST: CPT

## 2024-05-28 PROCEDURE — 96361 HYDRATE IV INFUSION ADD-ON: CPT | Performed by: FAMILY MEDICINE

## 2024-05-28 PROCEDURE — 36415 COLL VENOUS BLD VENIPUNCTURE: CPT | Performed by: FAMILY MEDICINE

## 2024-05-28 PROCEDURE — 96374 THER/PROPH/DIAG INJ IV PUSH: CPT | Mod: 59 | Performed by: FAMILY MEDICINE

## 2024-05-28 PROCEDURE — 250N000011 HC RX IP 250 OP 636: Performed by: FAMILY MEDICINE

## 2024-05-28 PROCEDURE — 82040 ASSAY OF SERUM ALBUMIN: CPT | Performed by: FAMILY MEDICINE

## 2024-05-28 PROCEDURE — 96375 TX/PRO/DX INJ NEW DRUG ADDON: CPT | Performed by: FAMILY MEDICINE

## 2024-05-28 PROCEDURE — 99285 EMERGENCY DEPT VISIT HI MDM: CPT | Mod: 25 | Performed by: FAMILY MEDICINE

## 2024-05-28 PROCEDURE — 250N000013 HC RX MED GY IP 250 OP 250 PS 637: Performed by: FAMILY MEDICINE

## 2024-05-28 PROCEDURE — 99284 EMERGENCY DEPT VISIT MOD MDM: CPT | Performed by: FAMILY MEDICINE

## 2024-05-28 PROCEDURE — 250N000009 HC RX 250: Performed by: FAMILY MEDICINE

## 2024-05-28 PROCEDURE — 85025 COMPLETE CBC W/AUTO DIFF WBC: CPT | Performed by: FAMILY MEDICINE

## 2024-05-28 RX ORDER — ONDANSETRON 2 MG/ML
4 INJECTION INTRAMUSCULAR; INTRAVENOUS ONCE
Status: COMPLETED | OUTPATIENT
Start: 2024-05-28 | End: 2024-05-28

## 2024-05-28 RX ORDER — HYDROMORPHONE HYDROCHLORIDE 1 MG/ML
0.5 INJECTION, SOLUTION INTRAMUSCULAR; INTRAVENOUS; SUBCUTANEOUS ONCE
Status: COMPLETED | OUTPATIENT
Start: 2024-05-28 | End: 2024-05-28

## 2024-05-28 RX ORDER — FLUOXETINE 20 MG/5ML
80 SOLUTION ORAL ONCE
Status: COMPLETED | OUTPATIENT
Start: 2024-05-28 | End: 2024-05-28

## 2024-05-28 RX ORDER — IOPAMIDOL 755 MG/ML
79 INJECTION, SOLUTION INTRAVASCULAR ONCE
Status: COMPLETED | OUTPATIENT
Start: 2024-05-28 | End: 2024-05-28

## 2024-05-28 RX ORDER — OLANZAPINE 2.5 MG/1
5 TABLET, FILM COATED ORAL DAILY PRN
Qty: 15 TABLET | Refills: 0 | Status: SHIPPED | OUTPATIENT
Start: 2024-05-28 | End: 2024-06-03

## 2024-05-28 RX ADMIN — IOPAMIDOL 79 ML: 755 INJECTION, SOLUTION INTRAVENOUS at 13:16

## 2024-05-28 RX ADMIN — FLUOXETINE 80 MG: 20 SOLUTION ORAL at 13:33

## 2024-05-28 RX ADMIN — PANTOPRAZOLE SODIUM 40 MG: 40 INJECTION, POWDER, FOR SOLUTION INTRAVENOUS at 12:30

## 2024-05-28 RX ADMIN — HYDROMORPHONE HYDROCHLORIDE 0.5 MG: 1 INJECTION, SOLUTION INTRAMUSCULAR; INTRAVENOUS; SUBCUTANEOUS at 14:39

## 2024-05-28 RX ADMIN — SODIUM CHLORIDE 61 ML: 9 INJECTION, SOLUTION INTRAVENOUS at 13:16

## 2024-05-28 RX ADMIN — SODIUM CHLORIDE 1000 ML: 9 INJECTION, SOLUTION INTRAVENOUS at 12:57

## 2024-05-28 RX ADMIN — ONDANSETRON 4 MG: 2 INJECTION INTRAMUSCULAR; INTRAVENOUS at 10:57

## 2024-05-28 ASSESSMENT — ACTIVITIES OF DAILY LIVING (ADL)
ADLS_ACUITY_SCORE: 33
ADLS_ACUITY_SCORE: 33
ADLS_ACUITY_SCORE: 35
ADLS_ACUITY_SCORE: 35

## 2024-05-28 ASSESSMENT — COLUMBIA-SUICIDE SEVERITY RATING SCALE - C-SSRS
1. IN THE PAST MONTH, HAVE YOU WISHED YOU WERE DEAD OR WISHED YOU COULD GO TO SLEEP AND NOT WAKE UP?: NO
2. HAVE YOU ACTUALLY HAD ANY THOUGHTS OF KILLING YOURSELF IN THE PAST MONTH?: NO
6. HAVE YOU EVER DONE ANYTHING, STARTED TO DO ANYTHING, OR PREPARED TO DO ANYTHING TO END YOUR LIFE?: NO
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
## 2024-05-28 NOTE — DISCHARGE INSTRUCTIONS
RETURN TO THE EMERGENCY ROOM FOR THE FOLLOWING:    Worsening pain, repeated vomiting and dehydration, fainting, fever greater than 101, or at anytime for any concern.    FOLLOW UP:    Call your bariatric surgeons clinic, as discussed.    TREATMENT RECOMMENDATIONS:    Zyprexa as needed for anxiety.    NURSE ADVICE LINE:  [phone] or [phone]

## 2024-05-28 NOTE — ED TRIAGE NOTES
Pt sent from clinic. LLQ abd, cramping since 5/27, dark red rectal bleeding, and nausea since this am. Has lap band, suppose to have it removed on June 10th. Unable to eat or get medications down. Reports 9 lbs loss since Friday. Pt is very tearful in triage room, has not kelvin able to her scheduled Prozac 80 mg in 4 days.      Triage Assessment (Adult)       Row Name 05/28/24 1045          Triage Assessment    Airway WDL WDL        Respiratory WDL    Respiratory WDL WDL        Skin Circulation/Temperature WDL    Skin Circulation/Temperature WDL WDL        Cardiac WDL    Cardiac WDL WDL        Peripheral/Neurovascular WDL    Peripheral Neurovascular WDL WDL        Cognitive/Neuro/Behavioral WDL    Cognitive/Neuro/Behavioral WDL WDL

## 2024-05-28 NOTE — ED NOTES
Abdominal pain, nausea/vomiting, and dark red rectal bleeding.  Patient ate meat on Saturday and normally is on a liquid diet.  Lower abdominal pain started yesterday.  Patient stated that she started having abdominal cramping this morning and had blood in stool.  Patient has lap band and is scheduled to have it removed.  Patient stated that she is on Prozac and pain medication for back injury, but hasn't been able to take them since Friday due to being unable to keep it down.

## 2024-05-28 NOTE — TELEPHONE ENCOUNTER
"S-(situation): Called Karla in regards to her my chart message.    B-(background): Scheduled to have her lap band removed 06/10/24  Has good days and bad days with episodes of vomiting and not being able to keep her meds down for the past 1 1/2 years.  History Barretts, Depression, anxiety, opioid dependence   No hemorrhoids since she was pregnant.     A-(assessment): Karla says she is not feeling good. She cannot keep her meds down. She is tearful and frustrated. This is not like her \"normal digestive issues\" which she describes as \"stuff getting stuck higher up\" and throwing up. This morning she has some lower abdominal pain described as cramping. She had 2 episodes of diarrhea, the 1st time bright red blood with clots filled the toilet, the 2nd time there was darker red blood on the toilet paper.   No chest pain, shortness of breath or dizziness. She Has not been able to keep her meds down since Saturday night. She takes liquid oxycodone three times daily. She is urinating less but at least every 8 hours.   She did eat some steak Friday night which may have exacerbated her symptoms. When this happens, she sticks to water and a protein shake daily. She can't even keep that down.     R-(recommendations): Karla does not think this is related to her lap band. PCP not in clinic today. Advised to go to Er for evaluation.   She plans to go to [facility] about 11 Am. Notified Brittani at [facility] of her anticipated arrival.   Michelle ARMANDO RN      "

## 2024-05-29 ENCOUNTER — PATIENT OUTREACH (OUTPATIENT)
Dept: CARE COORDINATION | Facility: CLINIC | Age: 54
End: 2024-05-29
Payer: COMMERCIAL

## 2024-05-29 NOTE — PROGRESS NOTES
Clinic Care Coordination Contact    Situation: Patient chart reviewed by care coordinator.    Background: Pt presented to [facility] ED 5/28/24 for evaluation of abdominal pain with concern with medication withdrawal - Prozac. Scheduled for gastric band removal 6/10/24.     Assessment: Workup completed. Declined admission. Pt to contact bariatric surgeons clinic to see about removal sooner than 6/10/24. Discharged home.    Plan/Recommendations: [facility] and [facility] CC to outreach as previously indicated.     Cherie Daugherty, DANTEW   Social Work Primary Care Clinic Care Coordinator   [facility]  [phone]  [email or web address]

## 2024-06-03 ENCOUNTER — VIRTUAL VISIT (OUTPATIENT)
Dept: FAMILY MEDICINE | Facility: CLINIC | Age: 54
End: 2024-06-03
Payer: COMMERCIAL

## 2024-06-03 ENCOUNTER — MYC MEDICAL ADVICE (OUTPATIENT)
Dept: FAMILY MEDICINE | Facility: CLINIC | Age: 54
End: 2024-06-03

## 2024-06-03 ENCOUNTER — E-VISIT (OUTPATIENT)
Dept: FAMILY MEDICINE | Facility: CLINIC | Age: 54
End: 2024-06-03
Payer: COMMERCIAL

## 2024-06-03 ENCOUNTER — PATIENT OUTREACH (OUTPATIENT)
Dept: CARE COORDINATION | Facility: CLINIC | Age: 54
End: 2024-06-03

## 2024-06-03 DIAGNOSIS — J30.9 ALLERGIC RHINITIS, UNSPECIFIED SEASONALITY, UNSPECIFIED TRIGGER: ICD-10-CM

## 2024-06-03 DIAGNOSIS — R11.2 NAUSEA AND VOMITING, UNSPECIFIED VOMITING TYPE: Primary | ICD-10-CM

## 2024-06-03 DIAGNOSIS — J01.90 ACUTE SINUSITIS WITH SYMPTOMS > 10 DAYS: Primary | ICD-10-CM

## 2024-06-03 DIAGNOSIS — F33.0 MILD EPISODE OF RECURRENT MAJOR DEPRESSIVE DISORDER (H): ICD-10-CM

## 2024-06-03 DIAGNOSIS — F41.1 GENERALIZED ANXIETY DISORDER: ICD-10-CM

## 2024-06-03 DIAGNOSIS — J32.4 CHRONIC PANSINUSITIS: ICD-10-CM

## 2024-06-03 PROCEDURE — 2894A VOIDCORRECT: CPT | Mod: 93 | Performed by: PHYSICIAN ASSISTANT

## 2024-06-03 RX ORDER — FLUTICASONE PROPIONATE 50 MCG
1-2 SPRAY, SUSPENSION (ML) NASAL 2 TIMES DAILY
Qty: 18.2 ML | Refills: 11 | Status: CANCELLED | OUTPATIENT
Start: 2024-06-03

## 2024-06-03 RX ORDER — OLANZAPINE 2.5 MG/1
2.5-5 TABLET, FILM COATED ORAL DAILY PRN
Qty: 30 TABLET | Refills: 0 | Status: SHIPPED | OUTPATIENT
Start: 2024-06-03 | End: 2024-08-09

## 2024-06-03 ASSESSMENT — PATIENT HEALTH QUESTIONNAIRE - PHQ9
SUM OF ALL RESPONSES TO PHQ QUESTIONS 1-9: 0
5. POOR APPETITE OR OVEREATING: NOT AT ALL

## 2024-06-03 ASSESSMENT — ANXIETY QUESTIONNAIRES
3. WORRYING TOO MUCH ABOUT DIFFERENT THINGS: NOT AT ALL
6. BECOMING EASILY ANNOYED OR IRRITABLE: NOT AT ALL
1. FEELING NERVOUS, ANXIOUS, OR ON EDGE: SEVERAL DAYS
IF YOU CHECKED OFF ANY PROBLEMS ON THIS QUESTIONNAIRE, HOW DIFFICULT HAVE THESE PROBLEMS MADE IT FOR YOU TO DO YOUR WORK, TAKE CARE OF THINGS AT HOME, OR GET ALONG WITH OTHER PEOPLE: NOT DIFFICULT AT ALL
2. NOT BEING ABLE TO STOP OR CONTROL WORRYING: NOT AT ALL
GAD7 TOTAL SCORE: 1
5. BEING SO RESTLESS THAT IT IS HARD TO SIT STILL: NOT AT ALL
7. FEELING AFRAID AS IF SOMETHING AWFUL MIGHT HAPPEN: NOT AT ALL
GAD7 TOTAL SCORE: 1

## 2024-06-03 NOTE — NURSING NOTE
"Chief Complaint   Patient presents with    emergency room follow up        Initial LMP  (LMP Unknown)  Estimated body mass index is 26.08 kg/m  as calculated from the following:    Height as of 5/28/24: 1.676 m (5' 6\").    Weight as of 5/22/24: 73.3 kg (161 lb 9.6 oz).    Patient presents to the clinic using No DME    Is there anyone who you would like to be able to receive your results? No  If yes have patient fill out CICI      "

## 2024-06-03 NOTE — PROGRESS NOTES
Karla is a 54 year old who is being evaluated via a billable telephone visit.    What phone number would you like to be contacted at? [phone]  How would you like to obtain your AVS? Karri  Originating Location (pt. Location): Home  Distant Location (provider location):  On-site    Assessment & Plan     Nausea and vomiting, unspecified vomiting type  Improved, unclear if some of symptoms were from narcotic withdrawal.  Doubt prozac withdrawal given long half life of prozac.  However she also quit her marijuana gummies.  Upcoming surgery to remove lapband.  Known gastroparesis.      Mild episode of recurrent major depressive disorder (H24)  Generalized anxiety disorder  Suboptimal control - she feels MUCH better taking this medication. Discussed could try switching to abilify if she wishes to be on this type of medication long term however she wishes no medication change prior to upcoming surgery next week.  Refill but encouraged to try lowest effective dose.  - OLANZapine (ZYPREXA) 2.5 MG tablet; Take 1-2 tablets (2.5-5 mg) by mouth daily as needed (anxiety)    Patient Instructions   Refilled - but see if maybe don't need to take 2 tabs nightly - 1 tab may give enough benefit but less wt gain    Subjective   Karla is a 54 year old, presenting for the following health issues:  No chief complaint on file.        6/3/2024     2:44 PM   Additional Questions   Roomed by kamari davis cma     \[facility] [place] [facility]\""     ED/UC Followup:    Facility:  [facility]  Date of visit: 5/28/2024 (4 hours   Reason for visit: Abdominal Pain  Rectal Bleeding   Current Status: improving     Was fine on Fri.  But Sat-Mon could hardly eat and woke with very bad stomach pain and very bloody diarrhea.      Depression and Anxiety   How are you doing with your depression since your last visit? Unsure  How are you doing with your anxiety since your last visit?  Unsure   Are you having other symptoms that might be associated with  depression or anxiety? No  Have you had a significant life event? Health Concerns   Do you have any concerns with your use of alcohol or other drugs? No    Social History     Tobacco Use    Smoking status: Former     Current packs/day: 0.00     Average packs/day: 0.5 packs/day for 40.0 years (20.0 ttl pk-yrs)     Types: Cigarettes     Start date: 1983     Quit date: 2023     Years since quittin.4    Smokeless tobacco: Never    Tobacco comments:     started smoking age 22.  never more than 0.5 ppd   Vaping Use    Vaping status: Former   Substance Use Topics    Alcohol use: Not Currently    Drug use: Yes     Types: Oxycodone         2023     2:47 PM 4/10/2024     1:09 PM 2024     7:55 AM   PHQ   PHQ-9 Total Score 0 3 11   Q9: Thoughts of better off dead/self-harm past 2 weeks Not at all Not at all Not at all         2023    10:27 AM 2023     7:35 AM 2024     7:55 AM   NANCI-7 SCORE   Total Score 10 (moderate anxiety)     Total Score 10 3 19         2024     7:55 AM   Last PHQ-9   1.  Little interest or pleasure in doing things 3   2.  Feeling down, depressed, or hopeless 2   3.  Trouble falling or staying asleep, or sleeping too much 0   4.  Feeling tired or having little energy 3   5.  Poor appetite or overeating 0   6.  Feeling bad about yourself 0   7.  Trouble concentrating 3   8.  Moving slowly or restless 0   Q9: Thoughts of better off dead/self-harm past 2 weeks 0   PHQ-9 Total Score 11   Difficulty at work, home, or with people Extremely dIfficult         2024     7:55 AM   NANCI-7    1. Feeling nervous, anxious, or on edge 3   2. Not being able to stop or control worrying 3   3. Worrying too much about different things 3   4. Trouble relaxing 2   5. Being so restless that it is hard to sit still 2   6. Becoming easily annoyed or irritable 3   7. Feeling afraid, as if something awful might happen 3   NANCI-7 Total Score 19   If you checked any problems, how difficult have they  made it for you to do your work, take care of things at home, or get along with other people? Extremely difficult     Objective           Vitals:  No vitals were obtained today due to virtual visit.    Physical Exam   General: Alert and no distress //Respiratory: No audible wheeze, cough, or shortness of breath // Psychiatric:  Appropriate affect, tone, and pace of words        Phone call duration: 12 minutes  Signed Electronically by: Rosaura Flores PA-C

## 2024-06-03 NOTE — PATIENT INSTRUCTIONS
Refilled - but see if maybe don't need to take 2 tabs nightly - 1 tab may give enough benefit but less wt gain

## 2024-06-03 NOTE — PROGRESS NOTES
Clinic Care Coordination Contact    Assessment: CHW Care Coordinator contacted patient for 2 month follow up. Patient has continued to follow the plan of care and assessment is negative for any new needs or concerns.    Enrollment status: Graduated     Plan: No further outreaches at this time. Patient will continue to follow the plan of care. If new needs arise a new Care Coordination referral may be placed. Letter sent.     Cherie Daugherty ANDREW   Social Work Primary Care Clinic Care Coordinator   [facility]  [phone]  [email or web address]

## 2024-06-03 NOTE — PROGRESS NOTES
Please see [employer] message. Not seeing mention of this in the note.    Pended Flonase for consideration.    Yumiko MCBRIDE RN

## 2024-06-03 NOTE — PROGRESS NOTES
Clinic Care Coordination Contact  Community Health Worker Follow Up    Care Gaps:     Health Maintenance Due   Topic Date Due    HEPATITIS A IMMUNIZATION (1 of 2 - Risk 2-dose series) Never done    DTAP/TDAP/TD IMMUNIZATION (2 - Td or Tdap) 02/05/2019    ZOSTER IMMUNIZATION (1 of 2) Never done    LUNG CANCER SCREENING  Never done    COVID-19 Vaccine (2 - 2023-24 season) 09/01/2023         Care Plan:   Care Plan: General       Problem: General                     Intervention and Education during outreach:   Patient states that she has no outstanding needs or concerns for CC at this time. Patient states that it is okay to be graduated from CC program.    CHW Plan: CHW will route patient to [facility] to review for graduation.    Ashley Murillo, ANGELITOW, B.A. [facility] Care Coordination  [facility]:   [facility]  [phone]

## 2024-06-03 NOTE — TELEPHONE ENCOUNTER
Pended Flonase per patient request.    See [employer] message regarding Sinus infection after visit from today.    Yumiko MCBRIDE RN

## 2024-06-03 NOTE — PROGRESS NOTES
Clinic Care Coordination Contact  [facility]/Voicemail    Clinical Data: Care Coordinator Outreach    Outreach Documentation Number of Outreach Attempt   5/20/2024  10:17 AM 1       CHW briefly spoke to patient. Pt states that she is busy and requested to be called back at a later time.    Plan: Care Coordinator will try to reach patient again in 3-5 business days.    PARISH Gómez, B.A. [facility] Care Coordination  [facility]:   [facility]  [phone]

## 2024-06-04 ENCOUNTER — TELEPHONE (OUTPATIENT)
Dept: ENDOCRINOLOGY | Facility: CLINIC | Age: 54
End: 2024-06-04
Payer: COMMERCIAL

## 2024-06-04 RX ORDER — FLUTICASONE PROPIONATE 50 MCG
1-2 SPRAY, SUSPENSION (ML) NASAL DAILY
Qty: 18.2 ML | Refills: 11 | Status: SHIPPED | OUTPATIENT
Start: 2024-06-04 | End: 2024-06-13

## 2024-06-04 RX ORDER — AMOXICILLIN AND CLAVULANATE POTASSIUM 400; 57 MG/5ML; MG/5ML
875 POWDER, FOR SUSPENSION ORAL 2 TIMES DAILY
Qty: 153.16 ML | Refills: 0 | Status: SHIPPED | OUTPATIENT
Start: 2024-06-04 | End: 2024-06-11

## 2024-06-04 NOTE — TELEPHONE ENCOUNTER
Called [employer] to check if prior authorization has been completed for band removal scheduled 6/10/24 with Dr. Machuca at [facility]. Per representative, Claudia VALLES Code [id number] does not need prior authorization, call ref # [id number].

## 2024-06-05 RX ORDER — BUPRENORPHINE HYDROCHLORIDE 600 UG/1
FILM, SOLUBLE BUCCAL
COMMUNITY
Start: 2024-05-29 | End: 2024-06-13

## 2024-06-06 ENCOUNTER — OFFICE VISIT (OUTPATIENT)
Dept: FAMILY MEDICINE | Facility: CLINIC | Age: 54
End: 2024-06-06
Payer: COMMERCIAL

## 2024-06-06 VITALS
HEIGHT: 66 IN | BODY MASS INDEX: 26.84 KG/M2 | SYSTOLIC BLOOD PRESSURE: 138 MMHG | OXYGEN SATURATION: 99 % | WEIGHT: 167 LBS | TEMPERATURE: 98.9 F | HEART RATE: 68 BPM | RESPIRATION RATE: 18 BRPM | DIASTOLIC BLOOD PRESSURE: 88 MMHG

## 2024-06-06 DIAGNOSIS — I10 BENIGN ESSENTIAL HYPERTENSION: ICD-10-CM

## 2024-06-06 DIAGNOSIS — F33.1 MODERATE EPISODE OF RECURRENT MAJOR DEPRESSIVE DISORDER (H): ICD-10-CM

## 2024-06-06 DIAGNOSIS — F41.1 GENERALIZED ANXIETY DISORDER: ICD-10-CM

## 2024-06-06 DIAGNOSIS — Z01.818 PRE-OP EXAM: Primary | ICD-10-CM

## 2024-06-06 DIAGNOSIS — K22.70 BARRETT'S ESOPHAGUS WITHOUT DYSPLASIA: ICD-10-CM

## 2024-06-06 DIAGNOSIS — K22.4 ESOPHAGEAL DYSMOTILITY: ICD-10-CM

## 2024-06-06 PROBLEM — F33.9 MAJOR DEPRESSION, RECURRENT (H): Status: RESOLVED | Noted: 2023-07-07 | Resolved: 2024-06-06

## 2024-06-06 LAB
ANION GAP SERPL CALCULATED.3IONS-SCNC: 12 MMOL/L (ref 7–15)
BUN SERPL-MCNC: 12.5 MG/DL (ref 6–20)
CALCIUM SERPL-MCNC: 9.8 MG/DL (ref 8.6–10)
CHLORIDE SERPL-SCNC: 102 MMOL/L (ref 98–107)
CREAT SERPL-MCNC: 0.8 MG/DL (ref 0.51–0.95)
DEPRECATED HCO3 PLAS-SCNC: 26 MMOL/L (ref 22–29)
EGFRCR SERPLBLD CKD-EPI 2021: 88 ML/MIN/1.73M2
GLUCOSE SERPL-MCNC: 101 MG/DL (ref 70–99)
POTASSIUM SERPL-SCNC: 5 MMOL/L (ref 3.4–5.3)
SODIUM SERPL-SCNC: 140 MMOL/L (ref 135–145)

## 2024-06-06 PROCEDURE — 99214 OFFICE O/P EST MOD 30 MIN: CPT | Performed by: PHYSICIAN ASSISTANT

## 2024-06-06 PROCEDURE — 36415 COLL VENOUS BLD VENIPUNCTURE: CPT | Performed by: PHYSICIAN ASSISTANT

## 2024-06-06 PROCEDURE — 80048 BASIC METABOLIC PNL TOTAL CA: CPT | Performed by: PHYSICIAN ASSISTANT

## 2024-06-06 PROCEDURE — G2211 COMPLEX E/M VISIT ADD ON: HCPCS | Performed by: PHYSICIAN ASSISTANT

## 2024-06-06 ASSESSMENT — PAIN SCALES - GENERAL: PAINLEVEL: MODERATE PAIN (4)

## 2024-06-06 NOTE — PATIENT INSTRUCTIONS
Stop belbuca that you had just started  Morning of procedure - take your oxycodone EARLY morning (6 or 7 AM) if needed    Keep appt with me next Thurs    Patient Education   Preparing for Your Surgery  Getting started  A nurse will call you to review your health history and instructions. They will give you an arrival time based on your scheduled surgery time. Please be ready to share:  Your doctor's clinic name and phone number  Your medical, surgical, and anesthesia history  A list of allergies and sensitivities  A list of medicines, including herbal treatments and over-the-counter drugs  Whether the patient has a legal guardian (ask how to send us the papers in advance)  Please tell us if you're pregnant--or if there's any chance you might be pregnant. Some surgeries may injure a fetus (unborn baby), so they require a pregnancy test. Surgeries that are safe for a fetus don't always need a test, and you can choose whether to have one.   If you have a child who's having surgery, please ask for a copy of Preparing for Your Child's Surgery.    Preparing for surgery  Within 10 to 30 days of surgery: Have a pre-op exam (sometimes called an H&P, or History and Physical). This can be done at a clinic or pre-operative center.  If you're having a , you may not need this exam. Talk to your care team.  At your pre-op exam, talk to your care team about all medicines you take. If you need to stop any medicines before surgery, ask when to start taking them again.  We do this for your safety. Many medicines can make you bleed too much during surgery. Some change how well surgery (anesthesia) drugs work.  Call your insurance company to let them know you're having surgery. (If you don't have insurance, call [phone].)  Call your clinic if there's any change in your health. This includes signs of a cold or flu (sore throat, runny nose, cough, rash, fever). It also includes a scrape or scratch near the surgery site.  If  you have questions on the day of surgery, call your hospital or surgery center.  Eating and drinking guidelines  For your safety: Unless your surgeon tells you otherwise, follow the guidelines below.  Eat and drink as usual until 8 hours before you arrive for surgery. After that, no food or milk.  Drink clear liquids until 2 hours before you arrive. These are liquids you can see through, like water, Gatorade, and Propel Water. They also include plain black coffee and tea (no cream or milk), candy, and breath mints. You can spit out gum when you arrive.  If you drink alcohol: Stop drinking it the night before surgery.  If your care team tells you to take medicine on the morning of surgery, it's okay to take it with a sip of water.  Preventing infection  Shower or bathe the night before and morning of your surgery. Follow the instructions your clinic gave you. (If no instructions, use regular soap.)  Don't shave or clip hair near your surgery site. We'll remove the hair if needed.  Don't smoke or vape the morning of surgery. You may chew nicotine gum up to 2 hours before surgery. A nicotine patch is okay.  Note: Some surgeries require you to completely quit smoking and nicotine. Check with your surgeon.  Your care team will make every effort to keep you safe from infection. We will:  Clean our hands often with soap and water (or an alcohol-based hand rub).  Clean the skin at your surgery site with a special soap that kills germs.  Give you a special gown to keep you warm. (Cold raises the risk of infection.)  Wear special hair covers, masks, gowns and gloves during surgery.  Give antibiotic medicine, if prescribed. Not all surgeries need antibiotics.  What to bring on the day of surgery  Photo ID and insurance card  Copy of your health care directive, if you have one  Glasses and hearing aids (bring cases)  You can't wear contacts during surgery  Inhaler and eye drops, if you use them (tell us about these when you  arrive)  CPAP machine or breathing device, if you use them  A few personal items, if spending the night  If you have . . .  A pacemaker, ICD (cardiac defibrillator) or other implant: Bring the ID card.  An implanted stimulator: Bring the remote control.  A legal guardian: Bring a copy of the certified (court-stamped) guardianship papers.  Please remove any jewelry, including body piercings. Leave jewelry and other valuables at home.  If you're going home the day of surgery  You must have a responsible adult drive you home. They should stay with you overnight as well.  If you don't have someone to stay with you, and you aren't safe to go home alone, we may keep you overnight. Insurance often won't pay for this.  After surgery  If it's hard to control your pain or you need more pain medicine, please call your surgeon's office.  Questions?   If you have any questions for your care team, list them here: _________________________________________________________________________________________________________________________________________________________________________ ____________________________________ ____________________________________ ____________________________________  For informational purposes only. Not to replace the advice of your health care provider. Copyright   2003, 2019 [facility]. All rights reserved. Clinically reviewed by Antonina Cho MD. [employer] [id number] - REV 12/22.
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
## 2024-06-07 ENCOUNTER — ANESTHESIA EVENT (OUTPATIENT)
Dept: SURGERY | Facility: CLINIC | Age: 54
End: 2024-06-07
Payer: COMMERCIAL
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
## 2024-06-10 ENCOUNTER — MYC MEDICAL ADVICE (OUTPATIENT)
Dept: FAMILY MEDICINE | Facility: CLINIC | Age: 54
End: 2024-06-10

## 2024-06-10 ENCOUNTER — HOSPITAL ENCOUNTER (OUTPATIENT)
Facility: CLINIC | Age: 54
Discharge: HOME | End: 2024-06-10
Attending: SURGERY | Admitting: SURGERY
Payer: COMMERCIAL

## 2024-06-10 ENCOUNTER — ANESTHESIA (OUTPATIENT)
Dept: SURGERY | Facility: CLINIC | Age: 54
End: 2024-06-10
Payer: COMMERCIAL

## 2024-06-10 VITALS
RESPIRATION RATE: 17 BRPM | BODY MASS INDEX: 27.47 KG/M2 | SYSTOLIC BLOOD PRESSURE: 133 MMHG | HEART RATE: 59 BPM | HEIGHT: 65 IN | WEIGHT: 164.9 LBS | DIASTOLIC BLOOD PRESSURE: 79 MMHG | TEMPERATURE: 98.1 F | OXYGEN SATURATION: 98 %

## 2024-06-10 DIAGNOSIS — Z98.84 HISTORY OF ADJUSTABLE GASTRIC BANDING: Primary | ICD-10-CM

## 2024-06-10 DIAGNOSIS — Z98.1 STATUS POST LAMINECTOMY WITH SPINAL FUSION: ICD-10-CM

## 2024-06-10 DIAGNOSIS — Z98.84 HISTORY OF LAPAROSCOPIC ADJUSTABLE GASTRIC BANDING: Primary | ICD-10-CM

## 2024-06-10 PROCEDURE — 250N000009 HC RX 250: Performed by: NURSE ANESTHETIST, CERTIFIED REGISTERED

## 2024-06-10 PROCEDURE — 250N000011 HC RX IP 250 OP 636: Mod: JZ | Performed by: ANESTHESIOLOGY

## 2024-06-10 PROCEDURE — 258N000003 HC RX IP 258 OP 636: Mod: JZ | Performed by: NURSE ANESTHETIST, CERTIFIED REGISTERED

## 2024-06-10 PROCEDURE — 250N000011 HC RX IP 250 OP 636: Performed by: NURSE ANESTHETIST, CERTIFIED REGISTERED

## 2024-06-10 PROCEDURE — 250N000009 HC RX 250: Performed by: SURGERY

## 2024-06-10 PROCEDURE — 272N000001 HC OR GENERAL SUPPLY STERILE: Performed by: SURGERY

## 2024-06-10 PROCEDURE — 258N000003 HC RX IP 258 OP 636: Mod: JZ | Performed by: ANESTHESIOLOGY

## 2024-06-10 PROCEDURE — 250N000024 HC ISOFLURANE, PER MIN: Performed by: SURGERY

## 2024-06-10 PROCEDURE — 250N000011 HC RX IP 250 OP 636: Mod: JZ | Performed by: NURSE ANESTHETIST, CERTIFIED REGISTERED

## 2024-06-10 PROCEDURE — 370N000017 HC ANESTHESIA TECHNICAL FEE, PER MIN: Performed by: SURGERY

## 2024-06-10 PROCEDURE — 710N000012 HC RECOVERY PHASE 2, PER MINUTE: Performed by: SURGERY

## 2024-06-10 PROCEDURE — 43774 LAP RMVL GASTR ADJ ALL PARTS: CPT | Performed by: ANESTHESIOLOGY

## 2024-06-10 PROCEDURE — 999N000141 HC STATISTIC PRE-PROCEDURE NURSING ASSESSMENT: Performed by: SURGERY

## 2024-06-10 PROCEDURE — 88300 SURGICAL PATH GROSS: CPT | Mod: TC | Performed by: SURGERY

## 2024-06-10 PROCEDURE — 250N000011 HC RX IP 250 OP 636: Mod: JZ | Performed by: STUDENT IN AN ORGANIZED HEALTH CARE EDUCATION/TRAINING PROGRAM

## 2024-06-10 PROCEDURE — 360N000077 HC SURGERY LEVEL 4, PER MIN: Performed by: SURGERY

## 2024-06-10 PROCEDURE — 43774 LAP RMVL GASTR ADJ ALL PARTS: CPT | Performed by: NURSE ANESTHETIST, CERTIFIED REGISTERED

## 2024-06-10 PROCEDURE — 43774 LAP RMVL GASTR ADJ ALL PARTS: CPT | Mod: GC | Performed by: SURGERY

## 2024-06-10 PROCEDURE — 710N000010 HC RECOVERY PHASE 1, LEVEL 2, PER MIN: Performed by: SURGERY

## 2024-06-10 PROCEDURE — 250N000013 HC RX MED GY IP 250 OP 250 PS 637: Performed by: ANESTHESIOLOGY

## 2024-06-10 PROCEDURE — 250N000011 HC RX IP 250 OP 636: Performed by: SURGERY

## 2024-06-10 PROCEDURE — 88300 SURGICAL PATH GROSS: CPT | Mod: 26 | Performed by: PATHOLOGY

## 2024-06-10 RX ORDER — ONDANSETRON 4 MG/1
4 TABLET, ORALLY DISINTEGRATING ORAL EVERY 30 MIN PRN
Status: DISCONTINUED | OUTPATIENT
Start: 2024-06-10 | End: 2024-06-10 | Stop reason: HOSPADM

## 2024-06-10 RX ORDER — FENTANYL CITRATE 50 UG/ML
25 INJECTION, SOLUTION INTRAMUSCULAR; INTRAVENOUS EVERY 5 MIN PRN
Status: DISCONTINUED | OUTPATIENT
Start: 2024-06-10 | End: 2024-06-10 | Stop reason: HOSPADM

## 2024-06-10 RX ORDER — ONDANSETRON 2 MG/ML
INJECTION INTRAMUSCULAR; INTRAVENOUS PRN
Status: DISCONTINUED | OUTPATIENT
Start: 2024-06-10 | End: 2024-06-10

## 2024-06-10 RX ORDER — FENTANYL CITRATE 50 UG/ML
INJECTION, SOLUTION INTRAMUSCULAR; INTRAVENOUS PRN
Status: DISCONTINUED | OUTPATIENT
Start: 2024-06-10 | End: 2024-06-10

## 2024-06-10 RX ORDER — APREPITANT 40 MG/1
40 CAPSULE ORAL ONCE
Status: DISCONTINUED | OUTPATIENT
Start: 2024-06-10 | End: 2024-06-10

## 2024-06-10 RX ORDER — FENTANYL CITRATE 50 UG/ML
50 INJECTION, SOLUTION INTRAMUSCULAR; INTRAVENOUS EVERY 5 MIN PRN
Status: DISCONTINUED | OUTPATIENT
Start: 2024-06-10 | End: 2024-06-10 | Stop reason: HOSPADM

## 2024-06-10 RX ORDER — LABETALOL HYDROCHLORIDE 5 MG/ML
10 INJECTION, SOLUTION INTRAVENOUS
Status: DISCONTINUED | OUTPATIENT
Start: 2024-06-10 | End: 2024-06-10 | Stop reason: HOSPADM

## 2024-06-10 RX ORDER — DEXAMETHASONE SODIUM PHOSPHATE 4 MG/ML
INJECTION, SOLUTION INTRA-ARTICULAR; INTRALESIONAL; INTRAMUSCULAR; INTRAVENOUS; SOFT TISSUE PRN
Status: DISCONTINUED | OUTPATIENT
Start: 2024-06-10 | End: 2024-06-10

## 2024-06-10 RX ORDER — EPHEDRINE SULFATE 50 MG/ML
INJECTION, SOLUTION INTRAMUSCULAR; INTRAVENOUS; SUBCUTANEOUS PRN
Status: DISCONTINUED | OUTPATIENT
Start: 2024-06-10 | End: 2024-06-10

## 2024-06-10 RX ORDER — NALOXONE HYDROCHLORIDE 0.4 MG/ML
0.1 INJECTION, SOLUTION INTRAMUSCULAR; INTRAVENOUS; SUBCUTANEOUS
Status: DISCONTINUED | OUTPATIENT
Start: 2024-06-10 | End: 2024-06-10 | Stop reason: HOSPADM

## 2024-06-10 RX ORDER — CEFAZOLIN SODIUM/WATER 2 G/20 ML
2 SYRINGE (ML) INTRAVENOUS SEE ADMIN INSTRUCTIONS
Status: DISCONTINUED | OUTPATIENT
Start: 2024-06-10 | End: 2024-06-10 | Stop reason: HOSPADM

## 2024-06-10 RX ORDER — SODIUM CHLORIDE, SODIUM LACTATE, POTASSIUM CHLORIDE, CALCIUM CHLORIDE 600; 310; 30; 20 MG/100ML; MG/100ML; MG/100ML; MG/100ML
INJECTION, SOLUTION INTRAVENOUS CONTINUOUS
Status: DISCONTINUED | OUTPATIENT
Start: 2024-06-10 | End: 2024-06-10 | Stop reason: HOSPADM

## 2024-06-10 RX ORDER — ACETAMINOPHEN 325 MG/1
650 TABLET ORAL EVERY 4 HOURS PRN
Qty: 50 TABLET | Refills: 0 | Status: SHIPPED | OUTPATIENT
Start: 2024-06-10 | End: 2024-06-13

## 2024-06-10 RX ORDER — ONDANSETRON 2 MG/ML
4 INJECTION INTRAMUSCULAR; INTRAVENOUS EVERY 30 MIN PRN
Status: DISCONTINUED | OUTPATIENT
Start: 2024-06-10 | End: 2024-06-10 | Stop reason: HOSPADM

## 2024-06-10 RX ORDER — OXYCODONE HYDROCHLORIDE 10 MG/1
10 TABLET ORAL
Status: COMPLETED | OUTPATIENT
Start: 2024-06-10 | End: 2024-06-10

## 2024-06-10 RX ORDER — HYDROMORPHONE HCL IN WATER/PF 6 MG/30 ML
0.2 PATIENT CONTROLLED ANALGESIA SYRINGE INTRAVENOUS EVERY 5 MIN PRN
Status: DISCONTINUED | OUTPATIENT
Start: 2024-06-10 | End: 2024-06-10 | Stop reason: HOSPADM

## 2024-06-10 RX ORDER — OXYCODONE HYDROCHLORIDE 5 MG/1
5 TABLET ORAL
Status: DISCONTINUED | OUTPATIENT
Start: 2024-06-10 | End: 2024-06-10 | Stop reason: HOSPADM

## 2024-06-10 RX ORDER — CEFAZOLIN SODIUM/WATER 2 G/20 ML
2 SYRINGE (ML) INTRAVENOUS
Status: COMPLETED | OUTPATIENT
Start: 2024-06-10 | End: 2024-06-10

## 2024-06-10 RX ORDER — BUPIVACAINE HYDROCHLORIDE AND EPINEPHRINE 5; 5 MG/ML; UG/ML
INJECTION, SOLUTION PERINEURAL PRN
Status: DISCONTINUED | OUTPATIENT
Start: 2024-06-10 | End: 2024-06-10 | Stop reason: HOSPADM

## 2024-06-10 RX ORDER — HYDROMORPHONE HCL IN WATER/PF 6 MG/30 ML
0.4 PATIENT CONTROLLED ANALGESIA SYRINGE INTRAVENOUS EVERY 5 MIN PRN
Status: DISCONTINUED | OUTPATIENT
Start: 2024-06-10 | End: 2024-06-10 | Stop reason: HOSPADM

## 2024-06-10 RX ORDER — ACETAMINOPHEN 325 MG/1
650 TABLET ORAL
Status: DISCONTINUED | OUTPATIENT
Start: 2024-06-10 | End: 2024-06-10 | Stop reason: HOSPADM

## 2024-06-10 RX ORDER — ALBUTEROL SULFATE 0.83 MG/ML
2.5 SOLUTION RESPIRATORY (INHALATION) EVERY 4 HOURS PRN
Status: DISCONTINUED | OUTPATIENT
Start: 2024-06-10 | End: 2024-06-10 | Stop reason: HOSPADM

## 2024-06-10 RX ORDER — PROPOFOL 10 MG/ML
INJECTION, EMULSION INTRAVENOUS PRN
Status: DISCONTINUED | OUTPATIENT
Start: 2024-06-10 | End: 2024-06-10

## 2024-06-10 RX ORDER — DEXAMETHASONE SODIUM PHOSPHATE 4 MG/ML
4 INJECTION, SOLUTION INTRA-ARTICULAR; INTRALESIONAL; INTRAMUSCULAR; INTRAVENOUS; SOFT TISSUE
Status: DISCONTINUED | OUTPATIENT
Start: 2024-06-10 | End: 2024-06-10 | Stop reason: HOSPADM

## 2024-06-10 RX ORDER — OXYCODONE HYDROCHLORIDE 5 MG/1
5-10 TABLET ORAL EVERY 4 HOURS PRN
Qty: 6 TABLET | Refills: 0 | Status: SHIPPED | OUTPATIENT
Start: 2024-06-10 | End: 2024-06-11

## 2024-06-10 RX ORDER — SODIUM CHLORIDE, SODIUM LACTATE, POTASSIUM CHLORIDE, CALCIUM CHLORIDE 600; 310; 30; 20 MG/100ML; MG/100ML; MG/100ML; MG/100ML
INJECTION, SOLUTION INTRAVENOUS CONTINUOUS PRN
Status: DISCONTINUED | OUTPATIENT
Start: 2024-06-10 | End: 2024-06-10

## 2024-06-10 RX ORDER — KETOROLAC TROMETHAMINE 30 MG/ML
15 INJECTION, SOLUTION INTRAMUSCULAR; INTRAVENOUS ONCE
Status: COMPLETED | OUTPATIENT
Start: 2024-06-10 | End: 2024-06-10

## 2024-06-10 RX ORDER — LIDOCAINE HYDROCHLORIDE 20 MG/ML
INJECTION, SOLUTION INFILTRATION; PERINEURAL PRN
Status: DISCONTINUED | OUTPATIENT
Start: 2024-06-10 | End: 2024-06-10

## 2024-06-10 RX ADMIN — FENTANYL CITRATE 50 MCG: 50 INJECTION, SOLUTION INTRAMUSCULAR; INTRAVENOUS at 16:08

## 2024-06-10 RX ADMIN — PHENYLEPHRINE HYDROCHLORIDE 100 MCG: 10 INJECTION INTRAVENOUS at 15:10

## 2024-06-10 RX ADMIN — EPHEDRINE SULFATE 5 MG: 5 INJECTION INTRAVENOUS at 15:07

## 2024-06-10 RX ADMIN — SUGAMMADEX 200 MG: 100 INJECTION, SOLUTION INTRAVENOUS at 15:21

## 2024-06-10 RX ADMIN — DEXAMETHASONE SODIUM PHOSPHATE 6 MG: 4 INJECTION, SOLUTION INTRA-ARTICULAR; INTRALESIONAL; INTRAMUSCULAR; INTRAVENOUS; SOFT TISSUE at 14:14

## 2024-06-10 RX ADMIN — PROPOFOL 20 MG: 10 INJECTION, EMULSION INTRAVENOUS at 15:25

## 2024-06-10 RX ADMIN — FENTANYL CITRATE 50 MCG: 50 INJECTION INTRAMUSCULAR; INTRAVENOUS at 15:37

## 2024-06-10 RX ADMIN — FENTANYL CITRATE 50 MCG: 50 INJECTION INTRAMUSCULAR; INTRAVENOUS at 14:32

## 2024-06-10 RX ADMIN — SODIUM CHLORIDE, POTASSIUM CHLORIDE, SODIUM LACTATE AND CALCIUM CHLORIDE: 600; 310; 30; 20 INJECTION, SOLUTION INTRAVENOUS at 13:58

## 2024-06-10 RX ADMIN — SUCCINYLCHOLINE CHLORIDE 80 MG: 20 INJECTION, SOLUTION INTRAMUSCULAR; INTRAVENOUS; PARENTERAL at 14:01

## 2024-06-10 RX ADMIN — FENTANYL CITRATE 50 MCG: 50 INJECTION INTRAMUSCULAR; INTRAVENOUS at 14:01

## 2024-06-10 RX ADMIN — PHENYLEPHRINE HYDROCHLORIDE 100 MCG: 10 INJECTION INTRAVENOUS at 15:16

## 2024-06-10 RX ADMIN — Medication 10 MG: at 15:08

## 2024-06-10 RX ADMIN — Medication 10 MG: at 14:21

## 2024-06-10 RX ADMIN — Medication 2 G: at 13:58

## 2024-06-10 RX ADMIN — PROPOFOL 140 MG: 10 INJECTION, EMULSION INTRAVENOUS at 14:01

## 2024-06-10 RX ADMIN — KETOROLAC TROMETHAMINE 15 MG: 30 INJECTION INTRAMUSCULAR; INTRAVENOUS at 15:55

## 2024-06-10 RX ADMIN — MIDAZOLAM 2 MG: 1 INJECTION INTRAMUSCULAR; INTRAVENOUS at 13:49

## 2024-06-10 RX ADMIN — SODIUM CHLORIDE 150 MG: 9 INJECTION, SOLUTION INTRAVENOUS at 13:02

## 2024-06-10 RX ADMIN — FENTANYL CITRATE 50 MCG: 50 INJECTION INTRAMUSCULAR; INTRAVENOUS at 14:03

## 2024-06-10 RX ADMIN — OXYCODONE 10 MG: 10 TABLET ORAL at 17:21

## 2024-06-10 RX ADMIN — FENTANYL CITRATE 50 MCG: 50 INJECTION INTRAMUSCULAR; INTRAVENOUS at 15:21

## 2024-06-10 RX ADMIN — Medication 30 MG: at 14:08

## 2024-06-10 RX ADMIN — LIDOCAINE HYDROCHLORIDE 60 MG: 20 INJECTION, SOLUTION INFILTRATION; PERINEURAL at 14:01

## 2024-06-10 RX ADMIN — ONDANSETRON 4 MG: 2 INJECTION INTRAMUSCULAR; INTRAVENOUS at 17:22

## 2024-06-10 RX ADMIN — PHENYLEPHRINE HYDROCHLORIDE 100 MCG: 10 INJECTION INTRAVENOUS at 15:01

## 2024-06-10 RX ADMIN — Medication 10 MG: at 14:47

## 2024-06-10 RX ADMIN — ONDANSETRON 4 MG: 2 INJECTION INTRAMUSCULAR; INTRAVENOUS at 15:16

## 2024-06-10 ASSESSMENT — ACTIVITIES OF DAILY LIVING (ADL)
ADLS_ACUITY_SCORE: 30
ADLS_ACUITY_SCORE: 29
ADLS_ACUITY_SCORE: 30
ADLS_ACUITY_SCORE: 29
ADLS_ACUITY_SCORE: 29

## 2024-06-10 NOTE — ANESTHESIA POSTPROCEDURE EVALUATION
Patient: Cynthia Lyons    Procedure: Procedure(s):  REMOVAL, GASTRIC BAND  AND COMPONENTS, ADJUSTABLE, LAPAROSCOPIC       Anesthesia Type:  General    Note:  Disposition: Outpatient   Postop Pain Control: Uneventful            Sign Out: Well controlled pain   PONV: No   Neuro/Psych: Uneventful            Sign Out: Acceptable/Baseline neuro status   Airway/Respiratory: Uneventful            Sign Out: Acceptable/Baseline resp. status   CV/Hemodynamics: Uneventful            Sign Out: Acceptable CV status; No obvious hypovolemia; No obvious fluid overload   Other NRE: NONE   DID A NON-ROUTINE EVENT OCCUR? No           Last vitals:  Vitals Value Taken Time   /79 06/10/24 1615   Temp 36.8  C (98.2  F) 06/10/24 1540   Pulse 61 06/10/24 1617   Resp 13 06/10/24 1617   SpO2 97 % 06/10/24 1617   Vitals shown include unfiled device data.    Electronically Signed By: Maira Summers DO  Amy 10, 2024  4:17 PM
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
## 2024-06-10 NOTE — PROGRESS NOTES
This is a 54-year-old female who is 15 years out from laparoscopic band placement.  She has issues with gastroparesis and significant esophagitis.  Plan is for removal.  The patient understands the procedure and its risk potential complications as well as the small potential for some remained portions of the band in her abdomen.  Her health is otherwise good.  No changes or recent infections.

## 2024-06-10 NOTE — ANESTHESIA CARE TRANSFER NOTE
Patient: Cynthia Lyons    Procedure: Procedure(s):  REMOVAL, GASTRIC BAND  AND COMPONENTS, ADJUSTABLE, LAPAROSCOPIC       Diagnosis: History of adjustable gastric banding [Z98.84]  Diagnosis Additional Information: No value filed.    Anesthesia Type:   General     Note:    Oropharynx: oropharynx clear of all foreign objects and spontaneously breathing  Level of Consciousness: awake  Oxygen Supplementation: nasal cannula  Level of Supplemental Oxygen (L/min / FiO2): 4  Independent Airway: airway patency satisfactory and stable  Dentition: dentition unchanged  Vital Signs Stable: post-procedure vital signs reviewed and stable  Report to RN Given: handoff report given  Patient transferred to: PACU    Handoff Report: Identifed the Patient, Identified the Reponsible Provider, Reviewed the pertinent medical history, Discussed the surgical course, Reviewed Intra-OP anesthesia mangement and issues during anesthesia, Set expectations for post-procedure period and Allowed opportunity for questions and acknowledgement of understanding      Vitals:  Vitals Value Taken Time   /67 06/10/24 1536   Temp     Pulse 64 06/10/24 1539   Resp 13 06/10/24 1539   SpO2 100 % 06/10/24 1539   Vitals shown include unfiled device data.    Electronically Signed By: ALAN Moran CRNA  Amy 10, 2024  3:39 PM

## 2024-06-10 NOTE — ANESTHESIA PROCEDURE NOTES
Airway       Patient location during procedure: OR       Procedure Start/Stop Times: 6/10/2024 2:03 PM  Staff -        CRNA: Rosina Hancock       Performed By: CRNA  Consent for Airway        Urgency: elective  Indications and Patient Condition       Indications for airway management: tamela-procedural       Induction type:intravenous       Mask difficulty assessment: 1 - vent by mask (Lightly ventilated, maskable)    Final Airway Details       Final airway type: endotracheal airway       Successful airway: ETT - single  Endotracheal Airway Details        ETT size (mm): 7.0       Cuffed: yes       Successful intubation technique: direct laryngoscopy       DL Blade Type: Orantes 2       Grade View of Cords: 1       Adjucts: stylet       Position: Right       Measured from: lips       Secured at (cm): 22       Bite block used: None    Post intubation assessment        Placement verified by: capnometry, equal breath sounds and chest rise        Number of attempts at approach: 1       Number of other approaches attempted: 0       Secured with: tape       Ease of procedure: easy       Dentition: Intact and Unchanged    Medication(s) Administered   Medication Administration Time: 6/10/2024 2:03 PM

## 2024-06-10 NOTE — DISCHARGE INSTRUCTIONS
Contacting your Doctor -   To contact a doctor, call Dr Machuca's office at [phone] (Bariatric clinic) M-F 9am-4:30pm   or:  [phone] and ask for the resident on call for Surgery (answered 24 hours a day)   Emergency Department:  [facility]: [phone] 911 if you are in need of immediate or emergent help

## 2024-06-11 ENCOUNTER — MYC MEDICAL ADVICE (OUTPATIENT)
Dept: ENDOCRINOLOGY | Facility: CLINIC | Age: 54
End: 2024-06-11
Payer: COMMERCIAL

## 2024-06-11 ENCOUNTER — TELEPHONE (OUTPATIENT)
Dept: ENDOCRINOLOGY | Facility: CLINIC | Age: 54
End: 2024-06-11
Payer: COMMERCIAL

## 2024-06-11 DIAGNOSIS — R52 PAIN: Primary | ICD-10-CM

## 2024-06-11 LAB
PATH REPORT.COMMENTS IMP SPEC: NORMAL
PATH REPORT.COMMENTS IMP SPEC: NORMAL
PATH REPORT.FINAL DX SPEC: NORMAL
PATH REPORT.GROSS SPEC: NORMAL
PATH REPORT.MICROSCOPIC SPEC OTHER STN: NORMAL
PATH REPORT.RELEVANT HX SPEC: NORMAL
PHOTO IMAGE: NORMAL

## 2024-06-11 RX ORDER — OXYCODONE HCL 5 MG/5 ML
10 SOLUTION, ORAL ORAL EVERY 4 HOURS PRN
Qty: 60 ML | Refills: 0 | Status: SHIPPED | OUTPATIENT
Start: 2024-06-11 | End: 2024-06-13

## 2024-06-11 RX ORDER — KETOROLAC TROMETHAMINE 10 MG/1
10 TABLET, FILM COATED ORAL EVERY 6 HOURS PRN
Qty: 10 TABLET | Refills: 0 | Status: SHIPPED | OUTPATIENT
Start: 2024-06-11 | End: 2024-08-09

## 2024-06-11 NOTE — TELEPHONE ENCOUNTER
General Call    Contacts         Type Contact Phone/Fax    06/11/2024 08:32 AM CDT Phone (Incoming) LyonsKarla (Self) [phone] (M)          Reason for Call:  PLEASE CALL PT NOW.. had to leave hospital after surgery without picking up the post-op pain meds.    NEEDS to be liquid pain meds, and please call to coordinate with her other pain meds.    Could we send this information to you in [facility] [employer] or would you prefer to receive a phone call?:   Patient would prefer a phone call   Okay to leave a detailed message?: Yes at Cell number on file:    Telephone Information:   Mobile [phone]

## 2024-06-11 NOTE — TELEPHONE ENCOUNTER
Patient was unable to  her oxycodone after her procedure because she is on chronic pain meds. She was wondering if she can have a few Toradol for breakthru pain.    Dr Machuca notified of above

## 2024-06-12 DIAGNOSIS — I10 HYPERTENSION, UNSPECIFIED TYPE: ICD-10-CM

## 2024-06-12 RX ORDER — LISINOPRIL 10 MG/1
10 TABLET ORAL DAILY
Qty: 90 TABLET | Refills: 1 | Status: SHIPPED | OUTPATIENT
Start: 2024-06-12

## 2024-06-12 NOTE — TELEPHONE ENCOUNTER
Routing refill request to provider for review/approval because:  Drug interaction warning between torodal and lisinopril.    BP Readings from Last 3 Encounters:   06/10/24 133/79   06/06/24 138/88   05/28/24 (!) 124/97     Julie Behrendt RN

## 2024-06-13 ENCOUNTER — VIRTUAL VISIT (OUTPATIENT)
Dept: FAMILY MEDICINE | Facility: CLINIC | Age: 54
End: 2024-06-13
Attending: PHYSICIAN ASSISTANT
Payer: COMMERCIAL

## 2024-06-13 DIAGNOSIS — K22.70 BARRETT'S ESOPHAGUS WITHOUT DYSPLASIA: ICD-10-CM

## 2024-06-13 DIAGNOSIS — F11.20 CONTINUOUS OPIOID DEPENDENCE (H): ICD-10-CM

## 2024-06-13 DIAGNOSIS — K22.4 ESOPHAGEAL DYSMOTILITY: ICD-10-CM

## 2024-06-13 DIAGNOSIS — F41.1 GENERALIZED ANXIETY DISORDER: ICD-10-CM

## 2024-06-13 DIAGNOSIS — F33.1 MODERATE EPISODE OF RECURRENT MAJOR DEPRESSIVE DISORDER (H): ICD-10-CM

## 2024-06-13 DIAGNOSIS — J32.4 CHRONIC PANSINUSITIS: ICD-10-CM

## 2024-06-13 DIAGNOSIS — J01.90 ACUTE SINUSITIS WITH SYMPTOMS > 10 DAYS: ICD-10-CM

## 2024-06-13 DIAGNOSIS — Z98.84 HISTORY OF LAPAROSCOPIC ADJUSTABLE GASTRIC BANDING: Primary | ICD-10-CM

## 2024-06-13 PROCEDURE — G2211 COMPLEX E/M VISIT ADD ON: HCPCS | Mod: 95 | Performed by: PHYSICIAN ASSISTANT

## 2024-06-13 PROCEDURE — 99214 OFFICE O/P EST MOD 30 MIN: CPT | Mod: 95 | Performed by: PHYSICIAN ASSISTANT

## 2024-06-13 RX ORDER — FLUTICASONE PROPIONATE 50 MCG
1-2 SPRAY, SUSPENSION (ML) NASAL 2 TIMES DAILY PRN
Qty: 18.2 ML | Refills: 11 | Status: SHIPPED | OUTPATIENT
Start: 2024-06-13

## 2024-06-13 ASSESSMENT — ANXIETY QUESTIONNAIRES
7. FEELING AFRAID AS IF SOMETHING AWFUL MIGHT HAPPEN: NOT AT ALL
2. NOT BEING ABLE TO STOP OR CONTROL WORRYING: NOT AT ALL
GAD7 TOTAL SCORE: 0
GAD7 TOTAL SCORE: 0
5. BEING SO RESTLESS THAT IT IS HARD TO SIT STILL: NOT AT ALL
IF YOU CHECKED OFF ANY PROBLEMS ON THIS QUESTIONNAIRE, HOW DIFFICULT HAVE THESE PROBLEMS MADE IT FOR YOU TO DO YOUR WORK, TAKE CARE OF THINGS AT HOME, OR GET ALONG WITH OTHER PEOPLE: NOT DIFFICULT AT ALL
6. BECOMING EASILY ANNOYED OR IRRITABLE: NOT AT ALL
1. FEELING NERVOUS, ANXIOUS, OR ON EDGE: NOT AT ALL
3. WORRYING TOO MUCH ABOUT DIFFERENT THINGS: NOT AT ALL

## 2024-06-13 ASSESSMENT — PATIENT HEALTH QUESTIONNAIRE - PHQ9
5. POOR APPETITE OR OVEREATING: NOT AT ALL
SUM OF ALL RESPONSES TO PHQ QUESTIONS 1-9: 0

## 2024-06-13 NOTE — PROGRESS NOTES
Karla is a 54 year old who is being evaluated via a billable video visit.    How would you like to obtain your AVS? MyChart  If the video visit is dropped, the invitation should be resent by: Text to cell phone: [phone]  Will anyone else be joining your video visit? No      Assessment & Plan     History of laparoscopic adjustable gastric banding  Esophageal dysmotility and gastroparesis  F11.2 - Continuous opioid dependence (H)  Saldana's esophagus without dysplasia  GI symptoms seem much improved since lap band removed 3 days ago however it is too early to know if this trend will last.  Recheck 1 month to reconsider tapering chronic narcotics to help with nausea/vomiting and consider trying to switch some medications from liquid back to pill form.    Generalized anxiety disorder  Moderate episode of recurrent major depressive disorder (H)  Improved x 3 days, monitor.  She is motivated to reduce prozac from current 80 mg but will have her wait to give a bit more time to get baseline before she reduces.    Chronic pansinusitis  Acute sinusitis with symptoms > 10 days  refill  - fluticasone (FLONASE) 50 MCG/ACT nasal spray; Spray 1-2 sprays into both nostrils 2 times daily as needed for rhinitis or allergies    The longitudinal plan of care for the diagnosis(es)/condition(s) as documented were addressed during this visit. Due to the added complexity in care, I will continue to support Karla in the subsequent management and with ongoing continuity of care.    Patient Instructions   Doing well - cross our fingers     Stay on prozac at 80 mg for at least another week to make sure you know your baseline, but then can try decreasing to 60 mg (15 mL).  Note date of when you decrease.    Endoscopy 1 yr from previous for saldana's surveillance - scheduling now mayhelp keep your brain from obsessing on how saldana's is doing    Keep the upcoming counseling appt    See me in a month or so to follow up on stomach symptoms,  mood symptoms, and see if can reduce meds    Subjective   Karla is a 54 year old, presenting for the following health issues:  Depression and Anxiety      2024     9:08 AM   Additional Questions   Roomed by kamari davis cma     HPI   Depression and Anxiety   How are you doing with your depression since your last visit? Improved   How are you doing with your anxiety since your last visit?  Improved   Are you having other symptoms that might be associated with depression or anxiety? No  Have you had a significant life event? Health Concerns   Do you have any concerns with your use of alcohol or other drugs? No    Feels amazing.  Hasn't had nausea or vomiting since lap band removed on Monday.  Was able to eat a grilled cheese.  Not needing zofran.  Mentally feels much more clear.  No zyprexa since .  Social History     Tobacco Use    Smoking status: Former     Current packs/day: 0.00     Average packs/day: 0.5 packs/day for 40.0 years (20.0 ttl pk-yrs)     Types: Cigarettes     Start date: 1983     Quit date: 2023     Years since quittin.4    Smokeless tobacco: Never    Tobacco comments:     started smoking age 22.  never more than 0.5 ppd   Vaping Use    Vaping status: Former   Substance Use Topics    Alcohol use: Not Currently    Drug use: Yes     Types: Oxycodone         4/10/2024     1:09 PM 2024     7:55 AM 6/3/2024     2:46 PM   PHQ   PHQ-9 Total Score 3 11 0   Q9: Thoughts of better off dead/self-harm past 2 weeks Not at all Not at all Not at all         2023     7:35 AM 2024     7:55 AM 6/3/2024     2:46 PM   NANCI-7 SCORE   Total Score 3 19 1         6/3/2024     2:46 PM   Last PHQ-9   1.  Little interest or pleasure in doing things 0   2.  Feeling down, depressed, or hopeless 0   3.  Trouble falling or staying asleep, or sleeping too much 0   4.  Feeling tired or having little energy 0   5.  Poor appetite or overeating 0   6.  Feeling bad about yourself 0   7.  Trouble  concentrating 0   8.  Moving slowly or restless 0   Q9: Thoughts of better off dead/self-harm past 2 weeks 0   PHQ-9 Total Score 0   Difficulty at work, home, or with people Not difficult at all         6/3/2024     2:46 PM   NANCI-7    1. Feeling nervous, anxious, or on edge 1   2. Not being able to stop or control worrying 0   3. Worrying too much about different things 0   4. Trouble relaxing 0   5. Being so restless that it is hard to sit still 0   6. Becoming easily annoyed or irritable 0   7. Feeling afraid, as if something awful might happen 0   NANCI-7 Total Score 1   If you checked any problems, how difficult have they made it for you to do your work, take care of things at home, or get along with other people? Not difficult at all         Objective           Vitals:  No vitals were obtained today due to virtual visit.        Video-Visit Details    Type of service:  Video Visit   Originating Location (pt. Location): Home    Distant Location (provider location):  On-site  Platform used for Video Visit: Alesha  Signed Electronically by: Rosaura Flores PA-C

## 2024-06-13 NOTE — NURSING NOTE
"Chief Complaint   Patient presents with    Depression    Anxiety       Initial LMP  (LMP Unknown)  Estimated body mass index is 27.44 kg/m  as calculated from the following:    Height as of 6/10/24: 1.651 m (5' 5\").    Weight as of 6/10/24: 74.8 kg (164 lb 14.5 oz).    Patient presents to the clinic using No DME    Is there anyone who you would like to be able to receive your results? No  If yes have patient fill out CICI      "

## 2024-06-13 NOTE — PATIENT INSTRUCTIONS
Doing well - cross our fingers     Stay on prozac at 80 mg for at least another week to make sure you know your baseline, but then can try decreasing to 60 mg (15 mL).  Note date of when you decrease.    Endoscopy 1 yr from previous for saldana's surveillance - scheduling now mayhelp keep your brain from obsessing on how saldana's is doing    Keep the upcoming counseling appt    See me in a month or so to follow up on stomach symptoms, mood symptoms, and see if can reduce meds

## 2024-06-19 ENCOUNTER — MYC MEDICAL ADVICE (OUTPATIENT)
Dept: FAMILY MEDICINE | Facility: CLINIC | Age: 54
End: 2024-06-19
Payer: COMMERCIAL

## 2024-06-19 DIAGNOSIS — F11.20 CONTINUOUS OPIOID DEPENDENCE (H): Primary | ICD-10-CM

## 2024-06-19 DIAGNOSIS — F41.9 ANXIETY: ICD-10-CM

## 2024-06-19 DIAGNOSIS — G89.4 CHRONIC PAIN DISORDER: ICD-10-CM

## 2024-06-19 RX ORDER — FLUOXETINE 40 MG/1
80 CAPSULE ORAL DAILY
Qty: 60 CAPSULE | Refills: 0 | Status: SHIPPED | OUTPATIENT
Start: 2024-06-19 | End: 2024-07-23

## 2024-06-19 RX ORDER — OXYCODONE HYDROCHLORIDE 10 MG/1
10 TABLET ORAL 3 TIMES DAILY PRN
Qty: 90 TABLET | Refills: 0 | Status: SHIPPED | OUTPATIENT
Start: 2024-07-06 | End: 2024-07-22

## 2024-07-22 ENCOUNTER — MYC REFILL (OUTPATIENT)
Dept: FAMILY MEDICINE | Facility: CLINIC | Age: 54
End: 2024-07-22
Payer: COMMERCIAL

## 2024-07-22 DIAGNOSIS — G89.4 CHRONIC PAIN DISORDER: ICD-10-CM

## 2024-07-22 DIAGNOSIS — F11.20 CONTINUOUS OPIOID DEPENDENCE (H): ICD-10-CM

## 2024-07-22 RX ORDER — OXYCODONE HYDROCHLORIDE 10 MG/1
10 TABLET ORAL 3 TIMES DAILY PRN
Qty: 90 TABLET | Refills: 0 | Status: SHIPPED | OUTPATIENT
Start: 2024-07-22 | End: 2024-08-09

## 2024-07-23 DIAGNOSIS — F41.9 ANXIETY: ICD-10-CM

## 2024-07-23 RX ORDER — FLUOXETINE 40 MG/1
80 CAPSULE ORAL DAILY
Qty: 60 CAPSULE | Refills: 1 | Status: SHIPPED | OUTPATIENT
Start: 2024-07-23 | End: 2024-10-01

## 2024-07-23 NOTE — TELEPHONE ENCOUNTER
Routing refill request to provider for review/approval because:  Drug interaction warning with ketorolac and fluoxetine.   Last Written Prescription Date:  6/19/24  Last Fill Quantity: 60,  # refills: 0   Last office visit: 6/6/2024 ; last virtual visit: 7/15/2024 with prescribing provider:  0   Future Office Visit:   Next 5 appointments (look out 90 days)      Aug 09, 2024 8:00 AM  (Arrive by 7:55 AM)  Provider Visit with Rosaura Flores PA-C  [facility] ([facility] ) [address] [place] [id number]  [phone]           Julie Behrendt RN

## 2024-08-09 ENCOUNTER — VIRTUAL VISIT (OUTPATIENT)
Dept: FAMILY MEDICINE | Facility: CLINIC | Age: 54
End: 2024-08-09
Payer: COMMERCIAL

## 2024-08-09 DIAGNOSIS — F41.1 GENERALIZED ANXIETY DISORDER: ICD-10-CM

## 2024-08-09 DIAGNOSIS — Z98.1 STATUS POST LAMINECTOMY WITH SPINAL FUSION: ICD-10-CM

## 2024-08-09 DIAGNOSIS — K21.9 GASTROESOPHAGEAL REFLUX DISEASE WITHOUT ESOPHAGITIS: ICD-10-CM

## 2024-08-09 DIAGNOSIS — F42.9 OBSESSIVE-COMPULSIVE DISORDER, UNSPECIFIED TYPE: ICD-10-CM

## 2024-08-09 DIAGNOSIS — A69.23 LYME ARTHRITIS (H): ICD-10-CM

## 2024-08-09 DIAGNOSIS — F11.20 CONTINUOUS OPIOID DEPENDENCE (H): ICD-10-CM

## 2024-08-09 DIAGNOSIS — K22.70 BARRETT'S ESOPHAGUS WITHOUT DYSPLASIA: ICD-10-CM

## 2024-08-09 DIAGNOSIS — G89.4 CHRONIC PAIN DISORDER: Primary | ICD-10-CM

## 2024-08-09 DIAGNOSIS — J30.9 ALLERGIC RHINITIS, UNSPECIFIED SEASONALITY, UNSPECIFIED TRIGGER: ICD-10-CM

## 2024-08-09 PROCEDURE — G2211 COMPLEX E/M VISIT ADD ON: HCPCS | Mod: 95 | Performed by: PHYSICIAN ASSISTANT

## 2024-08-09 PROCEDURE — 99215 OFFICE O/P EST HI 40 MIN: CPT | Mod: 95 | Performed by: PHYSICIAN ASSISTANT

## 2024-08-09 RX ORDER — CETIRIZINE HYDROCHLORIDE 10 MG/1
10 TABLET ORAL DAILY
Qty: 90 TABLET | Refills: 3 | Status: SHIPPED | OUTPATIENT
Start: 2024-08-09

## 2024-08-09 RX ORDER — BUSPIRONE HYDROCHLORIDE 15 MG/1
TABLET ORAL
Qty: 90 TABLET | Refills: 1 | Status: SHIPPED | OUTPATIENT
Start: 2024-08-09 | End: 2024-10-07

## 2024-08-09 RX ORDER — MORPHINE SULFATE 15 MG/1
15 TABLET, FILM COATED, EXTENDED RELEASE ORAL EVERY 12 HOURS
Qty: 60 TABLET | Refills: 0 | Status: SHIPPED | OUTPATIENT
Start: 2024-08-09 | End: 2024-08-28 | Stop reason: ALTCHOICE

## 2024-08-09 RX ORDER — FAMOTIDINE 20 MG/1
20 TABLET, FILM COATED ORAL 2 TIMES DAILY PRN
Qty: 60 TABLET | Refills: 1 | Status: SHIPPED | OUTPATIENT
Start: 2024-08-09

## 2024-08-09 RX ORDER — OXYCODONE HYDROCHLORIDE 10 MG/1
10 TABLET ORAL DAILY PRN
Status: SHIPPED
Start: 2024-08-09 | End: 2024-08-28

## 2024-08-09 ASSESSMENT — ANXIETY QUESTIONNAIRES
IF YOU CHECKED OFF ANY PROBLEMS ON THIS QUESTIONNAIRE, HOW DIFFICULT HAVE THESE PROBLEMS MADE IT FOR YOU TO DO YOUR WORK, TAKE CARE OF THINGS AT HOME, OR GET ALONG WITH OTHER PEOPLE: EXTREMELY DIFFICULT
GAD7 TOTAL SCORE: 14
GAD7 TOTAL SCORE: 14
5. BEING SO RESTLESS THAT IT IS HARD TO SIT STILL: NOT AT ALL
7. FEELING AFRAID AS IF SOMETHING AWFUL MIGHT HAPPEN: SEVERAL DAYS
3. WORRYING TOO MUCH ABOUT DIFFERENT THINGS: NEARLY EVERY DAY
1. FEELING NERVOUS, ANXIOUS, OR ON EDGE: NEARLY EVERY DAY
6. BECOMING EASILY ANNOYED OR IRRITABLE: SEVERAL DAYS
2. NOT BEING ABLE TO STOP OR CONTROL WORRYING: NEARLY EVERY DAY

## 2024-08-09 ASSESSMENT — ENCOUNTER SYMPTOMS: NERVOUS/ANXIOUS: 1

## 2024-08-09 ASSESSMENT — PATIENT HEALTH QUESTIONNAIRE - PHQ9
SUM OF ALL RESPONSES TO PHQ QUESTIONS 1-9: 10
5. POOR APPETITE OR OVEREATING: NEARLY EVERY DAY

## 2024-08-09 NOTE — NURSING NOTE
"No chief complaint on file.      Initial LMP  (LMP Unknown)  Estimated body mass index is 27.44 kg/m  as calculated from the following:    Height as of 6/10/24: 1.651 m (5' 5\").    Weight as of 6/10/24: 74.8 kg (164 lb 14.5 oz).    Patient presents to the clinic using No DME    Is there anyone who you would like to be able to receive your results? No  If yes have patient fill out CICI      "

## 2024-08-09 NOTE — PROGRESS NOTES
Karla is a 54 year old who is being evaluated via a billable video visit.    How would you like to obtain your AVS? MyChart  If the video visit is dropped, the invitation should be resent by: Text to cell phone: [phone]  Will anyone else be joining your video visit? No      Assessment & Plan     Chronic pain disorder  F11.2 - Continuous opioid dependence (H)  Status post laminectomy with spinal fusion  Lyme arthritis (H)  Uncontrolled, having more pain.  Discussed long acting typically safer and more stable level of medication than the short acting narcotics - she is willing to retry long acting, so med changes made.  Keeping same MME level.    - morphine (MS CONTIN) 15 MG CR tablet; Take 1 tablet (15 mg) by mouth every 12 hours Take 1 tablet (15 mg) by mouth At Bedtime  - oxyCODONE IR (ROXICODONE) 10 MG tablet; Take 1 tablet (10 mg) by mouth daily as needed for pain When starting your long acting pain medication (morphine) , decrease oxycodone to up to 1 tab daily.  Until starting morphine, continue oxycodone at 1 tab three times daily as needed.    Guzman's esophagus without dysplasia  Up to date on monitoring.      Generalized anxiety disorder  Uncontrolled, on prozac 80 mg, add buspar.  Alternative would be gabapentin.    - busPIRone (BUSPAR) 15 MG tablet; Increase if anxiety not doing well: 0.5 tab 2x/day x 3 days, then 1 tab 2x/day x 3 days, then 1.5 tab 2x/day x 3 days, then 2 tab 2x/day.    Obsessive-compulsive disorder, unspecified type  Uncontrolled, on prozac 80 mg, again discussed adding counseling.  Has had scheduling difficulties.  Will try EAP through 's work as a temporary.    Gastroesophageal reflux disease without esophagitis  Uncontrolled, worsened symptoms since lap band removed.  On omeprazole 40 mg.  Add famotidine.  - famotidine (PEPCID) 20 MG tablet; Take 1 tablet (20 mg) by mouth 2 times daily as needed .  Continue omeprazole.    Allergic rhinitis, unspecified seasonality,  unspecified trigger  Refill.  Had self-changed back from liquid formulation to pills.  - cetirizine (ZYRTEC) 10 MG tablet; Take 1 tablet (10 mg) by mouth daily    The longitudinal plan of care for the diagnosis(es)/condition(s) as documented were addressed during this visit. Due to the added complexity in care, I will continue to support Karla in the subsequent management and with ongoing continuity of care.    41 min total spent day of visit with patient visit (31 min) and documentation   Patient Instructions   Set up the counseling - use your 's option through work if needed  Add buspirone to help anxiety  As best you can, try to reassure and redirect self when find yourself worrying  I don't see saldana's progressing quickly.  We are monitoring.  You were scoped very recently.  Add famotidine to help heartburn.    Subjective   Karla is a 54 year old, presenting for the following health issues:  No chief complaint on file.      8/9/2024     7:53 AM   Additional Questions   Roomed by kamari davis cma     Anxiety    Musculoskeletal Problem       Last seen 6/13.  She is generally doing better with stomach since having lap band removed.  No vomiting.  Does get nausea - takes zofran but not daily.  Can't eat too late at night/before bed.  Food no longer gets stuck.  Is glad that she removed the band - but feels more heartburn and so she is worried that her barretts will get worse.  Has learned still can't eat chocolate or red sauce.      Also feels that her back is looking very hunched.  Hasn't seen her surgeon in 6 yrs.  Has appt with surgeon 8/22.    Never has had as many panic attacks while on prozac as she has been having lately.  Worse at night.  Not using as much of her cannabis since it makes her want to eat.  Notes her binge eating is out of control.      Chronic/Recurring Back Pain Follow Up  Where is your back pain located? (Select all that apply) low back both, neck right, and shoulders both  How would  you describe your back pain?  gnawing and sharp  Where does your back pain spread? the right and left  thigh  Since your last clinic visit for back pain, how has your pain changed? gradually worsening  Does your back pain interfere with your job? YES    Anxiety   How are you doing with your anxiety since your last visit? Worsened   Are you having other symptoms that might be associated with anxiety? Yes  Have you had a significant life event? No   Are you feeling depressed? No  Do you have any concerns with your use of alcohol or other drugs? No    Just found out dad .   is on Tues.  Social History     Tobacco Use    Smoking status: Former     Current packs/day: 0.00     Average packs/day: 0.5 packs/day for 40.0 years (20.0 ttl pk-yrs)     Types: Cigarettes     Start date: 1983     Quit date: 2023     Years since quittin.6    Smokeless tobacco: Never    Tobacco comments:     started smoking age 22.  never more than 0.5 ppd   Vaping Use    Vaping status: Former   Substance Use Topics    Alcohol use: Not Currently    Drug use: Yes     Types: Oxycodone         2024     7:55 AM 6/3/2024     2:46 PM 2024     9:13 AM   NANCI-7 SCORE   Total Score 19 1 0         2024     7:55 AM 6/3/2024     2:46 PM 2024     9:13 AM   PHQ   PHQ-9 Total Score 11 0 0   Q9: Thoughts of better off dead/self-harm past 2 weeks Not at all Not at all Not at all         2024     7:59 AM   Last PHQ-9   1.  Little interest or pleasure in doing things 3   2.  Feeling down, depressed, or hopeless 1   3.  Trouble falling or staying asleep, or sleeping too much 0   4.  Feeling tired or having little energy 3   5.  Poor appetite or overeating 3   6.  Feeling bad about yourself 0   7.  Trouble concentrating 0   8.  Moving slowly or restless 0   Q9: Thoughts of better off dead/self-harm past 2 weeks 0   PHQ-9 Total Score 10   Difficulty at work, home, or with people Extremely dIfficult         2024     7:59 AM    NANCI-7    1. Feeling nervous, anxious, or on edge 3   2. Not being able to stop or control worrying 3   3. Worrying too much about different things 3   4. Trouble relaxing 3   5. Being so restless that it is hard to sit still 0   6. Becoming easily annoyed or irritable 1   7. Feeling afraid, as if something awful might happen 1   NANCI-7 Total Score 14   If you checked any problems, how difficult have they made it for you to do your work, take care of things at home, or get along with other people? Extremely difficult       Objective           Vitals:  No vitals were obtained today due to virtual visit.        Video-Visit Details    Type of service:  Video Visit   Originating Location (pt. Location): Home    Distant Location (provider location):  On-site  Platform used for Video Visit: Alesha  Signed Electronically by: Rosaura Flores PA-C

## 2024-08-09 NOTE — PATIENT INSTRUCTIONS
Set up the counseling - use your 's option through work if needed  Add buspirone to help anxiety  As best you can, try to reassure and redirect self when find yourself worrying  I don't see saldana's progressing quickly.  We are monitoring.  You were scoped very recently.  Add famotidine to help heartburn.

## 2024-08-13 ENCOUNTER — MYC MEDICAL ADVICE (OUTPATIENT)
Dept: FAMILY MEDICINE | Facility: CLINIC | Age: 54
End: 2024-08-13
Payer: COMMERCIAL

## 2024-08-13 DIAGNOSIS — F33.1 MODERATE EPISODE OF RECURRENT MAJOR DEPRESSIVE DISORDER (H): ICD-10-CM

## 2024-08-13 DIAGNOSIS — F42.9 OBSESSIVE-COMPULSIVE DISORDER, UNSPECIFIED TYPE: ICD-10-CM

## 2024-08-13 DIAGNOSIS — F41.1 GENERALIZED ANXIETY DISORDER: Primary | ICD-10-CM

## 2024-08-22 ENCOUNTER — TRANSFERRED RECORDS (OUTPATIENT)
Dept: HEALTH INFORMATION MANAGEMENT | Facility: CLINIC | Age: 54
End: 2024-08-22
Payer: COMMERCIAL

## 2024-08-27 ENCOUNTER — MYC MEDICAL ADVICE (OUTPATIENT)
Dept: FAMILY MEDICINE | Facility: CLINIC | Age: 54
End: 2024-08-27

## 2024-08-28 ENCOUNTER — MYC MEDICAL ADVICE (OUTPATIENT)
Dept: FAMILY MEDICINE | Facility: CLINIC | Age: 54
End: 2024-08-28
Payer: COMMERCIAL

## 2024-08-28 ENCOUNTER — VIRTUAL VISIT (OUTPATIENT)
Dept: FAMILY MEDICINE | Facility: CLINIC | Age: 54
End: 2024-08-28
Payer: COMMERCIAL

## 2024-08-28 DIAGNOSIS — F41.1 GENERALIZED ANXIETY DISORDER: ICD-10-CM

## 2024-08-28 DIAGNOSIS — G89.4 CHRONIC PAIN DISORDER: ICD-10-CM

## 2024-08-28 DIAGNOSIS — Z98.1 STATUS POST LAMINECTOMY WITH SPINAL FUSION: ICD-10-CM

## 2024-08-28 DIAGNOSIS — F11.20 CONTINUOUS OPIOID DEPENDENCE (H): Primary | ICD-10-CM

## 2024-08-28 PROCEDURE — 99214 OFFICE O/P EST MOD 30 MIN: CPT | Mod: 95 | Performed by: PHYSICIAN ASSISTANT

## 2024-08-28 PROCEDURE — G2211 COMPLEX E/M VISIT ADD ON: HCPCS | Mod: 95 | Performed by: PHYSICIAN ASSISTANT

## 2024-08-28 RX ORDER — OXYCODONE HYDROCHLORIDE 10 MG/1
10 TABLET ORAL EVERY 8 HOURS PRN
Status: SHIPPED
Start: 2024-08-28 | End: 2024-09-09

## 2024-08-28 RX ORDER — OXYCODONE HYDROCHLORIDE 10 MG/1
10 TABLET ORAL EVERY 8 HOURS PRN
Qty: 90 TABLET | Refills: 0 | Status: SHIPPED | OUTPATIENT
Start: 2024-09-09 | End: 2024-09-26

## 2024-08-28 ASSESSMENT — PATIENT HEALTH QUESTIONNAIRE - PHQ9
5. POOR APPETITE OR OVEREATING: NOT AT ALL
SUM OF ALL RESPONSES TO PHQ QUESTIONS 1-9: 2

## 2024-08-28 ASSESSMENT — ANXIETY QUESTIONNAIRES
GAD7 TOTAL SCORE: 3
GAD7 TOTAL SCORE: 3
7. FEELING AFRAID AS IF SOMETHING AWFUL MIGHT HAPPEN: NOT AT ALL
6. BECOMING EASILY ANNOYED OR IRRITABLE: SEVERAL DAYS
2. NOT BEING ABLE TO STOP OR CONTROL WORRYING: NOT AT ALL
5. BEING SO RESTLESS THAT IT IS HARD TO SIT STILL: NOT AT ALL
1. FEELING NERVOUS, ANXIOUS, OR ON EDGE: NOT AT ALL
3. WORRYING TOO MUCH ABOUT DIFFERENT THINGS: MORE THAN HALF THE DAYS
IF YOU CHECKED OFF ANY PROBLEMS ON THIS QUESTIONNAIRE, HOW DIFFICULT HAVE THESE PROBLEMS MADE IT FOR YOU TO DO YOUR WORK, TAKE CARE OF THINGS AT HOME, OR GET ALONG WITH OTHER PEOPLE: SOMEWHAT DIFFICULT

## 2024-08-28 NOTE — NURSING NOTE
"Chief Complaint   Patient presents with    Back Pain       Initial LMP  (LMP Unknown)  Estimated body mass index is 27.44 kg/m  as calculated from the following:    Height as of 6/10/24: 1.651 m (5' 5\").    Weight as of 6/10/24: 74.8 kg (164 lb 14.5 oz).    Patient presents to the clinic using No DME    Is there anyone who you would like to be able to receive your results? No  If yes have patient fill out CICI      "

## 2024-08-28 NOTE — PATIENT INSTRUCTIONS
Do your MRI and keep working with back surgeon  Decided meanwhile to go back to previous pain pill plan  Still recommend working on finding counselor

## 2024-08-28 NOTE — PROGRESS NOTES
Karla is a 54 year old who is being evaluated via a billable video visit.    How would you like to obtain your AVS? MyChart  If the video visit is dropped, the invitation should be resent by: Text to cell phone: [phone]  Will anyone else be joining your video visit? No      Assessment & Plan     F11.2 - Continuous opioid dependence (H)  Chronic pain disorder  Status post laminectomy with spinal fusion  Uncontrolled pain since attempt to transition to long acting.  Had had similar issues years ago.  Opts to go back to her previous pain control plan of just short acting medications.  Urine drug screen and RISOD questionnaire next visit - contract is up to date.  - oxyCODONE IR (ROXICODONE) 10 MG tablet; Take 1 tablet (10 mg) by mouth every 8 hours as needed for pain. Do not start before September 9, 2024.  - oxyCODONE IR (ROXICODONE) 10 MG tablet; Take 1 tablet (10 mg) by mouth every 8 hours as needed for severe pain.    Generalized anxiety disorder  Improved at buspar 15 mg twice daily.  Has counseling scheduled.  Monitor.    The longitudinal plan of care for the diagnosis(es)/condition(s) as documented were addressed during this visit. Due to the added complexity in care, I will continue to support Karla in the subsequent management and with ongoing continuity of care.    Patient Instructions   Do your MRI and keep working with back surgeon  Decided meanwhile to go back to previous pain pill plan  Still recommend working on finding counselor     Subjective   Karla is a 54 year old, presenting for the following health issues:  Back Pain      8/28/2024     2:05 PM   Additional Questions   Roomed by kamari davis cma     HPI     Pain History:  When did you first notice your pain? 35 years ago   Have you seen this provider for your pain in the past? Yes   Where in your body do you have pain? Middle to lower   Are you seeing anyone else for your pain? No    Saw surgeon 8/22, plan repeat MRI, tentatively with plan of  medial branch blocks and if blocks are helpful, then surgeon says surgery will be helpful.       NANCI - buspar 15 mg twice daily helpful, was tired if at higher doses.          6/3/2024     2:46 PM 6/13/2024     9:13 AM 8/9/2024     7:59 AM   PHQ-9 SCORE   PHQ-9 Total Score 0 0 10           6/3/2024     2:46 PM 6/13/2024     9:13 AM 8/9/2024     7:59 AM   NANCI-7 SCORE   Total Score 1 0 14           6/13/2024     9:13 AM 8/9/2024     7:59 AM 8/28/2024     2:09 PM   PHQ-9 SCORE   PHQ-9 Total Score 0 10 2           6/13/2024     9:13 AM 8/9/2024     7:59 AM 8/28/2024     2:09 PM   NANCI-7 SCORE   Total Score 0 14 3           6/13/2024     9:13 AM 8/9/2024     7:59 AM 8/28/2024     2:09 PM   PEG Score   PEG Total Score 3.67 9.33 8       Chronic Pain Follow Up:    Location of pain: middle to lower back   Analgesia/pain control:    - Recent changes:  yes     - Overall control: Inadequate pain control    - Current treatments: pain medication   Adherence:     - Do you ever take more pain medicine than prescribed? No    - When did you take your last dose of pain medicine?  This morning   Adverse effects: No   PDMP Review         Value Time User    State PDMP site checked  Yes 6/6/2024  2:34 PM Rosaura Flores PA-C          Last CSA Agreement:   CSA -- Patient Level:     [Media Unavailable] Controlled Substance Agreement - Opioid - Scan on 5/8/2024 11:12 AM   [Media Unavailable] Controlled Substance Agreement - Opioid - Scan on 8/16/2023  8:45 AM   [Media Unavailable] Controlled Substance Agreement - Opioid - Scan on 5/13/2021   [Media Unavailable] Controlled Substance Agreement - Opioid - Scan on 3/2/2020: OUTSIDE RECORD       Last UDS: 8/18/2023    Objective           Vitals:  No vitals were obtained today due to virtual visit.          Video-Visit Details    Type of service:  Video Visit   Originating Location (pt. Location): Home    Distant Location (provider location):  On-site  Platform used for Video Visit:  Alesha  Signed Electronically by: Rosaura Flores PA-C

## 2024-08-29 ENCOUNTER — E-VISIT (OUTPATIENT)
Dept: FAMILY MEDICINE | Facility: CLINIC | Age: 54
End: 2024-08-29
Payer: COMMERCIAL

## 2024-08-29 DIAGNOSIS — J01.90 ACUTE SINUSITIS WITH SYMPTOMS > 10 DAYS: Primary | ICD-10-CM

## 2024-08-29 PROCEDURE — 99421 OL DIG E/M SVC 5-10 MIN: CPT | Performed by: PHYSICIAN ASSISTANT

## 2024-09-04 ENCOUNTER — TRANSCRIBE ORDERS (OUTPATIENT)
Dept: OTHER | Age: 54
End: 2024-09-04

## 2024-09-04 DIAGNOSIS — Z98.1 ARTHRODESIS STATUS: Primary | ICD-10-CM

## 2024-09-04 DIAGNOSIS — M54.50 PAIN, LUMBAR REGION: ICD-10-CM

## 2024-09-10 ENCOUNTER — E-VISIT (OUTPATIENT)
Dept: FAMILY MEDICINE | Facility: CLINIC | Age: 54
End: 2024-09-10
Payer: COMMERCIAL

## 2024-09-10 DIAGNOSIS — M54.9 BACK PAIN WITH SCIATICA: ICD-10-CM

## 2024-09-10 DIAGNOSIS — M54.30 BACK PAIN WITH SCIATICA: ICD-10-CM

## 2024-09-10 DIAGNOSIS — M62.838 MUSCLE SPASM: Primary | ICD-10-CM

## 2024-09-10 PROCEDURE — 99422 OL DIG E/M SVC 11-20 MIN: CPT | Performed by: PHYSICIAN ASSISTANT

## 2024-09-10 RX ORDER — METHOCARBAMOL 750 MG/1
TABLET, FILM COATED ORAL
Qty: 120 TABLET | Refills: 4 | Status: SHIPPED | OUTPATIENT
Start: 2024-09-10

## 2024-09-10 RX ORDER — CYCLOBENZAPRINE HCL 10 MG
10 TABLET ORAL 3 TIMES DAILY PRN
Qty: 30 TABLET | Refills: 0 | Status: CANCELLED | OUTPATIENT
Start: 2024-09-10

## 2024-09-10 RX ORDER — METHYLPREDNISOLONE 4 MG
TABLET, DOSE PACK ORAL
Qty: 21 TABLET | Refills: 0 | Status: SHIPPED | OUTPATIENT
Start: 2024-09-10

## 2024-09-19 ENCOUNTER — THERAPY VISIT (OUTPATIENT)
Dept: PHYSICAL THERAPY | Facility: CLINIC | Age: 54
End: 2024-09-19
Attending: SPECIALIST
Payer: COMMERCIAL

## 2024-09-19 DIAGNOSIS — M54.50 PAIN, LUMBAR REGION: ICD-10-CM

## 2024-09-19 DIAGNOSIS — Z98.1 ARTHRODESIS STATUS: ICD-10-CM

## 2024-09-19 PROCEDURE — 97162 PT EVAL MOD COMPLEX 30 MIN: CPT | Mod: GP | Performed by: PHYSICAL THERAPIST

## 2024-09-19 PROCEDURE — 97110 THERAPEUTIC EXERCISES: CPT | Mod: GP | Performed by: PHYSICAL THERAPIST

## 2024-09-19 PROCEDURE — 97112 NEUROMUSCULAR REEDUCATION: CPT | Mod: GP | Performed by: PHYSICAL THERAPIST
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
## 2024-09-24 ENCOUNTER — MYC MEDICAL ADVICE (OUTPATIENT)
Dept: FAMILY MEDICINE | Facility: CLINIC | Age: 54
End: 2024-09-24
Payer: COMMERCIAL

## 2024-09-24 DIAGNOSIS — B00.1 RECURRENT COLD SORES: ICD-10-CM

## 2024-09-24 RX ORDER — VALACYCLOVIR HYDROCHLORIDE 500 MG/1
500 TABLET, FILM COATED ORAL 2 TIMES DAILY
Qty: 18 TABLET | Refills: 3 | Status: SHIPPED | OUTPATIENT
Start: 2024-09-24

## 2024-09-24 NOTE — TELEPHONE ENCOUNTER
Routing refill request to provider for review/approval because:  Drug not active on patient's medication list    Last Written Prescription Date:  4/20/24   Last Fill Quantity: 96,  # refills: 0   Last office visit: 6/6/2024 ; last virtual visit: 8/28/2024 with prescribing provider:     Future Office Visit:      Julie Behrendt RN

## 2024-09-26 ENCOUNTER — MYC REFILL (OUTPATIENT)
Dept: FAMILY MEDICINE | Facility: CLINIC | Age: 54
End: 2024-09-26
Payer: COMMERCIAL

## 2024-09-26 DIAGNOSIS — F11.20 CONTINUOUS OPIOID DEPENDENCE (H): ICD-10-CM

## 2024-09-26 DIAGNOSIS — G89.4 CHRONIC PAIN DISORDER: ICD-10-CM

## 2024-09-27 RX ORDER — OXYCODONE HYDROCHLORIDE 10 MG/1
10 TABLET ORAL EVERY 8 HOURS PRN
Qty: 90 TABLET | Refills: 0 | Status: SHIPPED | OUTPATIENT
Start: 2024-09-27

## 2024-10-01 DIAGNOSIS — F41.9 ANXIETY: ICD-10-CM

## 2024-10-01 RX ORDER — FLUOXETINE 40 MG/1
80 CAPSULE ORAL DAILY
Qty: 180 CAPSULE | Refills: 1 | Status: SHIPPED | OUTPATIENT
Start: 2024-10-01

## 2024-10-07 ENCOUNTER — OFFICE VISIT (OUTPATIENT)
Dept: BEHAVIORAL HEALTH | Facility: CLINIC | Age: 54
End: 2024-10-07
Payer: COMMERCIAL

## 2024-10-07 ENCOUNTER — OFFICE VISIT (OUTPATIENT)
Dept: PSYCHIATRY | Facility: CLINIC | Age: 54
End: 2024-10-07
Payer: COMMERCIAL

## 2024-10-07 VITALS
HEART RATE: 72 BPM | OXYGEN SATURATION: 97 % | BODY MASS INDEX: 27.49 KG/M2 | HEIGHT: 65 IN | DIASTOLIC BLOOD PRESSURE: 90 MMHG | WEIGHT: 165 LBS | SYSTOLIC BLOOD PRESSURE: 152 MMHG

## 2024-10-07 DIAGNOSIS — F41.0 PANIC DISORDER WITHOUT AGORAPHOBIA: ICD-10-CM

## 2024-10-07 DIAGNOSIS — F33.1 MODERATE EPISODE OF RECURRENT MAJOR DEPRESSIVE DISORDER (H): ICD-10-CM

## 2024-10-07 DIAGNOSIS — F43.10 PTSD (POST-TRAUMATIC STRESS DISORDER): ICD-10-CM

## 2024-10-07 DIAGNOSIS — F33.1 MODERATE EPISODE OF RECURRENT MAJOR DEPRESSIVE DISORDER (H): Primary | ICD-10-CM

## 2024-10-07 DIAGNOSIS — F43.10 POSTTRAUMATIC STRESS DISORDER: Primary | ICD-10-CM

## 2024-10-07 DIAGNOSIS — F41.1 GENERALIZED ANXIETY DISORDER: ICD-10-CM

## 2024-10-07 DIAGNOSIS — F17.200 NICOTINE USE DISORDER: ICD-10-CM

## 2024-10-07 PROCEDURE — 99204 OFFICE O/P NEW MOD 45 MIN: CPT | Performed by: PSYCHIATRY & NEUROLOGY

## 2024-10-07 PROCEDURE — 90791 PSYCH DIAGNOSTIC EVALUATION: CPT | Performed by: COUNSELOR

## 2024-10-07 PROCEDURE — G2211 COMPLEX E/M VISIT ADD ON: HCPCS | Performed by: PSYCHIATRY & NEUROLOGY

## 2024-10-07 RX ORDER — OLANZAPINE 2.5 MG/1
TABLET, FILM COATED ORAL
COMMUNITY
End: 2024-10-07

## 2024-10-07 RX ORDER — BUSPIRONE HYDROCHLORIDE 15 MG/1
15 TABLET ORAL AT BEDTIME
Qty: 90 TABLET | Refills: 1 | Status: SHIPPED | OUTPATIENT
Start: 2024-10-07 | End: 2024-10-16

## 2024-10-07 RX ORDER — ZIPRASIDONE HYDROCHLORIDE 20 MG/1
20 CAPSULE ORAL 2 TIMES DAILY WITH MEALS
Qty: 60 CAPSULE | Refills: 2 | Status: SHIPPED | OUTPATIENT
Start: 2024-10-07 | End: 2024-10-16 | Stop reason: SINTOL

## 2024-10-07 ASSESSMENT — ANXIETY QUESTIONNAIRES
IF YOU CHECKED OFF ANY PROBLEMS ON THIS QUESTIONNAIRE, HOW DIFFICULT HAVE THESE PROBLEMS MADE IT FOR YOU TO DO YOUR WORK, TAKE CARE OF THINGS AT HOME, OR GET ALONG WITH OTHER PEOPLE: VERY DIFFICULT
6. BECOMING EASILY ANNOYED OR IRRITABLE: MORE THAN HALF THE DAYS
2. NOT BEING ABLE TO STOP OR CONTROL WORRYING: MORE THAN HALF THE DAYS
7. FEELING AFRAID AS IF SOMETHING AWFUL MIGHT HAPPEN: NEARLY EVERY DAY
GAD7 TOTAL SCORE: 13
8. IF YOU CHECKED OFF ANY PROBLEMS, HOW DIFFICULT HAVE THESE MADE IT FOR YOU TO DO YOUR WORK, TAKE CARE OF THINGS AT HOME, OR GET ALONG WITH OTHER PEOPLE?: VERY DIFFICULT
7. FEELING AFRAID AS IF SOMETHING AWFUL MIGHT HAPPEN: NEARLY EVERY DAY
3. WORRYING TOO MUCH ABOUT DIFFERENT THINGS: MORE THAN HALF THE DAYS
1. FEELING NERVOUS, ANXIOUS, OR ON EDGE: MORE THAN HALF THE DAYS
2. NOT BEING ABLE TO STOP OR CONTROL WORRYING: MORE THAN HALF THE DAYS
8. IF YOU CHECKED OFF ANY PROBLEMS, HOW DIFFICULT HAVE THESE MADE IT FOR YOU TO DO YOUR WORK, TAKE CARE OF THINGS AT HOME, OR GET ALONG WITH OTHER PEOPLE?: VERY DIFFICULT
GAD7 TOTAL SCORE: 13
5. BEING SO RESTLESS THAT IT IS HARD TO SIT STILL: SEVERAL DAYS
GAD7 TOTAL SCORE: 13
4. TROUBLE RELAXING: SEVERAL DAYS
5. BEING SO RESTLESS THAT IT IS HARD TO SIT STILL: SEVERAL DAYS
3. WORRYING TOO MUCH ABOUT DIFFERENT THINGS: MORE THAN HALF THE DAYS
1. FEELING NERVOUS, ANXIOUS, OR ON EDGE: MORE THAN HALF THE DAYS
7. FEELING AFRAID AS IF SOMETHING AWFUL MIGHT HAPPEN: NEARLY EVERY DAY
7. FEELING AFRAID AS IF SOMETHING AWFUL MIGHT HAPPEN: NEARLY EVERY DAY
GAD7 TOTAL SCORE: 13
GAD7 TOTAL SCORE: 13
4. TROUBLE RELAXING: SEVERAL DAYS
IF YOU CHECKED OFF ANY PROBLEMS ON THIS QUESTIONNAIRE, HOW DIFFICULT HAVE THESE PROBLEMS MADE IT FOR YOU TO DO YOUR WORK, TAKE CARE OF THINGS AT HOME, OR GET ALONG WITH OTHER PEOPLE: VERY DIFFICULT
6. BECOMING EASILY ANNOYED OR IRRITABLE: MORE THAN HALF THE DAYS
GAD7 TOTAL SCORE: 13

## 2024-10-07 ASSESSMENT — PATIENT HEALTH QUESTIONNAIRE - PHQ9
10. IF YOU CHECKED OFF ANY PROBLEMS, HOW DIFFICULT HAVE THESE PROBLEMS MADE IT FOR YOU TO DO YOUR WORK, TAKE CARE OF THINGS AT HOME, OR GET ALONG WITH OTHER PEOPLE: SOMEWHAT DIFFICULT
SUM OF ALL RESPONSES TO PHQ QUESTIONS 1-9: 12
10. IF YOU CHECKED OFF ANY PROBLEMS, HOW DIFFICULT HAVE THESE PROBLEMS MADE IT FOR YOU TO DO YOUR WORK, TAKE CARE OF THINGS AT HOME, OR GET ALONG WITH OTHER PEOPLE: SOMEWHAT DIFFICULT
SUM OF ALL RESPONSES TO PHQ QUESTIONS 1-9: 12

## 2024-10-07 ASSESSMENT — COLUMBIA-SUICIDE SEVERITY RATING SCALE - C-SSRS
ATTEMPT LIFETIME: NO
6. HAVE YOU EVER DONE ANYTHING, STARTED TO DO ANYTHING, OR PREPARED TO DO ANYTHING TO END YOUR LIFE?: NO
TOTAL  NUMBER OF ABORTED OR SELF INTERRUPTED ATTEMPTS LIFETIME: NO
1. HAVE YOU WISHED YOU WERE DEAD OR WISHED YOU COULD GO TO SLEEP AND NOT WAKE UP?: NO
2. HAVE YOU ACTUALLY HAD ANY THOUGHTS OF KILLING YOURSELF?: NO
TOTAL  NUMBER OF INTERRUPTED ATTEMPTS LIFETIME: NO
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
## 2024-10-07 NOTE — PROGRESS NOTES
Mental Health and Collaborative Care Psychiatry Service Rooming Note      Most pressing mental health concern at this time: PTSD possible       Any new physical health conditions or diagnoses affecting you that we should be aware of: back and stomach issues      Side effects related to medications patient would like to discuss with the provider:  Yes      Are you taking your medications as prescribed?  yes  If not, why? N/A      Do you need refills of any of the medications?  Patient is not she that she is on the correct medication and would like to know if she is.  If so, which ones? N/A      Are you taking any recreational substances? Medical THC        Care team has reviewed attendance agreement with patient. Patient advised that two failed appointments within 6 months may lead to termination of current episode of care.   Answers submitted by the patient for this visit:  Patient Health Questionnaire (Submitted on 10/7/2024)  If you checked off any problems, how difficult have these problems made it for you to do your work, take care of things at home, or get along with other people?: Somewhat difficult  PHQ9 TOTAL SCORE: 12          Sherita Bryant MA  October 7, 2024  9:44 AM

## 2024-10-07 NOTE — Clinical Note
Rosaura,  Thank you for consult and care of the patient. I've added geodone given zyprexa worked so well - but caused weight gain. Geodon is a newer antipsychotic that typically doesn't cause weight gain. Thank you!  Sincerely, Rakesh Correa M.D. Consultative Psychiatrist Program Medical Director, Lead Collaborative [facility] Psychiatry Service

## 2024-10-08 ENCOUNTER — MYC MEDICAL ADVICE (OUTPATIENT)
Dept: FAMILY MEDICINE | Facility: CLINIC | Age: 54
End: 2024-10-08

## 2024-10-08 NOTE — PATIENT INSTRUCTIONS
"Patient Education   Collaborative Care Psychiatry Service  What to Expect  Here's what to expect from your Collaborative Care Psychiatry Service (CCPS).   About CCPS  CCPS means 2 people work together to help you get better. You'll meet with a behavioral health clinician and a psychiatric doctor. A behavioral health clinician helps people with mental health problems by talking with them. A psychiatric doctor helps people by giving them medicine.  How it works  At every visit, you'll see the behavioral health clinician (BHC) first. They'll talk with you about how you're doing and teach you how to feel better.   Then you'll see the psychiatric doctor. This doctor can help you deal with troubling thoughts and feelings by giving you medicine. They'll make sure you know the plan for your care.   CCPS usually takes 3 to 6 visits. If you need more visits, we may have you start seeing a different psychiatric doctor for ongoing care.  If you have any questions or concerns, we'll be glad to talk with you.  About visits  Be open  At your visits, please talk openly about your problems. It may feel hard, but it's the best way for us to help you.  Cancelling visits  If you can't come to your visit, please call us right away at [phone]. If you don't cancel at least 24 hours (1 full day) before your visit, that's \"late cancellation.\"  Being late to visits  Being very late is the same as not showing up. You will be a \"no show\" if:  Your appointment starts with a BHC, and you're more than 15 minutes late for a 30-minute (half hour) visit. This will also cancel your appointment with the psychiatric doctor.  Your appointment is with a psychiatric doctor only, and you're more than 15 minutes late for a 30-minute (half hour) visit.  Your appointment is with a psychiatric doctor only, and you're more than 30 minutes late for a 60-minute (full hour) visit.  If you cancel late or don't show up 2 times within 6 months, we may end your " care.   Getting help between visits  If you need help between visits, you can call us Monday to Friday from 8 a.m. to 4:30 p.m. at [phone].  Emergency care  Call 911 or go to the nearest emergency department if your life or someone else's life is in danger.  Call 988 anytime to reach the national Suicide and Crisis hotline.  Medicine refills  To refill your medicine, call your pharmacy. You can also call [facility]'s Behavioral Access at [phone], Monday to Friday, 8 a.m. to 4:30 p.m. It can take 1 to 3 business days to get a refill.   Forms, letters, and tests  You may have papers to fill out, like FMLA, short-term disability, and workability. We can help you with these forms at your visits, but you must have an appointment. You may need more than 1 visit for this, to be in an intensive therapy program, or both.  Before we can give you medicine for ADHD, we may refer you to get tested for it or confirm it another way.  We may not be able to give you an emotional support animal letter.  We don't do mental health checks ordered by the court.   We don't do mental health testing, but we can refer you to get tested.   Thank you for choosing us for your care.  For informational purposes only. Not to replace the advice of your health care provider. Copyright   2022 [facility]. All rights reserved. [employer] [id number] - 12/22.

## 2024-10-09 ENCOUNTER — THERAPY VISIT (OUTPATIENT)
Dept: PHYSICAL THERAPY | Facility: CLINIC | Age: 54
End: 2024-10-09
Attending: SPECIALIST
Payer: COMMERCIAL

## 2024-10-09 ENCOUNTER — MYC MEDICAL ADVICE (OUTPATIENT)
Dept: FAMILY MEDICINE | Facility: CLINIC | Age: 54
End: 2024-10-09

## 2024-10-09 DIAGNOSIS — M54.50 PAIN, LUMBAR REGION: ICD-10-CM

## 2024-10-09 DIAGNOSIS — Z98.1 ARTHRODESIS STATUS: Primary | ICD-10-CM

## 2024-10-09 DIAGNOSIS — G89.4 CHRONIC PAIN DISORDER: Primary | ICD-10-CM

## 2024-10-09 PROCEDURE — 97110 THERAPEUTIC EXERCISES: CPT | Mod: GP | Performed by: PHYSICAL THERAPIST

## 2024-10-15 ENCOUNTER — VIRTUAL VISIT (OUTPATIENT)
Dept: FAMILY MEDICINE | Facility: CLINIC | Age: 54
End: 2024-10-15
Payer: COMMERCIAL

## 2024-10-15 ENCOUNTER — MYC MEDICAL ADVICE (OUTPATIENT)
Dept: PSYCHIATRY | Facility: CLINIC | Age: 54
End: 2024-10-15

## 2024-10-15 DIAGNOSIS — F11.20 CONTINUOUS OPIOID DEPENDENCE (H): Primary | ICD-10-CM

## 2024-10-15 DIAGNOSIS — R32 URINARY INCONTINENCE, UNSPECIFIED TYPE: ICD-10-CM

## 2024-10-15 DIAGNOSIS — F33.1 MODERATE EPISODE OF RECURRENT MAJOR DEPRESSIVE DISORDER (H): ICD-10-CM

## 2024-10-15 DIAGNOSIS — F41.1 GENERALIZED ANXIETY DISORDER: ICD-10-CM

## 2024-10-15 DIAGNOSIS — G89.4 CHRONIC PAIN DISORDER: ICD-10-CM

## 2024-10-15 PROCEDURE — G2211 COMPLEX E/M VISIT ADD ON: HCPCS | Mod: 95 | Performed by: PHYSICIAN ASSISTANT

## 2024-10-15 PROCEDURE — 99215 OFFICE O/P EST HI 40 MIN: CPT | Mod: 95 | Performed by: PHYSICIAN ASSISTANT

## 2024-10-15 RX ORDER — HYDROCODONE BITARTRATE AND ACETAMINOPHEN 5; 325 MG/1; MG/1
1 TABLET ORAL EVERY 8 HOURS
Qty: 28 TABLET | Refills: 0 | Status: CANCELLED | OUTPATIENT
Start: 2024-10-15

## 2024-10-15 RX ORDER — HYDROCODONE BITARTRATE 15 MG/1
15 CAPSULE, EXTENDED RELEASE ORAL 2 TIMES DAILY
Qty: 14 CAPSULE | Refills: 0 | Status: SHIPPED | OUTPATIENT
Start: 2024-10-15 | End: 2024-10-15

## 2024-10-15 RX ORDER — HYDROCODONE BITARTRATE 15 MG/1
15 CAPSULE, EXTENDED RELEASE ORAL 2 TIMES DAILY
Qty: 14 CAPSULE | Refills: 0 | Status: SHIPPED | OUTPATIENT
Start: 2024-10-15 | End: 2024-10-22

## 2024-10-15 NOTE — PROGRESS NOTES
Karla is a 54 year old who is being evaluated via a billable video visit.    How would you like to obtain your AVS? MyChart  If the video visit is dropped, the invitation should be resent by: Text to cell phone: [phone]  Will anyone else be joining your video visit? No      Assessment & Plan     Continuous opioid dependence (H)  Chronic pain disorder  Inadequately controlled pain.  Did not do well with butrans, ms contin, but wanting better pain control and open to trying other long acting options.  Will send for prior authorization then if approved transition her IR oxycodone to IR hydrocodone for total MME similar to current MME.  - HYDROcodone Bitartrate ER (ZOHYDRO ER) 15 MG CP12; Take 15 mg by mouth 2 times daily.    Moderate episode of recurrent major depressive disorder (H)  Uncontrolled, with side effects from geodon.  I have asked her to message psychiatrist.    Urinary incontinence, unspecified type  New problem, r/o cauda equina, UTI.  - UA Macroscopic with reflex to Microscopic and Culture - Lab Collect; Future  - Wet prep - lab collect; Future  - MR Lumbar Spine w/o & w Contrast; Future    The longitudinal plan of care for the diagnosis(es)/condition(s) as documented were addressed during this visit. Due to the added complexity in care, I will continue to support Karla in the subsequent management and with ongoing continuity of care.    43 min total spent in office visit and documentation day of visit.  Patient Instructions   My Chart to or call to update psychiatrist on experience of new medication    Set up lab appt     Set up lumbar MRI - to make sure no spine emergency causing urinary issue - Call [phone] to set up your imaging testing.    Wait to hear on prior authorization for long acting hydrocodone - BUT DO NOT start it without my knowledge because I also have to get the short acting changed.        Subjective   aKrla is a 54 year old, presenting for the following health issues:  Pain  (Pain management ) and PTSD (Discuss new medication given, geodon, has been taking x 1 week, having some side effects and stopped taking)      10/15/2024     3:07 PM   Additional Questions   Roomed by Hazel MORALES   Accompanied by self     History of Present Illness       Back Pain:  She presents for follow up of back pain. Patient's back pain is a chronic problem.  Location of back pain:  Right lower back and left lower back  Description of back pain: burning, cramping, dull ache, fullness, gnawing, sharp, shooting and stabbing  Back pain spreads: right buttocks, left buttocks, right thigh and left thigh    Since patient first noticed back pain, pain is: gradually worsening  Does back pain interfere with her job:  Not applicable       She eats 2-3 servings of fruits and vegetables daily.She consumes 0 sweetened beverage(s) daily.She exercises with enough effort to increase her heart rate 30 to 60 minutes per day.  She exercises with enough effort to increase her heart rate 7 days per week.   She is taking medications regularly.     Chief Complaint   Patient presents with    Pain     Pain management     PTSD     Discuss new medication given, geodon, has been taking x 1 week, having some side effects and stopped taking       1 wk into geodon felt extremely anxious and insomnia.  Quit med.    Pain - not better.  Wakes a lot in the night due to pain.  Doing Physical Therapy as requested by her back surgeon.  Likes the Physical Therapist however hasn't found helpful for pain at this point.  Is trying to be more active, going for walks which she actually finds that she enjoys.  Feels the Physical Therapy is required to get her insurance to cover MRI, and needs MRIs before can trial various injections to pursue radiofrequency ablation.  Frustrated by the amount of pain which is only better when she doesn't move.  Feels radiofrequency ablation wouldn't be until spring and wants better relief until then.  Has newly had several  instances of having wet herself.  No bowel incontinence or saddle paresthesia.  Has weakness only when pain is severe.      Pain History:  When did you first notice your pain?    Have you seen this provider for your pain in the past? Yes   Where in your body do you have pain? Low back  Are you seeing anyone else for your pain? No        8/9/2024     7:59 AM 8/28/2024     2:09 PM 10/7/2024     9:27 AM   PHQ-9 SCORE   PHQ-9 Total Score MyChart   12 (Moderate depression)   PHQ-9 Total Score 10 2 12    12           8/9/2024     7:59 AM 8/28/2024     2:09 PM 10/7/2024     9:51 AM   NANCI-7 SCORE   Total Score   13 (moderate anxiety)   Total Score 14 3 13    13     Chronic Pain Follow Up:    Location of pain: low back  Analgesia/pain control:    - Recent changes:  not working as well, is doing PT which is causing more pain   - Overall control: Tolerable with discomfort    - Current treatments: methocarbamol, oxycodone   Adherence:     - Do you ever take more pain medicine than prescribed? No    - When did you take your last dose of pain medicine?  9:00 am   Adverse effects: No   PDMP Review         Value Time User    State PDMP site checked  Yes 6/6/2024  2:34 PM Rosaura Flores, RAQUEL          Last CSA Agreement:   CSA -- Patient Level:     [Media Unavailable] Controlled Substance Agreement - Opioid - Scan on 5/8/2024 11:12 AM   [Media Unavailable] Controlled Substance Agreement - Opioid - Scan on 8/16/2023  8:45 AM   [Media Unavailable] Controlled Substance Agreement - Opioid - Scan on 5/13/2021   [Media Unavailable] Controlled Substance Agreement - Opioid - Scan on 3/2/2020: OUTSIDE RECORD       Last UDS: 8/18/2023        Objective           Vitals:  No vitals were obtained today due to virtual visit.        Video-Visit Details    Type of service:  Video Visit   Originating Location (pt. Location): Home    Distant Location (provider location):  On-site  Platform used for Video Visit: Alesha  Signed Electronically  by: Rosaura Flores PA-C

## 2024-10-15 NOTE — PATIENT INSTRUCTIONS
My Chart to or call to update psychiatrist on experience of new medication    Set up lab appt     Set up lumbar MRI - to make sure no spine emergency causing urinary issue - Call [phone] to set up your imaging testing.    Wait to hear on prior authorization for long acting hydrocodone - BUT DO NOT start it without my knowledge because I also have to get the short acting changed.

## 2024-10-16 ENCOUNTER — MYC MEDICAL ADVICE (OUTPATIENT)
Dept: FAMILY MEDICINE | Facility: CLINIC | Age: 54
End: 2024-10-16

## 2024-10-16 ENCOUNTER — LAB (OUTPATIENT)
Dept: LAB | Facility: CLINIC | Age: 54
End: 2024-10-16
Payer: COMMERCIAL

## 2024-10-16 DIAGNOSIS — G89.4 CHRONIC PAIN DISORDER: ICD-10-CM

## 2024-10-16 DIAGNOSIS — Z87.891 PERSONAL HISTORY OF TOBACCO USE, PRESENTING HAZARDS TO HEALTH: ICD-10-CM

## 2024-10-16 DIAGNOSIS — R31.29 MICROSCOPIC HEMATURIA: Primary | ICD-10-CM

## 2024-10-16 DIAGNOSIS — R32 URINARY INCONTINENCE, UNSPECIFIED TYPE: ICD-10-CM

## 2024-10-16 DIAGNOSIS — N76.0 BACTERIAL VAGINOSIS: Primary | ICD-10-CM

## 2024-10-16 DIAGNOSIS — B96.89 BACTERIAL VAGINOSIS: Primary | ICD-10-CM

## 2024-10-16 LAB
ALBUMIN UR-MCNC: NEGATIVE MG/DL
AMPHETAMINES UR QL SCN: ABNORMAL
APPEARANCE UR: CLEAR
BACTERIA #/AREA URNS HPF: ABNORMAL /HPF
BARBITURATES UR QL SCN: ABNORMAL
BENZODIAZ UR QL SCN: ABNORMAL
BILIRUB UR QL STRIP: NEGATIVE
BZE UR QL SCN: ABNORMAL
CANNABINOIDS UR QL SCN: ABNORMAL
CLUE CELLS: PRESENT
COLOR UR AUTO: YELLOW
FENTANYL UR QL: ABNORMAL
GLUCOSE UR STRIP-MCNC: NEGATIVE MG/DL
HGB UR QL STRIP: ABNORMAL
KETONES UR STRIP-MCNC: NEGATIVE MG/DL
LEUKOCYTE ESTERASE UR QL STRIP: NEGATIVE
NITRATE UR QL: NEGATIVE
OPIATES UR QL SCN: ABNORMAL
PCP QUAL URINE (ROCHE): ABNORMAL
PH UR STRIP: 7 [PH] (ref 5–7)
RBC #/AREA URNS AUTO: ABNORMAL /HPF
SP GR UR STRIP: 1.01 (ref 1–1.03)
SQUAMOUS #/AREA URNS AUTO: ABNORMAL /LPF
TRICHOMONAS, WET PREP: ABNORMAL
UROBILINOGEN UR STRIP-ACNC: 0.2 E.U./DL
WBC #/AREA URNS AUTO: ABNORMAL /HPF
WBC'S/HIGH POWER FIELD, WET PREP: ABNORMAL
YEAST, WET PREP: ABNORMAL

## 2024-10-16 PROCEDURE — 81001 URINALYSIS AUTO W/SCOPE: CPT | Mod: 59

## 2024-10-16 PROCEDURE — 87210 SMEAR WET MOUNT SALINE/INK: CPT

## 2024-10-16 PROCEDURE — 80307 DRUG TEST PRSMV CHEM ANLYZR: CPT

## 2024-10-16 RX ORDER — BUSPIRONE HYDROCHLORIDE 15 MG/1
15 TABLET ORAL 2 TIMES DAILY
Qty: 60 TABLET | Refills: 2 | Status: SHIPPED | OUTPATIENT
Start: 2024-10-16

## 2024-10-16 NOTE — TELEPHONE ENCOUNTER
Received MyC message from patient regarding ziprasidone (GEODON) 20 MG capsules and side effects.     RN replied via MyC to continue to hold medication and inquired if SE have gotten better. RN let patient know message would be sent to Dr. Correa right away for recommendation of next steps.     Routing to provider.     Joi Real RN on 10/16/2024 at 10:55 AM

## 2024-10-17 RX ORDER — METRONIDAZOLE 500 MG/1
500 TABLET ORAL 2 TIMES DAILY
Qty: 14 TABLET | Refills: 0 | Status: SHIPPED | OUTPATIENT
Start: 2024-10-17 | End: 2024-10-24

## 2024-10-20 DIAGNOSIS — F11.20 CONTINUOUS OPIOID DEPENDENCE (H): Primary | ICD-10-CM

## 2024-10-20 NOTE — RESULT ENCOUNTER NOTE
Karla  It looks like Dr Pena has already treated for bacterial vaginosis.   I do also see that you had a bit of blood in your urine.  That is not normal but can be from various causes, including even infection that doesn't always show on the quick urine test.  I will order a repeat urine test for you - this time with culture to check for infection.  Even if symptoms resolve with treating for bacterial vaginosis, please do the urine test sometime since we need to figure out if blood continues.     I see your note about going back to buspar.    Rosaura

## 2024-10-21 ENCOUNTER — DOCUMENTATION ONLY (OUTPATIENT)
Dept: FAMILY MEDICINE | Facility: CLINIC | Age: 54
End: 2024-10-21
Payer: COMMERCIAL

## 2024-10-21 ENCOUNTER — MYC MEDICAL ADVICE (OUTPATIENT)
Dept: FAMILY MEDICINE | Facility: CLINIC | Age: 54
End: 2024-10-21

## 2024-10-21 NOTE — PROGRESS NOTES
The add-on test urine culture that was requested on 10/20 is unable to be completed due to too old for testing. Future order is available for collection. If labs are needed before next appointment, please arrange for collection.

## 2024-10-22 ENCOUNTER — MYC REFILL (OUTPATIENT)
Dept: FAMILY MEDICINE | Facility: CLINIC | Age: 54
End: 2024-10-22
Payer: COMMERCIAL

## 2024-10-22 ENCOUNTER — MYC MEDICAL ADVICE (OUTPATIENT)
Dept: FAMILY MEDICINE | Facility: CLINIC | Age: 54
End: 2024-10-22

## 2024-10-22 DIAGNOSIS — F11.20 CONTINUOUS OPIOID DEPENDENCE (H): ICD-10-CM

## 2024-10-22 DIAGNOSIS — G89.4 CHRONIC PAIN DISORDER: ICD-10-CM

## 2024-10-23 ENCOUNTER — MYC MEDICAL ADVICE (OUTPATIENT)
Dept: FAMILY MEDICINE | Facility: CLINIC | Age: 54
End: 2024-10-23

## 2024-10-23 ENCOUNTER — MYC REFILL (OUTPATIENT)
Dept: FAMILY MEDICINE | Facility: CLINIC | Age: 54
End: 2024-10-23

## 2024-10-23 DIAGNOSIS — J30.9 ALLERGIC RHINITIS, UNSPECIFIED SEASONALITY, UNSPECIFIED TRIGGER: ICD-10-CM

## 2024-10-23 DIAGNOSIS — R10.13 EPIGASTRIC PAIN: ICD-10-CM

## 2024-10-23 RX ORDER — HYDROCODONE BITARTRATE AND ACETAMINOPHEN 5; 325 MG/1; MG/1
1 TABLET ORAL 3 TIMES DAILY PRN
Qty: 90 TABLET | Refills: 0 | Status: SHIPPED | OUTPATIENT
Start: 2024-10-23 | End: 2024-10-23

## 2024-10-23 RX ORDER — HYDROCODONE BITARTRATE 15 MG/1
15 CAPSULE, EXTENDED RELEASE ORAL 2 TIMES DAILY
Qty: 28 CAPSULE | Refills: 0 | Status: SHIPPED | OUTPATIENT
Start: 2024-10-23 | End: 2024-10-23

## 2024-10-23 RX ORDER — CETIRIZINE HYDROCHLORIDE 10 MG/1
10 TABLET ORAL DAILY
Qty: 90 TABLET | Refills: 3 | OUTPATIENT
Start: 2024-10-23

## 2024-10-23 RX ORDER — HYDROCODONE BITARTRATE AND ACETAMINOPHEN 5; 325 MG/1; MG/1
1 TABLET ORAL 3 TIMES DAILY PRN
Qty: 84 TABLET | Refills: 0 | Status: SHIPPED | OUTPATIENT
Start: 2024-10-23 | End: 2024-11-10

## 2024-10-23 RX ORDER — HYDROCODONE BITARTRATE 15 MG/1
15 CAPSULE, EXTENDED RELEASE ORAL 2 TIMES DAILY
Qty: 56 CAPSULE | Refills: 0 | Status: SHIPPED | OUTPATIENT
Start: 2024-10-23 | End: 2024-11-10

## 2024-10-23 RX ORDER — OMEPRAZOLE 40 MG/1
40 CAPSULE, DELAYED RELEASE ORAL DAILY
Qty: 30 CAPSULE | Refills: 2 | Status: SHIPPED | OUTPATIENT
Start: 2024-10-23

## 2024-10-23 NOTE — TELEPHONE ENCOUNTER
Prescription approved per [facility] Refill Protocol.  Julie Behrendt RN

## 2024-10-24 ENCOUNTER — E-VISIT (OUTPATIENT)
Dept: FAMILY MEDICINE | Facility: CLINIC | Age: 54
End: 2024-10-24
Payer: COMMERCIAL

## 2024-10-24 DIAGNOSIS — K04.7 DENTAL INFECTION: Primary | ICD-10-CM

## 2024-10-24 PROCEDURE — 99421 OL DIG E/M SVC 5-10 MIN: CPT | Performed by: PHYSICIAN ASSISTANT

## 2024-11-08 ENCOUNTER — OFFICE VISIT (OUTPATIENT)
Dept: URGENT CARE | Facility: URGENT CARE | Age: 54
End: 2024-11-08
Payer: COMMERCIAL

## 2024-11-08 ENCOUNTER — ANCILLARY PROCEDURE (OUTPATIENT)
Dept: GENERAL RADIOLOGY | Facility: CLINIC | Age: 54
End: 2024-11-08
Attending: PHYSICIAN ASSISTANT
Payer: COMMERCIAL

## 2024-11-08 VITALS
TEMPERATURE: 98.9 F | DIASTOLIC BLOOD PRESSURE: 91 MMHG | BODY MASS INDEX: 29.79 KG/M2 | WEIGHT: 179 LBS | HEART RATE: 66 BPM | OXYGEN SATURATION: 99 % | RESPIRATION RATE: 16 BRPM | SYSTOLIC BLOOD PRESSURE: 134 MMHG

## 2024-11-08 DIAGNOSIS — S49.91XA SHOULDER INJURY, RIGHT, INITIAL ENCOUNTER: Primary | ICD-10-CM

## 2024-11-08 DIAGNOSIS — S49.91XA SHOULDER INJURY, RIGHT, INITIAL ENCOUNTER: ICD-10-CM

## 2024-11-08 PROCEDURE — 99214 OFFICE O/P EST MOD 30 MIN: CPT | Performed by: PHYSICIAN ASSISTANT

## 2024-11-08 PROCEDURE — 73030 X-RAY EXAM OF SHOULDER: CPT | Mod: TC | Performed by: RADIOLOGY

## 2024-11-08 NOTE — PROGRESS NOTES
"  Assessment & Plan     Shoulder injury, right, initial encounter  Reassurance, no acute fracture. Can try voltaren gel 4x/day as needed. Avoid aggravating factors but encouraged gentle stretching/ROM. Would recommend follow up with orthopedics in 1 week if needed.     - XR Shoulder Right G/E 3 Views; Future  - diclofenac (VOLTAREN) 1 % topical gel; Apply 4 g topically 4 times daily.  - Orthopedic  Referral; Future          BMI  Estimated body mass index is 29.79 kg/m  as calculated from the following:    Height as of 10/7/24: 1.651 m (5' 5\").    Weight as of this encounter: 81.2 kg (179 lb).             Return in about 1 week (around 11/15/2024), or if symptoms worsen or fail to improve.      30 minutes spent by me on the date of the encounter doing chart review, history and exam, documentation and further activities per the note      Subjective   Chief Complaint   Patient presents with    Shoulder Pain     One week ago while walking a dog, dog took off and pulled her right arm injuring her shoulder.  Pain getting worse.  NOTE:  pt is taking Norco and Robaxin daily.       HPI     Shoulder pain     Onset of symptoms was 1 week(s) ago.  Course of illness is worsening.    Severity moderate  Current and Associated symptoms: injured right shoulder after fall when walking dogs  Treatment measures tried include Tylenol/Ibuprofen.  Predisposing factors include None.                    Objective    BP (!) 134/91   Pulse 66   Temp 98.9  F (37.2  C) (Tympanic)   Resp 16   Wt 81.2 kg (179 lb)   LMP  (LMP Unknown)   SpO2 99%   BMI 29.79 kg/m    Body mass index is 29.79 kg/m .  Physical Exam  Constitutional:       General: She is not in acute distress.  Musculoskeletal:      Comments: Right shoulder with no obvious deformity, erythema, edema, or ecchymosis. Diffuse tenderness with palpation. Painful active ROM with abduction and reaching above head. Non-tender internal /external rotation of the glenohumeral " joint. CMS intact.     Neurological:      Mental Status: She is alert.            Xray - Reviewed and interpreted by me.  No acute fracture         Signed Electronically by: Anahi Sharp PA-C

## 2024-11-10 ENCOUNTER — MYC REFILL (OUTPATIENT)
Dept: FAMILY MEDICINE | Facility: CLINIC | Age: 54
End: 2024-11-10
Payer: COMMERCIAL

## 2024-11-10 DIAGNOSIS — F11.20 CONTINUOUS OPIOID DEPENDENCE (H): ICD-10-CM

## 2024-11-10 DIAGNOSIS — G89.4 CHRONIC PAIN DISORDER: ICD-10-CM

## 2024-11-11 ENCOUNTER — PATIENT OUTREACH (OUTPATIENT)
Dept: CARE COORDINATION | Facility: CLINIC | Age: 54
End: 2024-11-11
Payer: COMMERCIAL

## 2024-11-11 RX ORDER — HYDROCODONE BITARTRATE 15 MG/1
15 CAPSULE, EXTENDED RELEASE ORAL 2 TIMES DAILY
Qty: 56 CAPSULE | Refills: 0 | Status: SHIPPED | OUTPATIENT
Start: 2024-11-21 | End: 2024-11-15

## 2024-11-11 RX ORDER — HYDROCODONE BITARTRATE AND ACETAMINOPHEN 5; 325 MG/1; MG/1
1 TABLET ORAL 3 TIMES DAILY PRN
Qty: 84 TABLET | Refills: 0 | Status: SHIPPED | OUTPATIENT
Start: 2024-11-20

## 2024-11-13 ENCOUNTER — MYC MEDICAL ADVICE (OUTPATIENT)
Dept: FAMILY MEDICINE | Facility: CLINIC | Age: 54
End: 2024-11-13
Payer: COMMERCIAL

## 2024-11-13 ENCOUNTER — PATIENT OUTREACH (OUTPATIENT)
Dept: CARE COORDINATION | Facility: CLINIC | Age: 54
End: 2024-11-13
Payer: COMMERCIAL

## 2024-11-13 DIAGNOSIS — F11.20 CONTINUOUS OPIOID DEPENDENCE (H): ICD-10-CM

## 2024-11-13 DIAGNOSIS — G89.4 CHRONIC PAIN DISORDER: ICD-10-CM

## 2024-11-14 ENCOUNTER — MYC REFILL (OUTPATIENT)
Dept: FAMILY MEDICINE | Facility: CLINIC | Age: 54
End: 2024-11-14
Payer: COMMERCIAL

## 2024-11-14 DIAGNOSIS — L70.0 COMEDONE: ICD-10-CM

## 2024-11-14 DIAGNOSIS — D18.01 ANGIOMA OF SKIN: ICD-10-CM

## 2024-11-14 DIAGNOSIS — L98.9 SKIN PROBLEM: ICD-10-CM

## 2024-11-14 DIAGNOSIS — D23.9 DERMAL NEVUS: ICD-10-CM

## 2024-11-14 DIAGNOSIS — L82.1 SEBORRHEIC KERATOSIS: ICD-10-CM

## 2024-11-14 DIAGNOSIS — L81.4 LENTIGO: ICD-10-CM

## 2024-11-15 RX ORDER — HYDROCODONE BITARTRATE 15 MG/1
15 CAPSULE, EXTENDED RELEASE ORAL 2 TIMES DAILY
Qty: 56 CAPSULE | Refills: 0 | Status: SHIPPED | OUTPATIENT
Start: 2024-11-21

## 2024-11-15 RX ORDER — HYDROCODONE BITARTRATE 15 MG/1
15 CAPSULE, EXTENDED RELEASE ORAL 2 TIMES DAILY
Qty: 56 CAPSULE | Refills: 0 | Status: SHIPPED | OUTPATIENT
Start: 2024-11-21 | End: 2024-11-15

## 2024-11-15 RX ORDER — TRETINOIN 0.5 MG/G
CREAM TOPICAL AT BEDTIME
Qty: 20 G | Refills: 3 | Status: SHIPPED | OUTPATIENT
Start: 2024-11-15

## 2024-11-15 NOTE — TELEPHONE ENCOUNTER
Rosaura,    Patient's rx for hydrocodone bitartrate ER medication is being ordered a local print.      I currently pended pharmacy and reorder rx, [facility].     A call was placed to pharmacy to see if they are able to accept a faxed copy, pharmacist said not needs to be electronically signed or a written rx.       Zari HAGAN  Station

## 2024-11-20 ENCOUNTER — MYC MEDICAL ADVICE (OUTPATIENT)
Dept: FAMILY MEDICINE | Facility: CLINIC | Age: 54
End: 2024-11-20
Payer: COMMERCIAL

## 2024-11-20 NOTE — TELEPHONE ENCOUNTER
See [employer] message, routing to PCP for review. Has an appt with PCP on 12/4/24.    Candelaria Conway RN  [facility]

## 2024-12-02 NOTE — PROGRESS NOTES
Cynthia Lyons is a 51 y.o. female who is being evaluated via a billable video visit.      How would you like to obtain your AVS? Skubanahart.  If dropped from the video visit, the video invitation should be resent by: Text to cell phone: [phone]  Will anyone else be joining your video visit? No        Platform used for Video Visit: [facility]     Pain score 8  Constant   What does your pain like feel during a flare? Achy, sharp, stabbing, stiff  Does the pain interfere with:  Work ----- NA  Walking ------ yes  Sleep ------- yes  Daily activities ------yes  Relationships -------yes  F=7    UDT/CSA 2/2020   knee pain

## 2024-12-04 ENCOUNTER — TELEPHONE (OUTPATIENT)
Dept: FAMILY MEDICINE | Facility: CLINIC | Age: 54
End: 2024-12-04

## 2024-12-04 ENCOUNTER — VIRTUAL VISIT (OUTPATIENT)
Dept: URGENT CARE | Facility: CLINIC | Age: 54
End: 2024-12-04
Payer: COMMERCIAL

## 2024-12-04 DIAGNOSIS — J01.10 ACUTE NON-RECURRENT FRONTAL SINUSITIS: Primary | ICD-10-CM

## 2024-12-04 DIAGNOSIS — R87.810 CERVICAL HIGH RISK HPV (HUMAN PAPILLOMAVIRUS) TEST POSITIVE: ICD-10-CM

## 2024-12-04 DIAGNOSIS — G89.4 CHRONIC PAIN DISORDER: ICD-10-CM

## 2024-12-04 DIAGNOSIS — F11.20 CONTINUOUS OPIOID DEPENDENCE (H): Primary | ICD-10-CM

## 2024-12-04 PROCEDURE — 99203 OFFICE O/P NEW LOW 30 MIN: CPT | Mod: 95

## 2024-12-04 RX ORDER — OXYCODONE HYDROCHLORIDE 15 MG/1
15 TABLET, FILM COATED, EXTENDED RELEASE ORAL EVERY 12 HOURS
Refills: 0 | Status: CANCELLED | OUTPATIENT
Start: 2024-12-04

## 2024-12-04 NOTE — TELEPHONE ENCOUNTER
Writer was asked to call patient to follow up on pain medication needs as patient's appointment with Rosaura XIAO was rescheduled for 12/13/24 but patient will be all out of her pain medications on 12/9/24.    Spoke with patient states she was changed to Hydrocodone ER 15 mg on 11/21/24 she received a quantity of 34 tabs (RX was for 56 but pharmacy only had 34) now the medication is out of stock at all pharmacies, not expecting it back in stock until after January 1st 2025.    Patient would like to try Oxycontin as a long acting pain medication, RX pended, message routed to Rosaura XIAO to review upon her return on 12/5/24.    Julie Behrendt RN

## 2024-12-04 NOTE — PROGRESS NOTES
Cynthia is a 54 year old female who presents for a billable video visit.    ASSESSMENT/PLAN:  Diagnoses and all orders for this visit:    Acute non-recurrent frontal sinusitis  -     amoxicillin-clavulanate (AUGMENTIN) 875-125 MG tablet; Take 1 tablet by mouth 2 times daily for 7 days.    Patient instructions:  Take antibiotics as prescribed with food. Increase probiotics either through OTC medications or yogurts. Take antibiotics with food to decrease chance of GI upset. If no improvement or worsening symptoms please follow up with PCP or higher level of care.      Medical decision making:  Pt presents today with sinus complaints x 14 days. No improvement with over the counter medications. Ddx include viral sinus sinusitis, URI, bacterial sinusitis, AOM, tension headache among others. Based on symptoms will tx for bacterial sinusitis with augmentin. In the mean time instructed patient to use Flonase, zyrtec, and tylenol/ibuprofen for symptoms. If no improvement in 2-3 days return or follow up with PCP. ED for worsening symptoms.       SUBJECTIVE: Pt presents with 2 weeks of sinus pain and congestion. Now having low grade fevers and fatigue. Reports history of frequent sinus infections and this feels the same.       ROS: Pertinent ROS neg other than the symptoms noted above in the HPI.     OBJECTIVE:  Vitals not done due to this being a virtual visit    GENERAL: healthy, alert and no distress  EYES: Eyes grossly normal to inspection,conjunctivae and sclerae normal  RESP: Able to speak in complete sentences, no audible wheeze or cough  SKIN: no suspicious lesions or rashes  NEURO: mentation intact and speech normal  PSYCH: mentation appears normal, affect normal/bright    Video-Visit Details    Type of service:  Video Visit  Video Start Time: 1340  Video End Time: 1355    Originating Location: Home    Distant Location:  [facility]     Platform used for Video Visit: Alesha CARRION  ALAN METCALF CNP

## 2024-12-05 ENCOUNTER — MYC MEDICAL ADVICE (OUTPATIENT)
Dept: FAMILY MEDICINE | Facility: CLINIC | Age: 54
End: 2024-12-05
Payer: COMMERCIAL

## 2024-12-05 DIAGNOSIS — F11.20 CONTINUOUS OPIOID DEPENDENCE (H): ICD-10-CM

## 2024-12-05 DIAGNOSIS — G89.4 CHRONIC PAIN DISORDER: ICD-10-CM

## 2024-12-05 RX ORDER — OXYCODONE HCL 10 MG/1
10 TABLET, FILM COATED, EXTENDED RELEASE ORAL EVERY 12 HOURS
Qty: 20 TABLET | Refills: 0 | Status: SHIPPED | OUTPATIENT
Start: 2024-12-08

## 2024-12-05 RX ORDER — OXYCODONE HCL 10 MG/1
10 TABLET, FILM COATED, EXTENDED RELEASE ORAL EVERY 12 HOURS
Qty: 10 TABLET | Refills: 0 | Status: SHIPPED | OUTPATIENT
Start: 2024-12-09 | End: 2024-12-05

## 2024-12-05 NOTE — TELEPHONE ENCOUNTER
Prescription sent for 5 day prescription.  To start in place of the long acting hydrocodone.  Keep appt next week.

## 2024-12-07 ENCOUNTER — E-VISIT (OUTPATIENT)
Dept: URGENT CARE | Facility: CLINIC | Age: 54
End: 2024-12-07
Payer: COMMERCIAL

## 2024-12-07 DIAGNOSIS — J06.9 ACUTE UPPER RESPIRATORY INFECTION, UNSPECIFIED: Primary | ICD-10-CM

## 2024-12-07 NOTE — PATIENT INSTRUCTIONS
Dear Karla,    After reviewing your responses, I would like you to come in for a swab to make sure we treat you correctly. This swab is to evaluate you for possible COVID and Flu, and should be scheduled for today or tomorrow. Please use the Schedule Now button in [employer] to schedule your swab. Otherwise, click this link to schedule a lab only appointment.    Lab appointments are not available at most locations on the weekends. If no Lab Only appointment is available, you should be seen in any of our convenient Urgent Care Centers for an in person visit, which can be found on our website here.    You will receive instructions with your results in [employer] once they are available.     If your symptoms worsen, you develop difficulty breathing, difficulty with drinking enough to stay hydrated, or fevers for more than 5 days, please contact your primary care provider for an appointment or visit an Urgent Care Center to be seen.      Thanks again for choosing us as your health care partner.   Sunitha Duvall MD

## 2024-12-07 NOTE — TELEPHONE ENCOUNTER
Assessment/Intervention/Current Symptoms and Care Coordination  CTC met with to talk about day treatment programs and discuss pt's concerns about not being able to get into a program soon enough. CTC shared the list of referrals that was sent over by the [facility] Care Advocate. CTC let pt know that an internal [facility] by the provider. Pt had a flat affect and at times looked like she was going to cry. CTC provided insight on writer experience of pt's non-verbals. Pt shared that she is suppose to discharge tomorrow and is nervous that a day treatment program won't get set up in time. CTC provided validation and reassurance by letting pt know writer would call some of the places on the referral list to see about wait list time for intakes.     Discharge Plan or Goal: Return home. IOP, individual therapy and medication management.         Barriers to Discharge: Symptom Management. Treatment availability           Referral Status: [place] day treatment consult made on 9/14/21 by unit provider.               Legal Status: Voluntary   Provider E-Visit time total (minutes): 5

## 2024-12-08 ENCOUNTER — LAB (OUTPATIENT)
Dept: LAB | Facility: CLINIC | Age: 54
End: 2024-12-08
Attending: INTERNAL MEDICINE
Payer: COMMERCIAL

## 2024-12-08 DIAGNOSIS — J06.9 ACUTE UPPER RESPIRATORY INFECTION, UNSPECIFIED: ICD-10-CM

## 2024-12-08 LAB
FLUAV AG SPEC QL IA: NEGATIVE
FLUBV AG SPEC QL IA: NEGATIVE

## 2024-12-08 PROCEDURE — 87804 INFLUENZA ASSAY W/OPTIC: CPT

## 2024-12-08 PROCEDURE — 87635 SARS-COV-2 COVID-19 AMP PRB: CPT

## 2024-12-09 LAB — SARS-COV-2 RNA RESP QL NAA+PROBE: NEGATIVE

## 2024-12-10 ENCOUNTER — VIRTUAL VISIT (OUTPATIENT)
Dept: FAMILY MEDICINE | Facility: CLINIC | Age: 54
End: 2024-12-10
Payer: COMMERCIAL

## 2024-12-10 DIAGNOSIS — G89.4 CHRONIC PAIN DISORDER: Primary | ICD-10-CM

## 2024-12-10 DIAGNOSIS — I10 HYPERTENSION, UNSPECIFIED TYPE: ICD-10-CM

## 2024-12-10 DIAGNOSIS — F11.20 CONTINUOUS OPIOID DEPENDENCE (H): ICD-10-CM

## 2024-12-10 DIAGNOSIS — K22.4 ESOPHAGEAL DYSMOTILITY: ICD-10-CM

## 2024-12-10 PROCEDURE — G2211 COMPLEX E/M VISIT ADD ON: HCPCS | Mod: 95 | Performed by: PHYSICIAN ASSISTANT

## 2024-12-10 PROCEDURE — 99214 OFFICE O/P EST MOD 30 MIN: CPT | Mod: 95 | Performed by: PHYSICIAN ASSISTANT

## 2024-12-10 RX ORDER — ONDANSETRON 8 MG/1
8 TABLET, FILM COATED ORAL EVERY 8 HOURS PRN
Qty: 90 TABLET | Refills: 11 | Status: SHIPPED | OUTPATIENT
Start: 2024-12-10

## 2024-12-10 ASSESSMENT — ANXIETY QUESTIONNAIRES
GAD7 TOTAL SCORE: 0
GAD7 TOTAL SCORE: 0
7. FEELING AFRAID AS IF SOMETHING AWFUL MIGHT HAPPEN: NOT AT ALL
5. BEING SO RESTLESS THAT IT IS HARD TO SIT STILL: NOT AT ALL
IF YOU CHECKED OFF ANY PROBLEMS ON THIS QUESTIONNAIRE, HOW DIFFICULT HAVE THESE PROBLEMS MADE IT FOR YOU TO DO YOUR WORK, TAKE CARE OF THINGS AT HOME, OR GET ALONG WITH OTHER PEOPLE: NOT DIFFICULT AT ALL
2. NOT BEING ABLE TO STOP OR CONTROL WORRYING: NOT AT ALL
3. WORRYING TOO MUCH ABOUT DIFFERENT THINGS: NOT AT ALL
1. FEELING NERVOUS, ANXIOUS, OR ON EDGE: NOT AT ALL
6. BECOMING EASILY ANNOYED OR IRRITABLE: NOT AT ALL

## 2024-12-10 ASSESSMENT — PATIENT HEALTH QUESTIONNAIRE - PHQ9
5. POOR APPETITE OR OVEREATING: NOT AT ALL
SUM OF ALL RESPONSES TO PHQ QUESTIONS 1-9: 0

## 2024-12-10 NOTE — NURSING NOTE
"Chief Complaint   Patient presents with    Pain       Initial LMP  (LMP Unknown)  Estimated body mass index is 29.79 kg/m  as calculated from the following:    Height as of 10/7/24: 1.651 m (5' 5\").    Weight as of 11/8/24: 81.2 kg (179 lb).    Patient presents to the clinic using No DME    Is there anyone who you would like to be able to receive your results? No  If yes have patient fill out CICI      "

## 2024-12-10 NOTE — PATIENT INSTRUCTIONS
Update me via My Chart on stomach, pain, and BP on by Mon morning at latest so can refill/adjust meds    If stomach stays bad despite getting off augmentin, then schedule follow up with [facility]    Definitely in Jan will want to get prior authorization started with insurance - see me around 1/10

## 2024-12-10 NOTE — PROGRESS NOTES
Karla is a 54 year old who is being evaluated via a billable video visit.    How would you like to obtain your AVS? MyChart  If the video visit is dropped, the invitation should be resent by: Text to cell phone: [phone]  Will anyone else be joining your video visit? No      Assessment & Plan     Chronic pain disorder  F11.2 - Continuous opioid dependence (H)  Too early to tell if med change has been effective - she will update me within next 1 wk.  She questions if tylenol is upsetting stomach but has also been sick with fever and also now on augmentin.      Hypertension, unspecified type  Uncontrolled x 2 clinic visits.  She will get home data.    Esophageal dysmotility and gastroparesis  Symptoms worsened, eval over next week, return to [facility] if persistent symptoms.  Refill.  - ondansetron (ZOFRAN) 8 MG tablet; Take 1 tablet (8 mg) by mouth every 8 hours as needed for nausea.    The longitudinal plan of care for the diagnosis(es)/condition(s) as documented were addressed during this visit. Due to the added complexity in care, I will continue to support Karla in the subsequent management and with ongoing continuity of care.      Patient Instructions   Update me via My Chart on stomach, pain, and BP on by Mon morning at latest so can refill/adjust meds    If stomach stays bad despite getting off augmentin, then schedule follow up with [facility]    Definitely in Jan will want to get prior authorization started with insurance - see me around 1/10    Subjective   Karla is a 54 year old, presenting for the following health issues:  Pain        12/10/2024     8:29 AM   Additional Questions   Roomed by Yumiko VALLES CMA     HPI     Pain History:  When did you first notice your pain? Chronic    Have you seen this provider for your pain in the past? Yes   Where in your body do you have pain? Middle to low back   Are you seeing anyone else for your pain? No    Long acting hydrocodone was helpful but is now on indefinite backorder  with numerous pharmacies.  Started alternative of oxycodone on Sunday - so far ok.      Does also note that is having more stomach issues since starting the long acting hydrocodone.  Taking omeprazole daily but back to having to take zofran and pepcid.  No dysphagia except sometimes having troubles swallowing pills again.  Feels like when her dysmotility first started.    Still plan of MRI in order to try injections to see if radiofrequency ablation is adequate option to delay fusion.  However MRI is being held up as insurance is requiring her to retry Physical Therapy first.  And, insurance is switching Jan 1st.          8/28/2024     2:09 PM 10/7/2024     9:27 AM 12/10/2024     8:31 AM   PHQ-9 SCORE   PHQ-9 Total Score MyChart  12 (Moderate depression)    PHQ-9 Total Score 2 12    12 0         8/28/2024     2:09 PM 10/7/2024     9:51 AM 12/10/2024     8:34 AM   NANCI-7 SCORE   Total Score  13 (moderate anxiety)    Total Score 3 13    13 0         8/9/2024     7:59 AM 8/28/2024     2:09 PM 12/10/2024     8:33 AM   PEG Score   PEG Total Score 9.33 8 3.67         8/28/2024     2:09 PM 10/7/2024     9:27 AM 12/10/2024     8:31 AM   PHQ-9 SCORE   PHQ-9 Total Score MyChart  12 (Moderate depression)    PHQ-9 Total Score 2 12    12 0         8/28/2024     2:09 PM 10/7/2024     9:51 AM 12/10/2024     8:34 AM   NANCI-7 SCORE   Total Score  13 (moderate anxiety)    Total Score 3 13    13 0         8/9/2024     7:59 AM 8/28/2024     2:09 PM 12/10/2024     8:33 AM   PEG Score   PEG Total Score 9.33 8 3.67     Chronic Pain Follow Up:    Location of pain: middle to low back   Analgesia/pain control:    - Recent changes:  no     - Overall control: Tolerable with discomfort    - Current treatments: pain medication   Adherence:     - Do you ever take more pain medicine than prescribed? No    - When did you take your last dose of pain medicine?  7am today    Adverse effects: No   PDMP Review         Value Time User    State PDMP site  checked  Yes 12/10/2024  8:52 AM Rosaura Flores PA-C          Last CSA Agreement:   CSA -- Patient Level:     [Media Unavailable] Controlled Substance Agreement - Opioid - Scan on 5/8/2024 11:12 AM   [Media Unavailable] Controlled Substance Agreement - Opioid - Scan on 8/16/2023  8:45 AM   [Media Unavailable] Controlled Substance Agreement - Opioid - Scan on 5/13/2021   [Media Unavailable] Controlled Substance Agreement - Opioid - Scan on 3/2/2020: OUTSIDE RECORD       Last UDS: 8/18/2023        Objective           Vitals:  No vitals were obtained today due to virtual visit.        Video-Visit Details    Type of service:  Video Visit   Originating Location (pt. Location): Home    Distant Location (provider location):  On-site  Platform used for Video Visit: [facility]  Signed Electronically by: Rosaura Flores PA-C

## 2024-12-12 ENCOUNTER — MYC MEDICAL ADVICE (OUTPATIENT)
Dept: FAMILY MEDICINE | Facility: CLINIC | Age: 54
End: 2024-12-12
Payer: COMMERCIAL

## 2024-12-12 DIAGNOSIS — F11.20 CONTINUOUS OPIOID DEPENDENCE (H): ICD-10-CM

## 2024-12-12 DIAGNOSIS — G89.4 CHRONIC PAIN DISORDER: ICD-10-CM

## 2024-12-12 RX ORDER — OXYCODONE HCL 10 MG/1
10 TABLET, FILM COATED, EXTENDED RELEASE ORAL EVERY 12 HOURS
Qty: 56 TABLET | Refills: 0 | Status: SHIPPED | OUTPATIENT
Start: 2024-12-16

## 2024-12-12 RX ORDER — HYDROCODONE BITARTRATE AND ACETAMINOPHEN 5; 325 MG/1; MG/1
1 TABLET ORAL 3 TIMES DAILY PRN
Qty: 84 TABLET | Refills: 0 | Status: SHIPPED | OUTPATIENT
Start: 2024-12-16

## 2024-12-19 ENCOUNTER — OFFICE VISIT (OUTPATIENT)
Dept: FAMILY MEDICINE | Facility: CLINIC | Age: 54
End: 2024-12-19
Payer: COMMERCIAL

## 2024-12-19 VITALS
HEIGHT: 65 IN | DIASTOLIC BLOOD PRESSURE: 98 MMHG | RESPIRATION RATE: 20 BRPM | SYSTOLIC BLOOD PRESSURE: 140 MMHG | WEIGHT: 176 LBS | TEMPERATURE: 97.8 F | BODY MASS INDEX: 29.32 KG/M2 | OXYGEN SATURATION: 98 % | HEART RATE: 70 BPM

## 2024-12-19 DIAGNOSIS — J01.90 ACUTE SINUSITIS WITH SYMPTOMS > 10 DAYS: Primary | ICD-10-CM

## 2024-12-19 RX ORDER — DOXYCYCLINE HYCLATE 100 MG
100 TABLET ORAL 2 TIMES DAILY
Qty: 20 TABLET | Refills: 0 | Status: SHIPPED | OUTPATIENT
Start: 2024-12-19 | End: 2024-12-29

## 2024-12-19 ASSESSMENT — PAIN SCALES - GENERAL: PAINLEVEL_OUTOF10: MILD PAIN (2)

## 2024-12-19 NOTE — PROGRESS NOTES
"  Assessment & Plan     Acute sinusitis with symptoms > 10 days  Differentials discussed in detail.  Doxycycline prescribed for suspected sinus infection.  Recommended well hydration, warm fluids, steam inhalation and to follow-up with PCP in 7 to 10 days or earlier if needed.  Patient understood and in agreement with above plan.  All questions answered.  - doxycycline hyclate (VIBRA-TABS) 100 MG tablet; Take 1 tablet (100 mg) by mouth 2 times daily for 10 days.      Kaushik Acosta is a 54 year old, presenting for the following health issues:  History of Present Illness        12/19/2024    10:10 AM   Additional Questions   Roomed by Radha MENDIETA LPN   Accompanied by self     HPI     Started Augmented and for 7 days, was feeling better on about day 5. Now that ABX is done ear and throat hurt again, sinus oncgestion. Feels ABX did not completely get ride of infection. States she has to be treated twice often.       Review of Systems  Constitutional, HEENT, cardiovascular, pulmonary, gi and gu systems are negative, except as otherwise noted.      Objective    BP (!) 140/98   Pulse 70   Temp 97.8  F (36.6  C) (Tympanic)   Resp 20   Ht 1.651 m (5' 5\")   Wt 79.8 kg (176 lb)   LMP  (LMP Unknown)   SpO2 98%   BMI 29.29 kg/m    Body mass index is 29.29 kg/m .  Physical Exam   GENERAL: alert and no distress  EYES: Eyes grossly normal to inspection, PERRL and conjunctivae and sclerae normal  HENT: normal cephalic/atraumatic, ear canals and TM's normal, nasal mucosa edematous , oral mucous membranes moist, and sinuses: maxillary tenderness on both sides  NECK: no adenopathy, no asymmetry, masses, or scars  RESP: lungs clear to auscultation - no rales, rhonchi or wheezes  MS: no gross musculoskeletal defects noted, no edema  NEURO: Normal strength and tone, mentation intact and speech normal  PSYCH: mentation appears normal, affect normal/bright      Signed Electronically by: Saul Castro MD    "

## 2025-01-06 ENCOUNTER — ANCILLARY PROCEDURE (OUTPATIENT)
Dept: GENERAL RADIOLOGY | Facility: CLINIC | Age: 55
End: 2025-01-06
Attending: PHYSICIAN ASSISTANT
Payer: COMMERCIAL

## 2025-01-06 DIAGNOSIS — R50.9 FEVER, UNSPECIFIED FEVER CAUSE: ICD-10-CM

## 2025-01-06 DIAGNOSIS — R05.1 ACUTE COUGH: ICD-10-CM

## 2025-01-06 PROCEDURE — 71046 X-RAY EXAM CHEST 2 VIEWS: CPT | Mod: TC | Performed by: RADIOLOGY

## 2025-01-07 DIAGNOSIS — I10 HYPERTENSION, UNSPECIFIED TYPE: ICD-10-CM

## 2025-01-07 RX ORDER — LISINOPRIL 10 MG/1
10 TABLET ORAL DAILY
Qty: 90 TABLET | Refills: 0 | Status: SHIPPED | OUTPATIENT
Start: 2025-01-07

## 2025-01-10 ENCOUNTER — TELEPHONE (OUTPATIENT)
Dept: FAMILY MEDICINE | Facility: CLINIC | Age: 55
End: 2025-01-10
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
## 2025-01-10 NOTE — TELEPHONE ENCOUNTER
Patient switched insurance and new insurance is needing prior auth for the 28 day prescription of norco.  Is also giving her high copay >$300 for the oxycodone 10 mg 12 hr tabs.      Patient has been on chronic narcotic therapy for many years, has seen pain clinic multiple times, and is working with her surgeon on plan of radiofrequency ablation.  She continues to need chronic narcotics, therefore please complete prior auth for norco and see what lower cost alternatives are for the long acting oxycodone.

## 2025-01-13 ENCOUNTER — TELEPHONE (OUTPATIENT)
Dept: FAMILY MEDICINE | Facility: CLINIC | Age: 55
End: 2025-01-13
Payer: COMMERCIAL

## 2025-01-13 ENCOUNTER — MYC MEDICAL ADVICE (OUTPATIENT)
Dept: FAMILY MEDICINE | Facility: CLINIC | Age: 55
End: 2025-01-13
Payer: COMMERCIAL

## 2025-01-13 DIAGNOSIS — G89.4 CHRONIC PAIN DISORDER: ICD-10-CM

## 2025-01-13 DIAGNOSIS — F11.20 CONTINUOUS OPIOID DEPENDENCE (H): ICD-10-CM

## 2025-01-13 RX ORDER — HYDROCODONE BITARTRATE AND ACETAMINOPHEN 5; 325 MG/1; MG/1
1 TABLET ORAL 3 TIMES DAILY PRN
Qty: 90 TABLET | Refills: 0 | Status: SHIPPED | OUTPATIENT
Start: 2025-01-20

## 2025-01-13 RX ORDER — HYDROCODONE BITARTRATE AND ACETAMINOPHEN 5; 325 MG/1; MG/1
1 TABLET ORAL 3 TIMES DAILY PRN
Qty: 63 TABLET | Refills: 0 | Status: SHIPPED | OUTPATIENT
Start: 2025-01-13 | End: 2025-01-13

## 2025-01-13 RX ORDER — FENTANYL 12.5 UG/1
1 PATCH TRANSDERMAL
Qty: 10 PATCH | Refills: 0 | Status: CANCELLED | OUTPATIENT
Start: 2025-01-13

## 2025-01-13 NOTE — TELEPHONE ENCOUNTER
PA Initiation    Medication: OXYCODONE HCL 10 MG PO TABS  Insurance Company: [employer] - Phone [phone] Fax [phone]  [facility] [place] [place] [facility]  Filling Pharmacy Phone: [phone]  Filling Pharmacy Fax:    Start Date: 1/13/2025

## 2025-01-13 NOTE — TELEPHONE ENCOUNTER
When the new rx for 23 day supply of norco was sent over it cancelled out the order we had ready for the 7 days.  I used the new order to redipense 7 days for Karla.  We will once again need an order for 23 days to complete the full month supply.  The other order has now been picked up and cancelling it will not affect Karla getting her refill.  Sorry for all the confusion,    Irving Bowling, Pharm D  [facility]  [phone]

## 2025-01-13 NOTE — TELEPHONE ENCOUNTER
Central Prior Authorization Team  Phone: [phone]    PA Initiation    Medication: HYDROCODONE-ACETAMINOPHEN 5-325 MG PO TABS  Insurance Company: [employer] - Phone [phone] Fax [phone]  Pharmacy Filling the Rx: [facility] [place], [place] - [address] [facility]  Filling Pharmacy Phone: [phone]  Filling Pharmacy Fax:    Start Date: 1/13/2025

## 2025-01-13 NOTE — TELEPHONE ENCOUNTER
Due to patients new insurance only a 7 day supply of Hydrocodone/apap was dispensed.  Can we get a new rx to replace the rest of the void order?    Thanks,  Irving Bowling, Pharm D  [facility]  [phone]

## 2025-01-14 DIAGNOSIS — G89.4 CHRONIC PAIN DISORDER: ICD-10-CM

## 2025-01-14 DIAGNOSIS — F11.20 CONTINUOUS OPIOID DEPENDENCE (H): Primary | ICD-10-CM

## 2025-01-14 RX ORDER — FENTANYL 12.5 UG/1
1 PATCH TRANSDERMAL
Qty: 10 PATCH | Refills: 0 | Status: SHIPPED | OUTPATIENT
Start: 2025-01-14

## 2025-01-14 NOTE — TELEPHONE ENCOUNTER
Prior Authorization Approval    Medication: OXYCODONE HCL 10 MG PO TABS  Authorization Effective Date: 1/13/2025  Authorization Expiration Date: 1/13/2026  Reference #: [id number]   Insurance Company: [employer] - Phone [phone] Fax [phone]  Which Pharmacy is filling the prescription: [facility] [place], [place] - [address] [place] [address]  Pharmacy Notified: yes  Patient Notified: yes

## 2025-01-14 NOTE — TELEPHONE ENCOUNTER
Prior Authorization Approval    Medication: HYDROCODONE-ACETAMINOPHEN 5-325 MG PO TABS  Authorization Effective Date: 12/14/2024  Authorization Expiration Date: 1/13/2026  Approved Dose/Quantity:   Reference #:     Insurance Company: [employer] - Phone [phone] Fax [phone]  Expected CoPay: $    CoPay Card Available:      Financial Assistance Needed:   Which Pharmacy is filling the prescription: [facility] [place], [place] - [address] [place] [address]  Pharmacy Notified: Yes  Patient Notified: **Instructed pharmacy to notify patient when script is ready to /ship.**

## 2025-02-03 ENCOUNTER — VIRTUAL VISIT (OUTPATIENT)
Dept: FAMILY MEDICINE | Facility: CLINIC | Age: 55
End: 2025-02-03
Payer: COMMERCIAL

## 2025-02-03 DIAGNOSIS — F41.1 GENERALIZED ANXIETY DISORDER: ICD-10-CM

## 2025-02-03 DIAGNOSIS — E66.01 SEVERE OBESITY (BMI 35.0-39.9) WITH COMORBIDITY (H): Primary | ICD-10-CM

## 2025-02-03 DIAGNOSIS — F33.1 MODERATE RECURRENT MAJOR DEPRESSION (H): ICD-10-CM

## 2025-02-03 DIAGNOSIS — E78.5 DYSLIPIDEMIA: ICD-10-CM

## 2025-02-03 DIAGNOSIS — I10 ESSENTIAL HYPERTENSION WITH GOAL BLOOD PRESSURE LESS THAN 140/90: ICD-10-CM

## 2025-02-03 PROCEDURE — 98006 SYNCH AUDIO-VIDEO EST MOD 30: CPT | Performed by: PHYSICIAN ASSISTANT

## 2025-02-03 RX ORDER — BUSPIRONE HYDROCHLORIDE 15 MG/1
TABLET ORAL
Qty: 90 TABLET | Refills: 11 | Status: SHIPPED | OUTPATIENT
Start: 2025-02-03

## 2025-02-03 RX ORDER — PHENTERMINE HYDROCHLORIDE 37.5 MG/1
TABLET ORAL
Qty: 15 TABLET | Refills: 0 | Status: SHIPPED | OUTPATIENT
Start: 2025-02-03

## 2025-02-03 NOTE — PROGRESS NOTES
"Karla is a 55 year old who is being evaluated via a billable video visit.    How would you like to obtain your AVS? MyChart  If the video visit is dropped, the invitation should be resent by: Text to cell phone: [phone]  Will anyone else be joining your video visit? No      Assessment & Plan   (E66.01) Severe obesity (BMI 35.0-39.9) with comorbidity (H)  (primary encounter diagnosis)  Comment: uncontrolled with comorbid HTN and dyslipidemia.  Restart/retry phentermine.  She plans to try and restart her physical activity and watching her eating.  Plan: phentermine (ADIPEX-P) 37.5 MG tablet    (I10) Essential hypertension with goal blood pressure less than 140/90  Comment: controlled, monitor with starting phentermine which can raise BP    (E78.5) Dyslipidemia  Comment: uncontrolled, last LDL was 186.  Treat with wt loss  Plan: phentermine (ADIPEX-P) 37.5 MG tablet, lipid profile     (F33.1) Moderate recurrent major depression (H)  Comment: fair control, continue to monitor    (F41.1) Generalized anxiety disorder  Comment: refill  Plan: busPIRone (BUSPAR) 15 MG tablet    The longitudinal plan of care for the diagnosis(es)/condition(s) as documented were addressed during this visit. Due to the added complexity in care, I will continue to support Karla in the subsequent management and with ongoing continuity of care.    BMI  Estimated body mass index is 29.29 kg/m  as calculated from the following:    Height as of 1/10/25: 1.651 m (5' 5\").    Weight as of 1/10/25: 79.8 kg (176 lb).   Weight management plan: Discussed healthy diet and exercise guidelines    Patient Instructions   Decided to retry phentermine     See if you can find out what type of thyroid cancer.    Check with insurance on if they cover zepbound or wegovy, and if so, what copay (is it affordable?), are there criteria you need to meet before they will cover     Recheck 4 wks.    Subjective   Karla is a 55 year old, presenting for the following " health issues:  weight management       2/3/2025     5:40 PM   Additional Questions   Roomed by Yumiko VALLES CMA     HPI   Up 47 pounds since lapband 6/10/24.  Wondering about wegovy - a friend lost 40 pounds with it.  Has not been watching her eating.    Had been using treadmill 3 mi/day but has quit.      Occasionally trouble with foods still getting stuck in throat.  But can use water to swallow it down since lapband removed.      Pain patches now doing well with tegaderm.    Depression overall doing well.  Running out of buspar early.  Taking 1.5 tabs twice daily.          Objective           Vitals:  No vitals were obtained today due to virtual visit.        Video-Visit Details    Type of service:  Video Visit   Originating Location (pt. Location): Home    Distant Location (provider location):  On-site  Platform used for Video Visit: [employer]  Signed Electronically by: Rosaura Flores PA-C

## 2025-02-03 NOTE — NURSING NOTE
"Chief Complaint   Patient presents with    weight management        Initial LMP  (LMP Unknown)  Estimated body mass index is 29.29 kg/m  as calculated from the following:    Height as of 1/10/25: 1.651 m (5' 5\").    Weight as of 1/10/25: 79.8 kg (176 lb).    Patient presents to the clinic using No DME    Is there anyone who you would like to be able to receive your results? No  If yes have patient fill out CICI      "

## 2025-02-04 ENCOUNTER — MYC REFILL (OUTPATIENT)
Dept: FAMILY MEDICINE | Facility: CLINIC | Age: 55
End: 2025-02-04
Payer: COMMERCIAL

## 2025-02-04 ENCOUNTER — MYC MEDICAL ADVICE (OUTPATIENT)
Dept: FAMILY MEDICINE | Facility: CLINIC | Age: 55
End: 2025-02-04
Payer: COMMERCIAL

## 2025-02-04 DIAGNOSIS — F11.20 CONTINUOUS OPIOID DEPENDENCE (H): ICD-10-CM

## 2025-02-04 DIAGNOSIS — G89.4 CHRONIC PAIN DISORDER: ICD-10-CM

## 2025-02-04 DIAGNOSIS — F11.20 CONTINUOUS OPIOID DEPENDENCE (H): Primary | ICD-10-CM

## 2025-02-04 RX ORDER — FENTANYL 12.5 UG/1
1 PATCH TRANSDERMAL
Qty: 10 PATCH | Refills: 0 | Status: SHIPPED | OUTPATIENT
Start: 2025-02-08

## 2025-02-04 RX ORDER — FENTANYL 25 UG/1
1 PATCH TRANSDERMAL
Refills: 0 | Status: CANCELLED | OUTPATIENT
Start: 2025-02-04

## 2025-02-04 RX ORDER — HYDROCODONE BITARTRATE AND ACETAMINOPHEN 5; 325 MG/1; MG/1
1 TABLET ORAL 3 TIMES DAILY PRN
Qty: 90 TABLET | Refills: 0 | Status: SHIPPED | OUTPATIENT
Start: 2025-02-19

## 2025-02-04 RX ORDER — FENTANYL 12.5 UG/1
1 PATCH TRANSDERMAL
Qty: 10 PATCH | Refills: 0 | Status: CANCELLED | OUTPATIENT
Start: 2025-02-04

## 2025-02-04 RX ORDER — FENTANYL 25 UG/1
1 PATCH TRANSDERMAL
Qty: 10 PATCH | Refills: 0 | Status: SHIPPED | OUTPATIENT
Start: 2025-02-04 | End: 2025-02-04

## 2025-02-04 NOTE — TELEPHONE ENCOUNTER
Pls see [email or web address] message below dated 2/4/25.    Last Written Prescription Date:  1/14/25  Last Fill Quantity: 10,  # refills: 0   Last office visit: 1/10/2025 ; last virtual visit: 2/3/2025 with prescribing provider:     Future Office Visit:   Next 5 appointments (look out 90 days)      Feb 27, 2025 8:00 AM  (Arrive by 7:55 AM)  Provider Visit with Rosaura Flores PA-C  [facility] ([facility] ) [address] [place] [id number]  [phone]           RX pended, message routed to provider for consideration of dose increase of fentanyl patch.     Julie Behrendt RN

## 2025-02-04 NOTE — PATIENT INSTRUCTIONS
Decided to retry phentermine     See if you can find out what type of thyroid cancer.    Check with insurance on if they cover zepbound or wegovy, and if so, what copay (is it affordable?), are there criteria you need to meet before they will cover     Recheck 4 wks.

## 2025-02-15 ENCOUNTER — TELEPHONE (OUTPATIENT)
Dept: FAMILY MEDICINE | Facility: CLINIC | Age: 55
End: 2025-02-15
Payer: COMMERCIAL

## 2025-02-15 ENCOUNTER — MYC MEDICAL ADVICE (OUTPATIENT)
Dept: FAMILY MEDICINE | Facility: CLINIC | Age: 55
End: 2025-02-15
Payer: COMMERCIAL

## 2025-02-15 DIAGNOSIS — F11.20 CONTINUOUS OPIOID DEPENDENCE (H): ICD-10-CM

## 2025-02-15 DIAGNOSIS — G89.4 CHRONIC PAIN DISORDER: ICD-10-CM

## 2025-02-15 NOTE — TELEPHONE ENCOUNTER
Order received by pharmacy for Noco has earliest fill date 2/19. Pt needs Norco on 2/17. Please authorize early fill by calling the pharmacy at [phone].     Thank you,    Roland Montano, PharmD  [facility]

## 2025-02-27 ENCOUNTER — PATIENT OUTREACH (OUTPATIENT)
Dept: CARE COORDINATION | Facility: CLINIC | Age: 55
End: 2025-02-27

## 2025-02-27 ENCOUNTER — VIRTUAL VISIT (OUTPATIENT)
Dept: FAMILY MEDICINE | Facility: CLINIC | Age: 55
End: 2025-02-27
Payer: COMMERCIAL

## 2025-02-27 DIAGNOSIS — K22.70 BARRETT'S ESOPHAGUS WITHOUT DYSPLASIA: ICD-10-CM

## 2025-02-27 DIAGNOSIS — G89.4 CHRONIC PAIN DISORDER: ICD-10-CM

## 2025-02-27 DIAGNOSIS — F11.20 CONTINUOUS OPIOID DEPENDENCE (H): ICD-10-CM

## 2025-02-27 DIAGNOSIS — E78.5 DYSLIPIDEMIA: ICD-10-CM

## 2025-02-27 DIAGNOSIS — E66.3 OVERWEIGHT: ICD-10-CM

## 2025-02-27 DIAGNOSIS — I10 ESSENTIAL HYPERTENSION WITH GOAL BLOOD PRESSURE LESS THAN 140/90: ICD-10-CM

## 2025-02-27 DIAGNOSIS — K22.4 ESOPHAGEAL DYSMOTILITY: Primary | ICD-10-CM

## 2025-02-27 RX ORDER — FENTANYL 12.5 UG/1
1 PATCH TRANSDERMAL
Qty: 10 PATCH | Refills: 0 | Status: SHIPPED | OUTPATIENT
Start: 2025-03-14

## 2025-02-27 RX ORDER — HYDROCODONE BITARTRATE AND ACETAMINOPHEN 5; 325 MG/1; MG/1
1 TABLET ORAL 3 TIMES DAILY PRN
Qty: 75 TABLET | Refills: 0 | Status: SHIPPED | OUTPATIENT
Start: 2025-03-19

## 2025-02-27 RX ORDER — FENTANYL 12.5 UG/1
1 PATCH TRANSDERMAL
Qty: 10 PATCH | Refills: 0 | Status: SHIPPED | OUTPATIENT
Start: 2025-04-13

## 2025-02-27 RX ORDER — HYDROCODONE BITARTRATE AND ACETAMINOPHEN 5; 325 MG/1; MG/1
1 TABLET ORAL 3 TIMES DAILY PRN
Qty: 90 TABLET | Refills: 0 | Status: SHIPPED | OUTPATIENT
Start: 2025-04-13

## 2025-02-27 RX ORDER — FENTANYL 12.5 UG/1
1 PATCH TRANSDERMAL
Qty: 10 PATCH | Refills: 0 | Status: SHIPPED | OUTPATIENT
Start: 2025-03-14 | End: 2025-02-27

## 2025-02-27 RX ORDER — PHENTERMINE HYDROCHLORIDE 37.5 MG/1
TABLET ORAL
Qty: 30 TABLET | Refills: 0 | Status: SHIPPED | OUTPATIENT
Start: 2025-02-27

## 2025-02-27 RX ORDER — HYDROCODONE BITARTRATE AND ACETAMINOPHEN 5; 325 MG/1; MG/1
1 TABLET ORAL 3 TIMES DAILY PRN
Qty: 90 TABLET | Refills: 0 | Status: SHIPPED | OUTPATIENT
Start: 2025-03-19 | End: 2025-02-27

## 2025-02-27 NOTE — NURSING NOTE
"Chief Complaint   Patient presents with    Weight Problem       Initial LMP  (LMP Unknown)  Estimated body mass index is 29.29 kg/m  as calculated from the following:    Height as of 1/10/25: 1.651 m (5' 5\").    Weight as of 1/10/25: 79.8 kg (176 lb).    Patient presents to the clinic using No DME    Is there anyone who you would like to be able to receive your results? No  If yes have patient fill out CICI      "

## 2025-02-27 NOTE — PROGRESS NOTES
Karla is a 55 year old who is being evaluated via a billable video visit.    How would you like to obtain your AVS? MyChart  If the video visit is dropped, the invitation should be resent by: Text to cell phone: [phone]  Will anyone else be joining your video visit? No      Assessment & Plan     Esophageal dysmotility and gastroparesis  Guzman's esophagus without dysplasia  Last endoscopy was reassuring 2023.  Plan in After Visit Summary     Continuous opioid dependence (H)  Chronic pain disorder  Stable, will work on insurance/surgeon when she can.  - fentaNYL (DURAGESIC) 12 mcg/hr 72 hr patch; Place 1 patch over 72 hours onto the skin every 72 hours. remove old patch.  - HYDROcodone-acetaminophen (NORCO) 5-325 MG tablet; Take 1 tablet by mouth 3 times daily as needed for pain.    Overweight  Essential hypertension with goal blood pressure less than 140/90  Dyslipidemia  Minimal wt loss and her esophageal and stomach symptoms worsening with the start of phentermine, see plan in After Visit Summary   - phentermine (ADIPEX-P) 37.5 MG tablet; 0.5-1 tab daily in morning.    The longitudinal plan of care for the diagnosis(es)/condition(s) as documented were addressed during this visit. Due to the added complexity in care, I will continue to support Karla in the subsequent management and with ongoing continuity of care.    Patient Instructions   Try off phentermine for a few days to see if makes the difference   Once you know if phentermine is the cause, THEN increase famotidine to twice daily all the time (not just as needed).  If phentermine not seeming to be the cause, can try increasing dose to 1 tab daily.  But need to check BP.  Set up with [facility] in case needed    Subjective   Karla is a 55 year old, presenting for the following health issues:  Weight Problem        2/27/2025     7:22 AM   Additional Questions   Roomed by Ally RODRIGUEZ([facility])     HPI   Weight Check  Wt Readings from Last 4 Encounters:   01/10/25 79.8  kg (176 lb)   12/19/24 79.8 kg (176 lb)   11/08/24 81.2 kg (179 lb)   10/07/24 74.8 kg (165 lb)     Swallowing issue has been bothering again.  Its different than before the lapband removal though.  Used to feel food getting stuck low down, where she thinks it was getting stuck on lapband.  Now when she chews it seems to get stuck in upper throat.  Certain foods seem to get stuck - anything that can get chewed into a paste.  Sometimes has to drink while eating to help pass foods.  Has less trouble with soup, mashed potatoes, ice cream.  Hiccups and reflux are bad - has continued omeprazole 40 mg but needing to take famotidine.  Sometimes also feels like stomach is not emptying well again, that certain things are just sitting in there.  Especially if she eats late.    At start of phentermine - 211, went up to 213, then was coming down again, now to 209.  Hasn't checked BP.    Mood and anxiety doing well overall.  Bothers her a bit when has the swallowing issues acting up.    Has not yet worked on next steps with surgeon/insurance yet.          Objective    Vitals - Patient Reported  Weight (Patient Reported): 94.8 kg (209 lb)      Vitals:  No vitals were obtained today due to virtual visit.        Video-Visit Details    Type of service:  Video Visit   Originating Location (pt. Location): Other vehicle    Distant Location (provider location):  On-site  Platform used for Video Visit: [employer]  Signed Electronically by: Rosaura Flores PA-C

## 2025-02-27 NOTE — PATIENT INSTRUCTIONS
Try off phentermine for a few days to see if makes the difference   Once you know if phentermine is the cause, THEN increase famotidine to twice daily all the time (not just as needed).  If phentermine not seeming to be the cause, can try increasing dose to 1 tab daily.  But need to check BP.  Set up with [facility] in case needed

## 2025-03-10 ENCOUNTER — MYC MEDICAL ADVICE (OUTPATIENT)
Dept: FAMILY MEDICINE | Facility: CLINIC | Age: 55
End: 2025-03-10
Payer: COMMERCIAL

## 2025-03-10 DIAGNOSIS — F11.20 CONTINUOUS OPIOID DEPENDENCE (H): Primary | ICD-10-CM

## 2025-03-11 RX ORDER — OXYCODONE HCL 5 MG/5 ML
5 SOLUTION, ORAL ORAL 3 TIMES DAILY PRN
Qty: 45 ML | Refills: 0 | Status: SHIPPED | OUTPATIENT
Start: 2025-03-11 | End: 2025-03-13

## 2025-03-12 ENCOUNTER — TELEPHONE (OUTPATIENT)
Dept: FAMILY MEDICINE | Facility: CLINIC | Age: 55
End: 2025-03-12
Payer: COMMERCIAL

## 2025-03-12 NOTE — TELEPHONE ENCOUNTER
Patient Quality Outreach    Patient is due for the following:   Depression  -  PHQ-9 needed      Topic Date Due    Hepatitis A Vaccine (1 of 2 - Risk 2-dose series) Never done    Diptheria Tetanus Pertussis (DTAP/TDAP/TD) Vaccine (2 - Td or Tdap) 02/05/2019    Pneumococcal Vaccine (1 of 1 - PCV) Never done    Zoster (Shingles) Vaccine (1 of 2) Never done    Flu Vaccine (1) 09/01/2024    COVID-19 Vaccine (2 - 2024-25 season) 09/01/2024       Action(s) Taken:   Patient has upcoming appointment, these items will be addressed at that time.  Patient was assigned appropriate questionnaire to complete    Type of outreach:    Sent [employer] message.    Questions for provider review:    None           Maira Jurado, CMA

## 2025-03-13 ENCOUNTER — MYC MEDICAL ADVICE (OUTPATIENT)
Dept: FAMILY MEDICINE | Facility: CLINIC | Age: 55
End: 2025-03-13
Payer: COMMERCIAL

## 2025-03-13 DIAGNOSIS — F11.20 CONTINUOUS OPIOID DEPENDENCE (H): ICD-10-CM

## 2025-03-13 RX ORDER — OXYCODONE HCL 5 MG/5 ML
5 SOLUTION, ORAL ORAL 3 TIMES DAILY PRN
Qty: 450 ML | Refills: 0 | Status: SHIPPED | OUTPATIENT
Start: 2025-03-14 | End: 2025-04-13

## 2025-03-19 ENCOUNTER — TRANSCRIBE ORDERS (OUTPATIENT)
Dept: OTHER | Age: 55
End: 2025-03-19

## 2025-03-19 DIAGNOSIS — G89.29 OTHER CHRONIC PAIN: ICD-10-CM

## 2025-03-19 DIAGNOSIS — R13.10 DYSPHAGIA, UNSPECIFIED TYPE: Primary | ICD-10-CM

## 2025-03-19 DIAGNOSIS — R11.2 NAUSEA AND VOMITING, UNSPECIFIED VOMITING TYPE: ICD-10-CM

## 2025-03-19 DIAGNOSIS — K59.00 CONSTIPATION, UNSPECIFIED CONSTIPATION TYPE: ICD-10-CM

## 2025-03-27 ENCOUNTER — E-VISIT (OUTPATIENT)
Dept: FAMILY MEDICINE | Facility: CLINIC | Age: 55
End: 2025-03-27
Payer: COMMERCIAL

## 2025-03-27 DIAGNOSIS — J01.90 ACUTE SINUSITIS WITH SYMPTOMS > 10 DAYS: Primary | ICD-10-CM

## 2025-03-27 RX ORDER — AMOXICILLIN AND CLAVULANATE POTASSIUM 400; 57 MG/5ML; MG/5ML
875 POWDER, FOR SUSPENSION ORAL 2 TIMES DAILY
Qty: 154 ML | Refills: 0 | Status: SHIPPED | OUTPATIENT
Start: 2025-03-27 | End: 2025-04-03

## 2025-03-28 ENCOUNTER — MYC MEDICAL ADVICE (OUTPATIENT)
Dept: FAMILY MEDICINE | Facility: CLINIC | Age: 55
End: 2025-03-28
Payer: COMMERCIAL

## 2025-03-28 DIAGNOSIS — F11.20 CONTINUOUS OPIOID DEPENDENCE (H): ICD-10-CM

## 2025-03-28 NOTE — TELEPHONE ENCOUNTER
Rosaura,     Fentanyl patches future order.    Oxycodone pended to review for early fill date of 4/10.    Thank you  Marco LUI RN  [facility]

## 2025-03-29 RX ORDER — OXYCODONE HCL 5 MG/5 ML
5 SOLUTION, ORAL ORAL 3 TIMES DAILY PRN
Qty: 450 ML | Refills: 0 | Status: SHIPPED | OUTPATIENT
Start: 2025-04-13

## 2025-03-31 ENCOUNTER — TELEPHONE (OUTPATIENT)
Dept: FAMILY MEDICINE | Facility: CLINIC | Age: 55
End: 2025-03-31
Payer: COMMERCIAL

## 2025-03-31 NOTE — TELEPHONE ENCOUNTER
Patient Quality Outreach    Patient is due for the following:   Depression  -  PHQ-9 needed    Action(s) Taken:   Patient was assigned appropriate questionnaire to complete    Type of outreach:    Sent [employer] message.    Questions for provider review:    None         Maira Jurado [facility]  Chart routed to none .

## 2025-04-17 DIAGNOSIS — F41.9 ANXIETY: ICD-10-CM

## 2025-04-17 RX ORDER — FLUOXETINE HYDROCHLORIDE 40 MG/1
80 CAPSULE ORAL DAILY
Qty: 180 CAPSULE | Refills: 3 | Status: SHIPPED | OUTPATIENT
Start: 2025-04-17

## 2025-04-20 ENCOUNTER — HOSPITAL ENCOUNTER (EMERGENCY)
Facility: CLINIC | Age: 55
Discharge: HOME | End: 2025-04-20
Payer: COMMERCIAL

## 2025-04-20 VITALS
TEMPERATURE: 98.9 F | OXYGEN SATURATION: 99 % | RESPIRATION RATE: 18 BRPM | DIASTOLIC BLOOD PRESSURE: 109 MMHG | SYSTOLIC BLOOD PRESSURE: 149 MMHG | HEART RATE: 83 BPM

## 2025-04-20 DIAGNOSIS — J06.9 VIRAL URI WITH COUGH: ICD-10-CM

## 2025-04-20 LAB
FLUAV RNA SPEC QL NAA+PROBE: NEGATIVE
FLUBV RNA RESP QL NAA+PROBE: NEGATIVE
RSV RNA SPEC NAA+PROBE: NEGATIVE
S PYO DNA THROAT QL NAA+PROBE: NOT DETECTED
SARS-COV-2 RNA RESP QL NAA+PROBE: NEGATIVE

## 2025-04-20 PROCEDURE — G0463 HOSPITAL OUTPT CLINIC VISIT: HCPCS

## 2025-04-20 PROCEDURE — 87651 STREP A DNA AMP PROBE: CPT

## 2025-04-20 PROCEDURE — 99213 OFFICE O/P EST LOW 20 MIN: CPT

## 2025-04-20 PROCEDURE — 87637 SARSCOV2&INF A&B&RSV AMP PRB: CPT

## 2025-04-20 ASSESSMENT — COLUMBIA-SUICIDE SEVERITY RATING SCALE - C-SSRS
2. HAVE YOU ACTUALLY HAD ANY THOUGHTS OF KILLING YOURSELF IN THE PAST MONTH?: NO
6. HAVE YOU EVER DONE ANYTHING, STARTED TO DO ANYTHING, OR PREPARED TO DO ANYTHING TO END YOUR LIFE?: NO
1. IN THE PAST MONTH, HAVE YOU WISHED YOU WERE DEAD OR WISHED YOU COULD GO TO SLEEP AND NOT WAKE UP?: NO

## 2025-04-20 ASSESSMENT — ACTIVITIES OF DAILY LIVING (ADL): ADLS_ACUITY_SCORE: 42
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
## 2025-04-20 NOTE — DISCHARGE INSTRUCTIONS
Drink plenty of fluids (warm fluids like tea or soup are soothing and reduce cough)  Sit in the bathroom with a hot shower running and breathe in the steam.  Honey may soothe your sore throat and help manage your cough- may take straight or in warm water with lemon juice.  Avoid smoke (cigarettes, bonfires, fireplace, wood burning stoves).  Take Tylenol or an NSAID such as ibuprofen or naproxen as needed for pain.  Delsym (dextromethorphan polistirex) is an over the counter cough medication that lasts 12 hours.   Mucinex or Robitussin (guiafenesin) thin mucus and may help it to loosen more quickly  Good handwashing is the best way to prevent spread of germs  Follow up with your primary care provider if symptoms worsen or fail to improve as expected.  Present to emergency room if you develop trouble breathing, swallowing or cough-up blood.

## 2025-04-23 PROBLEM — R87.810 CERVICAL HIGH RISK HPV (HUMAN PAPILLOMAVIRUS) TEST POSITIVE: Status: ACTIVE | Noted: 2024-05-08

## 2025-05-01 ENCOUNTER — MYC MEDICAL ADVICE (OUTPATIENT)
Dept: FAMILY MEDICINE | Facility: CLINIC | Age: 55
End: 2025-05-01
Payer: COMMERCIAL

## 2025-05-09 ENCOUNTER — MYC MEDICAL ADVICE (OUTPATIENT)
Dept: FAMILY MEDICINE | Facility: CLINIC | Age: 55
End: 2025-05-09
Payer: COMMERCIAL

## 2025-05-09 DIAGNOSIS — F11.20 CONTINUOUS OPIOID DEPENDENCE (H): ICD-10-CM

## 2025-05-09 DIAGNOSIS — G89.4 CHRONIC PAIN DISORDER: ICD-10-CM

## 2025-05-13 RX ORDER — FENTANYL 12.5 UG/1
1 PATCH TRANSDERMAL
Status: SHIPPED
Start: 2025-05-13

## 2025-05-13 RX ORDER — FENTANYL 12.5 UG/1
1 PATCH TRANSDERMAL
Qty: 15 PATCH | Refills: 0 | Status: SHIPPED | OUTPATIENT
Start: 2025-05-29

## 2025-05-14 ENCOUNTER — TELEPHONE (OUTPATIENT)
Dept: FAMILY MEDICINE | Facility: CLINIC | Age: 55
End: 2025-05-14

## 2025-05-14 ENCOUNTER — TRANSFERRED RECORDS (OUTPATIENT)
Dept: ADMINISTRATIVE | Facility: CLINIC | Age: 55
End: 2025-05-14

## 2025-05-14 NOTE — TELEPHONE ENCOUNTER
Patient Quality Outreach    Patient is due for the following:   Breast Cancer Screening - Mammogram  Depression  -  PHQ-9 needed  Physical Preventive Adult Physical  Chronic Opioid Use -  Treatment Agreement (CSA)    Action(s) Taken:   Patient was scheduled for physical    Type of outreach:    Phone, spoke to patient/parent. Patient     Questions for provider review:    None         Maira Jurado CMA  Chart routed to None.

## 2025-05-16 ENCOUNTER — HOSPITAL ENCOUNTER (OUTPATIENT)
Dept: GENERAL RADIOLOGY | Facility: CLINIC | Age: 55
Discharge: HOME | End: 2025-05-16
Attending: PHYSICIAN ASSISTANT | Admitting: PHYSICIAN ASSISTANT
Payer: COMMERCIAL

## 2025-05-16 DIAGNOSIS — R11.2 NAUSEA AND VOMITING, UNSPECIFIED VOMITING TYPE: ICD-10-CM

## 2025-05-16 DIAGNOSIS — R13.10 DYSPHAGIA, UNSPECIFIED: ICD-10-CM

## 2025-05-16 DIAGNOSIS — G89.29 OTHER CHRONIC PAIN: ICD-10-CM

## 2025-05-16 DIAGNOSIS — K59.00 CONSTIPATION, UNSPECIFIED CONSTIPATION TYPE: ICD-10-CM

## 2025-05-16 PROBLEM — R13.19 OTHER DYSPHAGIA: Status: ACTIVE | Noted: 2025-05-16

## 2025-05-16 PROCEDURE — 74230 X-RAY XM SWLNG FUNCJ C+: CPT

## 2025-05-16 RX ORDER — BARIUM SULFATE 400 MG/ML
SUSPENSION ORAL ONCE
Status: COMPLETED | OUTPATIENT
Start: 2025-05-16 | End: 2025-05-16

## 2025-05-16 RX ADMIN — BARIUM SULFATE 50 ML: 400 SUSPENSION ORAL at 11:17

## 2025-05-19 ENCOUNTER — RESULTS FOLLOW-UP (OUTPATIENT)
Dept: GASTROENTEROLOGY | Facility: CLINIC | Age: 55
End: 2025-05-19

## 2025-05-19 NOTE — PROCEDURES
[facility]   [address] [place] [address], [address], [place] [address]     Patient Name: Cynthia Lyons  Gender:  Female  Exam Date: 05/14/2025 Visit Number:  [id number]  Age: 55 Years YOB: 1970  Attending MD: Ishmael Tejada MD Medical Record#:  [id number]    Procedure: Colonoscopy   Indications:       Referring MD:   Medications: Admitting Medications:   0.9% Normal Saline at TKO   Intra Procedure Medications:   Patient received monitored anesthesia care.     Complications:   ______________________________________________________________________________  Procedure:   The risks and benefits of the procedure were explained to the patient.After obtaining informed consent, the patient received monitored anesthesia care and I passed the scope   ().      Findings:    Impression:  Pathology Results:  A: ESOPHAGUS, DISTAL, BIOPSY:           1. Normal esophageal squamous mucosa           2. Gastric cardio-oxyntic type mucosa with no diagnostic abnormalities           3. Negative for reflux changes and eosinophilic esophagitis           4. Negative for intestinal metaplasia and dysplasia      B: ESOPHAGUS, MID, BIOPSY:           1. Normal squamous mucosa           2. Negative for reflux changes and eosinophilic esophagitis           3. Negative for columnar mucosa      MICROSCOPIC  A: Performed   B: Performed     Electronically signed by: Mert Vickers MD    Interpreted at [facility], [address] [place] [id number]  Additional Comments:  No Guzman's esophagus.  This is now 3 endoscopies without Guzman's esophagus so you do not need further Guzman's surveillance.      _Electronically signed by:___________________  Ishmael Tejada MD                 05/14/2025    cc: Rosaura Flores PAC

## 2025-05-21 ENCOUNTER — ANCILLARY PROCEDURE (OUTPATIENT)
Dept: MAMMOGRAPHY | Facility: CLINIC | Age: 55
End: 2025-05-21
Attending: PHYSICIAN ASSISTANT
Payer: COMMERCIAL

## 2025-05-21 DIAGNOSIS — Z12.31 VISIT FOR SCREENING MAMMOGRAM: ICD-10-CM

## 2025-06-12 ENCOUNTER — E-VISIT (OUTPATIENT)
Dept: FAMILY MEDICINE | Facility: CLINIC | Age: 55
End: 2025-06-12
Payer: COMMERCIAL

## 2025-06-12 DIAGNOSIS — J30.2 SEASONAL ALLERGIC RHINITIS, UNSPECIFIED TRIGGER: ICD-10-CM

## 2025-06-12 DIAGNOSIS — J01.90 ACUTE SINUSITIS WITH SYMPTOMS > 10 DAYS: Primary | ICD-10-CM

## 2025-06-12 RX ORDER — PSEUDOEPHEDRINE HCL 120 MG/1
120 TABLET, FILM COATED, EXTENDED RELEASE ORAL EVERY 12 HOURS
Qty: 60 TABLET | Refills: 2 | Status: SHIPPED | OUTPATIENT
Start: 2025-06-12

## 2025-06-24 ENCOUNTER — MYC REFILL (OUTPATIENT)
Dept: FAMILY MEDICINE | Facility: CLINIC | Age: 55
End: 2025-06-24
Payer: COMMERCIAL

## 2025-06-24 DIAGNOSIS — G89.4 CHRONIC PAIN DISORDER: ICD-10-CM

## 2025-06-24 DIAGNOSIS — F11.20 CONTINUOUS OPIOID DEPENDENCE (H): ICD-10-CM

## 2025-06-24 RX ORDER — FENTANYL 12.5 UG/1
1 PATCH TRANSDERMAL
Qty: 15 PATCH | Refills: 0 | Status: CANCELLED | OUTPATIENT
Start: 2025-06-24

## 2025-06-24 RX ORDER — OXYCODONE HCL 5 MG/5 ML
5 SOLUTION, ORAL ORAL 3 TIMES DAILY PRN
Qty: 450 ML | Refills: 0 | Status: CANCELLED | OUTPATIENT
Start: 2025-06-24

## 2025-06-25 DIAGNOSIS — G89.4 CHRONIC PAIN DISORDER: ICD-10-CM

## 2025-06-25 DIAGNOSIS — F11.20 CONTINUOUS OPIOID DEPENDENCE (H): ICD-10-CM

## 2025-06-25 RX ORDER — OXYCODONE HYDROCHLORIDE 5 MG/1
5 TABLET ORAL EVERY 8 HOURS PRN
Qty: 92 TABLET | Refills: 0 | Status: SHIPPED | OUTPATIENT
Start: 2025-07-06

## 2025-06-25 RX ORDER — FENTANYL 12.5 UG/1
PATCH TRANSDERMAL
Qty: 15 PATCH | Refills: 0 | Status: SHIPPED | OUTPATIENT
Start: 2025-06-28

## 2025-07-09 NOTE — PROGRESS NOTES
SUBJECTIVE:   Cynthia Lyons is a 47 year old female who presents to clinic today for the following health issues:      Chronic Pain Follow-Up       Type / Location of Pain: Lower Back  Analgesia/pain control:       Recent changes:  same      Overall control: Comfortably manageable  Activity level/function:      Daily activities:  Can do most things most days, with some rest    Work:  Unable to work  Adverse effects:  No  Adherance    Taking medication as directed?  Yes    Participating in other treatments: no - not at this time  Risk Factors:    Sleep:  Good    Mood/anxiety:  controlled    Recent family or social stressors:  none noted    Other aggravating factors: none  PHQ-9 SCORE 2/17/2017 5/3/2017 6/9/2017   Total Score - - -   Total Score 0 19 1     NANCI-7 SCORE 2/17/2017 5/3/2017 6/9/2017   Total Score - - -   Total Score 4 19 2     Encounter-Level CSA - 08/11/2016:          Controlled Substance Agreement - Scan on 9/8/2016  8:37 AM : CONTROLLED SUBSTANCE AGREEMENT 08/11/2016 (below)            Encounter-Level CSA - 08/11/2016:          Controlled Substance Agreement - Scan on 9/16/2014  3:59 PM : [facility] Controlled Medication Agreement 9/10/14 (below)                Amount of exercise or physical activity: hasn't been exercising lately, but usually does every other day    Problems taking medications regularly: No    Medication side effects: none  Diet: regular (no restrictions)    Chronic back pain.  Narcotic contract transferred from Dr Guzman.    Very excited today as thinks daughter is going into labor soon.    Mood- very good since the increase in effexor.    Pain ok overall.  Hurts more if overdoes things.    Wt - increase 9 pounds in past 2 months.  Feels has cut pop, junk food, feels is doing good efforts.    Started her statin but was inconsistent in remembering first couple weeks.    Continues to recall her motivations for wt loss: back pain, getting rid of BP med, daughter's upcoming wedding,  "grandchild, and considering plastic surgery.    Problem list and histories reviewed & adjusted, as indicated.  Additional history: as documented    BP Readings from Last 3 Encounters:   10/05/17 132/86   08/09/17 118/64   07/31/17 125/80    Wt Readings from Last 3 Encounters:   10/05/17 222 lb (100.7 kg)   08/09/17 213 lb 6.4 oz (96.8 kg)   07/31/17 227 lb (103 kg)         Labs reviewed in EPIC      Reviewed and updated as needed this visit by clinical staffTobacco  Allergies  Meds  Problems  Med Hx  Surg Hx  Fam Hx  Soc Hx        Reviewed and updated as needed this visit by Provider  Tobacco  Allergies  Meds  Problems  Med Hx  Surg Hx  Fam Hx  Soc Hx        ROS:  Constitutional, cardiovascular, pulmonary, musculoskeletal, endocrine and psych systems are negative, except as otherwise noted.    OBJECTIVE:   /86 (BP Location: Right arm, Patient Position: Chair, Cuff Size: Adult Large)  Pulse 72  Temp 98  F (36.7  C) (Tympanic)  Ht 5' 5\" (1.651 m)  Wt 222 lb (100.7 kg)  BMI 36.94 kg/m2  Body mass index is 36.94 kg/(m^2).  GENERAL: healthy, alert and no distress  PSYCH: mentation appears normal, affect normal/bright    ASSESSMENT/PLAN:       ICD-10-CM    1. Chronic pain disorder G89.4 HYDROcodone-acetaminophen (NORCO) 5-325 MG per tablet   2. Class 2 obesity with serious comorbidity in adult, unspecified BMI, unspecified obesity type E66.9 phentermine (ADIPEX-P) 37.5 MG tablet   3. Essential hypertension with goal blood pressure less than 140/90 I10 phentermine (ADIPEX-P) 37.5 MG tablet   4. Dyslipidemia E78.5 phentermine (ADIPEX-P) 37.5 MG tablet   5. Status post laminectomy with spinal fusion Z98.1 HYDROcodone-acetaminophen (NORCO) 5-325 MG per tablet       Patient Instructions   Refilled pain med    Keep working on weight loss  Discussed weight loss may help back pain, BP, and cholesterol  Decided to add phentermine to help with wt loss  Recheck in 1 mo    Decided cholesterol lab again next " month    Call if questions          Rosaura Flores PA-C  [facility]     low end of fair  appropriate  returning to baseline  has reactivity  deferred  adequate  continues to be limited into the extent of his alcohol addiction  reflective of context

## 2025-07-15 DIAGNOSIS — I10 HYPERTENSION, UNSPECIFIED TYPE: ICD-10-CM

## 2025-07-15 RX ORDER — LISINOPRIL 20 MG/1
20 TABLET ORAL DAILY
Qty: 90 TABLET | Refills: 0 | Status: SHIPPED | OUTPATIENT
Start: 2025-07-15

## 2025-07-15 NOTE — TELEPHONE ENCOUNTER
Requested Prescriptions   Pending Prescriptions Disp Refills    lisinopril (ZESTRIL) 20 MG tablet [Pharmacy Med Name: LISINOPRIL 20MG TABS] 90 tablet 0     Sig: TAKE ONE TABLET BY MOUTH ONCE DAILY       ACE Inhibitors (Including Combos) Protocol Failed - 7/15/2025 11:08 AM        Failed - Most recent blood pressure under 140/90 in past 12 months- Clinicial or Patient Reported     BP Readings from Last 3 Encounters:   05/08/25 133/90   04/20/25 (!) 149/109   01/10/25 122/85       Systolic (Patient Reported): 120 (4/23/2025  4:06 PM)  Diastolic (Patient Reported): 85 (4/23/2025  4:06 PM)              Failed - Most recent GFR on file in the past 12 months >30        Failed - Normal serum potassium on file in past 12 months     Recent Labs   Lab Test 06/06/24  1453   POTASSIUM 5.0             Passed - Medication is active on med list and the sig matches. RN to manually verify dose and sig if red X/fail.     If the protocol passes (green check), you do not need to verify med dose and sig.    A prescription matches if they are the same clinical intention.    For Example: once daily and every morning are the same.    The protocol can not identify upper and lower case letters as matching and will fail.     For Example: Take 1 tablet (50 mg) by mouth daily     TAKE 1 TABLET (50 MG) BY MOUTH DAILY    For all fails (red x), verify dose and sig.    If the refill does match what is on file, the RN can still proceed to approve the refill request.       If they do not match, route to the appropriate provider.             Passed - Medication indicated for associated diagnosis     Medication is associated with one or more of the following diagnoses:     Chronic Kidney Disease (CKD)   Coronary Artery Disease (CAD)   Diabetes   Heart Failure (HF)   Hypertension (HTN)   Nephropathy   History of myocarditis   Tachycardia induced cardiomyopathy   STEMI (ST elevation myocardial infarction)   Spontaneous dissection of coronary  artery   Status post percutaneous transluminal coronary angioplasty   Cardiomyopathy            Passed - Recent (12 month) or future (90 days) visit with authorizing provider's specialty (provided they have been seen in the past 15 months)     The patient must have completed an in-person or virtual visit within the past 12 months or has a future visit scheduled within the next 90 days with the authorizing provider s specialty.  Urgent care and e-visits do not qualify as an office visit for this protocol.          Passed - Patient is age 18 or older        Passed - No active pregnancy on record        Passed - No positive pregnancy test within past 12 months          Julie Behrendt RN

## 2025-08-11 DIAGNOSIS — K21.9 GASTROESOPHAGEAL REFLUX DISEASE WITHOUT ESOPHAGITIS: ICD-10-CM

## 2025-08-12 RX ORDER — FAMOTIDINE 20 MG/1
20 TABLET, FILM COATED ORAL 2 TIMES DAILY PRN
Qty: 60 TABLET | Refills: 0 | Status: SHIPPED | OUTPATIENT
Start: 2025-08-12

## 2025-08-12 RX ORDER — FAMOTIDINE 20 MG/1
TABLET, FILM COATED ORAL
Qty: 60 TABLET | Refills: 1 | OUTPATIENT
Start: 2025-08-12

## 2025-08-18 ENCOUNTER — MYC REFILL (OUTPATIENT)
Dept: FAMILY MEDICINE | Facility: CLINIC | Age: 55
End: 2025-08-18
Payer: COMMERCIAL

## 2025-08-18 DIAGNOSIS — F11.20 CONTINUOUS OPIOID DEPENDENCE (H): ICD-10-CM

## 2025-08-18 DIAGNOSIS — G89.4 CHRONIC PAIN DISORDER: ICD-10-CM

## 2025-08-19 RX ORDER — FENTANYL 12.5 UG/1
PATCH TRANSDERMAL
Qty: 15 PATCH | Refills: 0 | Status: SHIPPED | OUTPATIENT
Start: 2025-08-19

## 2025-08-23 ENCOUNTER — MYC REFILL (OUTPATIENT)
Dept: FAMILY MEDICINE | Facility: CLINIC | Age: 55
End: 2025-08-23
Payer: COMMERCIAL

## 2025-08-23 DIAGNOSIS — G89.4 CHRONIC PAIN DISORDER: ICD-10-CM

## 2025-08-23 DIAGNOSIS — F11.20 CONTINUOUS OPIOID DEPENDENCE (H): ICD-10-CM

## 2025-08-25 RX ORDER — OXYCODONE HYDROCHLORIDE 5 MG/1
5 TABLET ORAL EVERY 8 HOURS PRN
Qty: 30 TABLET | Refills: 0 | Status: SHIPPED | OUTPATIENT
Start: 2025-08-25

## (undated) DEVICE — DRSG PRIMAPORE 02X3" 7133

## (undated) DEVICE — ESU PENCIL W/COATED BLADE E2450H

## (undated) DEVICE — SU VICRYL 0 CT-1 27" UND J260H

## (undated) DEVICE — SU MONOCRYL 4-0 PS-2 27" UND Y426H

## (undated) DEVICE — PREP CHLORAPREP 26ML TINTED HI-LITE ORANGE 930815

## (undated) DEVICE — Device

## (undated) DEVICE — SYR 10ML LL W/O NDL 302995

## (undated) DEVICE — NDL INSUFFLATION 13GA 150MM C2202

## (undated) DEVICE — TUBING SUCTION 10'X3/16" N510

## (undated) DEVICE — SU DERMABOND ADVANCED .7ML DNX12

## (undated) DEVICE — ADH LIQUID MASTISOL TOPICAL VIAL 2-3ML 0523-48

## (undated) DEVICE — SUCTION MANIFOLD NEPTUNE 2 SYS 4 PORT 0702-020-000

## (undated) DEVICE — ESU ENDO SCISSORS 5MM CVD 5DCS

## (undated) DEVICE — LINEN TOWEL PACK X5 5464

## (undated) DEVICE — ENDO TROCAR 15MM VERSASTEP VS101015P

## (undated) DEVICE — DRSG PRIMAPORE 03 1/8X6" 66000318

## (undated) DEVICE — ESU GROUND PAD ADULT W/CORD E7507

## (undated) DEVICE — ENDO TROCAR FIRST ENTRY KII FIOS Z-THRD 12X100MM CTF73

## (undated) DEVICE — GLOVE BIOGEL PI ULTRATOUCH SZ 7.5 41175

## (undated) DEVICE — LINEN TOWEL PACK X6 WHITE 5487

## (undated) DEVICE — ENDO TROCAR SLEEVE KII Z-THREADED 05X100MM CTS02

## (undated) DEVICE — SYR 30ML LL W/O NDL 302832

## (undated) DEVICE — KIT ENDO FIRST STEP DISINFECTANT 200ML W/POUCH EP-4

## (undated) DEVICE — ENDO TROCAR FIRST ENTRY KII FIOS Z-THRD 05X100MM CTF03

## (undated) DEVICE — ENDO FORCEP ENDOJAW BIOPSY 2.8MMX230CM FB-220U

## (undated) DEVICE — GLOVE BIOGEL PI MICRO INDICATOR UNDERGLOVE SZ 7.5 48975

## (undated) DEVICE — DEVICE SUTURE PASSER 14GA WECK EFX EFXSP2

## (undated) RX ORDER — ONDANSETRON 2 MG/ML
INJECTION INTRAMUSCULAR; INTRAVENOUS
Status: DISPENSED
Start: 2024-06-10

## (undated) RX ORDER — KETOROLAC TROMETHAMINE 30 MG/ML
INJECTION, SOLUTION INTRAMUSCULAR; INTRAVENOUS
Status: DISPENSED
Start: 2024-06-10

## (undated) RX ORDER — FENTANYL CITRATE 50 UG/ML
INJECTION, SOLUTION INTRAMUSCULAR; INTRAVENOUS
Status: DISPENSED
Start: 2024-06-10

## (undated) RX ORDER — ONDANSETRON 2 MG/ML
INJECTION INTRAMUSCULAR; INTRAVENOUS
Status: DISPENSED
Start: 2023-08-14

## (undated) RX ORDER — LIDOCAINE HYDROCHLORIDE AND EPINEPHRINE 10; 10 MG/ML; UG/ML
INJECTION, SOLUTION INFILTRATION; PERINEURAL
Status: DISPENSED
Start: 2024-06-10

## (undated) RX ORDER — OXYCODONE HYDROCHLORIDE 10 MG/1
TABLET ORAL
Status: DISPENSED
Start: 2024-06-10

## (undated) RX ORDER — PROPOFOL 10 MG/ML
INJECTION, EMULSION INTRAVENOUS
Status: DISPENSED
Start: 2024-06-10

## (undated) RX ORDER — BUPIVACAINE HYDROCHLORIDE 5 MG/ML
INJECTION, SOLUTION EPIDURAL; INTRACAUDAL
Status: DISPENSED
Start: 2024-06-10

## (undated) RX ORDER — DEXAMETHASONE SODIUM PHOSPHATE 4 MG/ML
INJECTION, SOLUTION INTRA-ARTICULAR; INTRALESIONAL; INTRAMUSCULAR; INTRAVENOUS; SOFT TISSUE
Status: DISPENSED
Start: 2024-06-10

## (undated) RX ORDER — EPHEDRINE SULFATE 50 MG/ML
INJECTION, SOLUTION INTRAMUSCULAR; INTRAVENOUS; SUBCUTANEOUS
Status: DISPENSED
Start: 2024-06-10